# Patient Record
Sex: FEMALE | Race: WHITE | NOT HISPANIC OR LATINO
[De-identification: names, ages, dates, MRNs, and addresses within clinical notes are randomized per-mention and may not be internally consistent; named-entity substitution may affect disease eponyms.]

---

## 2017-03-16 PROBLEM — Z00.00 ENCOUNTER FOR PREVENTIVE HEALTH EXAMINATION: Status: ACTIVE | Noted: 2017-03-16

## 2017-03-24 ENCOUNTER — RECORD ABSTRACTING (OUTPATIENT)
Age: 82
End: 2017-03-24

## 2017-03-24 DIAGNOSIS — Z87.891 PERSONAL HISTORY OF NICOTINE DEPENDENCE: ICD-10-CM

## 2017-03-24 DIAGNOSIS — I25.10 ATHEROSCLEROTIC HEART DISEASE OF NATIVE CORONARY ARTERY W/OUT ANGINA PECTORIS: ICD-10-CM

## 2017-03-24 DIAGNOSIS — Z95.5 PRESENCE OF CORONARY ANGIOPLASTY IMPLANT AND GRAFT: ICD-10-CM

## 2017-04-14 ENCOUNTER — APPOINTMENT (OUTPATIENT)
Dept: VASCULAR SURGERY | Facility: CLINIC | Age: 82
End: 2017-04-14

## 2017-04-14 VITALS
SYSTOLIC BLOOD PRESSURE: 162 MMHG | HEIGHT: 61 IN | WEIGHT: 108 LBS | DIASTOLIC BLOOD PRESSURE: 100 MMHG | BODY MASS INDEX: 20.39 KG/M2

## 2017-04-14 DIAGNOSIS — I70.0 ATHEROSCLEROSIS OF AORTA: ICD-10-CM

## 2017-04-14 DIAGNOSIS — I73.9 PERIPHERAL VASCULAR DISEASE, UNSPECIFIED: ICD-10-CM

## 2017-04-14 DIAGNOSIS — I65.23 OCCLUSION AND STENOSIS OF BILATERAL CAROTID ARTERIES: ICD-10-CM

## 2017-04-14 DIAGNOSIS — I70.299 ATHEROSCLEROSIS OF AORTA: ICD-10-CM

## 2017-04-14 RX ORDER — RISEDRONATE SODIUM 129; 21 MG/1; MG/1
150 TABLET, FILM COATED ORAL
Refills: 0 | Status: ACTIVE | COMMUNITY

## 2017-04-14 RX ORDER — METOPROLOL TARTRATE 25 MG/1
25 TABLET, FILM COATED ORAL
Refills: 0 | Status: ACTIVE | COMMUNITY

## 2017-04-14 RX ORDER — LEVETIRACETAM 500 MG/1
500 TABLET, FILM COATED ORAL
Refills: 0 | Status: ACTIVE | COMMUNITY

## 2017-04-14 RX ORDER — ASPIRIN 81 MG
81 TABLET, DELAYED RELEASE (ENTERIC COATED) ORAL
Refills: 0 | Status: ACTIVE | COMMUNITY

## 2017-04-14 RX ORDER — CILOSTAZOL 100 MG/1
100 TABLET ORAL
Refills: 0 | Status: ACTIVE | COMMUNITY

## 2017-04-14 RX ORDER — BENAZEPRIL HYDROCHLORIDE 10 MG/1
10 TABLET, FILM COATED ORAL
Refills: 0 | Status: ACTIVE | COMMUNITY

## 2017-04-14 RX ORDER — LOPERAMIDE HYDROCHLORIDE 2 MG/1
2 TABLET ORAL
Refills: 0 | Status: ACTIVE | COMMUNITY

## 2017-04-14 RX ORDER — ATORVASTATIN CALCIUM 10 MG/1
10 TABLET, FILM COATED ORAL
Refills: 0 | Status: ACTIVE | COMMUNITY

## 2017-04-14 RX ORDER — LORATADINE 5 MG/5 ML
10 SOLUTION, ORAL ORAL
Refills: 0 | Status: ACTIVE | COMMUNITY

## 2017-04-14 RX ORDER — CHROMIUM 200 MCG
10 MCG TABLET ORAL
Refills: 0 | Status: ACTIVE | COMMUNITY

## 2017-04-14 RX ORDER — FELODIPINE 10 MG/1
10 TABLET, EXTENDED RELEASE ORAL
Refills: 0 | Status: ACTIVE | COMMUNITY

## 2017-04-14 RX ORDER — MECLIZINE HYDROCHLORIDE 25 MG/1
25 TABLET ORAL
Refills: 0 | Status: ACTIVE | COMMUNITY

## 2017-04-14 RX ORDER — CLOPIDOGREL 75 MG/1
75 TABLET, FILM COATED ORAL
Refills: 0 | Status: ACTIVE | COMMUNITY

## 2017-10-07 ENCOUNTER — OUTPATIENT (OUTPATIENT)
Dept: OUTPATIENT SERVICES | Facility: HOSPITAL | Age: 82
LOS: 1 days | Discharge: HOME | End: 2017-10-07

## 2017-10-07 DIAGNOSIS — Z12.31 ENCOUNTER FOR SCREENING MAMMOGRAM FOR MALIGNANT NEOPLASM OF BREAST: ICD-10-CM

## 2017-10-13 ENCOUNTER — APPOINTMENT (OUTPATIENT)
Dept: VASCULAR SURGERY | Facility: CLINIC | Age: 82
End: 2017-10-13

## 2017-10-27 ENCOUNTER — OUTPATIENT (OUTPATIENT)
Dept: OUTPATIENT SERVICES | Facility: HOSPITAL | Age: 82
LOS: 1 days | Discharge: HOME | End: 2017-10-27

## 2017-10-27 DIAGNOSIS — R92.8 OTHER ABNORMAL AND INCONCLUSIVE FINDINGS ON DIAGNOSTIC IMAGING OF BREAST: ICD-10-CM

## 2018-01-01 ENCOUNTER — TRANSCRIPTION ENCOUNTER (OUTPATIENT)
Age: 83
End: 2018-01-01

## 2018-01-01 ENCOUNTER — INPATIENT (INPATIENT)
Facility: HOSPITAL | Age: 83
LOS: 8 days | Discharge: HOME HEALTH | End: 2018-04-19
Attending: INTERNAL MEDICINE | Admitting: INTERNAL MEDICINE

## 2018-01-01 ENCOUNTER — INPATIENT (INPATIENT)
Facility: HOSPITAL | Age: 83
LOS: 3 days | Discharge: HOME | End: 2018-09-10
Attending: INTERNAL MEDICINE | Admitting: INTERNAL MEDICINE

## 2018-01-01 VITALS
SYSTOLIC BLOOD PRESSURE: 190 MMHG | OXYGEN SATURATION: 92 % | RESPIRATION RATE: 18 BRPM | HEART RATE: 90 BPM | DIASTOLIC BLOOD PRESSURE: 114 MMHG

## 2018-01-01 VITALS
SYSTOLIC BLOOD PRESSURE: 112 MMHG | OXYGEN SATURATION: 62 % | RESPIRATION RATE: 18 BRPM | HEART RATE: 88 BPM | TEMPERATURE: 97 F | DIASTOLIC BLOOD PRESSURE: 57 MMHG

## 2018-01-01 VITALS — TEMPERATURE: 99 F | SYSTOLIC BLOOD PRESSURE: 105 MMHG | DIASTOLIC BLOOD PRESSURE: 58 MMHG | HEART RATE: 82 BPM

## 2018-01-01 VITALS — OXYGEN SATURATION: 98 %

## 2018-01-01 DIAGNOSIS — J44.9 CHRONIC OBSTRUCTIVE PULMONARY DISEASE, UNSPECIFIED: ICD-10-CM

## 2018-01-01 DIAGNOSIS — E78.5 HYPERLIPIDEMIA, UNSPECIFIED: ICD-10-CM

## 2018-01-01 DIAGNOSIS — Z98.890 OTHER SPECIFIED POSTPROCEDURAL STATES: Chronic | ICD-10-CM

## 2018-01-01 DIAGNOSIS — I13.0 HYPERTENSIVE HEART AND CHRONIC KIDNEY DISEASE WITH HEART FAILURE AND STAGE 1 THROUGH STAGE 4 CHRONIC KIDNEY DISEASE, OR UNSPECIFIED CHRONIC KIDNEY DISEASE: ICD-10-CM

## 2018-01-01 DIAGNOSIS — R47.81 SLURRED SPEECH: ICD-10-CM

## 2018-01-01 DIAGNOSIS — J43.9 EMPHYSEMA, UNSPECIFIED: ICD-10-CM

## 2018-01-01 DIAGNOSIS — F03.90 UNSPECIFIED DEMENTIA WITHOUT BEHAVIORAL DISTURBANCE: ICD-10-CM

## 2018-01-01 DIAGNOSIS — G45.9 TRANSIENT CEREBRAL ISCHEMIC ATTACK, UNSPECIFIED: ICD-10-CM

## 2018-01-01 DIAGNOSIS — Z87.891 PERSONAL HISTORY OF NICOTINE DEPENDENCE: ICD-10-CM

## 2018-01-01 DIAGNOSIS — R06.02 SHORTNESS OF BREATH: ICD-10-CM

## 2018-01-01 DIAGNOSIS — G40.909 EPILEPSY, UNSPECIFIED, NOT INTRACTABLE, WITHOUT STATUS EPILEPTICUS: ICD-10-CM

## 2018-01-01 DIAGNOSIS — Z95.828 PRESENCE OF OTHER VASCULAR IMPLANTS AND GRAFTS: Chronic | ICD-10-CM

## 2018-01-01 DIAGNOSIS — J90 PLEURAL EFFUSION, NOT ELSEWHERE CLASSIFIED: ICD-10-CM

## 2018-01-01 DIAGNOSIS — Z86.73 PERSONAL HISTORY OF TRANSIENT ISCHEMIC ATTACK (TIA), AND CEREBRAL INFARCTION WITHOUT RESIDUAL DEFICITS: ICD-10-CM

## 2018-01-01 DIAGNOSIS — R91.1 SOLITARY PULMONARY NODULE: ICD-10-CM

## 2018-01-01 DIAGNOSIS — I73.9 PERIPHERAL VASCULAR DISEASE, UNSPECIFIED: ICD-10-CM

## 2018-01-01 DIAGNOSIS — J96.01 ACUTE RESPIRATORY FAILURE WITH HYPOXIA: ICD-10-CM

## 2018-01-01 DIAGNOSIS — N18.3 CHRONIC KIDNEY DISEASE, STAGE 3 (MODERATE): ICD-10-CM

## 2018-01-01 DIAGNOSIS — I65.21 OCCLUSION AND STENOSIS OF RIGHT CAROTID ARTERY: ICD-10-CM

## 2018-01-01 DIAGNOSIS — I27.20 PULMONARY HYPERTENSION, UNSPECIFIED: ICD-10-CM

## 2018-01-01 DIAGNOSIS — I08.3 COMBINED RHEUMATIC DISORDERS OF MITRAL, AORTIC AND TRICUSPID VALVES: ICD-10-CM

## 2018-01-01 DIAGNOSIS — R09.02 HYPOXEMIA: ICD-10-CM

## 2018-01-01 DIAGNOSIS — M19.90 UNSPECIFIED OSTEOARTHRITIS, UNSPECIFIED SITE: ICD-10-CM

## 2018-01-01 DIAGNOSIS — N18.1 CHRONIC KIDNEY DISEASE, STAGE 1: ICD-10-CM

## 2018-01-01 DIAGNOSIS — I50.31 ACUTE DIASTOLIC (CONGESTIVE) HEART FAILURE: ICD-10-CM

## 2018-01-01 DIAGNOSIS — I12.9 HYPERTENSIVE CHRONIC KIDNEY DISEASE WITH STAGE 1 THROUGH STAGE 4 CHRONIC KIDNEY DISEASE, OR UNSPECIFIED CHRONIC KIDNEY DISEASE: ICD-10-CM

## 2018-01-01 LAB
ALBUMIN SERPL ELPH-MCNC: 4.1 G/DL — SIGNIFICANT CHANGE UP (ref 3.5–5.2)
ALBUMIN SERPL ELPH-MCNC: 4.2 G/DL — SIGNIFICANT CHANGE UP (ref 3.5–5.2)
ALBUMIN SERPL ELPH-MCNC: 4.7 G/DL — SIGNIFICANT CHANGE UP (ref 3.5–5.2)
ALP SERPL-CCNC: 56 U/L — SIGNIFICANT CHANGE UP (ref 30–115)
ALP SERPL-CCNC: 59 U/L — SIGNIFICANT CHANGE UP (ref 30–115)
ALP SERPL-CCNC: 86 U/L — SIGNIFICANT CHANGE UP (ref 30–115)
ALT FLD-CCNC: 15 U/L — SIGNIFICANT CHANGE UP (ref 0–41)
ALT FLD-CCNC: 174 U/L — HIGH (ref 0–41)
ALT FLD-CCNC: 19 U/L — SIGNIFICANT CHANGE UP (ref 0–41)
ANION GAP SERPL CALC-SCNC: 10 MMOL/L — SIGNIFICANT CHANGE UP (ref 7–14)
ANION GAP SERPL CALC-SCNC: 11 MMOL/L — SIGNIFICANT CHANGE UP (ref 7–14)
ANION GAP SERPL CALC-SCNC: 12 MMOL/L — SIGNIFICANT CHANGE UP (ref 7–14)
ANION GAP SERPL CALC-SCNC: 12 MMOL/L — SIGNIFICANT CHANGE UP (ref 7–14)
ANION GAP SERPL CALC-SCNC: 14 MMOL/L — SIGNIFICANT CHANGE UP (ref 7–14)
ANION GAP SERPL CALC-SCNC: 16 MMOL/L — HIGH (ref 7–14)
ANION GAP SERPL CALC-SCNC: 19 MMOL/L — HIGH (ref 7–14)
ANION GAP SERPL CALC-SCNC: 9 MMOL/L — SIGNIFICANT CHANGE UP (ref 7–14)
APTT BLD: 24.5 SEC — LOW (ref 27–39.2)
AST SERPL-CCNC: 25 U/L — SIGNIFICANT CHANGE UP (ref 0–41)
AST SERPL-CCNC: 48 U/L — HIGH (ref 0–41)
AST SERPL-CCNC: 72 U/L — HIGH (ref 0–41)
BASE EXCESS BLDA CALC-SCNC: 2.8 MMOL/L — HIGH (ref -2–2)
BASE EXCESS BLDA CALC-SCNC: 2.9 MMOL/L — HIGH (ref -2–2)
BASE EXCESS BLDV CALC-SCNC: 1.3 MMOL/L — SIGNIFICANT CHANGE UP (ref -2–2)
BASOPHILS # BLD AUTO: 0.04 K/UL — SIGNIFICANT CHANGE UP (ref 0–0.2)
BASOPHILS # BLD AUTO: 0.05 K/UL — SIGNIFICANT CHANGE UP (ref 0–0.2)
BASOPHILS # BLD AUTO: 0.05 K/UL — SIGNIFICANT CHANGE UP (ref 0–0.2)
BASOPHILS NFR BLD AUTO: 0.5 % — SIGNIFICANT CHANGE UP (ref 0–1)
BASOPHILS NFR BLD AUTO: 0.7 % — SIGNIFICANT CHANGE UP (ref 0–1)
BASOPHILS NFR BLD AUTO: 0.8 % — SIGNIFICANT CHANGE UP (ref 0–1)
BILIRUB SERPL-MCNC: 0.3 MG/DL — SIGNIFICANT CHANGE UP (ref 0.2–1.2)
BILIRUB SERPL-MCNC: 0.3 MG/DL — SIGNIFICANT CHANGE UP (ref 0.2–1.2)
BILIRUB SERPL-MCNC: 0.5 MG/DL — SIGNIFICANT CHANGE UP (ref 0.2–1.2)
BUN SERPL-MCNC: 21 MG/DL — HIGH (ref 10–20)
BUN SERPL-MCNC: 24 MG/DL — HIGH (ref 10–20)
BUN SERPL-MCNC: 27 MG/DL — HIGH (ref 10–20)
BUN SERPL-MCNC: 30 MG/DL — HIGH (ref 10–20)
BUN SERPL-MCNC: 32 MG/DL — HIGH (ref 10–20)
BUN SERPL-MCNC: 33 MG/DL — HIGH (ref 10–20)
BUN SERPL-MCNC: 37 MG/DL — HIGH (ref 10–20)
BUN SERPL-MCNC: 37 MG/DL — HIGH (ref 10–20)
CA-I SERPL-SCNC: 1.26 MMOL/L — SIGNIFICANT CHANGE UP (ref 1.12–1.3)
CALCIUM SERPL-MCNC: 8.7 MG/DL — SIGNIFICANT CHANGE UP (ref 8.5–10.1)
CALCIUM SERPL-MCNC: 8.7 MG/DL — SIGNIFICANT CHANGE UP (ref 8.5–10.1)
CALCIUM SERPL-MCNC: 8.8 MG/DL — SIGNIFICANT CHANGE UP (ref 8.5–10.1)
CALCIUM SERPL-MCNC: 9 MG/DL — SIGNIFICANT CHANGE UP (ref 8.5–10.1)
CALCIUM SERPL-MCNC: 9.1 MG/DL — SIGNIFICANT CHANGE UP (ref 8.5–10.1)
CALCIUM SERPL-MCNC: 9.1 MG/DL — SIGNIFICANT CHANGE UP (ref 8.5–10.1)
CALCIUM SERPL-MCNC: 9.3 MG/DL — SIGNIFICANT CHANGE UP (ref 8.5–10.1)
CALCIUM SERPL-MCNC: 9.6 MG/DL — SIGNIFICANT CHANGE UP (ref 8.5–10.1)
CALCIUM SERPL-MCNC: 9.6 MG/DL — SIGNIFICANT CHANGE UP (ref 8.5–10.1)
CALCIUM SERPL-MCNC: 9.9 MG/DL — SIGNIFICANT CHANGE UP (ref 8.5–10.1)
CHLORIDE SERPL-SCNC: 100 MMOL/L — SIGNIFICANT CHANGE UP (ref 98–110)
CHLORIDE SERPL-SCNC: 102 MMOL/L — SIGNIFICANT CHANGE UP (ref 98–110)
CHLORIDE SERPL-SCNC: 103 MMOL/L — SIGNIFICANT CHANGE UP (ref 98–110)
CHLORIDE SERPL-SCNC: 103 MMOL/L — SIGNIFICANT CHANGE UP (ref 98–110)
CHLORIDE SERPL-SCNC: 105 MMOL/L — SIGNIFICANT CHANGE UP (ref 98–110)
CHLORIDE SERPL-SCNC: 105 MMOL/L — SIGNIFICANT CHANGE UP (ref 98–110)
CHLORIDE SERPL-SCNC: 97 MMOL/L — LOW (ref 98–110)
CHLORIDE SERPL-SCNC: 98 MMOL/L — SIGNIFICANT CHANGE UP (ref 98–110)
CHLORIDE SERPL-SCNC: 99 MMOL/L — SIGNIFICANT CHANGE UP (ref 98–110)
CHLORIDE SERPL-SCNC: 99 MMOL/L — SIGNIFICANT CHANGE UP (ref 98–110)
CHOLEST SERPL-MCNC: 209 MG/DL — HIGH (ref 100–200)
CK SERPL-CCNC: 100 U/L — SIGNIFICANT CHANGE UP (ref 0–225)
CO2 SERPL-SCNC: 24 MMOL/L — SIGNIFICANT CHANGE UP (ref 17–32)
CO2 SERPL-SCNC: 29 MMOL/L — SIGNIFICANT CHANGE UP (ref 17–32)
CO2 SERPL-SCNC: 29 MMOL/L — SIGNIFICANT CHANGE UP (ref 17–32)
CO2 SERPL-SCNC: 30 MMOL/L — SIGNIFICANT CHANGE UP (ref 17–32)
CO2 SERPL-SCNC: 31 MMOL/L — SIGNIFICANT CHANGE UP (ref 17–32)
CO2 SERPL-SCNC: 31 MMOL/L — SIGNIFICANT CHANGE UP (ref 17–32)
CO2 SERPL-SCNC: 32 MMOL/L — SIGNIFICANT CHANGE UP (ref 17–32)
CO2 SERPL-SCNC: 34 MMOL/L — HIGH (ref 17–32)
CO2 SERPL-SCNC: 34 MMOL/L — HIGH (ref 17–32)
CO2 SERPL-SCNC: 35 MMOL/L — HIGH (ref 17–32)
CREAT SERPL-MCNC: 1.1 MG/DL — SIGNIFICANT CHANGE UP (ref 0.7–1.5)
CREAT SERPL-MCNC: 1.2 MG/DL — SIGNIFICANT CHANGE UP (ref 0.7–1.5)
CREAT SERPL-MCNC: 1.3 MG/DL — SIGNIFICANT CHANGE UP (ref 0.7–1.5)
CREAT SERPL-MCNC: 1.4 MG/DL — SIGNIFICANT CHANGE UP (ref 0.7–1.5)
EOSINOPHIL # BLD AUTO: 0.19 K/UL — SIGNIFICANT CHANGE UP (ref 0–0.7)
EOSINOPHIL # BLD AUTO: 0.19 K/UL — SIGNIFICANT CHANGE UP (ref 0–0.7)
EOSINOPHIL # BLD AUTO: 0.34 K/UL — SIGNIFICANT CHANGE UP (ref 0–0.7)
EOSINOPHIL NFR BLD AUTO: 2.7 % — SIGNIFICANT CHANGE UP (ref 0–8)
EOSINOPHIL NFR BLD AUTO: 3.6 % — SIGNIFICANT CHANGE UP (ref 0–8)
EOSINOPHIL NFR BLD AUTO: 3.7 % — SIGNIFICANT CHANGE UP (ref 0–8)
ESTIMATED AVERAGE GLUCOSE: 114 MG/DL — SIGNIFICANT CHANGE UP (ref 68–114)
GAS PNL BLDV: 146 MMOL/L — HIGH (ref 136–145)
GAS PNL BLDV: SIGNIFICANT CHANGE UP
GLUCOSE BLDC GLUCOMTR-MCNC: 100 MG/DL — HIGH (ref 70–99)
GLUCOSE BLDC GLUCOMTR-MCNC: 102 MG/DL — HIGH (ref 70–99)
GLUCOSE BLDC GLUCOMTR-MCNC: 112 MG/DL — HIGH (ref 70–99)
GLUCOSE BLDC GLUCOMTR-MCNC: 154 MG/DL — HIGH (ref 70–99)
GLUCOSE BLDC GLUCOMTR-MCNC: 80 MG/DL — SIGNIFICANT CHANGE UP (ref 70–99)
GLUCOSE BLDC GLUCOMTR-MCNC: 98 MG/DL — SIGNIFICANT CHANGE UP (ref 70–99)
GLUCOSE SERPL-MCNC: 104 MG/DL — HIGH (ref 70–99)
GLUCOSE SERPL-MCNC: 111 MG/DL — HIGH (ref 70–99)
GLUCOSE SERPL-MCNC: 114 MG/DL — HIGH (ref 70–99)
GLUCOSE SERPL-MCNC: 127 MG/DL — HIGH (ref 70–99)
GLUCOSE SERPL-MCNC: 76 MG/DL — SIGNIFICANT CHANGE UP (ref 70–99)
GLUCOSE SERPL-MCNC: 80 MG/DL — SIGNIFICANT CHANGE UP (ref 70–99)
GLUCOSE SERPL-MCNC: 86 MG/DL — SIGNIFICANT CHANGE UP (ref 70–99)
GLUCOSE SERPL-MCNC: 87 MG/DL — SIGNIFICANT CHANGE UP (ref 70–99)
GLUCOSE SERPL-MCNC: 89 MG/DL — SIGNIFICANT CHANGE UP (ref 70–99)
GLUCOSE SERPL-MCNC: 92 MG/DL — SIGNIFICANT CHANGE UP (ref 70–99)
HBA1C BLD-MCNC: 5.6 % — SIGNIFICANT CHANGE UP (ref 4–5.6)
HCO3 BLDA-SCNC: 29 MMOL/L — HIGH (ref 23–27)
HCO3 BLDA-SCNC: 29 MMOL/L — HIGH (ref 23–27)
HCO3 BLDV-SCNC: 28 MMOL/L — SIGNIFICANT CHANGE UP (ref 22–29)
HCT VFR BLD CALC: 35.1 % — LOW (ref 37–47)
HCT VFR BLD CALC: 36.6 % — LOW (ref 37–47)
HCT VFR BLD CALC: 36.9 % — LOW (ref 37–47)
HCT VFR BLD CALC: 37.7 % — SIGNIFICANT CHANGE UP (ref 37–47)
HCT VFR BLD CALC: 38.7 % — SIGNIFICANT CHANGE UP (ref 37–47)
HCT VFR BLD CALC: 38.9 % — SIGNIFICANT CHANGE UP (ref 37–47)
HCT VFR BLD CALC: 40.2 % — SIGNIFICANT CHANGE UP (ref 37–47)
HCT VFR BLD CALC: 42.3 % — SIGNIFICANT CHANGE UP (ref 37–47)
HCT VFR BLD CALC: 42.3 % — SIGNIFICANT CHANGE UP (ref 37–47)
HCT VFR BLD CALC: 42.8 % — SIGNIFICANT CHANGE UP (ref 37–47)
HCT VFR BLDA CALC: 39.5 % — SIGNIFICANT CHANGE UP (ref 34–44)
HDLC SERPL-MCNC: 106 MG/DL — SIGNIFICANT CHANGE UP
HGB BLD CALC-MCNC: 12.9 G/DL — LOW (ref 14–18)
HGB BLD-MCNC: 11.1 G/DL — LOW (ref 12–16)
HGB BLD-MCNC: 11.4 G/DL — LOW (ref 12–16)
HGB BLD-MCNC: 11.9 G/DL — LOW (ref 12–16)
HGB BLD-MCNC: 12 G/DL — SIGNIFICANT CHANGE UP (ref 12–16)
HGB BLD-MCNC: 12.2 G/DL — SIGNIFICANT CHANGE UP (ref 12–16)
HGB BLD-MCNC: 12.2 G/DL — SIGNIFICANT CHANGE UP (ref 12–16)
HGB BLD-MCNC: 12.4 G/DL — SIGNIFICANT CHANGE UP (ref 12–16)
HGB BLD-MCNC: 13 G/DL — SIGNIFICANT CHANGE UP (ref 12–16)
HGB BLD-MCNC: 13.3 G/DL — SIGNIFICANT CHANGE UP (ref 12–16)
HGB BLD-MCNC: 13.6 G/DL — SIGNIFICANT CHANGE UP (ref 12–16)
HOROWITZ INDEX BLDA+IHG-RTO: 50 — SIGNIFICANT CHANGE UP
IMM GRANULOCYTES NFR BLD AUTO: 0.4 % — HIGH (ref 0.1–0.3)
IMM GRANULOCYTES NFR BLD AUTO: 0.4 % — HIGH (ref 0.1–0.3)
IMM GRANULOCYTES NFR BLD AUTO: 0.7 % — HIGH (ref 0.1–0.3)
INR BLD: 0.99 RATIO — SIGNIFICANT CHANGE UP (ref 0.65–1.3)
LACTATE BLDV-MCNC: 1.1 MMOL/L — SIGNIFICANT CHANGE UP (ref 0.5–1.6)
LIPID PNL WITH DIRECT LDL SERPL: 89 MG/DL — SIGNIFICANT CHANGE UP (ref 4–129)
LYMPHOCYTES # BLD AUTO: 0.81 K/UL — LOW (ref 1.2–3.4)
LYMPHOCYTES # BLD AUTO: 0.96 K/UL — LOW (ref 1.2–3.4)
LYMPHOCYTES # BLD AUTO: 0.98 K/UL — LOW (ref 1.2–3.4)
LYMPHOCYTES # BLD AUTO: 14 % — LOW (ref 20.5–51.1)
LYMPHOCYTES # BLD AUTO: 18.9 % — LOW (ref 20.5–51.1)
LYMPHOCYTES # BLD AUTO: 8.5 % — LOW (ref 20.5–51.1)
MAGNESIUM SERPL-MCNC: 1.9 MG/DL — SIGNIFICANT CHANGE UP (ref 1.8–2.4)
MCHC RBC-ENTMCNC: 28.3 PG — SIGNIFICANT CHANGE UP (ref 27–31)
MCHC RBC-ENTMCNC: 28.8 PG — SIGNIFICANT CHANGE UP (ref 27–31)
MCHC RBC-ENTMCNC: 28.9 PG — SIGNIFICANT CHANGE UP (ref 27–31)
MCHC RBC-ENTMCNC: 29 PG — SIGNIFICANT CHANGE UP (ref 27–31)
MCHC RBC-ENTMCNC: 29 PG — SIGNIFICANT CHANGE UP (ref 27–31)
MCHC RBC-ENTMCNC: 29.2 PG — SIGNIFICANT CHANGE UP (ref 27–31)
MCHC RBC-ENTMCNC: 29.3 PG — SIGNIFICANT CHANGE UP (ref 27–31)
MCHC RBC-ENTMCNC: 29.4 PG — SIGNIFICANT CHANGE UP (ref 27–31)
MCHC RBC-ENTMCNC: 29.6 PG — SIGNIFICANT CHANGE UP (ref 27–31)
MCHC RBC-ENTMCNC: 29.8 PG — SIGNIFICANT CHANGE UP (ref 27–31)
MCHC RBC-ENTMCNC: 30.7 G/DL — LOW (ref 32–37)
MCHC RBC-ENTMCNC: 30.8 G/DL — LOW (ref 32–37)
MCHC RBC-ENTMCNC: 31.1 G/DL — LOW (ref 32–37)
MCHC RBC-ENTMCNC: 31.4 G/DL — LOW (ref 32–37)
MCHC RBC-ENTMCNC: 31.4 G/DL — LOW (ref 32–37)
MCHC RBC-ENTMCNC: 31.5 G/DL — LOW (ref 32–37)
MCHC RBC-ENTMCNC: 31.6 G/DL — LOW (ref 32–37)
MCHC RBC-ENTMCNC: 31.8 G/DL — LOW (ref 32–37)
MCHC RBC-ENTMCNC: 31.8 G/DL — LOW (ref 32–37)
MCHC RBC-ENTMCNC: 32.2 G/DL — SIGNIFICANT CHANGE UP (ref 32–37)
MCV RBC AUTO: 89.8 FL — SIGNIFICANT CHANGE UP (ref 81–99)
MCV RBC AUTO: 90.8 FL — SIGNIFICANT CHANGE UP (ref 81–99)
MCV RBC AUTO: 91.4 FL — SIGNIFICANT CHANGE UP (ref 81–99)
MCV RBC AUTO: 92.2 FL — SIGNIFICANT CHANGE UP (ref 81–99)
MCV RBC AUTO: 92.4 FL — SIGNIFICANT CHANGE UP (ref 81–99)
MCV RBC AUTO: 93.1 FL — SIGNIFICANT CHANGE UP (ref 81–99)
MCV RBC AUTO: 93.5 FL — SIGNIFICANT CHANGE UP (ref 81–99)
MCV RBC AUTO: 93.8 FL — SIGNIFICANT CHANGE UP (ref 81–99)
MCV RBC AUTO: 93.9 FL — SIGNIFICANT CHANGE UP (ref 81–99)
MCV RBC AUTO: 95.3 FL — SIGNIFICANT CHANGE UP (ref 81–99)
MONOCYTES # BLD AUTO: 0.58 K/UL — SIGNIFICANT CHANGE UP (ref 0.1–0.6)
MONOCYTES # BLD AUTO: 0.79 K/UL — HIGH (ref 0.1–0.6)
MONOCYTES # BLD AUTO: 0.88 K/UL — HIGH (ref 0.1–0.6)
MONOCYTES NFR BLD AUTO: 11.3 % — HIGH (ref 1.7–9.3)
MONOCYTES NFR BLD AUTO: 11.4 % — HIGH (ref 1.7–9.3)
MONOCYTES NFR BLD AUTO: 9.2 % — SIGNIFICANT CHANGE UP (ref 1.7–9.3)
NEUTROPHILS # BLD AUTO: 3.3 K/UL — SIGNIFICANT CHANGE UP (ref 1.4–6.5)
NEUTROPHILS # BLD AUTO: 4.97 K/UL — SIGNIFICANT CHANGE UP (ref 1.4–6.5)
NEUTROPHILS # BLD AUTO: 7.4 K/UL — HIGH (ref 1.4–6.5)
NEUTROPHILS NFR BLD AUTO: 64.8 % — SIGNIFICANT CHANGE UP (ref 42.2–75.2)
NEUTROPHILS NFR BLD AUTO: 70.9 % — SIGNIFICANT CHANGE UP (ref 42.2–75.2)
NEUTROPHILS NFR BLD AUTO: 77.5 % — HIGH (ref 42.2–75.2)
NRBC # BLD: 0 /100 WBCS — SIGNIFICANT CHANGE UP (ref 0–0)
NT-PROBNP SERPL-SCNC: 4869 PG/ML — HIGH (ref 0–300)
NT-PROBNP SERPL-SCNC: 9750 PG/ML — HIGH (ref 0–300)
PCO2 BLDA: 50 MMHG — HIGH (ref 38–42)
PCO2 BLDA: 52 MMHG — HIGH (ref 38–42)
PCO2 BLDV: 52 MMHG — HIGH (ref 41–51)
PH BLDA: 7.36 — LOW (ref 7.38–7.42)
PH BLDA: 7.37 — LOW (ref 7.38–7.42)
PH BLDV: 7.34 — SIGNIFICANT CHANGE UP (ref 7.26–7.43)
PLATELET # BLD AUTO: 202 K/UL — SIGNIFICANT CHANGE UP (ref 130–400)
PLATELET # BLD AUTO: 229 K/UL — SIGNIFICANT CHANGE UP (ref 130–400)
PLATELET # BLD AUTO: 231 K/UL — SIGNIFICANT CHANGE UP (ref 130–400)
PLATELET # BLD AUTO: 238 K/UL — SIGNIFICANT CHANGE UP (ref 130–400)
PLATELET # BLD AUTO: 244 K/UL — SIGNIFICANT CHANGE UP (ref 130–400)
PLATELET # BLD AUTO: 261 K/UL — SIGNIFICANT CHANGE UP (ref 130–400)
PLATELET # BLD AUTO: 274 K/UL — SIGNIFICANT CHANGE UP (ref 130–400)
PLATELET # BLD AUTO: 274 K/UL — SIGNIFICANT CHANGE UP (ref 130–400)
PLATELET # BLD AUTO: 303 K/UL — SIGNIFICANT CHANGE UP (ref 130–400)
PLATELET # BLD AUTO: 328 K/UL — SIGNIFICANT CHANGE UP (ref 130–400)
PO2 BLDA: 61 MMHG — LOW (ref 78–95)
PO2 BLDA: 70 MMHG — LOW (ref 78–95)
PO2 BLDV: 36 MMHG — SIGNIFICANT CHANGE UP (ref 20–40)
POTASSIUM BLDV-SCNC: 4.5 MMOL/L — SIGNIFICANT CHANGE UP (ref 3.3–5.6)
POTASSIUM SERPL-MCNC: 4.3 MMOL/L — SIGNIFICANT CHANGE UP (ref 3.5–5)
POTASSIUM SERPL-MCNC: 4.3 MMOL/L — SIGNIFICANT CHANGE UP (ref 3.5–5)
POTASSIUM SERPL-MCNC: 4.4 MMOL/L — SIGNIFICANT CHANGE UP (ref 3.5–5)
POTASSIUM SERPL-MCNC: 4.4 MMOL/L — SIGNIFICANT CHANGE UP (ref 3.5–5)
POTASSIUM SERPL-MCNC: 4.5 MMOL/L — SIGNIFICANT CHANGE UP (ref 3.5–5)
POTASSIUM SERPL-MCNC: 4.6 MMOL/L — SIGNIFICANT CHANGE UP (ref 3.5–5)
POTASSIUM SERPL-MCNC: 4.7 MMOL/L — SIGNIFICANT CHANGE UP (ref 3.5–5)
POTASSIUM SERPL-MCNC: 4.9 MMOL/L — SIGNIFICANT CHANGE UP (ref 3.5–5)
POTASSIUM SERPL-MCNC: 5 MMOL/L — SIGNIFICANT CHANGE UP (ref 3.5–5)
POTASSIUM SERPL-MCNC: 7 MMOL/L — CRITICAL HIGH (ref 3.5–5)
POTASSIUM SERPL-SCNC: 4.3 MMOL/L — SIGNIFICANT CHANGE UP (ref 3.5–5)
POTASSIUM SERPL-SCNC: 4.3 MMOL/L — SIGNIFICANT CHANGE UP (ref 3.5–5)
POTASSIUM SERPL-SCNC: 4.4 MMOL/L — SIGNIFICANT CHANGE UP (ref 3.5–5)
POTASSIUM SERPL-SCNC: 4.4 MMOL/L — SIGNIFICANT CHANGE UP (ref 3.5–5)
POTASSIUM SERPL-SCNC: 4.5 MMOL/L — SIGNIFICANT CHANGE UP (ref 3.5–5)
POTASSIUM SERPL-SCNC: 4.6 MMOL/L — SIGNIFICANT CHANGE UP (ref 3.5–5)
POTASSIUM SERPL-SCNC: 4.7 MMOL/L — SIGNIFICANT CHANGE UP (ref 3.5–5)
POTASSIUM SERPL-SCNC: 4.9 MMOL/L — SIGNIFICANT CHANGE UP (ref 3.5–5)
POTASSIUM SERPL-SCNC: 5 MMOL/L — SIGNIFICANT CHANGE UP (ref 3.5–5)
POTASSIUM SERPL-SCNC: 7 MMOL/L — CRITICAL HIGH (ref 3.5–5)
PROT SERPL-MCNC: 6 G/DL — SIGNIFICANT CHANGE UP (ref 6–8)
PROT SERPL-MCNC: 6.3 G/DL — SIGNIFICANT CHANGE UP (ref 6–8)
PROT SERPL-MCNC: 7.2 G/DL — SIGNIFICANT CHANGE UP (ref 6–8)
PROTHROM AB SERPL-ACNC: 10.7 SEC — SIGNIFICANT CHANGE UP (ref 9.95–12.87)
RBC # BLD: 3.84 M/UL — LOW (ref 4.2–5.4)
RBC # BLD: 4.03 M/UL — LOW (ref 4.2–5.4)
RBC # BLD: 4.03 M/UL — LOW (ref 4.2–5.4)
RBC # BLD: 4.11 M/UL — LOW (ref 4.2–5.4)
RBC # BLD: 4.12 M/UL — LOW (ref 4.2–5.4)
RBC # BLD: 4.18 M/UL — LOW (ref 4.2–5.4)
RBC # BLD: 4.22 M/UL — SIGNIFICANT CHANGE UP (ref 4.2–5.4)
RBC # BLD: 4.51 M/UL — SIGNIFICANT CHANGE UP (ref 4.2–5.4)
RBC # BLD: 4.58 M/UL — SIGNIFICANT CHANGE UP (ref 4.2–5.4)
RBC # BLD: 4.64 M/UL — SIGNIFICANT CHANGE UP (ref 4.2–5.4)
RBC # FLD: 12.7 % — SIGNIFICANT CHANGE UP (ref 11.5–14.5)
RBC # FLD: 12.8 % — SIGNIFICANT CHANGE UP (ref 11.5–14.5)
RBC # FLD: 12.9 % — SIGNIFICANT CHANGE UP (ref 11.5–14.5)
RBC # FLD: 13.3 % — SIGNIFICANT CHANGE UP (ref 11.5–14.5)
RBC # FLD: 13.3 % — SIGNIFICANT CHANGE UP (ref 11.5–14.5)
RBC # FLD: 13.4 % — SIGNIFICANT CHANGE UP (ref 11.5–14.5)
RBC # FLD: 13.4 % — SIGNIFICANT CHANGE UP (ref 11.5–14.5)
RBC # FLD: 13.8 % — SIGNIFICANT CHANGE UP (ref 11.5–14.5)
RBC # FLD: 13.9 % — SIGNIFICANT CHANGE UP (ref 11.5–14.5)
RBC # FLD: 14.3 % — SIGNIFICANT CHANGE UP (ref 11.5–14.5)
SAO2 % BLDA: 89 % — LOW (ref 94–98)
SAO2 % BLDA: 92 % — LOW (ref 94–98)
SAO2 % BLDV: 61 % — SIGNIFICANT CHANGE UP
SODIUM SERPL-SCNC: 141 MMOL/L — SIGNIFICANT CHANGE UP (ref 135–146)
SODIUM SERPL-SCNC: 143 MMOL/L — SIGNIFICANT CHANGE UP (ref 135–146)
SODIUM SERPL-SCNC: 144 MMOL/L — SIGNIFICANT CHANGE UP (ref 135–146)
SODIUM SERPL-SCNC: 145 MMOL/L — SIGNIFICANT CHANGE UP (ref 135–146)
SODIUM SERPL-SCNC: 146 MMOL/L — SIGNIFICANT CHANGE UP (ref 135–146)
TOTAL CHOLESTEROL/HDL RATIO MEASUREMENT: 2 RATIO — LOW (ref 4–5.5)
TRIGL SERPL-MCNC: 86 MG/DL — SIGNIFICANT CHANGE UP (ref 10–149)
TROPONIN T SERPL-MCNC: 0.02 NG/ML — HIGH
TROPONIN T SERPL-MCNC: <0.01 NG/ML — SIGNIFICANT CHANGE UP
WBC # BLD: 5.09 K/UL — SIGNIFICANT CHANGE UP (ref 4.8–10.8)
WBC # BLD: 7.01 K/UL — SIGNIFICANT CHANGE UP (ref 4.8–10.8)
WBC # BLD: 7.3 K/UL — SIGNIFICANT CHANGE UP (ref 4.8–10.8)
WBC # BLD: 7.74 K/UL — SIGNIFICANT CHANGE UP (ref 4.8–10.8)
WBC # BLD: 7.74 K/UL — SIGNIFICANT CHANGE UP (ref 4.8–10.8)
WBC # BLD: 8.19 K/UL — SIGNIFICANT CHANGE UP (ref 4.8–10.8)
WBC # BLD: 8.43 K/UL — SIGNIFICANT CHANGE UP (ref 4.8–10.8)
WBC # BLD: 8.62 K/UL — SIGNIFICANT CHANGE UP (ref 4.8–10.8)
WBC # BLD: 9.55 K/UL — SIGNIFICANT CHANGE UP (ref 4.8–10.8)
WBC # BLD: 9.64 K/UL — SIGNIFICANT CHANGE UP (ref 4.8–10.8)
WBC # FLD AUTO: 5.09 K/UL — SIGNIFICANT CHANGE UP (ref 4.8–10.8)
WBC # FLD AUTO: 7.01 K/UL — SIGNIFICANT CHANGE UP (ref 4.8–10.8)
WBC # FLD AUTO: 7.3 K/UL — SIGNIFICANT CHANGE UP (ref 4.8–10.8)
WBC # FLD AUTO: 7.74 K/UL — SIGNIFICANT CHANGE UP (ref 4.8–10.8)
WBC # FLD AUTO: 7.74 K/UL — SIGNIFICANT CHANGE UP (ref 4.8–10.8)
WBC # FLD AUTO: 8.19 K/UL — SIGNIFICANT CHANGE UP (ref 4.8–10.8)
WBC # FLD AUTO: 8.43 K/UL — SIGNIFICANT CHANGE UP (ref 4.8–10.8)
WBC # FLD AUTO: 8.62 K/UL — SIGNIFICANT CHANGE UP (ref 4.8–10.8)
WBC # FLD AUTO: 9.55 K/UL — SIGNIFICANT CHANGE UP (ref 4.8–10.8)
WBC # FLD AUTO: 9.64 K/UL — SIGNIFICANT CHANGE UP (ref 4.8–10.8)

## 2018-01-01 RX ORDER — DONEPEZIL HYDROCHLORIDE 10 MG/1
5 TABLET, FILM COATED ORAL AT BEDTIME
Qty: 0 | Refills: 0 | Status: DISCONTINUED | OUTPATIENT
Start: 2018-01-01 | End: 2018-01-01

## 2018-01-01 RX ORDER — BUDESONIDE AND FORMOTEROL FUMARATE DIHYDRATE 160; 4.5 UG/1; UG/1
2 AEROSOL RESPIRATORY (INHALATION)
Qty: 0 | Refills: 0 | Status: DISCONTINUED | OUTPATIENT
Start: 2018-01-01 | End: 2018-01-01

## 2018-01-01 RX ORDER — CHOLECALCIFEROL (VITAMIN D3) 125 MCG
1000 CAPSULE ORAL DAILY
Qty: 0 | Refills: 0 | Status: DISCONTINUED | OUTPATIENT
Start: 2018-01-01 | End: 2018-01-01

## 2018-01-01 RX ORDER — CLOPIDOGREL BISULFATE 75 MG/1
1 TABLET, FILM COATED ORAL
Qty: 30 | Refills: 0 | OUTPATIENT
Start: 2018-01-01 | End: 2018-01-01

## 2018-01-01 RX ORDER — HEPARIN SODIUM 5000 [USP'U]/ML
5000 INJECTION INTRAVENOUS; SUBCUTANEOUS EVERY 12 HOURS
Qty: 0 | Refills: 0 | Status: DISCONTINUED | OUTPATIENT
Start: 2018-01-01 | End: 2018-01-01

## 2018-01-01 RX ORDER — ASPIRIN/CALCIUM CARB/MAGNESIUM 324 MG
81 TABLET ORAL
Qty: 0 | Refills: 0 | Status: DISCONTINUED | OUTPATIENT
Start: 2018-01-01 | End: 2018-01-01

## 2018-01-01 RX ORDER — RISEDRONATE SODIUM 25.8; 4.2 MG/1; MG/1
1 TABLET, FILM COATED ORAL
Qty: 1 | Refills: 0 | OUTPATIENT
Start: 2018-01-01 | End: 2018-01-01

## 2018-01-01 RX ORDER — ALBUTEROL 90 UG/1
3 AEROSOL, METERED ORAL
Qty: 0 | Refills: 0 | COMMUNITY

## 2018-01-01 RX ORDER — SODIUM CHLORIDE 9 MG/ML
500 INJECTION INTRAMUSCULAR; INTRAVENOUS; SUBCUTANEOUS ONCE
Qty: 0 | Refills: 0 | Status: DISCONTINUED | OUTPATIENT
Start: 2018-01-01 | End: 2018-01-01

## 2018-01-01 RX ORDER — METOPROLOL TARTRATE 50 MG
25 TABLET ORAL
Qty: 0 | Refills: 0 | Status: DISCONTINUED | OUTPATIENT
Start: 2018-01-01 | End: 2018-01-01

## 2018-01-01 RX ORDER — CHOLECALCIFEROL (VITAMIN D3) 125 MCG
400 CAPSULE ORAL DAILY
Qty: 0 | Refills: 0 | Status: DISCONTINUED | OUTPATIENT
Start: 2018-01-01 | End: 2018-01-01

## 2018-01-01 RX ORDER — FUROSEMIDE 40 MG
40 TABLET ORAL DAILY
Qty: 0 | Refills: 0 | Status: DISCONTINUED | OUTPATIENT
Start: 2018-01-01 | End: 2018-01-01

## 2018-01-01 RX ORDER — ASPIRIN/CALCIUM CARB/MAGNESIUM 324 MG
0 TABLET ORAL
Qty: 0 | Refills: 0 | COMMUNITY

## 2018-01-01 RX ORDER — AMLODIPINE BESYLATE 2.5 MG/1
10 TABLET ORAL DAILY
Qty: 0 | Refills: 0 | Status: DISCONTINUED | OUTPATIENT
Start: 2018-01-01 | End: 2018-01-01

## 2018-01-01 RX ORDER — DILTIAZEM HCL 120 MG
1 CAPSULE, EXT RELEASE 24 HR ORAL
Qty: 30 | Refills: 0 | OUTPATIENT
Start: 2018-01-01 | End: 2018-01-01

## 2018-01-01 RX ORDER — ASPIRIN/CALCIUM CARB/MAGNESIUM 324 MG
1 TABLET ORAL
Qty: 9 | Refills: 0 | OUTPATIENT
Start: 2018-01-01 | End: 2018-01-01

## 2018-01-01 RX ORDER — RISEDRONATE SODIUM 25.8; 4.2 MG/1; MG/1
1 TABLET, FILM COATED ORAL
Qty: 0 | Refills: 0 | COMMUNITY

## 2018-01-01 RX ORDER — MECLIZINE HCL 12.5 MG
25 TABLET ORAL
Qty: 0 | Refills: 0 | Status: DISCONTINUED | OUTPATIENT
Start: 2018-01-01 | End: 2018-01-01

## 2018-01-01 RX ORDER — BUDESONIDE AND FORMOTEROL FUMARATE DIHYDRATE 160; 4.5 UG/1; UG/1
2 AEROSOL RESPIRATORY (INHALATION)
Qty: 1 | Refills: 0 | OUTPATIENT
Start: 2018-01-01 | End: 2018-01-01

## 2018-01-01 RX ORDER — ATORVASTATIN CALCIUM 80 MG/1
1 TABLET, FILM COATED ORAL
Qty: 30 | Refills: 0 | OUTPATIENT
Start: 2018-01-01 | End: 2018-01-01

## 2018-01-01 RX ORDER — LEVETIRACETAM 250 MG/1
1 TABLET, FILM COATED ORAL
Qty: 60 | Refills: 0 | OUTPATIENT
Start: 2018-01-01 | End: 2018-01-01

## 2018-01-01 RX ORDER — ATORVASTATIN CALCIUM 80 MG/1
1 TABLET, FILM COATED ORAL
Qty: 0 | Refills: 0 | COMMUNITY

## 2018-01-01 RX ORDER — NYSTATIN CREAM 100000 [USP'U]/G
1 CREAM TOPICAL
Qty: 0 | Refills: 0 | Status: DISCONTINUED | OUTPATIENT
Start: 2018-01-01 | End: 2018-01-01

## 2018-01-01 RX ORDER — MIRTAZAPINE 45 MG/1
1 TABLET, ORALLY DISINTEGRATING ORAL
Qty: 0 | Refills: 0 | COMMUNITY

## 2018-01-01 RX ORDER — LORATADINE 10 MG/1
1 TABLET ORAL
Qty: 30 | Refills: 0 | OUTPATIENT
Start: 2018-01-01 | End: 2018-01-01

## 2018-01-01 RX ORDER — CILOSTAZOL 100 MG/1
1 TABLET ORAL
Qty: 0 | Refills: 0 | COMMUNITY

## 2018-01-01 RX ORDER — ASPIRIN/CALCIUM CARB/MAGNESIUM 324 MG
1 TABLET ORAL
Qty: 0 | Refills: 0 | COMMUNITY

## 2018-01-01 RX ORDER — METOPROLOL TARTRATE 50 MG
1 TABLET ORAL
Qty: 0 | Refills: 0 | COMMUNITY

## 2018-01-01 RX ORDER — BENAZEPRIL HYDROCHLORIDE 40 MG/1
1 TABLET ORAL
Qty: 0 | Refills: 0 | COMMUNITY

## 2018-01-01 RX ORDER — BENAZEPRIL HYDROCHLORIDE 40 MG/1
1 TABLET ORAL
Qty: 30 | Refills: 0 | OUTPATIENT
Start: 2018-01-01 | End: 2018-01-01

## 2018-01-01 RX ORDER — BENAZEPRIL HYDROCHLORIDE 40 MG/1
0 TABLET ORAL
Qty: 0 | Refills: 0 | COMMUNITY

## 2018-01-01 RX ORDER — CHOLECALCIFEROL (VITAMIN D3) 125 MCG
1 CAPSULE ORAL
Qty: 30 | Refills: 0 | OUTPATIENT
Start: 2018-01-01 | End: 2018-01-01

## 2018-01-01 RX ORDER — NICARDIPINE HYDROCHLORIDE 30 MG/1
5 CAPSULE, EXTENDED RELEASE ORAL
Qty: 40 | Refills: 0 | Status: DISCONTINUED | OUTPATIENT
Start: 2018-01-01 | End: 2018-01-01

## 2018-01-01 RX ORDER — SODIUM CHLORIDE 9 MG/ML
500 INJECTION INTRAMUSCULAR; INTRAVENOUS; SUBCUTANEOUS ONCE
Qty: 0 | Refills: 0 | Status: COMPLETED | OUTPATIENT
Start: 2018-01-01 | End: 2018-01-01

## 2018-01-01 RX ORDER — FELODIPINE 5 MG/1
1 TABLET, FILM COATED, EXTENDED RELEASE ORAL
Qty: 0 | Refills: 0 | COMMUNITY

## 2018-01-01 RX ORDER — LISINOPRIL 2.5 MG/1
10 TABLET ORAL DAILY
Qty: 0 | Refills: 0 | Status: DISCONTINUED | OUTPATIENT
Start: 2018-01-01 | End: 2018-01-01

## 2018-01-01 RX ORDER — FUROSEMIDE 40 MG
20 TABLET ORAL DAILY
Qty: 0 | Refills: 0 | Status: DISCONTINUED | OUTPATIENT
Start: 2018-01-01 | End: 2018-01-01

## 2018-01-01 RX ORDER — FELODIPINE 5 MG/1
1 TABLET, FILM COATED, EXTENDED RELEASE ORAL
Qty: 30 | Refills: 0 | OUTPATIENT
Start: 2018-01-01 | End: 2018-01-01

## 2018-01-01 RX ORDER — CILOSTAZOL 100 MG/1
100 TABLET ORAL
Qty: 0 | Refills: 0 | Status: DISCONTINUED | OUTPATIENT
Start: 2018-01-01 | End: 2018-01-01

## 2018-01-01 RX ORDER — CHOLECALCIFEROL (VITAMIN D3) 125 MCG
0 CAPSULE ORAL
Qty: 0 | Refills: 0 | COMMUNITY

## 2018-01-01 RX ORDER — FUROSEMIDE 40 MG
1 TABLET ORAL
Qty: 0 | Refills: 0 | COMMUNITY

## 2018-01-01 RX ORDER — ASPIRIN/CALCIUM CARB/MAGNESIUM 324 MG
243 TABLET ORAL ONCE
Qty: 0 | Refills: 0 | Status: COMPLETED | OUTPATIENT
Start: 2018-01-01 | End: 2018-01-01

## 2018-01-01 RX ORDER — MECLIZINE HCL 12.5 MG
1 TABLET ORAL
Qty: 0 | Refills: 0 | COMMUNITY

## 2018-01-01 RX ORDER — LEVETIRACETAM 250 MG/1
500 TABLET, FILM COATED ORAL
Qty: 0 | Refills: 0 | Status: DISCONTINUED | OUTPATIENT
Start: 2018-01-01 | End: 2018-01-01

## 2018-01-01 RX ORDER — METOPROLOL TARTRATE 50 MG
1 TABLET ORAL
Qty: 60 | Refills: 0 | OUTPATIENT
Start: 2018-01-01 | End: 2018-01-01

## 2018-01-01 RX ORDER — CILOSTAZOL 100 MG/1
1 TABLET ORAL
Qty: 60 | Refills: 0 | OUTPATIENT
Start: 2018-01-01 | End: 2018-01-01

## 2018-01-01 RX ORDER — LEVETIRACETAM 250 MG/1
1 TABLET, FILM COATED ORAL
Qty: 0 | Refills: 0 | COMMUNITY

## 2018-01-01 RX ORDER — CLOPIDOGREL BISULFATE 75 MG/1
75 TABLET, FILM COATED ORAL DAILY
Qty: 0 | Refills: 0 | Status: DISCONTINUED | OUTPATIENT
Start: 2018-01-01 | End: 2018-01-01

## 2018-01-01 RX ORDER — LORATADINE 10 MG/1
10 TABLET ORAL DAILY
Qty: 0 | Refills: 0 | Status: DISCONTINUED | OUTPATIENT
Start: 2018-01-01 | End: 2018-01-01

## 2018-01-01 RX ORDER — ONDANSETRON 8 MG/1
4 TABLET, FILM COATED ORAL EVERY 6 HOURS
Qty: 0 | Refills: 0 | Status: DISCONTINUED | OUTPATIENT
Start: 2018-01-01 | End: 2018-01-01

## 2018-01-01 RX ORDER — DILTIAZEM HCL 120 MG
180 CAPSULE, EXT RELEASE 24 HR ORAL DAILY
Qty: 0 | Refills: 0 | Status: DISCONTINUED | OUTPATIENT
Start: 2018-01-01 | End: 2018-01-01

## 2018-01-01 RX ORDER — CLOPIDOGREL BISULFATE 75 MG/1
1 TABLET, FILM COATED ORAL
Qty: 0 | Refills: 0 | COMMUNITY

## 2018-01-01 RX ORDER — HALOPERIDOL DECANOATE 100 MG/ML
0.5 INJECTION INTRAMUSCULAR ONCE
Qty: 0 | Refills: 0 | Status: COMPLETED | OUTPATIENT
Start: 2018-01-01 | End: 2018-01-01

## 2018-01-01 RX ORDER — LORATADINE 10 MG/1
1 TABLET ORAL
Qty: 0 | Refills: 0 | COMMUNITY

## 2018-01-01 RX ORDER — ATORVASTATIN CALCIUM 80 MG/1
10 TABLET, FILM COATED ORAL AT BEDTIME
Qty: 0 | Refills: 0 | Status: DISCONTINUED | OUTPATIENT
Start: 2018-01-01 | End: 2018-01-01

## 2018-01-01 RX ORDER — CLOPIDOGREL BISULFATE 75 MG/1
0 TABLET, FILM COATED ORAL
Qty: 0 | Refills: 0 | COMMUNITY

## 2018-01-01 RX ORDER — HEPARIN SODIUM 5000 [USP'U]/ML
5000 INJECTION INTRAVENOUS; SUBCUTANEOUS EVERY 8 HOURS
Qty: 0 | Refills: 0 | Status: DISCONTINUED | OUTPATIENT
Start: 2018-01-01 | End: 2018-01-01

## 2018-01-01 RX ORDER — FUROSEMIDE 40 MG
40 TABLET ORAL EVERY 12 HOURS
Qty: 0 | Refills: 0 | Status: DISCONTINUED | OUTPATIENT
Start: 2018-01-01 | End: 2018-01-01

## 2018-01-01 RX ORDER — SENNA PLUS 8.6 MG/1
2 TABLET ORAL AT BEDTIME
Qty: 0 | Refills: 0 | Status: DISCONTINUED | OUTPATIENT
Start: 2018-01-01 | End: 2018-01-01

## 2018-01-01 RX ORDER — METOPROLOL TARTRATE 50 MG
50 TABLET ORAL
Qty: 0 | Refills: 0 | Status: DISCONTINUED | OUTPATIENT
Start: 2018-01-01 | End: 2018-01-01

## 2018-01-01 RX ORDER — DOCUSATE SODIUM 100 MG
100 CAPSULE ORAL THREE TIMES A DAY
Qty: 0 | Refills: 0 | Status: DISCONTINUED | OUTPATIENT
Start: 2018-01-01 | End: 2018-01-01

## 2018-01-01 RX ORDER — FUROSEMIDE 40 MG
40 TABLET ORAL ONCE
Qty: 0 | Refills: 0 | Status: COMPLETED | OUTPATIENT
Start: 2018-01-01 | End: 2018-01-01

## 2018-01-01 RX ORDER — MECLIZINE HCL 12.5 MG
0 TABLET ORAL
Qty: 0 | Refills: 0 | COMMUNITY

## 2018-01-01 RX ORDER — ATORVASTATIN CALCIUM 80 MG/1
10 TABLET, FILM COATED ORAL DAILY
Qty: 0 | Refills: 0 | Status: DISCONTINUED | OUTPATIENT
Start: 2018-01-01 | End: 2018-01-01

## 2018-01-01 RX ORDER — FUROSEMIDE 40 MG
1 TABLET ORAL
Qty: 0 | Refills: 0 | COMMUNITY
Start: 2018-01-01

## 2018-01-01 RX ORDER — IPRATROPIUM/ALBUTEROL SULFATE 18-103MCG
3 AEROSOL WITH ADAPTER (GRAM) INHALATION
Qty: 0 | Refills: 0 | Status: COMPLETED | OUTPATIENT
Start: 2018-01-01 | End: 2018-01-01

## 2018-01-01 RX ORDER — ASPIRIN/CALCIUM CARB/MAGNESIUM 324 MG
325 TABLET ORAL ONCE
Qty: 0 | Refills: 0 | Status: COMPLETED | OUTPATIENT
Start: 2018-01-01 | End: 2018-01-01

## 2018-01-01 RX ORDER — IPRATROPIUM/ALBUTEROL SULFATE 18-103MCG
3 AEROSOL WITH ADAPTER (GRAM) INHALATION EVERY 6 HOURS
Qty: 0 | Refills: 0 | Status: DISCONTINUED | OUTPATIENT
Start: 2018-01-01 | End: 2018-01-01

## 2018-01-01 RX ORDER — IPRATROPIUM/ALBUTEROL SULFATE 18-103MCG
3 AEROSOL WITH ADAPTER (GRAM) INHALATION EVERY 4 HOURS
Qty: 0 | Refills: 0 | Status: DISCONTINUED | OUTPATIENT
Start: 2018-01-01 | End: 2018-01-01

## 2018-01-01 RX ORDER — MULTIVIT-MIN/FERROUS GLUCONATE 9 MG/15 ML
1 LIQUID (ML) ORAL
Qty: 0 | Refills: 0 | COMMUNITY

## 2018-01-01 RX ADMIN — CLOPIDOGREL BISULFATE 75 MILLIGRAM(S): 75 TABLET, FILM COATED ORAL at 12:40

## 2018-01-01 RX ADMIN — HEPARIN SODIUM 5000 UNIT(S): 5000 INJECTION INTRAVENOUS; SUBCUTANEOUS at 15:46

## 2018-01-01 RX ADMIN — Medication 50 MILLIGRAM(S): at 07:10

## 2018-01-01 RX ADMIN — LEVETIRACETAM 500 MILLIGRAM(S): 250 TABLET, FILM COATED ORAL at 18:14

## 2018-01-01 RX ADMIN — Medication 20 MILLIGRAM(S): at 06:08

## 2018-01-01 RX ADMIN — Medication 125 MILLIGRAM(S): at 22:57

## 2018-01-01 RX ADMIN — LORATADINE 10 MILLIGRAM(S): 10 TABLET ORAL at 12:40

## 2018-01-01 RX ADMIN — ATORVASTATIN CALCIUM 10 MILLIGRAM(S): 80 TABLET, FILM COATED ORAL at 21:07

## 2018-01-01 RX ADMIN — LEVETIRACETAM 500 MILLIGRAM(S): 250 TABLET, FILM COATED ORAL at 05:46

## 2018-01-01 RX ADMIN — ATORVASTATIN CALCIUM 10 MILLIGRAM(S): 80 TABLET, FILM COATED ORAL at 22:14

## 2018-01-01 RX ADMIN — HEPARIN SODIUM 5000 UNIT(S): 5000 INJECTION INTRAVENOUS; SUBCUTANEOUS at 06:53

## 2018-01-01 RX ADMIN — Medication 25 MILLIGRAM(S): at 17:29

## 2018-01-01 RX ADMIN — Medication 25 MILLIGRAM(S): at 17:56

## 2018-01-01 RX ADMIN — LEVETIRACETAM 500 MILLIGRAM(S): 250 TABLET, FILM COATED ORAL at 17:25

## 2018-01-01 RX ADMIN — BUDESONIDE AND FORMOTEROL FUMARATE DIHYDRATE 2 PUFF(S): 160; 4.5 AEROSOL RESPIRATORY (INHALATION) at 22:10

## 2018-01-01 RX ADMIN — LEVETIRACETAM 500 MILLIGRAM(S): 250 TABLET, FILM COATED ORAL at 18:23

## 2018-01-01 RX ADMIN — Medication 25 MILLIGRAM(S): at 18:04

## 2018-01-01 RX ADMIN — HEPARIN SODIUM 5000 UNIT(S): 5000 INJECTION INTRAVENOUS; SUBCUTANEOUS at 18:14

## 2018-01-01 RX ADMIN — CLOPIDOGREL BISULFATE 75 MILLIGRAM(S): 75 TABLET, FILM COATED ORAL at 11:23

## 2018-01-01 RX ADMIN — Medication 100 MILLIGRAM(S): at 06:02

## 2018-01-01 RX ADMIN — HEPARIN SODIUM 5000 UNIT(S): 5000 INJECTION INTRAVENOUS; SUBCUTANEOUS at 06:36

## 2018-01-01 RX ADMIN — HEPARIN SODIUM 5000 UNIT(S): 5000 INJECTION INTRAVENOUS; SUBCUTANEOUS at 05:39

## 2018-01-01 RX ADMIN — HEPARIN SODIUM 5000 UNIT(S): 5000 INJECTION INTRAVENOUS; SUBCUTANEOUS at 05:24

## 2018-01-01 RX ADMIN — Medication 325 MILLIGRAM(S): at 09:52

## 2018-01-01 RX ADMIN — CILOSTAZOL 100 MILLIGRAM(S): 100 TABLET ORAL at 17:45

## 2018-01-01 RX ADMIN — LEVETIRACETAM 500 MILLIGRAM(S): 250 TABLET, FILM COATED ORAL at 06:02

## 2018-01-01 RX ADMIN — CLOPIDOGREL BISULFATE 75 MILLIGRAM(S): 75 TABLET, FILM COATED ORAL at 12:57

## 2018-01-01 RX ADMIN — Medication 20 MILLIGRAM(S): at 05:39

## 2018-01-01 RX ADMIN — CLOPIDOGREL BISULFATE 75 MILLIGRAM(S): 75 TABLET, FILM COATED ORAL at 12:46

## 2018-01-01 RX ADMIN — HEPARIN SODIUM 5000 UNIT(S): 5000 INJECTION INTRAVENOUS; SUBCUTANEOUS at 18:31

## 2018-01-01 RX ADMIN — LORATADINE 10 MILLIGRAM(S): 10 TABLET ORAL at 12:52

## 2018-01-01 RX ADMIN — Medication 3 MILLILITER(S): at 22:31

## 2018-01-01 RX ADMIN — Medication 1000 UNIT(S): at 12:46

## 2018-01-01 RX ADMIN — Medication 243 MILLIGRAM(S): at 04:14

## 2018-01-01 RX ADMIN — ATORVASTATIN CALCIUM 10 MILLIGRAM(S): 80 TABLET, FILM COATED ORAL at 12:17

## 2018-01-01 RX ADMIN — LEVETIRACETAM 500 MILLIGRAM(S): 250 TABLET, FILM COATED ORAL at 17:13

## 2018-01-01 RX ADMIN — Medication 3 MILLILITER(S): at 12:40

## 2018-01-01 RX ADMIN — Medication 180 MILLIGRAM(S): at 06:29

## 2018-01-01 RX ADMIN — LEVETIRACETAM 500 MILLIGRAM(S): 250 TABLET, FILM COATED ORAL at 06:53

## 2018-01-01 RX ADMIN — HEPARIN SODIUM 5000 UNIT(S): 5000 INJECTION INTRAVENOUS; SUBCUTANEOUS at 18:04

## 2018-01-01 RX ADMIN — HEPARIN SODIUM 5000 UNIT(S): 5000 INJECTION INTRAVENOUS; SUBCUTANEOUS at 06:16

## 2018-01-01 RX ADMIN — Medication 25 MILLIGRAM(S): at 06:11

## 2018-01-01 RX ADMIN — HEPARIN SODIUM 5000 UNIT(S): 5000 INJECTION INTRAVENOUS; SUBCUTANEOUS at 13:29

## 2018-01-01 RX ADMIN — LISINOPRIL 10 MILLIGRAM(S): 2.5 TABLET ORAL at 06:53

## 2018-01-01 RX ADMIN — Medication 60 MILLIGRAM(S): at 17:08

## 2018-01-01 RX ADMIN — Medication 1000 UNIT(S): at 12:57

## 2018-01-01 RX ADMIN — Medication 25 MILLIGRAM(S): at 17:45

## 2018-01-01 RX ADMIN — CLOPIDOGREL BISULFATE 75 MILLIGRAM(S): 75 TABLET, FILM COATED ORAL at 11:10

## 2018-01-01 RX ADMIN — LISINOPRIL 10 MILLIGRAM(S): 2.5 TABLET ORAL at 06:40

## 2018-01-01 RX ADMIN — LORATADINE 10 MILLIGRAM(S): 10 TABLET ORAL at 12:08

## 2018-01-01 RX ADMIN — LORATADINE 10 MILLIGRAM(S): 10 TABLET ORAL at 12:11

## 2018-01-01 RX ADMIN — Medication 100 MILLIGRAM(S): at 06:23

## 2018-01-01 RX ADMIN — Medication 180 MILLIGRAM(S): at 05:24

## 2018-01-01 RX ADMIN — LORATADINE 10 MILLIGRAM(S): 10 TABLET ORAL at 12:45

## 2018-01-01 RX ADMIN — BUDESONIDE AND FORMOTEROL FUMARATE DIHYDRATE 2 PUFF(S): 160; 4.5 AEROSOL RESPIRATORY (INHALATION) at 12:45

## 2018-01-01 RX ADMIN — Medication 60 MILLIGRAM(S): at 06:37

## 2018-01-01 RX ADMIN — LEVETIRACETAM 500 MILLIGRAM(S): 250 TABLET, FILM COATED ORAL at 06:29

## 2018-01-01 RX ADMIN — HEPARIN SODIUM 5000 UNIT(S): 5000 INJECTION INTRAVENOUS; SUBCUTANEOUS at 06:23

## 2018-01-01 RX ADMIN — LISINOPRIL 10 MILLIGRAM(S): 2.5 TABLET ORAL at 06:08

## 2018-01-01 RX ADMIN — Medication 3 MILLILITER(S): at 23:40

## 2018-01-01 RX ADMIN — Medication 10 MILLIGRAM(S): at 12:11

## 2018-01-01 RX ADMIN — CILOSTAZOL 100 MILLIGRAM(S): 100 TABLET ORAL at 17:26

## 2018-01-01 RX ADMIN — Medication 25 MILLIGRAM(S): at 06:02

## 2018-01-01 RX ADMIN — HEPARIN SODIUM 5000 UNIT(S): 5000 INJECTION INTRAVENOUS; SUBCUTANEOUS at 18:02

## 2018-01-01 RX ADMIN — CLOPIDOGREL BISULFATE 75 MILLIGRAM(S): 75 TABLET, FILM COATED ORAL at 12:17

## 2018-01-01 RX ADMIN — HALOPERIDOL DECANOATE 0.5 MILLIGRAM(S): 100 INJECTION INTRAMUSCULAR at 01:40

## 2018-01-01 RX ADMIN — HEPARIN SODIUM 5000 UNIT(S): 5000 INJECTION INTRAVENOUS; SUBCUTANEOUS at 17:26

## 2018-01-01 RX ADMIN — HEPARIN SODIUM 5000 UNIT(S): 5000 INJECTION INTRAVENOUS; SUBCUTANEOUS at 18:25

## 2018-01-01 RX ADMIN — BUDESONIDE AND FORMOTEROL FUMARATE DIHYDRATE 2 PUFF(S): 160; 4.5 AEROSOL RESPIRATORY (INHALATION) at 09:17

## 2018-01-01 RX ADMIN — LISINOPRIL 10 MILLIGRAM(S): 2.5 TABLET ORAL at 05:47

## 2018-01-01 RX ADMIN — Medication 25 MILLIGRAM(S): at 06:29

## 2018-01-01 RX ADMIN — HEPARIN SODIUM 5000 UNIT(S): 5000 INJECTION INTRAVENOUS; SUBCUTANEOUS at 17:13

## 2018-01-01 RX ADMIN — HEPARIN SODIUM 5000 UNIT(S): 5000 INJECTION INTRAVENOUS; SUBCUTANEOUS at 06:40

## 2018-01-01 RX ADMIN — LEVETIRACETAM 500 MILLIGRAM(S): 250 TABLET, FILM COATED ORAL at 18:31

## 2018-01-01 RX ADMIN — LORATADINE 10 MILLIGRAM(S): 10 TABLET ORAL at 12:57

## 2018-01-01 RX ADMIN — BUDESONIDE AND FORMOTEROL FUMARATE DIHYDRATE 2 PUFF(S): 160; 4.5 AEROSOL RESPIRATORY (INHALATION) at 21:41

## 2018-01-01 RX ADMIN — NYSTATIN CREAM 1 APPLICATION(S): 100000 CREAM TOPICAL at 06:02

## 2018-01-01 RX ADMIN — LEVETIRACETAM 500 MILLIGRAM(S): 250 TABLET, FILM COATED ORAL at 17:41

## 2018-01-01 RX ADMIN — NYSTATIN CREAM 1 APPLICATION(S): 100000 CREAM TOPICAL at 17:46

## 2018-01-01 RX ADMIN — HEPARIN SODIUM 5000 UNIT(S): 5000 INJECTION INTRAVENOUS; SUBCUTANEOUS at 06:51

## 2018-01-01 RX ADMIN — LEVETIRACETAM 500 MILLIGRAM(S): 250 TABLET, FILM COATED ORAL at 06:40

## 2018-01-01 RX ADMIN — ATORVASTATIN CALCIUM 10 MILLIGRAM(S): 80 TABLET, FILM COATED ORAL at 22:08

## 2018-01-01 RX ADMIN — Medication 1000 UNIT(S): at 12:40

## 2018-01-01 RX ADMIN — HEPARIN SODIUM 5000 UNIT(S): 5000 INJECTION INTRAVENOUS; SUBCUTANEOUS at 21:31

## 2018-01-01 RX ADMIN — Medication 1000 UNIT(S): at 11:42

## 2018-01-01 RX ADMIN — Medication 100 MILLIGRAM(S): at 06:52

## 2018-01-01 RX ADMIN — Medication 100 MILLIGRAM(S): at 06:40

## 2018-01-01 RX ADMIN — NYSTATIN CREAM 1 APPLICATION(S): 100000 CREAM TOPICAL at 06:42

## 2018-01-01 RX ADMIN — LISINOPRIL 10 MILLIGRAM(S): 2.5 TABLET ORAL at 06:51

## 2018-01-01 RX ADMIN — ATORVASTATIN CALCIUM 10 MILLIGRAM(S): 80 TABLET, FILM COATED ORAL at 22:30

## 2018-01-01 RX ADMIN — LEVETIRACETAM 500 MILLIGRAM(S): 250 TABLET, FILM COATED ORAL at 06:24

## 2018-01-01 RX ADMIN — CLOPIDOGREL BISULFATE 75 MILLIGRAM(S): 75 TABLET, FILM COATED ORAL at 12:52

## 2018-01-01 RX ADMIN — CILOSTAZOL 100 MILLIGRAM(S): 100 TABLET ORAL at 17:25

## 2018-01-01 RX ADMIN — ATORVASTATIN CALCIUM 10 MILLIGRAM(S): 80 TABLET, FILM COATED ORAL at 11:13

## 2018-01-01 RX ADMIN — Medication 180 MILLIGRAM(S): at 05:39

## 2018-01-01 RX ADMIN — HEPARIN SODIUM 5000 UNIT(S): 5000 INJECTION INTRAVENOUS; SUBCUTANEOUS at 21:30

## 2018-01-01 RX ADMIN — ATORVASTATIN CALCIUM 10 MILLIGRAM(S): 80 TABLET, FILM COATED ORAL at 11:11

## 2018-01-01 RX ADMIN — Medication 180 MILLIGRAM(S): at 06:16

## 2018-01-01 RX ADMIN — Medication 25 MILLIGRAM(S): at 18:52

## 2018-01-01 RX ADMIN — HEPARIN SODIUM 5000 UNIT(S): 5000 INJECTION INTRAVENOUS; SUBCUTANEOUS at 22:49

## 2018-01-01 RX ADMIN — Medication 3 MILLILITER(S): at 22:56

## 2018-01-01 RX ADMIN — NYSTATIN CREAM 1 APPLICATION(S): 100000 CREAM TOPICAL at 17:33

## 2018-01-01 RX ADMIN — CLOPIDOGREL BISULFATE 75 MILLIGRAM(S): 75 TABLET, FILM COATED ORAL at 17:24

## 2018-01-01 RX ADMIN — Medication 3 MILLILITER(S): at 08:11

## 2018-01-01 RX ADMIN — CILOSTAZOL 100 MILLIGRAM(S): 100 TABLET ORAL at 06:23

## 2018-01-01 RX ADMIN — LEVETIRACETAM 500 MILLIGRAM(S): 250 TABLET, FILM COATED ORAL at 05:25

## 2018-01-01 RX ADMIN — LEVETIRACETAM 500 MILLIGRAM(S): 250 TABLET, FILM COATED ORAL at 18:04

## 2018-01-01 RX ADMIN — BUDESONIDE AND FORMOTEROL FUMARATE DIHYDRATE 2 PUFF(S): 160; 4.5 AEROSOL RESPIRATORY (INHALATION) at 09:40

## 2018-01-01 RX ADMIN — LEVETIRACETAM 500 MILLIGRAM(S): 250 TABLET, FILM COATED ORAL at 17:32

## 2018-01-01 RX ADMIN — NICARDIPINE HYDROCHLORIDE 25 MG/HR: 30 CAPSULE, EXTENDED RELEASE ORAL at 09:51

## 2018-01-01 RX ADMIN — Medication 25 MILLIGRAM(S): at 06:51

## 2018-01-01 RX ADMIN — LISINOPRIL 10 MILLIGRAM(S): 2.5 TABLET ORAL at 06:29

## 2018-01-01 RX ADMIN — Medication 1000 UNIT(S): at 12:15

## 2018-01-01 RX ADMIN — Medication 25 MILLIGRAM(S): at 17:41

## 2018-01-01 RX ADMIN — CLOPIDOGREL BISULFATE 75 MILLIGRAM(S): 75 TABLET, FILM COATED ORAL at 12:33

## 2018-01-01 RX ADMIN — CILOSTAZOL 100 MILLIGRAM(S): 100 TABLET ORAL at 17:32

## 2018-01-01 RX ADMIN — Medication 1000 UNIT(S): at 11:24

## 2018-01-01 RX ADMIN — CILOSTAZOL 100 MILLIGRAM(S): 100 TABLET ORAL at 18:53

## 2018-01-01 RX ADMIN — Medication 180 MILLIGRAM(S): at 17:08

## 2018-01-01 RX ADMIN — Medication 3 MILLILITER(S): at 14:37

## 2018-01-01 RX ADMIN — BUDESONIDE AND FORMOTEROL FUMARATE DIHYDRATE 2 PUFF(S): 160; 4.5 AEROSOL RESPIRATORY (INHALATION) at 17:28

## 2018-01-01 RX ADMIN — HEPARIN SODIUM 5000 UNIT(S): 5000 INJECTION INTRAVENOUS; SUBCUTANEOUS at 17:29

## 2018-01-01 RX ADMIN — Medication 20 MILLIGRAM(S): at 06:29

## 2018-01-01 RX ADMIN — CLOPIDOGREL BISULFATE 75 MILLIGRAM(S): 75 TABLET, FILM COATED ORAL at 11:42

## 2018-01-01 RX ADMIN — LISINOPRIL 10 MILLIGRAM(S): 2.5 TABLET ORAL at 06:12

## 2018-01-01 RX ADMIN — Medication 25 MILLIGRAM(S): at 17:26

## 2018-01-01 RX ADMIN — NYSTATIN CREAM 1 APPLICATION(S): 100000 CREAM TOPICAL at 17:27

## 2018-01-01 RX ADMIN — CLOPIDOGREL BISULFATE 75 MILLIGRAM(S): 75 TABLET, FILM COATED ORAL at 16:28

## 2018-01-01 RX ADMIN — Medication 25 MILLIGRAM(S): at 06:08

## 2018-01-01 RX ADMIN — BUDESONIDE AND FORMOTEROL FUMARATE DIHYDRATE 2 PUFF(S): 160; 4.5 AEROSOL RESPIRATORY (INHALATION) at 21:43

## 2018-01-01 RX ADMIN — LISINOPRIL 10 MILLIGRAM(S): 2.5 TABLET ORAL at 07:10

## 2018-01-01 RX ADMIN — LEVETIRACETAM 500 MILLIGRAM(S): 250 TABLET, FILM COATED ORAL at 06:08

## 2018-01-01 RX ADMIN — Medication 180 MILLIGRAM(S): at 05:46

## 2018-01-01 RX ADMIN — LEVETIRACETAM 500 MILLIGRAM(S): 250 TABLET, FILM COATED ORAL at 17:56

## 2018-01-01 RX ADMIN — Medication 1000 UNIT(S): at 16:29

## 2018-01-01 RX ADMIN — CILOSTAZOL 100 MILLIGRAM(S): 100 TABLET ORAL at 06:51

## 2018-01-01 RX ADMIN — Medication 100 MILLIGRAM(S): at 15:46

## 2018-01-01 RX ADMIN — HEPARIN SODIUM 5000 UNIT(S): 5000 INJECTION INTRAVENOUS; SUBCUTANEOUS at 07:13

## 2018-01-01 RX ADMIN — ATORVASTATIN CALCIUM 10 MILLIGRAM(S): 80 TABLET, FILM COATED ORAL at 21:40

## 2018-01-01 RX ADMIN — Medication 81 MILLIGRAM(S): at 06:07

## 2018-01-01 RX ADMIN — LEVETIRACETAM 500 MILLIGRAM(S): 250 TABLET, FILM COATED ORAL at 18:32

## 2018-01-01 RX ADMIN — Medication 25 MILLIGRAM(S): at 06:40

## 2018-01-01 RX ADMIN — Medication 25 MILLIGRAM(S): at 06:24

## 2018-01-01 RX ADMIN — LEVETIRACETAM 500 MILLIGRAM(S): 250 TABLET, FILM COATED ORAL at 17:46

## 2018-01-01 RX ADMIN — LEVETIRACETAM 500 MILLIGRAM(S): 250 TABLET, FILM COATED ORAL at 17:29

## 2018-01-01 RX ADMIN — AMLODIPINE BESYLATE 10 MILLIGRAM(S): 2.5 TABLET ORAL at 07:10

## 2018-01-01 RX ADMIN — LEVETIRACETAM 500 MILLIGRAM(S): 250 TABLET, FILM COATED ORAL at 17:26

## 2018-01-01 RX ADMIN — LEVETIRACETAM 500 MILLIGRAM(S): 250 TABLET, FILM COATED ORAL at 06:37

## 2018-01-01 RX ADMIN — HEPARIN SODIUM 5000 UNIT(S): 5000 INJECTION INTRAVENOUS; SUBCUTANEOUS at 06:35

## 2018-01-01 RX ADMIN — LORATADINE 10 MILLIGRAM(S): 10 TABLET ORAL at 11:23

## 2018-01-01 RX ADMIN — BUDESONIDE AND FORMOTEROL FUMARATE DIHYDRATE 2 PUFF(S): 160; 4.5 AEROSOL RESPIRATORY (INHALATION) at 20:00

## 2018-01-01 RX ADMIN — Medication 40 MILLIGRAM(S): at 04:14

## 2018-01-01 RX ADMIN — BUDESONIDE AND FORMOTEROL FUMARATE DIHYDRATE 2 PUFF(S): 160; 4.5 AEROSOL RESPIRATORY (INHALATION) at 22:32

## 2018-01-01 RX ADMIN — ATORVASTATIN CALCIUM 10 MILLIGRAM(S): 80 TABLET, FILM COATED ORAL at 17:24

## 2018-01-01 RX ADMIN — Medication 1000 UNIT(S): at 12:08

## 2018-01-01 RX ADMIN — ATORVASTATIN CALCIUM 10 MILLIGRAM(S): 80 TABLET, FILM COATED ORAL at 22:10

## 2018-01-01 RX ADMIN — BUDESONIDE AND FORMOTEROL FUMARATE DIHYDRATE 2 PUFF(S): 160; 4.5 AEROSOL RESPIRATORY (INHALATION) at 20:47

## 2018-01-01 RX ADMIN — CLOPIDOGREL BISULFATE 75 MILLIGRAM(S): 75 TABLET, FILM COATED ORAL at 11:13

## 2018-01-01 RX ADMIN — LISINOPRIL 10 MILLIGRAM(S): 2.5 TABLET ORAL at 05:39

## 2018-01-01 RX ADMIN — ATORVASTATIN CALCIUM 10 MILLIGRAM(S): 80 TABLET, FILM COATED ORAL at 22:32

## 2018-01-01 RX ADMIN — HEPARIN SODIUM 5000 UNIT(S): 5000 INJECTION INTRAVENOUS; SUBCUTANEOUS at 05:47

## 2018-01-01 RX ADMIN — SODIUM CHLORIDE 500 MILLILITER(S): 9 INJECTION INTRAMUSCULAR; INTRAVENOUS; SUBCUTANEOUS at 10:40

## 2018-01-01 RX ADMIN — LEVETIRACETAM 500 MILLIGRAM(S): 250 TABLET, FILM COATED ORAL at 07:10

## 2018-01-01 RX ADMIN — Medication 25 MILLIGRAM(S): at 17:25

## 2018-01-01 RX ADMIN — Medication 180 MILLIGRAM(S): at 06:08

## 2018-01-01 RX ADMIN — CILOSTAZOL 100 MILLIGRAM(S): 100 TABLET ORAL at 06:02

## 2018-01-01 RX ADMIN — Medication 81 MILLIGRAM(S): at 06:10

## 2018-01-01 RX ADMIN — Medication 20 MILLIGRAM(S): at 06:12

## 2018-01-01 RX ADMIN — LEVETIRACETAM 500 MILLIGRAM(S): 250 TABLET, FILM COATED ORAL at 06:11

## 2018-01-01 RX ADMIN — HEPARIN SODIUM 5000 UNIT(S): 5000 INJECTION INTRAVENOUS; SUBCUTANEOUS at 21:38

## 2018-01-01 RX ADMIN — CILOSTAZOL 100 MILLIGRAM(S): 100 TABLET ORAL at 06:32

## 2018-01-01 RX ADMIN — HEPARIN SODIUM 5000 UNIT(S): 5000 INJECTION INTRAVENOUS; SUBCUTANEOUS at 13:46

## 2018-01-01 RX ADMIN — Medication 50 MILLIGRAM(S): at 17:13

## 2018-01-01 RX ADMIN — LISINOPRIL 10 MILLIGRAM(S): 2.5 TABLET ORAL at 06:02

## 2018-01-01 RX ADMIN — Medication 180 MILLIGRAM(S): at 06:53

## 2018-01-01 RX ADMIN — HEPARIN SODIUM 5000 UNIT(S): 5000 INJECTION INTRAVENOUS; SUBCUTANEOUS at 18:32

## 2018-01-01 RX ADMIN — LISINOPRIL 10 MILLIGRAM(S): 2.5 TABLET ORAL at 06:23

## 2018-01-01 RX ADMIN — LEVETIRACETAM 500 MILLIGRAM(S): 250 TABLET, FILM COATED ORAL at 06:51

## 2018-01-01 RX ADMIN — LEVETIRACETAM 500 MILLIGRAM(S): 250 TABLET, FILM COATED ORAL at 05:39

## 2018-01-01 RX ADMIN — CLOPIDOGREL BISULFATE 75 MILLIGRAM(S): 75 TABLET, FILM COATED ORAL at 12:08

## 2018-01-01 RX ADMIN — Medication 20 MILLIGRAM(S): at 08:04

## 2018-01-01 RX ADMIN — Medication 25 MILLIGRAM(S): at 18:31

## 2018-01-01 RX ADMIN — Medication 25 MILLIGRAM(S): at 05:39

## 2018-01-01 RX ADMIN — BUDESONIDE AND FORMOTEROL FUMARATE DIHYDRATE 2 PUFF(S): 160; 4.5 AEROSOL RESPIRATORY (INHALATION) at 21:27

## 2018-01-01 RX ADMIN — Medication 25 MILLIGRAM(S): at 05:27

## 2018-01-01 RX ADMIN — LEVETIRACETAM 500 MILLIGRAM(S): 250 TABLET, FILM COATED ORAL at 18:52

## 2018-01-01 RX ADMIN — BUDESONIDE AND FORMOTEROL FUMARATE DIHYDRATE 2 PUFF(S): 160; 4.5 AEROSOL RESPIRATORY (INHALATION) at 20:54

## 2018-01-01 RX ADMIN — Medication 25 MILLIGRAM(S): at 05:47

## 2018-01-01 RX ADMIN — Medication 100 MILLIGRAM(S): at 22:49

## 2018-01-01 RX ADMIN — CLOPIDOGREL BISULFATE 75 MILLIGRAM(S): 75 TABLET, FILM COATED ORAL at 12:13

## 2018-01-01 RX ADMIN — Medication 20 MILLIGRAM(S): at 05:24

## 2018-01-01 RX ADMIN — ATORVASTATIN CALCIUM 10 MILLIGRAM(S): 80 TABLET, FILM COATED ORAL at 21:43

## 2018-01-01 RX ADMIN — ATORVASTATIN CALCIUM 10 MILLIGRAM(S): 80 TABLET, FILM COATED ORAL at 12:33

## 2018-01-01 RX ADMIN — Medication 100 MILLIGRAM(S): at 21:31

## 2018-01-01 RX ADMIN — HEPARIN SODIUM 5000 UNIT(S): 5000 INJECTION INTRAVENOUS; SUBCUTANEOUS at 15:20

## 2018-01-01 RX ADMIN — HEPARIN SODIUM 5000 UNIT(S): 5000 INJECTION INTRAVENOUS; SUBCUTANEOUS at 06:09

## 2018-01-01 RX ADMIN — Medication 1000 UNIT(S): at 12:52

## 2018-01-01 RX ADMIN — NYSTATIN CREAM 1 APPLICATION(S): 100000 CREAM TOPICAL at 17:26

## 2018-01-01 RX ADMIN — Medication 3 MILLILITER(S): at 12:41

## 2018-01-01 RX ADMIN — Medication 3 MILLILITER(S): at 20:00

## 2018-01-01 RX ADMIN — HEPARIN SODIUM 5000 UNIT(S): 5000 INJECTION INTRAVENOUS; SUBCUTANEOUS at 06:02

## 2018-04-11 NOTE — ED PROVIDER NOTE - MEDICAL DECISION MAKING DETAILS
SOB on exertion. Noted trop elevation with no CALE changes on ECG and no symptoms currently at rest. Noted BNP elevation. No overt e/o HF on CXR, though this is likely new onset CHF. Also suspicion for PE, awaiting CT PE. Has O2 req at this time, well appearing with no increased WOB. Signed off care to Dr. Roche at this time. SOB on exertion. Noted trop elevation with no CALE changes on ECG and no symptoms currently at rest. Noted BNP elevation. No overt e/o HF on CXR, though this is likely new onset CHF. Also suspicion for PE, awaiting CT PE. Has O2 req at this time, well appearing with no increased WOB. Signed off care to Dr. Roche at this time.   Addendum: Pt admitted to Tele SOB on exertion. Noted trop elevation with no CALE changes on ECG and no symptoms currently at rest. Noted BNP elevation. No overt e/o HF on CXR, though this is likely new onset CHF. Long-time smoker and diminished lung sounds concerning for COPD, given Duonebs/solumedrol. Also suspicion for PE, awaiting CT PE. Has O2 req at this time, well appearing with no increased WOB. Signed off care to Dr. Roche at this time.   Addendum: Pt admitted to Tele

## 2018-04-11 NOTE — H&P ADULT - NSHPREVIEWOFSYSTEMS_GEN_ALL_CORE
Review of Systems:   CONSTITUTIONAL: No fever, chills  EYES: No eye pain, visual disturbances, or discharge  ENMT:  No difficulty hearing, tinnitus, vertigo; No sinus or throat pain  NECK: No pain or stiffness  RESPIRATORY: + SOB, dry cough. no wheezing  CARDIOVASCULAR: No chest pain, palpitations, dizziness, or leg swelling  GASTROINTESTINAL: No abdominal or epigastric pain. No nausea, vomiting, diarrhea  GENITOURINARY: No dysuria  NEUROLOGICAL: No headaches, loss of strength  SKIN: No itching, burning, rashes, or lesions

## 2018-04-11 NOTE — H&P ADULT - NSHPPHYSICALEXAM_GEN_ALL_CORE
GENERAL: NAD, thin older women  HEAD:  Atraumatic, Normocephalic  NECK: Supple, No JVD  CHEST/LUNG: decreased breath sound. + bibasilar crackles. + rare occasional wheezes  HEART: Regular rate and rhythm; No murmurs, rubs, or gallops  ABDOMEN: Soft, Nontender, Nondistended + BS  EXTREMITIES:  trace - 1 + pitting edema on b/l legs  NEUROLOGY: non-focal, AAO X3

## 2018-04-11 NOTE — ED PROVIDER NOTE - PHYSICAL EXAMINATION
Afebrile, hemodynamically stable, needs 3-4L NC O2  NAD, well appearing, no increased WOB  Head NCAT  EOMI grossly, anicteric  MMM  No JVD  RRR, nml S1/S2, no m/r/g  Diminished lung sounds diffusely, no w/r/r  Abd soft, NT, ND, nml BS, no rebound or guarding  AAO, CN's 3-12 grossly intact  LANDRY spontaneously, b/l symmetric 1+ pitting edema  Skin warm, dry

## 2018-04-11 NOTE — ED PROVIDER NOTE - CARE PLAN
Principal Discharge DX:	Congestive heart failure, unspecified chronicity, unspecified heart failure type  Secondary Diagnosis:	Shortness of breath Principal Discharge DX:	Congestive heart failure, unspecified chronicity, unspecified heart failure type  Secondary Diagnosis:	Emphysema of lung  Secondary Diagnosis:	Pleural effusion, bilateral

## 2018-04-11 NOTE — ED PROVIDER NOTE - OBJECTIVE STATEMENT
84yoF with h/o HTN, HLD, carotid stent, on ASA/Plavix, p/w SOB x1 wk, worsened x2-3 days, only present on exertion and not currently. Associated cough with exertion. Denies CP at any time, as well as leg pain or swelling, recent long distance travel, or fever.  Meds: ASA, Plavix, benazepril, metop, filodipine, statin, Keppra for 1 sz 84yoF with h/o dementia, PVD, HTN, HLD, carotid stent, on ASA/Plavix, p/w SOB x1 wk, worsened x2-3 days, only present on exertion and not currently. Associated cough with exertion. Denies CP at any time, as well as leg pain or swelling, recent long distance travel, or fever. Sent from [facility] with sats 76% on R  Meds: ASA, Plavix, benazepril, metop, filodipine, statin, Keppra for 1 sz

## 2018-04-11 NOTE — CONSULT NOTE ADULT - SUBJECTIVE AND OBJECTIVE BOX
Date of Admission: 4/11/18    CHIEF COMPLAINT:   Shortness of breath   Hx obtained from son, daughter and the chart.  Pt has hx of dementia.    HISTORY OF PRESENT ILLNESS: 84yoF with h/o dementia, PVD, HTN, HLD, R carotid stent, femoral artery angioplasty p/w GUSTAFSON x 5 days. SOB is associated with occasional dry cough, no fever, chills, CP. Pt was at PMD's office, was found to have pulse ox 76% on RA and pt was send to the ED. Pt reports having worsening dyspnea on exertion the past few days, having SOB with walking to the bathroom, admits to PND, no orthopnea. Pt is able to walk 1-2 blocks and climb one flight of stairs without SOB. Pt denies fever, chills, headache, dizziness, palpitations, abdominal pain, or dysuria.   - CTA negative for PE, + severe emphysematous changes and bilateral pleural effusion, L>R.   - pt is s/p IV lasix 40mg X1, douneb X1 and solumedol 125mg X1. (11 Apr 2018 04:58)      PAST MEDICAL & SURGICAL HISTORY:  Kidney disease, chronic, stage I (GFR over 89 ml/min)  Peripheral vascular disease  HTN (hypertension)  High cholesterol  CVA (cerebral vascular accident)  History of right common carotid artery stent placement    HEALTH ISSUES - PROBLEM Dx:        FAMILY HISTORY:  Family history of atherosclerosis (Mother)    Allergies:   No Known Allergies        	  Home Medications:  Actonel 150 mg oral tablet: 1 tab(s) orally once a month (11 Apr 2018 06:30)  aspirin 81 mg oral tablet: orally 2 times a week, on tuesday and friday (11 Apr 2018 06:30)  Claritin 10 mg oral tablet: 1 tab(s) orally once a day (11 Apr 2018 06:30)  Keppra 500 mg oral tablet: 1 tab(s) orally 2 times a day (11 Apr 2018 06:30)  Lipitor 10 mg oral tablet: 1 tab(s) orally once a day (11 Apr 2018 06:30)  Lopressor 50 mg oral tablet: 1 tab(s) orally 2 times a day (11 Apr 2018 06:30)  Lotensin 10 mg oral tablet: orally 2 times a day (11 Apr 2018 06:30)  meclizine 25 mg oral tablet: 1  orally 2 times a day, As Needed (11 Apr 2018 06:30)  Plavix 75 mg oral tablet: 1  orally once a day (11 Apr 2018 06:30)  Plendil 10 mg oral tablet, extended release: 1 tab(s) orally once a day (11 Apr 2018 06:30)  Pletal 100 mg oral tablet: 1 tab(s) orally 2 times a day (11 Apr 2018 06:30)  Vitamin D3 400 intl units oral tablet: orally once a day (11 Apr 2018 06:30)    MEDICATIONS  (STANDING):  ALBUTerol/ipratropium for Nebulization 3 milliLiter(s) Nebulizer every 6 hours  amLODIPine   Tablet 10 milliGRAM(s) Oral daily  aspirin  chewable 81 milliGRAM(s) Oral <User Schedule>  atorvastatin 10 milliGRAM(s) Oral at bedtime  cholecalciferol 1000 Unit(s) Oral daily  clopidogrel Tablet 75 milliGRAM(s) Oral daily  furosemide   Injectable 40 milliGRAM(s) IV Push daily  heparin  Injectable 5000 Unit(s) SubCutaneous every 12 hours  levETIRAcetam 500 milliGRAM(s) Oral two times a day  lisinopril 10 milliGRAM(s) Oral daily  loratadine 10 milliGRAM(s) Oral daily  methylPREDNISolone sodium succinate Injectable 60 milliGRAM(s) IV Push daily  metoprolol tartrate 50 milliGRAM(s) Oral two times a day    MEDICATIONS  (PRN):  ALBUTerol/ipratropium for Nebulization 3 milliLiter(s) Nebulizer every 4 hours PRN Shortness of Breath and/or Wheezing  meclizine 25 milliGRAM(s) Oral two times a day PRN Dizziness      SOCIAL HISTORY:    [x ] Smoker  [ ] Alcohol    REVIEW OF SYSTEMS:  CONSTITUTIONAL: No fever, weight loss but has fatigue  CARDIOLOGY: Patient denies chest pain, palp,.    RESPIRATORY: progressive shortness of breath, no wheezing.   NEUROLOGICAL:  no focal deficits to report.  ENDOCRINOLOGICAL: none   GI: no BRBPR, no N,V,diarrhea.    PSYCHIATRY: normal mood and affect  HEENT: no nasal discharge, no ecchymosis  SKIN: no ecchymosis, no breakdown  MUSCULOSKELETAL: Full range of motion x4.      PHYSICAL EXAM:  T(C): 36.1 (04-11-18 @ 07:34), Max: 36.4 (04-11-18 @ 00:16)  HR: 68 (04-11-18 @ 07:34) (68 - 92)  BP: 141/67 (04-11-18 @ 07:34) (110/60 - 144/63)  RR: 18 (04-11-18 @ 07:34) (18 - 18)  SpO2: 94% (04-11-18 @ 07:34) (62% - 94%)  Wt(kg): --  I&O's Summary    General Appearance: Well developed, thin female in mild distress  Cardiovascular:  S1 S2 normal intensity, No JVD, 2/6 BALJINDER at base, no S3.  No peripheral edema  Respiratory: Lungs decreased air entry, no wheezing, few scattered rales in bases.	  Psychiatry: A & O x 3, Mood & affect appropriate  Gastrointestinal:  Soft, Non-tender, no bruits.  Skin: No rashes, No ecchymoses, No cyanosis	  Neurologic: Alert, oriented to person.  No focal motor deficits.  Extremities: Normal range of motion, No clubbing, cyanosis or edema  Vascular: Peripheral pulses palpable 2+ bilaterally    LABS:	 	                          12.2   9.55  )-----------( 328      ( 10 Apr 2018 22:29 )             38.7     04-10    146  |  105  |  30<H>  ----------------------------<  111<H>  5.0   |  29  |  1.2    Ca    9.6      10 Apr 2018 22:29    TPro  6.3  /  Alb  4.1  /  TBili  0.3  /  DBili  x   /  AST  72<H>  /  ALT  174<H>  /  AlkPhos  86  04-10    CARDIAC MARKERS ( 10 Apr 2018 22:29 )  x     / 0.02 ng/mL / x     / x     / x          PT/INR - ( 10 Apr 2018 22:29 )   PT: 10.70 sec;   INR: 0.99 ratio         PTT - ( 10 Apr 2018 22:29 )  PTT:24.5 sec    proBNP: Serum Pro-Brain Natriuretic Peptide: 9750 pg/mL (04-10 @ 22:29)    Lipid Profile:   HgA1c:   TSH:       TELEMETRY EVENTS: 	    ECG:  	Normal sinus rhythm, cannot exclude old septal MI, old inferior MI  RADIOLOGY:  OTHER: 	    PREVIOUS DIAGNOSTIC TESTING:    [x ] Echocardiogram: Hyperdynamic LV systolic function, dilated RV and RA.  Severe pulmonary hypertension, mild aortic stenosis.  [ ]  Catheterization:  [ ] Stress Test:  	  	  ASSESSMENT/PLAN: Date of Admission: 4/11/18    CHIEF COMPLAINT:   Shortness of breath   Hx obtained from son, daughter and the chart.  Pt has hx of dementia.    HISTORY OF PRESENT ILLNESS: 84yoF with h/o dementia, PVD, HTN, HLD, R carotid stent, femoral artery angioplasty p/w GUSTAFSON x 5 days. SOB is associated with occasional dry cough, no fever, chills, CP. Pt was at PMD's office, was found to have pulse ox 76% on RA and pt was send to the ED.  Pt reports having worsening dyspnea on exertion the past few days, having SOB with walking to the bathroom, admits to PND, no orthopnea. Pt is able to walk 1 block and climb one flight of stairs without SOB.  Pt denies fever, chills, headache, dizziness, palpitations, abdominal pain, or dysuria.  No hx of MI, arrhythmias.  - CTA negative for PE, + severe emphysematous changes and bilateral pleural effusion, L>R.   - pt was given IV lasix 40mg X1, douneb X1 and solumedol 125mg X1. (11 Apr 2018 04:58)      PAST MEDICAL & SURGICAL HISTORY:  Kidney disease, chronic, stage I (GFR over 89 ml/min)  Peripheral vascular disease  HTN (hypertension)  High cholesterol  CVA (cerebral vascular accident)  History of right common carotid artery stent placement    FAMILY HISTORY:  Family history of atherosclerosis (Mother)    Allergies:   No Known Allergies    Home Medications:  Actonel 150 mg oral tablet: 1 tab(s) orally once a month (11 Apr 2018 06:30)  aspirin 81 mg oral tablet: orally 2 times a week, on tuesday and friday (11 Apr 2018 06:30)  Claritin 10 mg oral tablet: 1 tab(s) orally once a day (11 Apr 2018 06:30)  Keppra 500 mg oral tablet: 1 tab(s) orally 2 times a day (11 Apr 2018 06:30)  Lipitor 10 mg oral tablet: 1 tab(s) orally once a day (11 Apr 2018 06:30)  Lopressor 50 mg oral tablet: 1 tab(s) orally 2 times a day (11 Apr 2018 06:30)  Lotensin 10 mg oral tablet: orally 2 times a day (11 Apr 2018 06:30)  meclizine 25 mg oral tablet: 1  orally 2 times a day, As Needed (11 Apr 2018 06:30)  Plavix 75 mg oral tablet: 1  orally once a day (11 Apr 2018 06:30)  Plendil 10 mg oral tablet, extended release: 1 tab(s) orally once a day (11 Apr 2018 06:30)  Pletal 100 mg oral tablet: 1 tab(s) orally 2 times a day (11 Apr 2018 06:30)  Vitamin D3 400 intl units oral tablet: orally once a day (11 Apr 2018 06:30)    MEDICATIONS  (STANDING):  ALBUTerol/ipratropium for Nebulization 3 milliLiter(s) Nebulizer every 6 hours  amLODIPine   Tablet 10 milliGRAM(s) Oral daily  aspirin  chewable 81 milliGRAM(s) Oral <User Schedule>  atorvastatin 10 milliGRAM(s) Oral at bedtime  cholecalciferol 1000 Unit(s) Oral daily  clopidogrel Tablet 75 milliGRAM(s) Oral daily  furosemide   Injectable 40 milliGRAM(s) IV Push daily  heparin  Injectable 5000 Unit(s) SubCutaneous every 12 hours  levETIRAcetam 500 milliGRAM(s) Oral two times a day  lisinopril 10 milliGRAM(s) Oral daily  loratadine 10 milliGRAM(s) Oral daily  methylPREDNISolone sodium succinate Injectable 60 milliGRAM(s) IV Push daily  metoprolol tartrate 50 milliGRAM(s) Oral two times a day    MEDICATIONS  (PRN):  ALBUTerol/ipratropium for Nebulization 3 milliLiter(s) Nebulizer every 4 hours PRN Shortness of Breath and/or Wheezing  meclizine 25 milliGRAM(s) Oral two times a day PRN Dizziness      SOCIAL HISTORY:    [x ] Smoker  [ ] Alcohol    REVIEW OF SYSTEMS:  CONSTITUTIONAL: No fever, chills  but has fatigue  CARDIOLOGY: Patient denies chest pain, palp,.    RESPIRATORY: progressive shortness of breath, no wheezing.   NEUROLOGICAL:  no focal deficits to report.  ENDOCRINOLOGICAL: none   GI: no BRBPR, no N,V,diarrhea.    PSYCHIATRY: normal mood and affect  HEENT: no nasal discharge, no ecchymosis  SKIN: no ecchymosis, no breakdown  MUSCULOSKELETAL: Full range of motion x4.      PHYSICAL EXAM:  T(C): 36.1 (04-11-18 @ 07:34), Max: 36.4 (04-11-18 @ 00:16)  HR: 68 (04-11-18 @ 07:34) (68 - 92)  BP: 141/67 (04-11-18 @ 07:34) (110/60 - 144/63)  RR: 18 (04-11-18 @ 07:34) (18 - 18)  SpO2: 94% (04-11-18 @ 07:34) (62% - 94%)  Wt(kg): --  I&O's Summary    General Appearance: Well developed, thin female in mild distress  Cardiovascular:  S1 S2 normal intensity, No JVD, 2/6 BALJINDER at base, no S3.  No peripheral edema  Respiratory: Lungs decreased air entry, no wheezing, few scattered rales in bases.	  Psychiatry: A & O x 3, Mood & affect appropriate  Gastrointestinal:  Soft, Non-tender, no bruits.  Skin: No rashes, No ecchymoses, No cyanosis	  Neurologic: Alert, oriented to person.  No focal motor deficits.  Extremities: Normal range of motion, No clubbing, cyanosis or edema  Vascular: Peripheral pulses 1+ in DP/PT    LABS:	 	                          12.2   9.55  )-----------( 328      ( 10 Apr 2018 22:29 )             38.7     04-10    146  |  105  |  30<H>  ----------------------------<  111<H>  5.0   |  29  |  1.2    Ca    9.6      10 Apr 2018 22:29    TPro  6.3  /  Alb  4.1  /  TBili  0.3  /  DBili  x   /  AST  72<H>  /  ALT  174<H>  /  AlkPhos  86  04-10    CARDIAC MARKERS ( 10 Apr 2018 22:29 )  x     / 0.02 ng/mL / x     / x     / x          PT/INR - ( 10 Apr 2018 22:29 )   PT: 10.70 sec;   INR: 0.99 ratio         PTT - ( 10 Apr 2018 22:29 )  PTT:24.5 sec    proBNP: Serum Pro-Brain Natriuretic Peptide: 9750 pg/mL (04-10 @ 22:29)    Lipid Profile:   HgA1c:   TSH:       TELEMETRY EVENTS: 	    ECG:  	Normal sinus rhythm, cannot exclude old septal MI, old inferior MI  RADIOLOGY:  OTHER: 	    PREVIOUS DIAGNOSTIC TESTING:    [x ] Echocardiogram: Hyperdynamic LV systolic function, dilated RV and RA.  Severe pulmonary hypertension, mild aortic stenosis.  [ ]  Catheterization:  [ ] Stress Test:  	  	  ASSESSMENT/PLAN:

## 2018-04-11 NOTE — H&P ADULT - HISTORY OF PRESENT ILLNESS
· HPI Objective Statement: 84yoF with h/o dementia, PVD, HTN, HLD, carotid stent, on ASA/Plavix, p/w SOB x1 wk, worsened x2-3 days, only present on exertion and not currently. Associated cough with exertion. Denies CP at any time, as well as leg pain or swelling, recent long distance travel, or fever. Sent from [facility] with sats 76% on R Meds: ASA, Plavix, benazepril, metop, filodipine, statin, Keppra for 1 sz 84yoF with h/o dementia, PVD, HTN, HLD, R carotid stent, femoral artery angioplasty p/w GUSTAFSON x 5 days. SOB is associated with occasional dry cough, no fever, chills, CP. Pt was at PMD's office, was found to have pulse ox 76% on RA and pt was send to the ED. Pt reports having worsening dyspnea on exertion the past few days, having SOB with walking to the bathroom, admits to PND, no orthopnea. Pt is able to walk 1-2 blocks and climb one flight of stairs without SOB. Pt denies fever, chills, headache, dizziness, palpitations, abdominal pain, or dysuria.   - CTA negative for PE, + severe emphysematous changes and bilateral pleural effusion, L>R.   - pt is s/p IV lasix 40mg X1, douneb X1 and solumedol 125mg X1.

## 2018-04-11 NOTE — CONSULT NOTE ADULT - SUBJECTIVE AND OBJECTIVE BOX
LILA VELA  MRN-[id number]    HISTORY OF PRESENT ILLNESS:    84yoF with h/o dementia, PVD, HTN, HLD, R carotid stent, femoral artery angioplasty p/w GUSTAFSON x 5 days. SOB is associated with occasional dry cough, no fever, chills, CP. Pt was at PMD's office, was found to have pulse ox 76% on RA and pt was send to the ED. Pt reports having worsening dyspnea on exertion the past few days, having SOB with walking to the bathroom, admits to PND and orthopnea. Pt is able to walk 1-2 blocks and climb one flight of stairs without SOB. Pt denies fever, chills, headache, dizziness, palpitations, abdominal pain, or dysuria.   - CTA negative for PE, + severe emphysematous changes and bilateral pleural effusion, L>R.   - pt is s/p IV lasix 40mg X1, douneb X1 and solumedol 125mg X1.       PMH/PSH:  PAST MEDICAL & SURGICAL HISTORY:  Kidney disease, chronic, stage I (GFR over 89 ml/min)  Peripheral vascular disease  HTN (hypertension)  High cholesterol  CVA (cerebral vascular accident)  History of right common carotid artery stent placement    ALLERGIES:  Allergies    No Known Allergies    Intolerances      SOCIAL HABITS:  tobacco abuse    FAMILY HISTORY:   FAMILY HISTORY:  Family history of atherosclerosis (Mother)      REVIEW OF SYSTEM:  Elements of review of systems are negative or non-applicable except as noted above in HPI section.       HOME MEDICATIONS:  Actonel 150 mg oral tablet  aspirin 81 mg oral tablet  Claritin 10 mg oral tablet  Keppra 500 mg oral tablet  Lipitor 10 mg oral tablet  Lopressor 50 mg oral tablet  Lotensin 10 mg oral tablet  meclizine 25 mg oral tablet  Plavix 75 mg oral tablet  Plendil 10 mg oral tablet, extended release  Pletal 100 mg oral tablet  Vitamin D3 400 intl units oral tablet    MEDICATIONS:  MEDICATIONS  (STANDING):  ALBUTerol/ipratropium for Nebulization 3 milliLiter(s) Nebulizer every 6 hours  aspirin  chewable 81 milliGRAM(s) Oral <User Schedule>  atorvastatin 10 milliGRAM(s) Oral at bedtime  cholecalciferol 1000 Unit(s) Oral daily  clopidogrel Tablet 75 milliGRAM(s) Oral daily  diltiazem    milliGRAM(s) Oral daily  furosemide   Injectable 40 milliGRAM(s) IV Push daily  heparin  Injectable 5000 Unit(s) SubCutaneous every 12 hours  levETIRAcetam 500 milliGRAM(s) Oral two times a day  lisinopril 10 milliGRAM(s) Oral daily  loratadine 10 milliGRAM(s) Oral daily  methylPREDNISolone sodium succinate Injectable 60 milliGRAM(s) IV Push daily  metoprolol tartrate 50 milliGRAM(s) Oral two times a day    MEDICATIONS  (PRN):  ALBUTerol/ipratropium for Nebulization 3 milliLiter(s) Nebulizer every 4 hours PRN Shortness of Breath and/or Wheezing  meclizine 25 milliGRAM(s) Oral two times a day PRN Dizziness        VITALS:   Vital Signs Last 24 Hrs  T(C): 36.1 (11 Apr 2018 07:34), Max: 36.4 (11 Apr 2018 00:16)  T(F): 96.9 (11 Apr 2018 07:34), Max: 97.5 (11 Apr 2018 00:16)  HR: 77 (11 Apr 2018 14:36) (68 - 92)  BP: 108/57 (11 Apr 2018 14:36) (108/57 - 144/63)  BP(mean): --  RR: 18 (11 Apr 2018 14:36) (18 - 18)  SpO2: 93% (11 Apr 2018 19:01) (62% - 96%)    [id number]      PHYSICAL EXAM:    GENERAL: NAD,  HEAD:  Atraumatic, Normocephalic  NECK: Supple, No JVD  CHEST/LUNG: dec breath sounds b/l  HEART: Regular rate and rhythm  ABDOMEN: Soft, Nontender, Nondistended; Bowel sounds present  EXTREMITIES:  Good peripheral Pulses, No clubbing, cyanosis, or edema      LABS:                        12.2   9.55  )-----------( 328      ( 10 Apr 2018 22:29 )             38.7     04-10    146  |  105  |  30<H>  ----------------------------<  111<H>  5.0   |  29  |  1.2    Ca    9.6      10 Apr 2018 22:29    TPro  6.3  /  Alb  4.1  /  TBili  0.3  /  DBili  x   /  AST  72<H>  /  ALT  174<H>  /  AlkPhos  86  04-10    LIVER FUNCTIONS - ( 10 Apr 2018 22:29 )  Alb: 4.1 g/dL / Pro: 6.3 g/dL / ALK PHOS: 86 U/L / ALT: 174 U/L / AST: 72 U/L / GGT: x           CARDIAC MARKERS ( 10 Apr 2018 22:29 )  x     / 0.02 ng/mL / x     / x     / x          PT/INR - ( 10 Apr 2018 22:29 )   PT: 10.70 sec;   INR: 0.99 ratio         PTT - ( 10 Apr 2018 22:29 )  PTT:24.5 sec    Blood Gas Profile - Arterial (04.11.18 @ 14:57)    pH, Arterial: 7.36    pCO2, Arterial: 52 mmHg    pO2, Arterial: 70 mmHg    HCO3, Arterial: 29 mmoL/L    Base Excess, Arterial: 2.9 mmoL/L    Oxygen Saturation, Arterial: 92 %    FIO2, Arterial: 50    Serum Pro-Brain Natriuretic Peptide: 9750 pg/mL (04.10.18 @ 22:29)          ABG & VENT SETTINGS (when applicable)  ABG - ( 11 Apr 2018 19:00 )  pH: 7.37  /  pCO2: 50    /  pO2: 61    / HCO3: 29    / Base Excess: 2.8   /  SaO2: 89                    DIAGNOSTIC STUDIES:  < from: Xray Chest 1 View- PORTABLE-Urgent (04.11.18 @ 15:50) >  Impression:      Bilateral pleural effusions.    < end of copied text >  < from: CT Chest w/ IV Cont (04.11.18 @ 02:48) >  FINDINGS:    PULMONARY EMBOLUS: No pulmonary embolus.    LUNGS, PLEURA AND AIRWAYS:  Severe emphysematous changes of bilateral   lungs. Bilateral pleural effusions, left greater than right, small to   moderate volume. There is adjacent compressive atelectasis. No lobar   consolidation, pleural effusion or pneumothorax. Patent central airways.    1.1 x 0.8 x 2.2 cm irregular nodule in the left upper lobe (series 5,   image 64).    MEDIASTINUM/THORACIC NODES: No lymphadenopathy. Enlarged heart. No   pericardial effusion. Normal caliber main pulmonary artery and thoracic   aorta. Patent aorto-right innominate graft. Left subclavian artery bypass   graft placement. Atherosclerotic calcifications.    VISUALIZED UPPER ABDOMEN: Partially visualized upper abdomen demonstrates   no focal abnormality.    BONES/SOFT TISSUES: Degenerative changes.    IMPRESSION:     No evidence of central or lobar pulmonary embolism.    Severe emphysematous changes of bilateral lungs.     Bilateral pleural effusions, left greater than right, small to moderate   volume.    < end of copied text >  < from: CT Chest w/ IV Cont (04.11.18 @ 02:48) >  FINDINGS:    PULMONARY EMBOLUS: No pulmonary embolus.    LUNGS, PLEURA AND AIRWAYS:  Severe emphysematous changes of bilateral   lungs. Bilateral pleural effusions, left greater than right, small to   moderate volume. There is adjacent compressive atelectasis. No lobar   consolidation, pleural effusion or pneumothorax. Patent central airways.    1.1 x 0.8 x 2.2 cm irregular nodule in the left upper lobe (series 5,   image 64).    MEDIASTINUM/THORACIC NODES: No lymphadenopathy. Enlarged heart. No   pericardial effusion. Normal caliber main pulmonary artery and thoracic   aorta. Patent aorto-right innominate graft. Left subclavian artery bypass   graft placement. Atherosclerotic calcifications.    VISUALIZED UPPER ABDOMEN: Partially visualized upper abdomen demonstrates   no focal abnormality.    BONES/SOFT TISSUES: Degenerative changes.    IMPRESSION:     No evidence of central or lobar pulmonary embolism.    Severe emphysematous changes of bilateral lungs.     Bilateral pleural effusions, left greater than right, small to moderate   volume.    < end of copied text >

## 2018-04-11 NOTE — H&P ADULT - NSHPLABSRESULTS_GEN_ALL_CORE
T(C): 36.3 (04-11-18 @ 04:15), Max: 36.4 (04-11-18 @ 00:16)  T(F): 97.3 (04-11-18 @ 04:15), Max: 97.5 (04-11-18 @ 00:16)  HR: 92 (04-11-18 @ 04:15) (88 - 92)  BP: 144/63 (04-11-18 @ 04:15) (110/60 - 144/63)  RR: 18 (04-11-18 @ 04:15) (18 - 18)  SpO2: 93% (04-11-18 @ 04:15) (62% - 93%)  Labs:                         12.2   9.55    )-----------(   328      ( 10 Apr 2018 22:29 )              38.7     Neutro%  77.5<H>   Lympho%  8.5<L>   Mono%    9.2     Bands    x        04-10    146  |  105  |  30<H>  ----------------------------<  111<H>  5.0   |  29  |  1.2    Ca    9.6      10 Apr 2018 22:29    TPro  6.3  /  Alb  4.1  /  TBili  0.3  /  DBili  x   /  AST  72<H>  /  ALT  174<H>  /  AlkPhos  86  04-10    eGFR if Non African American: 41 mL/min/1.73M2 (04-10-18 @ 22:29)  eGFR if : 48 mL/min/1.73M2 (04-10-18 @ 22:29)      ( 10 Apr 2018 22:29 )   PT: 10.70 sec;   INR: 0.99 ratio;       PTT:24.5 sec    CARDIAC MARKERS ( 10 Apr 2018 22:29 )  x     / 0.02 ng/mL / x     / x     / x          Serum Pro-Brain Natriuretic Peptide: 9750 pg/mL (04-10-18 @ 22:29)    Venous Blood Gas:  04-10 @ 22:17  7.34/52/36/28/61  VBG Lactate: 1.1 T(C): 36.3 (04-11-18 @ 04:15), Max: 36.4 (04-11-18 @ 00:16)  T(F): 97.3 (04-11-18 @ 04:15), Max: 97.5 (04-11-18 @ 00:16)  HR: 92 (04-11-18 @ 04:15) (88 - 92)  BP: 144/63 (04-11-18 @ 04:15) (110/60 - 144/63)  RR: 18 (04-11-18 @ 04:15) (18 - 18)  SpO2: 93% (04-11-18 @ 04:15) (62% - 93%)  Labs:                         12.2   9.55    )-----------(   328      ( 10 Apr 2018 22:29 )              38.7     Neutro%  77.5<H>   Lympho%  8.5<L>   Mono%    9.2     Bands    x        04-10    146  |  105  |  30<H>  ----------------------------<  111<H>  5.0   |  29  |  1.2    Ca    9.6      10 Apr 2018 22:29    TPro  6.3  /  Alb  4.1  /  TBili  0.3  /  DBili  x   /  AST  72<H>  /  ALT  174<H>  /  AlkPhos  86  04-10    eGFR if Non African American: 41 mL/min/1.73M2 (04-10-18 @ 22:29)  eGFR if : 48 mL/min/1.73M2 (04-10-18 @ 22:29)      ( 10 Apr 2018 22:29 )   PT: 10.70 sec;   INR: 0.99 ratio;       PTT:24.5 sec    CARDIAC MARKERS ( 10 Apr 2018 22:29 )  x     / 0.02 ng/mL / x     / x     / x          Serum Pro-Brain Natriuretic Peptide: 9750 pg/mL (04-10-18 @ 22:29)    Venous Blood Gas:  04-10 @ 22:17  7.34/52/36/28/61  VBG Lactate: 1.1    EKG: NSR

## 2018-04-11 NOTE — CONSULT NOTE ADULT - ASSESSMENT
Acute respiratory failure  Severe pulmonary hypertension - suspect severe chronic lung disease  Diastolic dysfunction  Mild aortic stenosis    REC:  Neb treatment  No further diuretics.  Change Amlodipine to Diltiazem  mg daily.  Pulm follow up. Acute respiratory failure  Severe pulmonary hypertension - suspect severe chronic lung disease  Diastolic dysfunction with acute diastolic CHF (given elevated BMP) but is euvolemic now.  Mild aortic stenosis    REC:  Neb treatment  No further diuretics.  Change Amlodipine to Diltiazem  mg daily and reduce Metoprolol to 25 mg q12H.  Discussed with Dr. Milligan, Pulm follow up.

## 2018-04-11 NOTE — H&P ADULT - PMH
CVA (cerebral vascular accident)    High cholesterol    HTN (hypertension)    Kidney disease, chronic, stage I (GFR over 89 ml/min)    Peripheral vascular disease

## 2018-04-11 NOTE — H&P ADULT - ASSESSMENT
83 yo F with PMHx of CVA. PVD, carotid artery stent, femoral artery angioplasty, HTN, DLD presents for worsening SOB X 5 days.    # SOB/dyspnea on exertion likely 2/2 a combination of CHF exacerbation and copd exaceration  - f/u 2D echo  - c/w IV lasix q24  - f/u cardiology: Dr. Geronimo and Pulmonary: Dr. Milligan  - strict Is and Os and daily weight, elevate head of bed  - repeat cxr tomorrow  - IV solumedrol 60mg q24 and douneb treatment and prn    # HTN- controlled  - c/w Norvasc 10 mg and lisinopril 10 mg    # PVD/ right carotid artery stent/ femoral artery angioplasty/ CVA in 2005  - DC cilostazol 2/2 new onset CHF  - c/w ASA, plavix and statin    # DLD  - c/w statin    # dvt ppx  - no need for gi ppx for now

## 2018-04-11 NOTE — CONSULT NOTE ADULT - ASSESSMENT
ARF  CHF-decompensated  COPD  PH- Group 2/3  Pulmonary nodule 2 cm    o2 via nasal cannula  2 decho  lasix 40 mg iv [id number]  monitor i/os, lytes cr-  maintain negative balance  symbicort 160 mg/4.5 2 puff bid  albuterol prn  cardio eval  repeat cxr 48 hours  discussed finding of pulmonary nodule with patient and family in detail regarding suspicion for underlying malignancy,  will benefit from pet scan as outpt  dvt px  will follow closely

## 2018-04-11 NOTE — ED PROVIDER NOTE - PROGRESS NOTE DETAILS
pt endorsed to me by Dr Grande. Pending CT r.o PE and admission spoke to dr madrid for admission (MAR) spoke to Jimmy (covering for [place]). Admit to him, consult Simran for cardiology, consult Dionne Milligan For pulmonary. Informed MAR about these requests spoke to cardiac fellow about the Trop of 0.02. Pt currently has no cp and comfortable. Will admit to Tele. No CCU at this time.

## 2018-04-11 NOTE — ED ADULT NURSE NOTE - OBJECTIVE STATEMENT
Patient reports having dyspnea on exertion x 1 week, went to PMD today and sent here for low pulse oxymetry. Patient denies pain, denies fever, denies cough, denies leg swelling.

## 2018-04-11 NOTE — PROGRESS NOTE ADULT - SUBJECTIVE AND OBJECTIVE BOX
LILA VLEA  84y  Female  patient sob for approx, days  Patient seen in office yesterday by Dr. Marquez  and found to be hypoxic. Patient sent to er    SUBJECTIVE:      Attending Admission Note:  PMH:  copd  cva  htn  ckd stage 3  rt. carotid occlusion   seizure disorder  dld  pad  cognitive impairment/dementia    REVIEW OF SYSTEMS:    T(C): 36.1 (04-11-18 @ 07:34), Max: 36.4 (04-11-18 @ 00:16)  04-10  HR: 77 (04-11-18 @ 14:36) (68 - 92)  BP: 108/57 (04-11-18 @ 14:36) (108/57 - 144/63)  RR: 18 (04-11-18 @ 14:36) (18 - 18)  SpO2: 93% (04-11-18 @ 19:01) (62% - 96%)  Wt(kg): --Vital Signs Last 24 Hrs  T(C): 36.1 (11 Apr 2018 07:34), Max: 36.4 (11 Apr 2018 00:16)  T(F): 96.9 (11 Apr 2018 07:34), Max: 97.5 (11 Apr 2018 00:16)  HR: 77 (11 Apr 2018 14:36) (68 - 92)  BP: 108/57 (11 Apr 2018 14:36) (108/57 - 144/63)  BP(mean): --  RR: 18 (11 Apr 2018 14:36) (18 - 18)  SpO2: 93% (11 Apr 2018 19:01) (62% - 96%)    PHYSICAL EXAM:  neck no masses  lungs decreased bs at bases  cor reg. nlS1 &S2  neuro-alert and confused  no calf tenderness    LABS:                        12.2   9.55  )-----------( 328      ( 10 Apr 2018 22:29 )             38.7   04-10    146  |  105  |  30<H>  ----------------------------<  111<H>  5.0   |  29  |  1.2    Ca    9.6      10 Apr 2018 22:29    TPro  6.3  /  Alb  4.1  /  TBili  0.3  /  DBili  x   /  AST  72<H>  /  ALT  174<H>  /  AlkPhos  86  04-10  Serum Pro-Brain Natriuretic Peptide (04.10.18 @ 22:29)    Serum Pro-Brain Natriuretic Peptide: 9750 pg/mL    HCO3, Arterial: 29 mmoL/L (04.11.18 @ 19:00)      < from: Transthoracic Echocardiogram (04.11.18 @ 11:38) >     EXAM:  2-D ECHO (TTE) COMPLETE        PROCEDURE DATE:  04/11/2018      INTERPRETATION:  REPORT:    TRANSTHORACIC ECHOCARDIOGRAM REPORT         Patient Name:   LILA VELA Accession #: [id number]  Medical Rec #:  [id number]         Height:      61.0 in 154.9 cm  YOB: 1933        Weight:      108.0 lb 48.99 kg  Patient Age:    84 years         BSA:         1.45 m²  Patient Gender: F                BP:          136/70 mmHg       Date of Exam:        4/11/2018 11:38:44 AM  Referring Physician: [id number] DESIREE HWANG  Sonographer:         Karlos armendariz  Reading Physician:   Ravin Nicholson M.D.    Procedure:   2D Echo/Doppler/Color Doppler Complete.  Indications: R06.00 - Dyspnea, unspecified  Diagnosis:   Diastolic dysfunction         Summary:   1. Left ventricular ejection fraction, by visual estimation, is 60 to   Serum Pro-Brain Natriuretic Peptide (04.10.18 @ 22:29)    Serum Pro-Brain Natriuretic Peptide: 9750 pg/mL    65%.   2. Moderately increased LV wall thickness.   3. Spectral Doppler shows impaired relaxation pattern of left   ventricular myocardial filling (Grade Idiastolic dysfunction).   4. There is moderate concentric left ventricular hypertrophy.   5. Moderately reduced RV systolic function.   6. Severely enlarged right ventricle.   7. Severe mitral annular calcification.   8. Moderate tricuspid regurgitation.   9. Estimated pulmonary artery systolic pressure is 58.7 mmHg assuming a   right atrial pressure of 10 mmHg, which is consistent with moderate   pulmonary hypertension.    PHYSICIAN INTERPRETATION:  Left Ventricle: The left ventricular internal cavity size is normal. Left   ventricular wall thickness is moderately increased. There is moderate   concentric left ventricular hypertrophy. Left ventricular ejection   fraction, by visual estimation, is 60 to 65%. Spectral Doppler shows   impaired relaxation pattern of left ventricular myocardial filling (Grade   I diastolic dysfunction).  Right Ventricle: The right ventricular size is severely enlarged. RV   systolic function is moderately reduced.  Left Atrium: Moderately enlarged left atrium.  Right Atrium: Moderately enlarged right atrium.  Mitral Valve: There is severe mitral annular calcification. Mild mitral   valve regurgitation is seen.  Tricuspid Valve: Moderate tricuspid regurgitation is visualized.   Estimated pulmonary artery systolic pressure is 58.7 mmHg assuming a   right atrial pressure of 10 mmHg, which is consistent with moderate     < end of copied text >    RADIOLOGY:    < from: CT Chest w/ IV Cont (04.11.18 @ 02:48) >    EXAM:  CT CHEST IC            PROCEDURE DATE:  04/11/2018            INTERPRETATION:  Clinical History / Reason for exam: Shortness of breath.    TECHNIQUE: Multislice helical sections were obtained from the thoracic   inlet to the lung bases during rapid administration of 80 cc Optiray 320   intravenous contrast using a CTA protocol. 20 cc discarded. Thin sections   were reconstructed through the pulmonary vasculature. Coronal and   sagittal reformatted images are also submitted.    COMPARISONCT: None.    OTHER STUDIES USED FOR CORRELATION: None.    FINDINGS:    PULMONARY EMBOLUS: No pulmonary embolus.    LUNGS, PLEURA AND AIRWAYS:  Severe emphysematous changes of bilateral   lungs. Bilateral pleural effusions, left greater than right, small to   moderate volume. There is adjacent compressive atelectasis. No lobar   consolidation, pleural effusion or pneumothorax. Patent central airways.    1.1 x 0.8 x 2.2 cm irregular nodule in the left upper lobe (series 5,   image 64).    MEDIASTINUM/THORACIC NODES: No lymphadenopathy. Enlarged heart. No   pericardial effusion. Normal caliber main pulmonary artery and thoracic   aorta. Patent aorto-right innominate graft. Left subclavian artery bypass   graft placement. Atherosclerotic calcifications.    VISUALIZED UPPER ABDOMEN: Partially visualized upper abdomen demonstrates   no focal abnormality.    BONES/SOFT TISSUES: Degenerative changes.    IMPRESSION:     No evidence of central or lobar pulmonary embolism.    Severe emphysematous changes of bilateral lungs.     Bilateral pleural effusions, left greater than right, small to moderate   volume.      Dr. Magy Horton discussed preliminary findings with SHAMA LOWERY on   4/11/2018 3:20 AM with readback.    Additional Findings/Recommendations After Attending Radiologist Review:    1.1 x 0.8 x 2.2 cm irregular nodule in the left upper lobe (series 5,   image 64). 3 month follow-up chest CT is recommended.    < end of copied text >    IMPRESSION:  chf  copd exaccerbation  pulm. htn.  hypoxia due to chf,pulm htn and copd  ckd stable  old cva  seizure disorder  cognitive impairment/dementia  lung nodule r/o cancer  pad        PLAN:  lasix iv prn  oxygen  steroids/nebulizer  cardiology consult dr. alejandro  pulm consult dr. gabriela wayne  neuro consult dr. henderson  dvt prophylaxis  I SPENT 60 MINUTES EVALUATING PATIENT/EXAMINING AND COORDINATING CARE AND DISCUSSING TREAMENT WITH FAMILY

## 2018-04-12 NOTE — PROGRESS NOTE ADULT - SUBJECTIVE AND OBJECTIVE BOX
LILA VELA  84y, Female  Allergy: No Known Allergies    PMH/PSH:  PAST MEDICAL & SURGICAL HISTORY:  Kidney disease, chronic, stage I (GFR over 89 ml/min)  Peripheral vascular disease  HTN (hypertension)  High cholesterol  CVA (cerebral vascular accident)  History of right common carotid artery stent placement    ALLERGIES:  Allergies    No Known Allergies    Intolerances          LAST 24-Hr EVENTS:  pt seen/examined  shortness of breath improved  feels better overall    VITALS:  T(F): 98 (04-12-18 @ 08:14), Max: 98 (04-12-18 @ 08:14)  HR: 65 (04-12-18 @ 08:14)  BP: 116/55 (04-12-18 @ 08:14) (100/52 - 116/55)  RR: 18 (04-12-18 @ 08:14)  SpO2: 96% (04-12-18 @ 08:14)    PHYSICAL EXAM:    GENERAL: NAD, well-developed  NECK: Supple, No JVD  CHEST/LUNG: dec breath sounds b/l  HEART: Regular rate and rhythm; No murmurs.  ABDOMEN: Soft, Nontender, Nondistended; Bowel sounds present  EXTREMITIES:  No clubbing, edema absent        TESTS & MEASUREMENTS:    IN: 0 mL / OUT: 200 mL / NET: -200 mL    IN: 0 mL / OUT: 150 mL / NET: -150 mL                            12.0   8.43  )-----------( 274      ( 12 Apr 2018 08:50 )             37.7     PT/INR - ( 10 Apr 2018 22:29 )   PT: 10.70 sec;   INR: 0.99 ratio         PTT - ( 10 Apr 2018 22:29 )  PTT:24.5 sec  04-12    146  |  105  |  30<H>  ----------------------------<  127<H>  4.9   |  31  |  1.1    Ca    9.0      12 Apr 2018 08:50  Mg     1.9     04-12    TPro  6.3  /  Alb  4.1  /  TBili  0.3  /  DBili  x   /  AST  72<H>  /  ALT  174<H>  /  AlkPhos  86  04-10    LIVER FUNCTIONS - ( 10 Apr 2018 22:29 )  Alb: 4.1 g/dL / Pro: 6.3 g/dL / ALK PHOS: 86 U/L / ALT: 174 U/L / AST: 72 U/L / GGT: x           CARDIAC MARKERS ( 10 Apr 2018 22:29 )  x     / 0.02 ng/mL / x     / x     / x                ABG & VENT SETTINGS: (when applicable)  ABG - ( 11 Apr 2018 19:00 )  pH: 7.37  /  pCO2: 50    /  pO2: 61    / HCO3: 29    / Base Excess: 2.8   /  SaO2: 89                    RADIOLOGY & ADDITIONAL TESTS:    < from: Xray Chest 1 View- PORTABLE-Routine (04.12.18 @ 06:33) >    Impression:      Stable bilateral pleural effusions.    < end of copied text >      MEDICATIONS:  MEDICATIONS  (STANDING):  ALBUTerol/ipratropium for Nebulization 3 milliLiter(s) Nebulizer every 6 hours  aspirin  chewable 81 milliGRAM(s) Oral <User Schedule>  atorvastatin 10 milliGRAM(s) Oral at bedtime  buDESOnide 160 MICROgram(s)/formoterol 4.5 MICROgram(s) Inhaler 2 Puff(s) Inhalation two times a day  cholecalciferol 1000 Unit(s) Oral daily  clopidogrel Tablet 75 milliGRAM(s) Oral daily  diltiazem    milliGRAM(s) Oral daily  furosemide   Injectable 40 milliGRAM(s) IV Push every 12 hours  heparin  Injectable 5000 Unit(s) SubCutaneous every 12 hours  levETIRAcetam 500 milliGRAM(s) Oral two times a day  lisinopril 10 milliGRAM(s) Oral daily  loratadine 10 milliGRAM(s) Oral daily  methylPREDNISolone sodium succinate Injectable 60 milliGRAM(s) IV Push daily  metoprolol tartrate 25 milliGRAM(s) Oral two times a day    MEDICATIONS  (PRN):  ALBUTerol/ipratropium for Nebulization 3 milliLiter(s) Nebulizer every 4 hours PRN Shortness of Breath and/or Wheezing  meclizine 25 milliGRAM(s) Oral two times a day PRN Dizziness        < from: Transthoracic Echocardiogram (04.11.18 @ 11:38) >  Summary:   1. Left ventricular ejection fraction, by visual estimation, is 60 to   65%.   2. Moderately increased LV wall thickness.   3. Spectral Doppler shows impaired relaxation pattern of left   ventricular myocardial filling (Grade Idiastolic dysfunction).   4. There is moderate concentric left ventricular hypertrophy.   5. Moderately reduced RV systolic function.   6. Severely enlarged right ventricle.   7. Severe mitral annular calcification.   8. Moderate tricuspid regurgitation.   9. Estimated pulmonary artery systolic pressure is 58.7 mmHg assuming a   right atrial pressure of 10 mmHg, which is consistent with moderate   pulmonary hypertension.    < end of copied text >

## 2018-04-12 NOTE — PROGRESS NOTE ADULT - ASSESSMENT
83 yo F presents for worsening SOB X 5 days.    # SOB/dyspnea on exertion likely 2/2 to concurrent diastolic CHF exacerbation, pulmonary hypertension and copd exacerbation   - c/w IV lasix BID  - strict Is and Os and daily weight, elevate head of bed  - repeat cxr this morning  - IV solumedrol 60mg q24   -Symbicort BID, nebs PRN  -Pulmonary nodule work up via pet scan as outpatient     # HTN- controlled  - c/w diltiazem, metoprolol, lisinopril     # PVD/ right carotid artery stent/ femoral artery angioplasty/ CVA in 2005  - DC cilostazol 2/2 new onset CHF  - c/w ASA, plavix and statin    # DLD  - c/w statin 83 yo F presents for worsening SOB X 5 days.    # SOB/dyspnea on exertion likely 2/2 to concurrent diastolic CHF exacerbation, pulmonary hypertension and copd exacerbation   - c/w IV lasix BID  - strict Is and Os and daily weight, elevate head of bed  - repeat cxr this morning  - IV solumedrol 60mg q24   -Symbicort BID, nebs PRN  -Pulmonary nodule work up via pet scan as outpatient     # HTN- controlled  - c/w diltiazem, metoprolol, lisinopril     # PVD/ right carotid artery stent/ femoral artery angioplasty/ CVA in 2005  - DC cilostazol 2/2 new onset CHF  - c/w ASA, plavix and statin    # DLD  - c/w statin    #Dementia/Seizure  -Awaiting neuro evaluation as per PMD

## 2018-04-12 NOTE — PROGRESS NOTE ADULT - ASSESSMENT
ARF  CHF-decompensated  COPD  PH- Group 2/3  Pulmonary nodule 2 cm    o2 via nasal cannula, check pulse ox on ra  switch iv lasix to po per cardio  monitor i/os, lytes cr-  symbicort 160 mg/4.5 2 puff bid  d/c iv steroids please  albuterol prn  discussed finding of pulmonary nodule with patient and family in detail regarding suspicion for underlying malignancy,  will benefit from pet scan as outpt  dvt px

## 2018-04-12 NOTE — PROGRESS NOTE ADULT - SUBJECTIVE AND OBJECTIVE BOX
04-12-18 @ 12:54    DANE LILA  84y  Female  seen OOB to chair. comfortable. PO2 now 93 - 94 at 2 L NC.   She feels well.     INTERVAL EVENTS: none    MEDICATIONS  (STANDING):  ALBUTerol/ipratropium for Nebulization 3 milliLiter(s) Nebulizer every 6 hours  aspirin  chewable 81 milliGRAM(s) Oral <User Schedule>  atorvastatin 10 milliGRAM(s) Oral at bedtime  buDESOnide 160 MICROgram(s)/formoterol 4.5 MICROgram(s) Inhaler 2 Puff(s) Inhalation two times a day  cholecalciferol 1000 Unit(s) Oral daily  clopidogrel Tablet 75 milliGRAM(s) Oral daily  diltiazem    milliGRAM(s) Oral daily  donepezil 5 milliGRAM(s) Oral at bedtime  heparin  Injectable 5000 Unit(s) SubCutaneous every 12 hours  levETIRAcetam 500 milliGRAM(s) Oral two times a day  lisinopril 10 milliGRAM(s) Oral daily  loratadine 10 milliGRAM(s) Oral daily  metoprolol tartrate 25 milliGRAM(s) Oral two times a day    MEDICATIONS  (PRN):  ALBUTerol/ipratropium for Nebulization 3 milliLiter(s) Nebulizer every 4 hours PRN Shortness of Breath and/or Wheezing  meclizine 25 milliGRAM(s) Oral two times a day PRN Dizziness      T(C): 36.7 (04-12-18 @ 08:14), Max: 36.7 (04-12-18 @ 08:14)  HR: 65 (04-12-18 @ 08:14) (59 - 81)  BP: 116/55 (04-12-18 @ 08:14) (100/52 - 116/55)  RR: 18 (04-12-18 @ 08:14) (18 - 18)  SpO2: 96% (04-12-18 @ 08:14) (79% - 97%)  Wt(kg): --Vital Signs Last 24 Hrs  T(C): 36.7 (12 Apr 2018 08:14), Max: 36.7 (12 Apr 2018 08:14)  T(F): 98 (12 Apr 2018 08:14), Max: 98 (12 Apr 2018 08:14)  HR: 65 (12 Apr 2018 08:14) (59 - 81)  BP: 116/55 (12 Apr 2018 08:14) (100/52 - 116/55)  BP(mean): --  RR: 18 (12 Apr 2018 08:14) (18 - 18)  SpO2: 96% (12 Apr 2018 08:14) (79% - 97%)    PHYSICAL EXAM:  GENERAL:   NECK:   CHEST/LUNG:  HEART: S1  ABDOMEN:   EXTREMITIES:                           12.0   8.43  )-----------( 274      ( 12 Apr 2018 08:50 )             37.7     04-12    146  |  105  |  30<H>  ----------------------------<  127<H>  4.9   |  31  |  1.1    Ca    9.0      12 Apr 2018 08:50  Mg     1.9     04-12    TPro  6.3  /  Alb  4.1  /  TBili  0.3  /  DBili  x   /  AST  72<H>  /  ALT  174<H>  /  AlkPhos  86  04-10      PT/INR - ( 10 Apr 2018 22:29 )   PT: 10.70 sec;   INR: 0.99 ratio         PTT - ( 10 Apr 2018 22:29 )  PTT:24.5 sec      RADIOLOGY & ADDITIONAL TESTS:      ASSESSMENT / PLAN  :    HEALTH ISSUES - PROBLEM Dx: 04-12-18 @ 12:54    DANE LILA  84y  Female  seen OOB to chair. comfortable. PO2 now 93 - 94 at 2 L NC.   She feels well.     INTERVAL EVENTS: none    MEDICATIONS  (STANDING):  ALBUTerol/ipratropium for Nebulization 3 milliLiter(s) Nebulizer every 6 hours  aspirin  chewable 81 milliGRAM(s) Oral <User Schedule>  atorvastatin 10 milliGRAM(s) Oral at bedtime  buDESOnide 160 MICROgram(s)/formoterol 4.5 MICROgram(s) Inhaler 2 Puff(s) Inhalation two times a day  cholecalciferol 1000 Unit(s) Oral daily  clopidogrel Tablet 75 milliGRAM(s) Oral daily  diltiazem    milliGRAM(s) Oral daily  donepezil 5 milliGRAM(s) Oral at bedtime  heparin  Injectable 5000 Unit(s) SubCutaneous every 12 hours  levETIRAcetam 500 milliGRAM(s) Oral two times a day  lisinopril 10 milliGRAM(s) Oral daily  loratadine 10 milliGRAM(s) Oral daily  metoprolol tartrate 25 milliGRAM(s) Oral two times a day    MEDICATIONS  (PRN):  ALBUTerol/ipratropium for Nebulization 3 milliLiter(s) Nebulizer every 4 hours PRN Shortness of Breath and/or Wheezing  meclizine 25 milliGRAM(s) Oral two times a day PRN Dizziness      T(C): 36.7 (04-12-18 @ 08:14), Max: 36.7 (04-12-18 @ 08:14)  HR: 65 (04-12-18 @ 08:14) (59 - 81)  BP: 116/55 (04-12-18 @ 08:14) (100/52 - 116/55)  RR: 18 (04-12-18 @ 08:14) (18 - 18)  SpO2: 96% (04-12-18 @ 08:14) (79% - 97%)  Wt(kg): --Vital Signs Last 24 Hrs  T(C): 36.7 (12 Apr 2018 08:14), Max: 36.7 (12 Apr 2018 08:14)  T(F): 98 (12 Apr 2018 08:14), Max: 98 (12 Apr 2018 08:14)  HR: 65 (12 Apr 2018 08:14) (59 - 81)  BP: 116/55 (12 Apr 2018 08:14) (100/52 - 116/55)  BP(mean): --  RR: 18 (12 Apr 2018 08:14) (18 - 18)  SpO2: 96% (12 Apr 2018 08:14) (79% - 97%)    PHYSICAL EXAM:  GENERAL: Thin built, no distress. Amiable, though not coherent in replies.   NECK: supple, no JVD  CHEST/LUNG: B/L AE, no wheeze.   HEART: S1S2 reg, no gallop  ABDOMEN: benign.   EXTREMITIES: no edema. Dry skin.                          12.0   8.43  )-----------( 274      ( 12 Apr 2018 08:50 )             37.7     04-12    146  |  105  |  30<H>  ----------------------------<  127<H>  4.9   |  31  |  1.1    Ca    9.0      12 Apr 2018 08:50  Mg     1.9     04-12    TPro  6.3  /  Alb  4.1  /  TBili  0.3  /  DBili  x   /  AST  72<H>  /  ALT  174<H>  /  AlkPhos  86  04-10      PT/INR - ( 10 Apr 2018 22:29 )   PT: 10.70 sec;   INR: 0.99 ratio         PTT - ( 10 Apr 2018 22:29 )  PTT:24.5 sec      RADIOLOGY & ADDITIONAL TESTS:      < from: Xray Chest 1 View- PORTABLE-Routine (04.12.18 @ 06:33) >  Lung parenchyma/Pleura: Bilateral pleural effusions. Stable increased   interstitial markings. No consolidation or pneumothorax. Skinfold   overlies left upper lung zone.    Skeleton/soft tissues: Stable    Impression:      Stable bilateral pleural effusions.    Stable increased interstitial markings    CXR   4/10 :    Lung parenchyma/Pleura: Bilateral coarsened interstitial lung disease,   worse since prior. Small bilateral pleural effusions with basilar   atelectasis . Known left upper lung field nodule is better assessed on    chest CT of same date    Skeleton/soft tissues: Stable. Vascular stent reidentified    Impression:      Cardiomegaly with increased interstitial opacities and small effusion   consistent with CHF    Nodular left upper lung field opacity, best assessed on  chest   CT of 4/11/2018    Underlying chronic interstitial lung disease    TTE :    < from: Transthoracic Echocardiogram (04.11.18 @ 11:38) >  Summary:   1. Left ventricular ejection fraction, by visual estimation, is 60 to   65%.   2. Moderately increased LV wall thickness.   3. Spectral Doppler shows impaired relaxation pattern of left   ventricular myocardial filling (Grade Idiastolic dysfunction).   4. There is moderate concentric left ventricular hypertrophy.   5. Moderately reduced RV systolic function.   6. Severely enlarged right ventricle.   7. Severe mitral annular calcification.   8. Moderate tricuspid regurgitation.   9. Estimated pulmonary artery systolic pressure is 58.7 mmHg assuming a   right atrial pressure of 10 mmHg, which is consistent with moderate   pulmonary hypertension.    CT chest w/ C :    < from: CT Chest w/ IV Cont (04.11.18 @ 02:48) >  IMPRESSION:     No evidence of central or lobar pulmonary embolism.    Severe emphysematous changes of bilateral lungs.     Bilateral pleural effusions, left greater than right, small to moderate   volume.      Dr. Magy Horton discussed preliminary findings with SHAMA LOWERY on   4/11/2018 3:20 AM with readback.    < end of copied text >    ASSESSMENT / PLAN  :    Hypoxemia : Seen by pulm. w/u c/w chronic lung disease. Sx-atically she is OK. Currently doing well w/ inhalers as advised, & low flow supplemental O2. To cont as adv by pulm.     Pulm HTN : Cont med Tx. as adv by Cardio.     Diast. Dysfx in Echo. : Started on high dose lasix. However, clinically as well as labs c/w relative dehydration. Will lower dose to 20 mg daily.     Lung nodule : incidental finding on chest CT. To be observed. 04-12-18 @ 12:54    DANE LILA  84y  Female  seen OOB to chair. comfortable. PO2 now 93 - 94 at 2 L NC.   She feels well.     INTERVAL EVENTS: none    MEDICATIONS  (STANDING):  ALBUTerol/ipratropium for Nebulization 3 milliLiter(s) Nebulizer every 6 hours  aspirin  chewable 81 milliGRAM(s) Oral <User Schedule>  atorvastatin 10 milliGRAM(s) Oral at bedtime  buDESOnide 160 MICROgram(s)/formoterol 4.5 MICROgram(s) Inhaler 2 Puff(s) Inhalation two times a day  cholecalciferol 1000 Unit(s) Oral daily  clopidogrel Tablet 75 milliGRAM(s) Oral daily  diltiazem    milliGRAM(s) Oral daily  donepezil 5 milliGRAM(s) Oral at bedtime  heparin  Injectable 5000 Unit(s) SubCutaneous every 12 hours  levETIRAcetam 500 milliGRAM(s) Oral two times a day  lisinopril 10 milliGRAM(s) Oral daily  loratadine 10 milliGRAM(s) Oral daily  metoprolol tartrate 25 milliGRAM(s) Oral two times a day    MEDICATIONS  (PRN):  ALBUTerol/ipratropium for Nebulization 3 milliLiter(s) Nebulizer every 4 hours PRN Shortness of Breath and/or Wheezing  meclizine 25 milliGRAM(s) Oral two times a day PRN Dizziness      T(C): 36.7 (04-12-18 @ 08:14), Max: 36.7 (04-12-18 @ 08:14)  HR: 65 (04-12-18 @ 08:14) (59 - 81)  BP: 116/55 (04-12-18 @ 08:14) (100/52 - 116/55)  RR: 18 (04-12-18 @ 08:14) (18 - 18)  SpO2: 96% (04-12-18 @ 08:14) (79% - 97%)  Wt(kg): --Vital Signs Last 24 Hrs  T(C): 36.7 (12 Apr 2018 08:14), Max: 36.7 (12 Apr 2018 08:14)  T(F): 98 (12 Apr 2018 08:14), Max: 98 (12 Apr 2018 08:14)  HR: 65 (12 Apr 2018 08:14) (59 - 81)  BP: 116/55 (12 Apr 2018 08:14) (100/52 - 116/55)  BP(mean): --  RR: 18 (12 Apr 2018 08:14) (18 - 18)  SpO2: 96% (12 Apr 2018 08:14) (79% - 97%)    PHYSICAL EXAM:  GENERAL: Thin built, no distress. Amiable, though not coherent in replies.   NECK: supple, no JVD  CHEST/LUNG: B/L AE, no wheeze.   HEART: S1S2 reg, no gallop  ABDOMEN: benign.   EXTREMITIES: no edema. Dry skin.                            12.0   8.43  )-----------( 274      ( 12 Apr 2018 08:50 )             37.7     04-12    146  |  105  |  30<H>  ----------------------------<  127<H>  4.9   |  31  |  1.1    Ca    9.0      12 Apr 2018 08:50  Mg     1.9     04-12    TPro  6.3  /  Alb  4.1  /  TBili  0.3  /  DBili  x   /  AST  72<H>  /  ALT  174<H>  /  AlkPhos  86  04-10      PT/INR - ( 10 Apr 2018 22:29 )   PT: 10.70 sec;   INR: 0.99 ratio         PTT - ( 10 Apr 2018 22:29 )  PTT:24.5 sec      RADIOLOGY & ADDITIONAL TESTS:      < from: Xray Chest 1 View- PORTABLE-Routine (04.12.18 @ 06:33) >  Lung parenchyma/Pleura: Bilateral pleural effusions. Stable increased   interstitial markings. No consolidation or pneumothorax. Skinfold   overlies left upper lung zone.    Skeleton/soft tissues: Stable    Impression:      Stable bilateral pleural effusions.    Stable increased interstitial markings    CXR   4/10 :    Lung parenchyma/Pleura: Bilateral coarsened interstitial lung disease,   worse since prior. Small bilateral pleural effusions with basilar   atelectasis . Known left upper lung field nodule is better assessed on    chest CT of same date    Skeleton/soft tissues: Stable. Vascular stent reidentified    Impression:      Cardiomegaly with increased interstitial opacities and small effusion   consistent with CHF    Nodular left upper lung field opacity, best assessed on  chest   CT of 4/11/2018    Underlying chronic interstitial lung disease    TTE :    < from: Transthoracic Echocardiogram (04.11.18 @ 11:38) >  Summary:   1. Left ventricular ejection fraction, by visual estimation, is 60 to   65%.   2. Moderately increased LV wall thickness.   3. Spectral Doppler shows impaired relaxation pattern of left   ventricular myocardial filling (Grade Idiastolic dysfunction).   4. There is moderate concentric left ventricular hypertrophy.   5. Moderately reduced RV systolic function.   6. Severely enlarged right ventricle.   7. Severe mitral annular calcification.   8. Moderate tricuspid regurgitation.   9. Estimated pulmonary artery systolic pressure is 58.7 mmHg assuming a   right atrial pressure of 10 mmHg, which is consistent with moderate   pulmonary hypertension.    CT chest w/ C :    < from: CT Chest w/ IV Cont (04.11.18 @ 02:48) >  IMPRESSION:     No evidence of central or lobar pulmonary embolism.    Severe emphysematous changes of bilateral lungs.     Bilateral pleural effusions, left greater than right, small to moderate   volume.      Dr. Magy Horton discussed preliminary findings with SHAMA LOWERY on   4/11/2018 3:20 AM with readback.    < end of copied text >    ASSESSMENT / PLAN  :    Hypoxemia : Seen by pulm. w/u c/w chronic lung disease. Sx-atically she is OK. Currently doing well w/ inhalers as advised, & low flow supplemental O2. To cont as adv by pulm.     Pulm HTN : Cont med Tx. as adv by Cardio.     Diast. Dysfx in Echo. : Started on high dose lasix. However, clinically as well as labs c/w relative dehydration. Will lower dose to 20 mg daily.     Lung nodule : incidental finding on chest CT. To be observed. Family doesn't want to pursue further, as discussed by pulm.     Hx CKD. Her labs c/w dehydration. Observe, monitor. Renal Fx stable.     Known hx PAD, car stenosis. Stable w/o sx. Observe.     Elevated BNP : To be rptd. ? 2' predominantly R heart failure, though w/o fluid overload.     Cont supp  & routine preventive Tx.

## 2018-04-12 NOTE — PROGRESS NOTE ADULT - SUBJECTIVE AND OBJECTIVE BOX
LILA VELA, female, 84y ([birth year]),   MRN-[id number]  Admit Date: 04-11-18 (1d)    Chief Complaint:  Patient is a 84y old  Female who presents with a chief complaint of worsening GUSTAFSON X 5 days (11 Apr 2018 04:58)      Past Medical and Surgical History:  PAST MEDICAL & SURGICAL HISTORY:  Kidney disease, chronic, stage I (GFR over 89 ml/min)  Peripheral vascular disease  HTN (hypertension)  High cholesterol  CVA (cerebral vascular accident)  History of right common carotid artery stent placement      Current Medications:  MEDICATIONS  (STANDING):  ALBUTerol/ipratropium for Nebulization 3 milliLiter(s) Nebulizer every 6 hours  aspirin  chewable 81 milliGRAM(s) Oral <User Schedule>  atorvastatin 10 milliGRAM(s) Oral at bedtime  buDESOnide 160 MICROgram(s)/formoterol 4.5 MICROgram(s) Inhaler 2 Puff(s) Inhalation two times a day  cholecalciferol 1000 Unit(s) Oral daily  clopidogrel Tablet 75 milliGRAM(s) Oral daily  diltiazem    milliGRAM(s) Oral daily  furosemide   Injectable 40 milliGRAM(s) IV Push every 12 hours  heparin  Injectable 5000 Unit(s) SubCutaneous every 12 hours  levETIRAcetam 500 milliGRAM(s) Oral two times a day  lisinopril 10 milliGRAM(s) Oral daily  loratadine 10 milliGRAM(s) Oral daily  methylPREDNISolone sodium succinate Injectable 60 milliGRAM(s) IV Push daily  metoprolol tartrate 25 milliGRAM(s) Oral two times a day    MEDICATIONS  (PRN):  ALBUTerol/ipratropium for Nebulization 3 milliLiter(s) Nebulizer every 4 hours PRN Shortness of Breath and/or Wheezing  meclizine 25 milliGRAM(s) Oral two times a day PRN Dizziness      Interval History:  No acute events overnight. No complaints at this time.    Vital Signs:  T(F): 97.2 (04-12-18 @ 06:00), Max: 97.9 (04-12-18 @ 00:23)  HR: 59 (04-12-18 @ 06:00) (59 - 92)  BP: 100/52 (04-12-18 @ 06:00) (100/52 - 144/63)  RR: 18 (04-12-18 @ 06:00) (18 - 18)  SpO2: 97% (04-12-18 @ 06:00) (62% - 97%)  CAPILLARY BLOOD GLUCOSE          Physical Exam:  General: Not in distress.   HEENT: Moist mucus membranes. PERRLA.  Cardio: Regular rate and rhythm, S1, S2, no murmur, rub, or gallop.  Pulm: Clear to auscultation bilaterally. No wheezing, rales, or rhonchi.  Abdomen: Soft, non-tender, non-distended. Normoactive bowel sounds.  Extremities: No cyanosis or edema bilaterally. No calf tenderness to palpation.  Neuro: A&O x3. No focal deficits. CN II - XII grossly intact.    Labs and Imaging:  CBC Full  -  ( 10 Apr 2018 22:29 )  WBC Count : 9.55 K/uL  Hemoglobin : 12.2 g/dL  Hematocrit : 38.7 %  Platelet Count - Automated : 328 K/uL  Mean Cell Volume : 93.9 fL  Mean Cell Hemoglobin : 29.6 pg  Mean Cell Hemoglobin Concentration : 31.5 g/dL  Auto Neutrophil # : 7.40 K/uL  Auto Lymphocyte # : 0.81 K/uL  Auto Monocyte # : 0.88 K/uL  Auto Eosinophil # : 0.34 K/uL  Auto Basophil # : 0.05 K/uL  Auto Neutrophil % : 77.5 %  Auto Lymphocyte % : 8.5 %  Auto Monocyte % : 9.2 %  Auto Eosinophil % : 3.6 %  Auto Basophil % : 0.5 %    RDW:   PT/INR/PTT: 04-10-18 @ 22:29  10.70 | 24.5        ^      0.99      LFTs: 04-10-18 @ 22:29  TP  6.3  | 4.1 Alb   ---------------  TB  0.3  | --  DB   ---------------   | 72  AST            ^          86  ALK          Cardiac Enzymes:    Urinalysis:    Cultures:      Home Medications:  Home Medications:  Actonel 150 mg oral tablet: 1 tab(s) orally once a month (11 Apr 2018 06:30)  aspirin 81 mg oral tablet: orally 2 times a week, on tuesday and friday (11 Apr 2018 06:30)  Claritin 10 mg oral tablet: 1 tab(s) orally once a day (11 Apr 2018 06:30)  Keppra 500 mg oral tablet: 1 tab(s) orally 2 times a day (11 Apr 2018 06:30)  Lipitor 10 mg oral tablet: 1 tab(s) orally once a day (11 Apr 2018 06:30)  Lopressor 50 mg oral tablet: 1 tab(s) orally 2 times a day (11 Apr 2018 06:30)  Lotensin 10 mg oral tablet: orally 2 times a day (11 Apr 2018 06:30)  meclizine 25 mg oral tablet: 1  orally 2 times a day, As Needed (11 Apr 2018 06:30)  Plavix 75 mg oral tablet: 1  orally once a day (11 Apr 2018 06:30)  Plendil 10 mg oral tablet, extended release: 1 tab(s) orally once a day (11 Apr 2018 06:30)  Pletal 100 mg oral tablet: 1 tab(s) orally 2 times a day (11 Apr 2018 06:30)  Vitamin D3 400 intl units oral tablet: orally once a day (11 Apr 2018 06:30)

## 2018-04-12 NOTE — CONSULT NOTE ADULT - ASSESSMENT
An 85 yo woman with multiple comorbidities adm for COPD exacerbation, has progressive memory loss.  - Memory loss most likely Dementia - vascular vs AD, r/o pseudo dementia.  - h/o CVA w/o residual neurological deficit  - h/o HTN, PVD, HLD    Plan:  - CT head w/o contrast to r/o structural lesion  - TSH, RPR, Vit B12, folate  - formal psychological testing for dementia as OP after d/c  - start Aricept 5mg po qd if OK with pulmonologist  - f/u with neurology as OP after d/c  - please call us for any questions

## 2018-04-12 NOTE — CONSULT NOTE ADULT - SUBJECTIVE AND OBJECTIVE BOX
Neurology consult    LILA [id number]    HPI:  84yoF with h/o dementia, PVD, HTN, HLD, R carotid stent, femoral artery angioplasty p/w GUSTAFSON x 5 days. SOB is associated with occasional dry cough, no fever, chills, CP. Pt was at PMD's office, was found to have pulse ox 76% on RA and pt was send to the ED. Pt reports having worsening dyspnea on exertion the past few days, having SOB with walking to the bathroom, admits to PND, no orthopnea. Pt is able to walk 1-2 blocks and climb one flight of stairs without SOB. Pt denies fever, chills, headache, dizziness, palpitations, abdominal pain, or dysuria.   - CTA negative for PE, + severe emphysematous changes and bilateral pleural effusion, L>R.   - pt is s/p IV lasix 40mg X1, douneb X1 and solumedol 125mg X1. (11 Apr 2018 04:58)  neurology was called to evaluate for progressive memory loss for the past 1-2 yrs. Pt thinks that she has no problems with memory.  Denies focal weakness, numbness, neck pain, incontinence, change in gait. Has h/o CVAs in the past w/o residual neuro deficit.    MEDICATIONS    ALBUTerol/ipratropium for Nebulization 3 milliLiter(s) Nebulizer every 6 hours  ALBUTerol/ipratropium for Nebulization 3 milliLiter(s) Nebulizer every 4 hours PRN  aspirin  chewable 81 milliGRAM(s) Oral <User Schedule>  atorvastatin 10 milliGRAM(s) Oral at bedtime  buDESOnide 160 MICROgram(s)/formoterol 4.5 MICROgram(s) Inhaler 2 Puff(s) Inhalation two times a day  cholecalciferol 1000 Unit(s) Oral daily  clopidogrel Tablet 75 milliGRAM(s) Oral daily  diltiazem    milliGRAM(s) Oral daily  furosemide   Injectable 40 milliGRAM(s) IV Push every 12 hours  heparin  Injectable 5000 Unit(s) SubCutaneous every 12 hours  levETIRAcetam 500 milliGRAM(s) Oral two times a day  lisinopril 10 milliGRAM(s) Oral daily  loratadine 10 milliGRAM(s) Oral daily  meclizine 25 milliGRAM(s) Oral two times a day PRN  methylPREDNISolone sodium succinate Injectable 60 milliGRAM(s) IV Push daily  metoprolol tartrate 25 milliGRAM(s) Oral two times a day    PAST MEDICAL & SURGICAL HISTORY:  Kidney disease, chronic, stage I (GFR over 89 ml/min)  Peripheral vascular disease  HTN (hypertension)  High cholesterol  CVA (cerebral vascular accident)  History of right common carotid artery stent placement     Family history: No history of dementia, strokes, or seizures   FAMILY HISTORY:  Family history of atherosclerosis (Mother)     Social History (marital status, living situation, occupation, tobacco use, alcohol and drug use, and sexual history): Former smoker: quit in 2005, 1 ppd for 40 years    Allergies  No Known Allergies    Vital Signs Last 24 Hrs  T(C): 36.7 (12 Apr 2018 08:14), Max: 36.7 (12 Apr 2018 08:14)  T(F): 98 (12 Apr 2018 08:14), Max: 98 (12 Apr 2018 08:14)  HR: 65 (12 Apr 2018 08:14) (59 - 81)  BP: 116/55 (12 Apr 2018 08:14) (100/52 - 116/55)  BP(mean): --  RR: 18 (12 Apr 2018 08:14) (18 - 18)  SpO2: 96% (12 Apr 2018 08:14) (79% - 97%)    CONGESTIVE HEART FAILURE  ^PHYS SENT FOR SOB  H/o or current diagnosis of HF- ACEI/ARB contraindication unknown  Family history of atherosclerosis (Mother)  Handoff  MEWS Score  Kidney disease, chronic, stage I (GFR over 89 ml/min)  Peripheral vascular disease  HTN (hypertension)  High cholesterol  CVA (cerebral vascular accident)  Congestive heart failure, unspecified chronicity, unspecified heart failure type  History of right common carotid artery stent placement  PHYS SENT FOR SOB  Pleural effusion, bilateral  Emphysema of lung  Shortness of breath      REVIEW OF SYSTEMS:    Constitutional: No fever, chills, fatigue, weakness  Eyes: no eye pain, visual disturbances, or discharge  ENT:  No difficulty hearing, tinnitus, vertigo; No sinus or throat pain  Neck: No pain or stiffness  Respiratory: No cough, dyspnea, wheezing   Cardiovascular: No chest pain, palpitations,   Gastrointestinal: No abdominal or epigastric pain. No nausea, vomiting  No diarrhea or constipation.   Genitourinary: No dysuria, frequency, hematuria or incontinence  Neurological: No headaches, lightheadedness, vertigo, numbness or tremors  Psychiatric: No depression, anxiety, mood swings or difficulty sleeping  Musculoskeletal: No joint pain or swelling; No muscle, back or extremity pain  Skin: No itching, burning, rashes or lesions   Lymph Nodes: No enlarged glands  Endocrine: No heat or cold intolerance; No hair loss, No h/o diabetes or thyroid dysfunction  Allergy and Immunologic: No hives or eczema      PHYSICAL EXAMINATION:  General: Well-developed, well nourished, in no acute distress.  Eyes: Conjunctiva and sclera clear.  Cardiovascular: Regular rate and rhythm; S1 and S2 Normal; No murmurs, gallops or rubs.  Neurologic:  - Mental Status:  Alert, awake, oriented to person, place, partially oriented to time. Does not know her age.  Impaired short term memory. Long term memory - OK. poor attention span. Speech is fluent with intact naming, repetition, and comprehension.  - Cranial Nerves II-XII:    II:  Visual acuity is 20/20 bilaterally; Visual fields are full to confronation; Fundoscopic exam is normal with sharp discs; Pupils are equal, round, and reactive to light.  III, IV, VI:  Extraocular movements are intact without nystagmus.  V:  Facial sensation is intact in the V1-V3 distribution bilaterally.  VII:  Face is symmetric.  VIII:  Hearing is intact to finger rub.  IX, X:  Uvula is midline and soft palate rises symmetrically  XI:  Head turning and shoulder shrug are intact.  XII:  Tongue protudes in the midline.  - Motor:  Strength is 5/5 throughout.  There is no prontator drift.  Normal muscle bulk and tone throughout.  - Reflexes:  2+ and symmetric at the biceps, triceps, brachioradialis, knees, and ankles.  Plantar responses flexor R>L.  - Sensory:  Intact to light touch, pin prick, vibration, and joint-position sense throughout.  - Coordination:  Finger-nose-finger intact without dysmetria.  Rapid alternating hand movements intact.  - Gait:  deferred.              LABS:  CBC Full  -  ( 12 Apr 2018 08:50 )  WBC Count : 8.43 K/uL  Hemoglobin : 12.0 g/dL  Hematocrit : 37.7 %  Platelet Count - Automated : 274 K/uL  Mean Cell Volume : 93.5 fL  Mean Cell Hemoglobin : 29.8 pg  Mean Cell Hemoglobin Concentration : 31.8 g/dL  Auto Neutrophil # : x  Auto Lymphocyte # : x  Auto Monocyte # : x  Auto Eosinophil # : x  Auto Basophil # : x  Auto Neutrophil % : x  Auto Lymphocyte % : x  Auto Monocyte % : x  Auto Eosinophil % : x  Auto Basophil % : x      04-12    146  |  105  |  30<H>  ----------------------------<  127<H>  4.9   |  31  |  1.1    Ca    9.0      12 Apr 2018 08:50  Mg     1.9     04-12    TPro  6.3  /  Alb  4.1  /  TBili  0.3  /  DBili  x   /  AST  72<H>  /  ALT  174<H>  /  AlkPhos  86  04-10    Hemoglobin A1C:     LIVER FUNCTIONS - ( 10 Apr 2018 22:29 )  Alb: 4.1 g/dL / Pro: 6.3 g/dL / ALK PHOS: 86 U/L / ALT: 174 U/L / AST: 72 U/L / GGT: x           Vitamin B12   PT/INR - ( 10 Apr 2018 22:29 )   PT: 10.70 sec;   INR: 0.99 ratio         PTT - ( 10 Apr 2018 22:29 )  PTT:24.5 sec      RADIOLOGY    EKG

## 2018-04-12 NOTE — PROGRESS NOTE ADULT - SUBJECTIVE AND OBJECTIVE BOX
SUBJ:  SOB improving    MEDICATIONS  (STANDING):  ALBUTerol/ipratropium for Nebulization 3 milliLiter(s) Nebulizer every 6 hours  aspirin  chewable 81 milliGRAM(s) Oral <User Schedule>  atorvastatin 10 milliGRAM(s) Oral at bedtime  buDESOnide 160 MICROgram(s)/formoterol 4.5 MICROgram(s) Inhaler 2 Puff(s) Inhalation two times a day  cholecalciferol 1000 Unit(s) Oral daily  clopidogrel Tablet 75 milliGRAM(s) Oral daily  diltiazem    milliGRAM(s) Oral daily  donepezil 5 milliGRAM(s) Oral at bedtime  heparin  Injectable 5000 Unit(s) SubCutaneous every 12 hours  levETIRAcetam 500 milliGRAM(s) Oral two times a day  lisinopril 10 milliGRAM(s) Oral daily  loratadine 10 milliGRAM(s) Oral daily  metoprolol tartrate 25 milliGRAM(s) Oral two times a day    MEDICATIONS  (PRN):  ALBUTerol/ipratropium for Nebulization 3 milliLiter(s) Nebulizer every 4 hours PRN Shortness of Breath and/or Wheezing  meclizine 25 milliGRAM(s) Oral two times a day PRN Dizziness            Vital Signs Last 24 Hrs  T(C): 36.6 (12 Apr 2018 16:13), Max: 36.7 (12 Apr 2018 08:14)  T(F): 97.8 (12 Apr 2018 16:13), Max: 98 (12 Apr 2018 08:14)  HR: 65 (12 Apr 2018 08:14) (59 - 81)  BP: 122/65 (12 Apr 2018 16:13) (100/52 - 122/65)  BP(mean): --  RR: 18 (12 Apr 2018 16:13) (18 - 18)  SpO2: 94% (12 Apr 2018 16:13) (93% - 97%)     REVIEW OF SYSTEMS:  CONSTITUTIONAL: No fever, weight loss, or fatigue  CARDIOLOGY: Patient denies chest pain, shortness of breath or syncopal episodes and leg edema.   RESPIRATORY: denies shortness of breath, wheezing.   NEUROLOGICAL: NO weakness, no focal deficits to report.  GI: no BRBPR, no Nausea, no Vomiting, no diarrhea.    PSYCHIATRY: normal mood and affect  HEENT: no nose bleed  SKIN: no ecchymosis, no breakdown  MUSCULOSKELETAL: normal range of motion,no myalgia        PHYSICAL EXAM:    ·RESPIRATORY:   airway patent; breath sounds equal; good air movement; respirations non-labored; clear to auscultation bilaterally; no chest wall tenderness; no intercostal retractions; no rales, rhonchi or wheeze  · CARDIOVASCULAR	regular rate and rhythm  no rub  no murmur  normal PMI  · EXTREMITIES: No cyanosis, clubbing or edema  · VASCULAR: 	Equal and normal pulses (carotid, femoral, dorsalis pedis)  	  TELEMETRY:    ECG:    TTE:    LABS:                        12.0   8.43  )-----------( 274      ( 12 Apr 2018 08:50 )             37.7     04-12    146  |  105  |  30<H>  ----------------------------<  127<H>  4.9   |  31  |  1.1    Ca    9.0      12 Apr 2018 08:50  Mg     1.9     04-12    TPro  6.3  /  Alb  4.1  /  TBili  0.3  /  DBili  x   /  AST  72<H>  /  ALT  174<H>  /  AlkPhos  86  04-10    CARDIAC MARKERS ( 10 Apr 2018 22:29 )  x     / 0.02 ng/mL / x     / x     / x          PT/INR - ( 10 Apr 2018 22:29 )   PT: 10.70 sec;   INR: 0.99 ratio         PTT - ( 10 Apr 2018 22:29 )  PTT:24.5 sec    I&O's Summary    11 Apr 2018 07:01  -  12 Apr 2018 07:00  --------------------------------------------------------  IN: 0 mL / OUT: 200 mL / NET: -200 mL    12 Apr 2018 07:01  -  12 Apr 2018 18:02  --------------------------------------------------------  IN: 0 mL / OUT: 150 mL / NET: -150 mL      BNP  RADIOLOGY & ADDITIONAL STUDIES:    IMPRESSION AND PLAN:      1) Acute respiratory failure likely due to chronic lung disease and acute diastolic CHF.      Continue present dose of diuretics.    2)Severe pulmonary hypertension - suspect severe chronic lung disease    3)Mild aortic stenosis     4) HTN controlled.       Continue Diltiazem  mg daily and reduce Metoprolol to 25 mg q12H.

## 2018-04-13 NOTE — PROGRESS NOTE ADULT - SUBJECTIVE AND OBJECTIVE BOX
LILA VELA  84y  Female      SUBJECTIVE:  resting comfortably no acute complaints "breathing better"    PAST MEDICAL & SURGICAL HISTORY:  Kidney disease, chronic, stage I (GFR over 89 ml/min)  Peripheral vascular disease  HTN (hypertension)  High cholesterol  CVA (cerebral vascular accident)  History of right common carotid artery stent placement    84y    REVIEW OF SYSTEMS:    T(C): 35.6 (04-13-18 @ 14:33), Max: 36.1 (04-13-18 @ 09:07)  HR: 72 (04-13-18 @ 14:33) (72 - 85)  BP: 103/53 (04-13-18 @ 14:33) (103/53 - 114/74)  RR: 18 (04-13-18 @ 14:33) (18 - 20)  SpO2: 77% (04-13-18 @ 13:20) (77% - 92%)  Wt(kg): --Vital Signs Last 24 Hrs  T(C): 35.6 (13 Apr 2018 14:33), Max: 36.1 (13 Apr 2018 09:07)  T(F): 96.1 (13 Apr 2018 14:33), Max: 96.9 (13 Apr 2018 09:07)  HR: 72 (13 Apr 2018 14:33) (72 - 85)  BP: 103/53 (13 Apr 2018 14:33) (103/53 - 114/74)  BP(mean): --  RR: 18 (13 Apr 2018 14:33) (18 - 20)  SpO2: 77% (13 Apr 2018 13:20) (77% - 92%)    MEDICATION:  ALBUTerol/ipratropium for Nebulization 3 milliLiter(s) Nebulizer every 6 hours  ALBUTerol/ipratropium for Nebulization 3 milliLiter(s) Nebulizer every 4 hours PRN  aspirin  chewable 81 milliGRAM(s) Oral <User Schedule>  atorvastatin 10 milliGRAM(s) Oral at bedtime  buDESOnide 160 MICROgram(s)/formoterol 4.5 MICROgram(s) Inhaler 2 Puff(s) Inhalation two times a day  cholecalciferol 1000 Unit(s) Oral daily  clopidogrel Tablet 75 milliGRAM(s) Oral daily  diltiazem    milliGRAM(s) Oral daily  donepezil 5 milliGRAM(s) Oral at bedtime  furosemide    Tablet 20 milliGRAM(s) Oral daily  heparin  Injectable 5000 Unit(s) SubCutaneous every 12 hours  levETIRAcetam 500 milliGRAM(s) Oral two times a day  lisinopril 10 milliGRAM(s) Oral daily  loratadine 10 milliGRAM(s) Oral daily  meclizine 25 milliGRAM(s) Oral two times a day PRN  metoprolol tartrate 25 milliGRAM(s) Oral two times a day      LABS:                       12.0   8.43  )-----------( 274      ( 12 Apr 2018 08:50 )             37.7     04-12    146  |  105  |  30<H>  ----------------------------<  127<H>  4.9   |  31  |  1.1    Ca    9.0      12 Apr 2018 08:50  Mg     1.9     04-12    RADIOLOGY:< from: Xray Chest 1 View- PORTABLE-Routine (04.12.18 @ 06:33) >    Impression:      Stable bilateral pleural effusions.    Stable increased interstitial markings    < end of copied text >  < from: CT Chest w/ IV Cont (04.11.18 @ 02:48) >  SSION:     No evidence of central or lobar pulmonary embolism.    Severe emphysematous changes of bilateral lungs.     Bilateral    < end of copied text >  < from: Transthoracic Echocardiogram (04.11.18 @ 11:38) >  summary:   1. Left ventricular ejection fraction, by visual estimation, is 60 to   65%.   2. Moderately increased LV wall thickness.   3. Spectral Doppler shows impaired relaxation pattern of left   ventricular myocardial filling (Grade Idiastolic dysfunction).   4. There is moderate concentric left ventricular hypertrophy.   5. Moderately reduced RV systolic function.   6. Severely enlarged right ventricle.   7. Severe mitral annular calcification.   8. Moderate tricuspid regurgitation.   9. Estimated pulmonary artery systolic pressure is 58.7 mmHg assuming a   right atrial pressure of 10 mmHg, which is consistent with moderate   pulmonary hypertension.    < end of copied text >    PHYSICAL EXAM:  awake alert in NAD  HEART RSR S1S2+  LUNGS - decreased breath sounds both bases scattered wheeze +  abdomen soft bs+  extremities no edema    IMPRESSION:  CHF DIASTOLIC - pulmonary hypertension - Severe MAC-   Acute respiratory failure - ct chest negative for PE  COPD - lung nodule  PERIPHERAL VASCULAR DISEASE  RT CAROTID STENT  HTN  old cva  dyslipedemia  CKD stage 1    PLAN:  discussed with pulmonary continue cautious diuresis  monitor lytes and renal function  nebulizer treatments   patients son aware of the lung nodule   wants f/u as outpatient with pulmonary   cardiology f/u  dvt prophylaxis with sc heparin

## 2018-04-13 NOTE — PROGRESS NOTE ADULT - ASSESSMENT
ASSESSMENT / PLAN  :    1) Acute respiratory failure likely due to chronic lung disease and acute diastolic CHF.      Continue present dose of diuretics.    Currently doing well w/ inhalers as advised, & low flow supplemental O2. To cont as adv by pulm.     2)Severe pulmonary hypertension - suspect severe chronic lung disease    Diast. Dysfx in Echo. : Started on high dose lasix. However, clinically as well as labs c/w relative dehydration. Will lower dose to 20 mg daily.     Lung nodule : incidental finding on chest CT. To be observed. Family doesn't want to pursue further, as discussed by pulm.     Hx CKD. Her labs c/w dehydration. Observe, monitor. Renal Fx stable.     Known hx PAD, car stenosis. Stable w/o sx. Observe.     Elevated BNP : To be rptd. ? 2' predominantly R heart failure, though w/o fluid overload.     3)Mild aortic stenosis     4) HTN controlled.       Continue Diltiazem  mg daily and reduce Metoprolol to 25 mg q12H.    Pt refused aricept, discussed with son ([facility]).  Due to side effect of weight loss pt does not want to be on aricept and wants to f/u with primary doctor after discharge before starting any further meds.    Pt refused bloods to be taken today    Cont supp  & routine preventive Tx.

## 2018-04-13 NOTE — PROGRESS NOTE ADULT - SUBJECTIVE AND OBJECTIVE BOX
Patient is a 84y old  Female who presents with a chief complaint of worsening GUSTAFSON X 5 days (11 Apr 2018 04:58)      PAST MEDICAL & SURGICAL HISTORY:  Kidney disease, chronic, stage I (GFR over 89 ml/min)  Peripheral vascular disease  HTN (hypertension)  High cholesterol  CVA (cerebral vascular accident)  History of right common carotid artery stent placement      MEDICATIONS  (STANDING):  ALBUTerol/ipratropium for Nebulization 3 milliLiter(s) Nebulizer every 6 hours  aspirin  chewable 81 milliGRAM(s) Oral <User Schedule>  atorvastatin 10 milliGRAM(s) Oral at bedtime  buDESOnide 160 MICROgram(s)/formoterol 4.5 MICROgram(s) Inhaler 2 Puff(s) Inhalation two times a day  cholecalciferol 1000 Unit(s) Oral daily  clopidogrel Tablet 75 milliGRAM(s) Oral daily  diltiazem    milliGRAM(s) Oral daily  donepezil 5 milliGRAM(s) Oral at bedtime  furosemide    Tablet 20 milliGRAM(s) Oral daily  heparin  Injectable 5000 Unit(s) SubCutaneous every 12 hours  levETIRAcetam 500 milliGRAM(s) Oral two times a day  lisinopril 10 milliGRAM(s) Oral daily  loratadine 10 milliGRAM(s) Oral daily  metoprolol tartrate 25 milliGRAM(s) Oral two times a day    MEDICATIONS  (PRN):  ALBUTerol/ipratropium for Nebulization 3 milliLiter(s) Nebulizer every 4 hours PRN Shortness of Breath and/or Wheezing  meclizine 25 milliGRAM(s) Oral two times a day PRN Dizziness      Overnight events:    Vital Signs Last 24 Hrs  T(C): 35.6 (13 Apr 2018 14:33), Max: 36.1 (13 Apr 2018 09:07)  T(F): 96.1 (13 Apr 2018 14:33), Max: 96.9 (13 Apr 2018 09:07)  HR: 72 (13 Apr 2018 14:33) (72 - 85)  BP: 103/53 (13 Apr 2018 14:33) (103/53 - 114/74)  BP(mean): --  RR: 18 (13 Apr 2018 14:33) (18 - 20)  SpO2: 77% (13 Apr 2018 13:20) (77% - 92%)  CAPILLARY BLOOD GLUCOSE        I&O's Summary    12 Apr 2018 07:01  -  13 Apr 2018 07:00  --------------------------------------------------------  IN: 0 mL / OUT: 450 mL / NET: -450 mL        Physical Exam:    GENERAL: NAD  NECK: supple  CHEST/LUNG: cta b/l, no wheezing   HEART: S1S2 reg  ABDOMEN: soft, NT, +BS   EXTREMITIES: no LL edema         Labs:                        12.0   8.43  )-----------( 274      ( 12 Apr 2018 08:50 )             37.7             04-12    146  |  105  |  30<H>  ----------------------------<  127<H>  4.9   |  31  |  1.1    Ca    9.0      12 Apr 2018 08:50  Mg     1.9     04-12                    ABG - ( 11 Apr 2018 19:00 )  pH: 7.37  /  pCO2: 50    /  pO2: 61    / HCO3: 29    / Base Excess: 2.8   /  SaO2: 89            < from: Xray Chest 1 View- PORTABLE-Routine (04.12.18 @ 06:33) >  Lung parenchyma/Pleura: Bilateral pleural effusions. Stable increased   interstitial markings. No consolidation or pneumothorax. Skinfold   overlies left upper lung zone.    Skeleton/soft tissues: Stable    Impression:      Stable bilateral pleural effusions.    Stable increased interstitial markings    CXR   4/10 :    Lung parenchyma/Pleura: Bilateral coarsened interstitial lung disease,   worse since prior. Small bilateral pleural effusions with basilar   atelectasis . Known left upper lung field nodule is better assessed on    chest CT of same date    Skeleton/soft tissues: Stable. Vascular stent reidentified    Impression:      Cardiomegaly with increased interstitial opacities and small effusion   consistent with CHF    Nodular left upper lung field opacity, best assessed on  chest   CT of 4/11/2018    Underlying chronic interstitial lung disease    TTE :    < from: Transthoracic Echocardiogram (04.11.18 @ 11:38) >  Summary:   1. Left ventricular ejection fraction, by visual estimation, is 60 to   65%.   2. Moderately increased LV wall thickness.   3. Spectral Doppler shows impaired relaxation pattern of left   ventricular myocardial filling (Grade Idiastolic dysfunction).   4. There is moderate concentric left ventricular hypertrophy.   5. Moderately reduced RV systolic function.   6. Severely enlarged right ventricle.   7. Severe mitral annular calcification.   8. Moderate tricuspid regurgitation.   9. Estimated pulmonary artery systolic pressure is 58.7 mmHg assuming a   right atrial pressure of 10 mmHg, which is consistent with moderate   pulmonary hypertension.    CT chest w/ C :    < from: CT Chest w/ IV Cont (04.11.18 @ 02:48) >  IMPRESSION:     No evidence of central or lobar pulmonary embolism.    Severe emphysematous changes of bilateral lungs.     Bilateral pleural effusions, left greater than right, small to moderate   volume.      Dr. Magy Horton discussed preliminary findings with SHAMA LOWERY on   4/11/2018 3:20 AM with readback.    < end of copied text >

## 2018-04-13 NOTE — PROGRESS NOTE ADULT - ASSESSMENT
ARF  CHF-decompensated  COPD  PH- Group 2/3  Pulmonary nodule 2 cm    o2 via nasal cannula, will need home 02 set up on discharge  would inc lasix 40 mg daily  monitor i/os, lytes cr-  symbicort 160 mg/4.5 2 puff bid  albuterol prn  patient and family aware of suspicion for underlying malignancy,  will benefit from pet scan as outpt i  dvt px  d/w primary attending  will follow

## 2018-04-13 NOTE — PROGRESS NOTE ADULT - SUBJECTIVE AND OBJECTIVE BOX
LILA VELA  84y, Female  Allergy: No Known Allergies    PMH/PSH:  PAST MEDICAL & SURGICAL HISTORY:  Kidney disease, chronic, stage I (GFR over 89 ml/min)  Peripheral vascular disease  HTN (hypertension)  High cholesterol  CVA (cerebral vascular accident)  History of right common carotid artery stent placement    ALLERGIES:  Allergies    No Known Allergies    Intolerances            LAST 24-Hr EVENTS:  sob improved  feeling better   hypoxic on ra    VITALS:  T(F): 96.7 (04-13-18 @ 20:51), Max: 96.9 (04-13-18 @ 09:07)  HR: 70 (04-13-18 @ 20:51)  BP: 112/64 (04-13-18 @ 20:51) (103/53 - 114/74)  RR: 18 (04-13-18 @ 20:51)  SpO2: 77% (04-13-18 @ 13:20)    PHYSICAL EXAM:    GENERAL: NAD, well-developed  NECK: Supple, No JVD  CHEST/LUNG: dec bs b/l bases  HEART: Regular rate and rhythm; No murmurs.  ABDOMEN: Soft, Nontender, Nondistended; Bowel sounds present  EXTREMITIES:  No clubbing, edema absent        TESTS & MEASUREMENTS:    IN: 0 mL / OUT: 200 mL / NET: -200 mL    IN: 0 mL / OUT: 450 mL / NET: -450 mL    IN: 630 mL / OUT: 200 mL / NET: 430 mL                            12.0   8.43  )-----------( 274      ( 12 Apr 2018 08:50 )             37.7       04-12    146  |  105  |  30<H>  ----------------------------<  127<H>  4.9   |  31  |  1.1    Ca    9.0      12 Apr 2018 08:50  Mg     1.9     04-12                  ABG & VENT SETTINGS: (when applicable)        RADIOLOGY & ADDITIONAL TESTS:  < from: Xray Chest 1 View- PORTABLE-Routine (04.12.18 @ 06:33) >    Impression:      Stable bilateral pleural effusions.    Stable increased interstitial markings    < end of copied text >        MEDICATIONS:  MEDICATIONS  (STANDING):  ALBUTerol/ipratropium for Nebulization 3 milliLiter(s) Nebulizer every 6 hours  aspirin  chewable 81 milliGRAM(s) Oral <User Schedule>  atorvastatin 10 milliGRAM(s) Oral at bedtime  buDESOnide 160 MICROgram(s)/formoterol 4.5 MICROgram(s) Inhaler 2 Puff(s) Inhalation two times a day  cholecalciferol 1000 Unit(s) Oral daily  clopidogrel Tablet 75 milliGRAM(s) Oral daily  diltiazem    milliGRAM(s) Oral daily  donepezil 5 milliGRAM(s) Oral at bedtime  furosemide    Tablet 20 milliGRAM(s) Oral daily  heparin  Injectable 5000 Unit(s) SubCutaneous every 12 hours  levETIRAcetam 500 milliGRAM(s) Oral two times a day  lisinopril 10 milliGRAM(s) Oral daily  loratadine 10 milliGRAM(s) Oral daily  metoprolol tartrate 25 milliGRAM(s) Oral two times a day    MEDICATIONS  (PRN):  ALBUTerol/ipratropium for Nebulization 3 milliLiter(s) Nebulizer every 4 hours PRN Shortness of Breath and/or Wheezing  meclizine 25 milliGRAM(s) Oral two times a day PRN Dizziness

## 2018-04-14 NOTE — PROGRESS NOTE ADULT - SUBJECTIVE AND OBJECTIVE BOX
SUBJ:  She is feeling better still on oxygen via     MEDICATIONS  (STANDING):  aspirin  chewable 81 milliGRAM(s) Oral <User Schedule>  atorvastatin 10 milliGRAM(s) Oral at bedtime  buDESOnide 160 MICROgram(s)/formoterol 4.5 MICROgram(s) Inhaler 2 Puff(s) Inhalation two times a day  cholecalciferol 1000 Unit(s) Oral daily  clopidogrel Tablet 75 milliGRAM(s) Oral daily  diltiazem    milliGRAM(s) Oral daily  donepezil 5 milliGRAM(s) Oral at bedtime  furosemide    Tablet 20 milliGRAM(s) Oral daily  heparin  Injectable 5000 Unit(s) SubCutaneous every 12 hours  levETIRAcetam 500 milliGRAM(s) Oral two times a day  lisinopril 10 milliGRAM(s) Oral daily  loratadine 10 milliGRAM(s) Oral daily  metoprolol tartrate 25 milliGRAM(s) Oral two times a day    MEDICATIONS  (PRN):  ALBUTerol/ipratropium for Nebulization 3 milliLiter(s) Nebulizer every 4 hours PRN Shortness of Breath and/or Wheezing  meclizine 25 milliGRAM(s) Oral two times a day PRN Dizziness      Vital Signs Last 24 Hrs  T(C): 36.6 (14 Apr 2018 20:23), Max: 36.6 (14 Apr 2018 20:23)  T(F): 97.9 (14 Apr 2018 20:23), Max: 97.9 (14 Apr 2018 20:23)  HR: 58 (14 Apr 2018 20:23) (58 - 75)  BP: 100/55 (14 Apr 2018 20:23) (97/55 - 147/64)  BP(mean): --  RR: 20 (14 Apr 2018 20:23) (18 - 20)  SpO2: 100% (14 Apr 2018 20:23) (78% - 100%)      PHYSICAL EXAM:  ·RESPIRATORY:   airway patent; breath sounds equal; good air movement; respirations non-labored; clear to auscultation bilaterally; no chest wall tenderness; no intercostal retractions; no rales, rhonchi or wheeze  · CARDIOVASCULAR	regular rate and rhythm  no rub  no murmur  normal PMI  · EXTREMITIES: No cyanosis, clubbing or edema  · VASCULAR: 	Equal and normal pulses (carotid, femoral, dorsalis pedis)  	  TELEMETRY:    ECG:    TTE:    LABS:                        12.4   8.19  )-----------( 238      ( 14 Apr 2018 08:39 )             40.2     04-14    145  |  102  |  33<H>  ----------------------------<  76  4.6   |  34<H>  |  1.1    Ca    8.7      14 Apr 2018 08:39              I&O's Summary    13 Apr 2018 07:01  -  14 Apr 2018 07:00  --------------------------------------------------------  IN: 830 mL / OUT: 200 mL / NET: 630 mL    14 Apr 2018 07:01  -  14 Apr 2018 22:13  --------------------------------------------------------  IN: 610 mL / OUT: 750 mL / NET: -140 mL      BNP  RADIOLOGY & ADDITIONAL STUDIES:    IMPRESSION AND PLAN:    1) Acute respiratory failure likely due to chronic lung disease and acute diastolic CHF.      Spoke to pulmonary and decided to increase lasix 40 mg po q24h    2)Severe pulmonary hypertension - suspect severe chronic lung disease    3)Mild aortic stenosis     4) HTN controlled.       Continue Diltiazem  mg daily and reduce Metoprolol to 25 mg q12H.

## 2018-04-14 NOTE — PROGRESS NOTE ADULT - SUBJECTIVE AND OBJECTIVE BOX
LILA VELA  84y, Female  Allergy: No Known Allergies    PMH/PSH:  PAST MEDICAL & SURGICAL HISTORY:  Kidney disease, chronic, stage I (GFR over 89 ml/min)  Peripheral vascular disease  HTN (hypertension)  High cholesterol  CVA (cerebral vascular accident)  History of right common carotid artery stent placement    ALLERGIES:  Allergies    No Known Allergies    Intolerances            LAST 24-Hr EVENTS:  sob improved  sitting up comfortable in bed    VITALS:  T(F): 96.9 (04-14-18 @ 14:31), Max: 97 (04-14-18 @ 06:11)  HR: 69 (04-14-18 @ 14:31)  BP: 97/55 (04-14-18 @ 14:31) (97/55 - 113/54)  RR: 18 (04-14-18 @ 14:31)  SpO2: 93% (04-14-18 @ 08:35)    PHYSICAL EXAM:    GENERAL: NAD, cachectic  NECK: Supple, No JVD  CHEST/LUNG: dec bs b/l  HEART: Regular rate and rhythm; No murmurs.  ABDOMEN: Soft, Nontender, Nondistended; Bowel sounds present  EXTREMITIES:  No clubbing, edema absent        TESTS & MEASUREMENTS:    IN: 0 mL / OUT: 450 mL / NET: -450 mL    IN: 830 mL / OUT: 200 mL / NET: 630 mL    IN: 610 mL / OUT: 300 mL / NET: 310 mL                            12.4   8.19  )-----------( 238      ( 14 Apr 2018 08:39 )             40.2       04-14    145  |  102  |  33<H>  ----------------------------<  76  4.6   |  34<H>  |  1.1    Ca    8.7      14 Apr 2018 08:39                  ABG & VENT SETTINGS: (when applicable)        RADIOLOGY & ADDITIONAL TESTS:    < from: Xray Chest 1 View- PORTABLE-Routine (04.12.18 @ 06:33) >  Impression:      Stable bilateral pleural effusions.    Stable increased interstitial markings    < end of copied text >      MEDICATIONS:  MEDICATIONS  (STANDING):  ALBUTerol/ipratropium for Nebulization 3 milliLiter(s) Nebulizer every 6 hours  aspirin  chewable 81 milliGRAM(s) Oral <User Schedule>  atorvastatin 10 milliGRAM(s) Oral at bedtime  buDESOnide 160 MICROgram(s)/formoterol 4.5 MICROgram(s) Inhaler 2 Puff(s) Inhalation two times a day  cholecalciferol 1000 Unit(s) Oral daily  clopidogrel Tablet 75 milliGRAM(s) Oral daily  diltiazem    milliGRAM(s) Oral daily  donepezil 5 milliGRAM(s) Oral at bedtime  furosemide    Tablet 20 milliGRAM(s) Oral daily  heparin  Injectable 5000 Unit(s) SubCutaneous every 12 hours  levETIRAcetam 500 milliGRAM(s) Oral two times a day  lisinopril 10 milliGRAM(s) Oral daily  loratadine 10 milliGRAM(s) Oral daily  metoprolol tartrate 25 milliGRAM(s) Oral two times a day    MEDICATIONS  (PRN):  ALBUTerol/ipratropium for Nebulization 3 milliLiter(s) Nebulizer every 4 hours PRN Shortness of Breath and/or Wheezing  meclizine 25 milliGRAM(s) Oral two times a day PRN Dizziness

## 2018-04-14 NOTE — PROGRESS NOTE ADULT - ASSESSMENT
ARF  CHF-decompensated  COPD  PH- Group 2/3  Pulmonary nodule 2 cm    o2 via nasal cannula, will need home 02 set up on discharge  inc lasix 40 mg daily  monitor i/os, lytes cr-  symbicort 160 mg/4.5 2 puff bid  albuterol prn  patient and family aware of suspicion for underlying malignancy,  will benefit from pet scan as outpt   dvt px  d/w cardiology

## 2018-04-15 NOTE — PROGRESS NOTE ADULT - SUBJECTIVE AND OBJECTIVE BOX
LILA VELA  84y  Female      SUBJECTIVE:  c/o wants to go home    Progress Note:      REVIEW OF SYSTEMS:    T(C): 36 (04-15-18 @ 05:30), Max: 36.6 (04-14-18 @ 20:23)  HR: 67 (04-15-18 @ 05:37) (58 - 75)  04-15        BP: 180/80 (04-15-18 @ 05:37) (97/55 - 197/81)  RR: 19 (04-15-18 @ 05:30) (18 - 20)  SpO2: 96% (04-15-18 @ 07:44) (92% - 100%)  Wt(kg): --Vital Signs Last 24 Hrs  T(C): 36 (15 Apr 2018 05:30), Max: 36.6 (14 Apr 2018 20:23)  T(F): 96.8 (15 Apr 2018 05:30), Max: 97.9 (14 Apr 2018 20:23)  HR: 67 (15 Apr 2018 05:37) (58 - 75)  BP: 180/80 (15 Apr 2018 05:37) (97/55 - 197/81)  BP(mean): --  RR: 19 (15 Apr 2018 05:30) (18 - 20)  SpO2: 96% (15 Apr 2018 07:44) (92% - 100%)    PHYSICAL EXAM    GENERAL:  no distress  CHEST/LUNG:  -clear, no wheezing   HEART: S1S2 reg  ABDOMEN: soft, non tender  EXTREMITIES: no edema , no calf tenderness  LABS:                          13.3   8.62  )-----------( 274      ( 15 Apr 2018 06:53 )             42.3   04-15    144  |  99  |  30<H>  ----------------------------<  87  4.7   |  34<H>  |  1.1    Ca    9.1      15 Apr 2018 06:53      RADIOLOGY:      IMPRESSION:    chf, diastolic , clinically better  copd exaccerbation improved with oxygen and nebulizer rx  pulm. htn.  hypoxia -will need home oxygen as per Dr. Milligan  ckd stable  old cva  seizure disorder  cognitive impairment   lung nodule r/o cancer  pad        PLAN:  lasix increased to 40 mg by cardiologis  nebulizer   dr. gabriela milligan -outpatient w/u for lung nodule including pet scan  will need home oxygen  dvt prophylaxis  discharge planning  outpatient w/u for cognitive impairement- improved  I SPENT 30 MINUTES EVALUATING PATIENT/EXAMINING AND COORDINATING CARE AND DISCUSSING TREAMENT WITH patient and family

## 2018-04-15 NOTE — PROGRESS NOTE ADULT - ASSESSMENT
ASSESSMENT / PLAN  :    1) Acute respiratory failure likely due to chronic lung disease and acute diastolic CHF.      Continue present dose of diuretics.    Currently doing well w/ inhalers as advised, & low flow supplemental O2. To cont as adv by pulm.     will need home O2    2)Severe pulmonary hypertension - suspect severe chronic lung disease    Diast. dysfunction on Echo. : Started on high dose lasix. Now on oral lasix      3)Lung nodule : incidental finding on chest CT. To be observed. Family doesn't want to pursue further, as discussed by pulm.       4) HTN controlled.       Continue Diltiazem  mg daily and reduce Metoprolol to 25 mg q12H.    Pt refused aricept, discussed with son (pharmacy tech).  Due to side effect of weight loss pt does not want to be on aricept and wants to f/u with primary doctor after discharge before starting any further meds.    Pt refused bloods to be taken today    Outpatient work up for cognitive impairment

## 2018-04-15 NOTE — PROGRESS NOTE ADULT - ASSESSMENT
ARF  CHF-decompensated  COPD  PH- Group 2/3  Pulmonary nodule 2 cm    o2 via nasal cannula, will need home 02 set up on discharge  on lasix-  monitor i/os, lytes cr-  symbicort 160 mg/4.5 2 puff bid  albuterol prn  patient and family aware of suspicion for underlying malignancy,  will benefit from pet scan as outpt   dvt px  overall prognosis guarded

## 2018-04-15 NOTE — PROGRESS NOTE ADULT - SUBJECTIVE AND OBJECTIVE BOX
Patient is a 84y old  Female who presents with a chief complaint of worsening GUSTAFSON X 5 days (11 Apr 2018 04:58)      PAST MEDICAL & SURGICAL HISTORY:  Kidney disease, chronic, stage I (GFR over 89 ml/min)  Peripheral vascular disease  HTN (hypertension)  High cholesterol  CVA (cerebral vascular accident)  History of right common carotid artery stent placement      MEDICATIONS  (STANDING):  aspirin  chewable 81 milliGRAM(s) Oral <User Schedule>  atorvastatin 10 milliGRAM(s) Oral at bedtime  buDESOnide 160 MICROgram(s)/formoterol 4.5 MICROgram(s) Inhaler 2 Puff(s) Inhalation two times a day  cholecalciferol 1000 Unit(s) Oral daily  clopidogrel Tablet 75 milliGRAM(s) Oral daily  diltiazem    milliGRAM(s) Oral daily  donepezil 5 milliGRAM(s) Oral at bedtime  furosemide    Tablet 20 milliGRAM(s) Oral daily  heparin  Injectable 5000 Unit(s) SubCutaneous every 12 hours  levETIRAcetam 500 milliGRAM(s) Oral two times a day  lisinopril 10 milliGRAM(s) Oral daily  loratadine 10 milliGRAM(s) Oral daily  metoprolol tartrate 25 milliGRAM(s) Oral two times a day    MEDICATIONS  (PRN):  ALBUTerol/ipratropium for Nebulization 3 milliLiter(s) Nebulizer every 4 hours PRN Shortness of Breath and/or Wheezing  meclizine 25 milliGRAM(s) Oral two times a day PRN Dizziness      Overnight events:    Vital Signs Last 24 Hrs  T(C): 36.6 (14 Apr 2018 20:23), Max: 36.6 (14 Apr 2018 20:23)  T(F): 97.9 (14 Apr 2018 20:23), Max: 97.9 (14 Apr 2018 20:23)  HR: 58 (14 Apr 2018 20:23) (58 - 75)  BP: 100/55 (14 Apr 2018 20:23) (97/55 - 147/64)  BP(mean): --  RR: 20 (14 Apr 2018 20:23) (18 - 20)  SpO2: 100% (14 Apr 2018 20:23) (93% - 100%)  CAPILLARY BLOOD GLUCOSE        I&O's Summary    13 Apr 2018 07:01  -  14 Apr 2018 07:00  --------------------------------------------------------  IN: 830 mL / OUT: 200 mL / NET: 630 mL    14 Apr 2018 07:01  -  15 Apr 2018 00:09  --------------------------------------------------------  IN: 610 mL / OUT: 750 mL / NET: -140 mL        Physical Exam:    -     General : NAD    -      Cardiac: S1/S2, no mrumurs    -      Pulm: cta b/l     -      GI: soft, NT, +BS    -    EXT:  no LL edema        Labs:                        12.4   8.19  )-----------( 238      ( 14 Apr 2018 08:39 )             40.2             04-14    145  |  102  |  33<H>  ----------------------------<  76  4.6   |  34<H>  |  1.1    Ca    8.7      14 Apr 2018 08:39                            Imaging:    < from: CT Head No Cont (04.12.18 @ 16:49) >  IMPRESSION:    1.  No evidence of acute intracranial hemorrhage, extra axial fluid   collection or midline shift.    2.  Left parietal lobe chronic appearing infarct, increased in extent   since the patient's prior CT from 2005.    < end of copied text >    < from: Xray Chest 1 View- PORTABLE-Routine (04.12.18 @ 06:33) >  Impression:      Stable bilateral pleural effusions.    Stable increased interstitial markings    < end of copied text >

## 2018-04-15 NOTE — PROGRESS NOTE ADULT - SUBJECTIVE AND OBJECTIVE BOX
LILA VELA  84y, Female  Allergy: No Known Allergies    PMH/PSH:  PAST MEDICAL & SURGICAL HISTORY:  Kidney disease, chronic, stage I (GFR over 89 ml/min)  Peripheral vascular disease  HTN (hypertension)  High cholesterol  CVA (cerebral vascular accident)  History of right common carotid artery stent placement    ALLERGIES:  Allergies    No Known Allergies    Intolerances          LAST 24-Hr EVENTS:  sob improved  laying comfortable in bed    VITALS:  T(F): 97 (04-15-18 @ 13:45), Max: 97.9 (04-14-18 @ 20:23)  HR: 64 (04-15-18 @ 18:32)  BP: 98/62 (04-15-18 @ 18:32) (93/48 - 197/81)  RR: 18 (04-15-18 @ 13:45)  SpO2: 96% (04-15-18 @ 07:44)    PHYSICAL EXAM:    GENERAL: NAD, well-developed  NECK: Supple, No JVD  CHEST/LUNG: Cdec bs b/l bases  HEART: Regular rate and rhythm  ABDOMEN: Soft, Nontender, Nondistended; Bowel sounds present  EXTREMITIES:  No clubbing, edema absent        TESTS & MEASUREMENTS:    IN: 830 mL / OUT: 200 mL / NET: 630 mL    IN: 610 mL / OUT: 750 mL / NET: -140 mL    IN: 710 mL / OUT: 950 mL / NET: -240 mL                            13.3   8.62  )-----------( 274      ( 15 Apr 2018 06:53 )             42.3       04-15    144  |  99  |  30<H>  ----------------------------<  87  4.7   |  34<H>  |  1.1    Ca    9.1      15 Apr 2018 06:53                  ABG & VENT SETTINGS: (when applicable)        RADIOLOGY & ADDITIONAL TESTS:        MEDICATIONS:  MEDICATIONS  (STANDING):  aspirin  chewable 81 milliGRAM(s) Oral <User Schedule>  atorvastatin 10 milliGRAM(s) Oral at bedtime  buDESOnide 160 MICROgram(s)/formoterol 4.5 MICROgram(s) Inhaler 2 Puff(s) Inhalation two times a day  cholecalciferol 1000 Unit(s) Oral daily  clopidogrel Tablet 75 milliGRAM(s) Oral daily  diltiazem    milliGRAM(s) Oral daily  donepezil 5 milliGRAM(s) Oral at bedtime  furosemide    Tablet 20 milliGRAM(s) Oral daily  heparin  Injectable 5000 Unit(s) SubCutaneous every 12 hours  levETIRAcetam 500 milliGRAM(s) Oral two times a day  lisinopril 10 milliGRAM(s) Oral daily  loratadine 10 milliGRAM(s) Oral daily  metoprolol tartrate 25 milliGRAM(s) Oral two times a day    MEDICATIONS  (PRN):  ALBUTerol/ipratropium for Nebulization 3 milliLiter(s) Nebulizer every 4 hours PRN Shortness of Breath and/or Wheezing  meclizine 25 milliGRAM(s) Oral two times a day PRN Dizziness

## 2018-04-16 NOTE — PROGRESS NOTE ADULT - SUBJECTIVE AND OBJECTIVE BOX
04-16-18 @ 10:40    LILA VELA  84y  Female  Seen OOB to chair. Feels good. Denies any c/o per se. Ambulates w/o sx.     INTERVAL EVENTS: None    MEDICATIONS  (STANDING):  aspirin  chewable 81 milliGRAM(s) Oral <User Schedule>  atorvastatin 10 milliGRAM(s) Oral at bedtime  buDESOnide 160 MICROgram(s)/formoterol 4.5 MICROgram(s) Inhaler 2 Puff(s) Inhalation two times a day  cholecalciferol 1000 Unit(s) Oral daily  clopidogrel Tablet 75 milliGRAM(s) Oral daily  diltiazem    milliGRAM(s) Oral daily  donepezil 5 milliGRAM(s) Oral at bedtime  furosemide    Tablet 20 milliGRAM(s) Oral daily  heparin  Injectable 5000 Unit(s) SubCutaneous every 12 hours  levETIRAcetam 500 milliGRAM(s) Oral two times a day  lisinopril 10 milliGRAM(s) Oral daily  loratadine 10 milliGRAM(s) Oral daily  metoprolol tartrate 25 milliGRAM(s) Oral two times a day    MEDICATIONS  (PRN):  ALBUTerol/ipratropium for Nebulization 3 milliLiter(s) Nebulizer every 4 hours PRN Shortness of Breath and/or Wheezing  meclizine 25 milliGRAM(s) Oral two times a day PRN Dizziness      T(C): 35.9 (04-16-18 @ 05:46), Max: 36.1 (04-15-18 @ 13:45)  HR: 69 (04-16-18 @ 05:46) (64 - 72)  BP: 192/84 (04-16-18 @ 05:46) (92/55 - 192/84)  RR: 18 (04-15-18 @ 20:27) (18 - 18)  SpO2: 100% (04-16-18 @ 07:48) (100% - 100%)  Wt(kg): --Vital Signs Last 24 Hrs  T(C): 35.9 (16 Apr 2018 05:46), Max: 36.1 (15 Apr 2018 13:45)  T(F): 96.7 (16 Apr 2018 05:46), Max: 97 (15 Apr 2018 13:45)  HR: 69 (16 Apr 2018 05:46) (64 - 72)  BP: 192/84 (16 Apr 2018 05:46) (92/55 - 192/84)  BP(mean): --  RR: 18 (15 Apr 2018 20:27) (18 - 18)  SpO2: 100% (16 Apr 2018 07:48) (100% - 100%)    PHYSICAL EXAM:  GENERAL: Slim built. NAD  NECK: supple.   CHEST/LUNG: No adv sound. AE OK. No wheeze.   HEART: S1 S2 regular.   ABDOMEN: benign  EXTREMITIES: Good color. No CCE                          13.3   8.62  )-----------( 274      ( 15 Apr 2018 06:53 )             42.3     04-15    144  |  99  |  30<H>  ----------------------------<  87  4.7   |  34<H>  |  1.1    Ca    9.1      15 Apr 2018 06:53              RADIOLOGY & ADDITIONAL TESTS:      ASSESSMENT / PLAN  :    Resp failure : 2' COPD, Chronic lung, Diast Dysfx, pulm HTN. Inhalers as guided by pulm. O2 supplement.   Diast CHF ; stable. lasix increased to 40 qd. Monitor dehydration. Sx atically stable.   Pulm HTN : med Tx as per cardio.   HTN ; fluctuating Amlodipine added. Observe  Pulm nodule : to be f/u as OP.   Hx PVD ; stable. No sx.   Cont supportive care, preventive cares.     Discussed w/ Dr. Alvarado, resident.

## 2018-04-16 NOTE — PROGRESS NOTE ADULT - SUBJECTIVE AND OBJECTIVE BOX
Patient is a 84y old  Female who presents with a chief complaint of worsening GUSTAFSON X 5 days (11 Apr 2018 04:58)      PAST MEDICAL & SURGICAL HISTORY:  Kidney disease, chronic, stage I (GFR over 89 ml/min)  Peripheral vascular disease  HTN (hypertension)  High cholesterol  CVA (cerebral vascular accident)  History of right common carotid artery stent placement      MEDICATIONS  (STANDING):  aspirin  chewable 81 milliGRAM(s) Oral <User Schedule>  atorvastatin 10 milliGRAM(s) Oral at bedtime  buDESOnide 160 MICROgram(s)/formoterol 4.5 MICROgram(s) Inhaler 2 Puff(s) Inhalation two times a day  cholecalciferol 1000 Unit(s) Oral daily  clopidogrel Tablet 75 milliGRAM(s) Oral daily  diltiazem    milliGRAM(s) Oral daily  donepezil 5 milliGRAM(s) Oral at bedtime  furosemide    Tablet 20 milliGRAM(s) Oral daily  heparin  Injectable 5000 Unit(s) SubCutaneous every 12 hours  levETIRAcetam 500 milliGRAM(s) Oral two times a day  lisinopril 10 milliGRAM(s) Oral daily  loratadine 10 milliGRAM(s) Oral daily  metoprolol tartrate 25 milliGRAM(s) Oral two times a day    MEDICATIONS  (PRN):  ALBUTerol/ipratropium for Nebulization 3 milliLiter(s) Nebulizer every 4 hours PRN Shortness of Breath and/or Wheezing  meclizine 25 milliGRAM(s) Oral two times a day PRN Dizziness      Overnight events:    Vital Signs Last 24 Hrs  T(C): 35.9 (16 Apr 2018 05:46), Max: 35.9 (16 Apr 2018 05:46)  T(F): 96.7 (16 Apr 2018 05:46), Max: 96.7 (16 Apr 2018 05:46)  HR: 69 (16 Apr 2018 05:46) (64 - 72)  BP: 110/65 (16 Apr 2018 14:07) (92/55 - 192/84)  BP(mean): --  RR: 18 (15 Apr 2018 20:27) (18 - 18)  SpO2: 86% (16 Apr 2018 12:01) (86% - 100%)  CAPILLARY BLOOD GLUCOSE        I&O's Summary    15 Apr 2018 07:01  -  16 Apr 2018 07:00  --------------------------------------------------------  IN: 1030 mL / OUT: 950 mL / NET: 80 mL    16 Apr 2018 07:01  -  16 Apr 2018 14:55  --------------------------------------------------------  IN: 0 mL / OUT: 1000 mL / NET: -1000 mL        Physical Exam:    GEN:  NAD  NECK: supple.   CHEST/LUNG: No adv sound. No wheeze.   HEART: S1 S2, regular.   ABDOMEN: soft, NT, +BS  EXTREMITIES: Good color. no LL edema         Labs:                        13.6   9.64  )-----------( 303      ( 16 Apr 2018 10:20 )             42.8             04-16    146  |  99  |  32<H>  ----------------------------<  92  4.3   |  31  |  1.2    Ca    9.3      16 Apr 2018 10:20                            Imaging:  < from: CT Head No Cont (04.12.18 @ 16:49) >  IMPRESSION:    1.  No evidence of acute intracranial hemorrhage, extra axial fluid   collection or midline shift.    2.  Left parietal lobe chronic appearing infarct, increased in extent   since the patient's prior CT from 2005.    < end of copied text >    < from: Xray Chest 1 View- PORTABLE-Routine (04.12.18 @ 06:33) >  Impression:      Stable bilateral pleural effusions.    Stable increased interstitial markings    < end of copied text >

## 2018-04-16 NOTE — PROGRESS NOTE ADULT - ASSESSMENT
ASSESSMENT / PLAN  :    1) Acute respiratory failure likely due to chronic lung disease and acute diastolic CHF.      Continue present dose of diuretics.    Currently doing well w/ inhalers as advised, & low flow supplemental O2. To cont as adv by pulm.     will need home O2.  Patient's sats are 86% on air at rest today.  Patient has diagnosis of emphysema and therefore needs home O2.  Patient is aware she will be going home on oxygen.  Patient is in a chronic stable state.      2)Severe pulmonary hypertension - suspect severe chronic lung disease    Diast. dysfunction on Echo. : Started on high dose lasix. Now on oral lasix    d/w Dr. Marquez, wants cardio f/u prior to discharge, message left w/ Dr. Geronimo's office requesting f/u.      3)Lung nodule : incidental finding on chest CT.      4) HTN controlled.       Continue Diltiazem  mg daily and reduce Metoprolol to 25 mg q12H.    Pt refused aricept, discussed with son (pharmacy tech).  Due to side effect of weight loss pt does not want to be on aricept and wants to f/u with primary doctor after discharge before starting any further meds.        Outpatient work up for cognitive impairment ASSESSMENT / PLAN  :    1) Acute respiratory failure (resolved) likely due to chronic lung disease and acute diastolic CHF (improved with lasix).  Currently doing well w/ inhalers as advised & low flow supplemental O2.   will need home O2.  Patient's sats are 86% on air at rest today.  Patient has diagnosis of emphysema and therefore needs home O2.  Patient is aware she will be going home on oxygen.  Patient is in a chronic stable state.      2)Severe pulmonary hypertension - suspect severe chronic lung disease    Diast. dysfunction on Echo. : Started on high dose lasix. Now on oral lasix    d/w Dr. Marquez, wants cardio f/u prior to discharge, message left w/ Dr. Geronimo's office requesting f/u.      3)Lung nodule : incidental finding on chest CT.      4) HTN controlled.       Continue Diltiazem  mg daily and reduce Metoprolol to 25 mg q12H.    Pt refused aricept, discussed with son (pharmacy tech).  Due to side effect of weight loss pt does not want to be on aricept and wants to f/u with primary doctor after discharge before starting any further meds.        Outpatient work up for cognitive impairment

## 2018-04-17 NOTE — PROVIDER CONTACT NOTE (OTHER) - SITUATION
Patient noted to have Bp of 90/64. No s/s and symptoms noted. Dr. Patricio at bedside. No interventions at this time. Metoprolol held. Will continue to monitor.

## 2018-04-17 NOTE — PROGRESS NOTE ADULT - SUBJECTIVE AND OBJECTIVE BOX
LILA VELA  84y  Female      SUBJECTIVE:  c/o wants to go home,feels good      Progress Note:      REVIEW OF SYSTEMS:    T(C): 35.8 (04-17-18 @ 05:42), Max: 36.1 (04-16-18 @ 19:22)  HR: 66 (04-17-18 @ 05:42) (66 - 82)  BP: 143/65 (04-17-18 @ 05:42) (110/65 - 144/75)  RR: 18 (04-17-18 @ 05:42) (18 - 18)  SpO2: 95% (04-16-18 @ 20:24) (86% - 100%)  Wt(kg): --Vital Signs Last 24 Hrs  T(C): 35.8 (17 Apr 2018 05:42), Max: 36.1 (16 Apr 2018 19:22)  T(F): 96.5 (17 Apr 2018 05:42), Max: 96.9 (16 Apr 2018 19:22)  HR: 66 (17 Apr 2018 05:42) (66 - 82)  BP: 143/65 (17 Apr 2018 05:42) (110/65 - 144/75)  BP(mean): --  RR: 18 (17 Apr 2018 05:42) (18 - 18)  SpO2: 95% (16 Apr 2018 20:24) (86% - 100%)    PHYSICAL EXAM:  CHEST/LUNG:  -clear   HEART:regular lormal  S1 & S2   ABDOMEN: soft, non tender  EXTREMITIES: no edema , no calf tenderness  LABS:                        13.6   9.64  )-----------( 303      ( 16 Apr 2018 10:20 )             42.8   04-16    146  |  99  |  32<H>  ----------------------------<  92  4.3   |  31  |  1.2    Ca    9.3      16 Apr 2018 10:20        RADIOLOGY:      IMPRESSION:    chf, diastolic , improved  copd exaccerbation improved with oxygen and nebulizer rx  pulm. htn.  hypoxia  needs  home oxygen as per Dr. Milligan  ckd stable  old cva  seizure disorder- no recurrence  cognitive impairment   lung nodule r/o cancer  pad      PLAN:  continie po lasix   dr. gabriela milligan -outpatient w/u for lung nodule including pet scan  home oxygen   dvt prophylaxis  discharge once home oxygen arranged  follow up with DR. Srini christian.  Follow up in our office,also with  neurologist and cardiologist  outpatient w/u for cognitive impairement- improved  I SPENT  over 20 MINUTES EVALUATING PATIENT/EXAMINING AND COORDINATING CARE

## 2018-04-17 NOTE — PROGRESS NOTE ADULT - ASSESSMENT
ASSESSMENT / PLAN  :    1) Acute respiratory failure (resolved) likely due to chronic lung disease and acute diastolic CHF (improved with lasix).  Currently doing well w/ inhalers as advised & low flow supplemental O2.   will need home O2.  Patient's sats are 86% on air at rest.  Patient has diagnosis of emphysema and therefore needs home O2.  Patient is aware she will be going home on oxygen.  Patient is in a chronic stable state.      2)Severe pulmonary hypertension - suspect severe chronic lung disease    Diast. dysfunction on Echo. : Started on high dose lasix. Now on oral lasix         3)Lung nodule : incidental finding on chest CT.   w/u as outpatient      4) HTN controlled.       Continue Diltiazem  mg daily and reduce Metoprolol to 25 mg q12H.    Pt refused aricept, discussed with son (pharmacy tech).  Due to side effect of weight loss pt does not want to be on aricept and wants to f/u with primary doctor after discharge before starting any further meds.        Outpatient work up for cognitive impairment   For discharge tomorrow

## 2018-04-17 NOTE — PROGRESS NOTE ADULT - SUBJECTIVE AND OBJECTIVE BOX
Patient is a 84y old  Female who presents with a chief complaint of worsening GUSTAFSON X 5 days (11 Apr 2018 04:58)      PAST MEDICAL & SURGICAL HISTORY:  Kidney disease, chronic, stage I (GFR over 89 ml/min)  Peripheral vascular disease  HTN (hypertension)  High cholesterol  CVA (cerebral vascular accident)  History of right common carotid artery stent placement      MEDICATIONS  (STANDING):  aspirin  chewable 81 milliGRAM(s) Oral <User Schedule>  atorvastatin 10 milliGRAM(s) Oral at bedtime  buDESOnide 160 MICROgram(s)/formoterol 4.5 MICROgram(s) Inhaler 2 Puff(s) Inhalation two times a day  cholecalciferol 1000 Unit(s) Oral daily  clopidogrel Tablet 75 milliGRAM(s) Oral daily  diltiazem    milliGRAM(s) Oral daily  donepezil 5 milliGRAM(s) Oral at bedtime  furosemide    Tablet 20 milliGRAM(s) Oral daily  heparin  Injectable 5000 Unit(s) SubCutaneous every 12 hours  levETIRAcetam 500 milliGRAM(s) Oral two times a day  lisinopril 10 milliGRAM(s) Oral daily  loratadine 10 milliGRAM(s) Oral daily  metoprolol tartrate 25 milliGRAM(s) Oral two times a day    MEDICATIONS  (PRN):  ALBUTerol/ipratropium for Nebulization 3 milliLiter(s) Nebulizer every 4 hours PRN Shortness of Breath and/or Wheezing  meclizine 25 milliGRAM(s) Oral two times a day PRN Dizziness      Overnight events:    Vital Signs Last 24 Hrs  T(C): 35.7 (17 Apr 2018 14:36), Max: 36.1 (16 Apr 2018 19:22)  T(F): 96.2 (17 Apr 2018 14:36), Max: 96.9 (16 Apr 2018 19:22)  HR: 67 (17 Apr 2018 14:36) (66 - 82)  BP: 89/43 (17 Apr 2018 14:36) (89/43 - 144/75)  BP(mean): --  RR: 18 (17 Apr 2018 14:36) (18 - 18)  SpO2: 95% (16 Apr 2018 20:24) (95% - 95%)  CAPILLARY BLOOD GLUCOSE        I&O's Summary    16 Apr 2018 07:01  -  17 Apr 2018 07:00  --------------------------------------------------------  IN: 0 mL / OUT: 1300 mL / NET: -1300 mL        Physical Exam:    GEN:  NAD  NECK: supple.   CHEST/LUNG: No adv sound. No wheeze.   HEART: S1 S2, regular.   ABDOMEN: soft, NT, +BS  EXTREMITIES: no LL edema       Labs:                        13.0   7.74  )-----------( 261      ( 17 Apr 2018 06:28 )             42.3             04-17    144  |  97<L>  |  37<H>  ----------------------------<  89  4.4   |  35<H>  |  1.3    Ca    8.7      17 Apr 2018 06:28                    Imaging:  Imaging:  < from: CT Head No Cont (04.12.18 @ 16:49) >  IMPRESSION:    1.  No evidence of acute intracranial hemorrhage, extra axial fluid   collection or midline shift.    2.  Left parietal lobe chronic appearing infarct, increased in extent   since the patient's prior CT from 2005.    < end of copied text >    < from: Xray Chest 1 View- PORTABLE-Routine (04.12.18 @ 06:33) >  Impression:      Stable bilateral pleural effusions.    Stable increased interstitial markings    < end of copied text >

## 2018-04-17 NOTE — PROGRESS NOTE ADULT - SUBJECTIVE AND OBJECTIVE BOX
LILA VELA  84y, Female  Allergy: No Known Allergies    PMH/PSH:  PAST MEDICAL & SURGICAL HISTORY:  Kidney disease, chronic, stage I (GFR over 89 ml/min)  Peripheral vascular disease  HTN (hypertension)  High cholesterol  CVA (cerebral vascular accident)  History of right common carotid artery stent placement    ALLERGIES:  Allergies    No Known Allergies    Intolerances          LAST 24-Hr EVENTS:  pt seen examined earlier this am around 9 am  sitting in chair  no complaints of sob    VITALS:  T(F): 96.4 (04-17-18 @ 20:12), Max: 96.5 (04-17-18 @ 05:42)  HR: 73 (04-17-18 @ 20:12)  BP: 93/52 (04-17-18 @ 20:12) (89/43 - 143/65)  RR: 18 (04-17-18 @ 20:12)  SpO2: --    PHYSICAL EXAM:    GENERAL: NAD, cachectic  NECK: Supple, No JVD  CHEST/LUNG: dec bs b/l bases  HEART: Regular rate and rhythm  ABDOMEN: Soft, Nontender, Nondistended; Bowel sounds present  EXTREMITIES:  No clubbing, edema absent        TESTS & MEASUREMENTS:    IN: 1030 mL / OUT: 950 mL / NET: 80 mL    IN: 0 mL / OUT: 1300 mL / NET: -1300 mL                            13.0   7.74  )-----------( 261      ( 17 Apr 2018 06:28 )             42.3       04-17    144  |  97<L>  |  37<H>  ----------------------------<  89  4.4   |  35<H>  |  1.3    Ca    8.7      17 Apr 2018 06:28                  MEDICATIONS:  MEDICATIONS  (STANDING):  aspirin  chewable 81 milliGRAM(s) Oral <User Schedule>  atorvastatin 10 milliGRAM(s) Oral at bedtime  buDESOnide 160 MICROgram(s)/formoterol 4.5 MICROgram(s) Inhaler 2 Puff(s) Inhalation two times a day  cholecalciferol 1000 Unit(s) Oral daily  clopidogrel Tablet 75 milliGRAM(s) Oral daily  diltiazem    milliGRAM(s) Oral daily  donepezil 5 milliGRAM(s) Oral at bedtime  furosemide    Tablet 20 milliGRAM(s) Oral daily  heparin  Injectable 5000 Unit(s) SubCutaneous every 12 hours  levETIRAcetam 500 milliGRAM(s) Oral two times a day  lisinopril 10 milliGRAM(s) Oral daily  loratadine 10 milliGRAM(s) Oral daily  metoprolol tartrate 25 milliGRAM(s) Oral two times a day    MEDICATIONS  (PRN):  ALBUTerol/ipratropium for Nebulization 3 milliLiter(s) Nebulizer every 4 hours PRN Shortness of Breath and/or Wheezing  meclizine 25 milliGRAM(s) Oral two times a day PRN Dizziness

## 2018-04-17 NOTE — PROGRESS NOTE ADULT - ASSESSMENT
ARF  CHF-decompensated now improved  COPD  PH- Group 2/3  Pulmonary nodule 2 cm    o2 via nasal cannula, will need home 02 set up on discharge  on lasix  monitor i/os, lytes cr  symbicort 160 mg/4.5 2 puff bid  albuterol prn  patient and family aware of suspicion for underlying malignancy,  will benefit from pet scan as out pt   dvt px  overall prognosis guarded  stable from pulmonary standpoint

## 2018-04-18 NOTE — DISCHARGE NOTE ADULT - CARE PROVIDERS DIRECT ADDRESSES
,DirectAddress_Unknown,DirectAddress_Unknown,[email or web address],DirectAddress_Unknown

## 2018-04-18 NOTE — DISCHARGE NOTE ADULT - CARE PLAN
Principal Discharge DX:	Emphysema of lung  Goal:	clinical stability  Assessment and plan of treatment:	will be on oxygen on nasal cannula at home  continue inhaler  will follow up pulmonary as outpatient and arrange for PET CT scan to follow up lung nodule found on CT chest  Secondary Diagnosis:	Congestive heart failure, unspecified chronicity, unspecified heart failure type  Goal:	clinical stability  Assessment and plan of treatment:	on lasix 20mg one tablet every other day  Secondary Diagnosis:	Lung nodule  Goal:	follow up  Assessment and plan of treatment:	found on CT chest  follow up with Dr. Milligan, pulmonology

## 2018-04-18 NOTE — PROGRESS NOTE ADULT - ASSESSMENT
ASSESSMENT / PLAN  :    1) Acute respiratory failure (resolved) likely due to chronic lung disease and acute diastolic CHF (improved with lasix).  Currently doing well w/ inhalers as advised & low flow supplemental O2.   will need home O2.  Patient's sats are 86% on air at rest.  Patient has diagnosis of emphysema and therefore needs home O2.  Patient is aware she will be going home on oxygen.  Patient is in a chronic stable state.      2)Severe pulmonary hypertension - suspect severe chronic lung disease    Diast. dysfunction on Echo. : Started on high dose lasix. Now on oral lasix         3)Lung nodule : incidental finding on chest CT.   w/u as outpatient      4) HTN controlled.       Continue Diltiazem  mg daily and reduce Metoprolol to 25 mg q12H.  Now hypotensive this afternoon (89/51) possibly 2/2 overdiuresis, patient asymptomatic.  D/w Dr. Bunch, hold afternoon metoprolol dose, furosemide already lowered to q48hrs, keep diltiazem at same dose.  Discharge cancelled, if becomes symptomatic consider gentle hydration.      Pt refused aricept, discussed with son (pharmacy tech).  Due to side effect of weight loss pt does not want to be on aricept and wants to f/u with primary doctor after discharge before starting any further meds.

## 2018-04-18 NOTE — PROGRESS NOTE ADULT - ASSESSMENT
ARF  CHF-decompensated now improved  COPD  PH- Group 2/3  Pulmonary nodule 2 cm    o2 via nasal cannula, will need home 02 set up on discharge  on lasix  monitor i/os, lytes cr  symbicort 160 mg/4.5 2 puff bid  albuterol prn  patient and family aware of suspicion for underlying malignancy,  will benefit from pet scan as out pt   dvt px  overall prognosis guarded  remains stable from pulmonary standpoint  follow up as outpt 1-2 weeks

## 2018-04-18 NOTE — PROGRESS NOTE ADULT - ASSESSMENT
Diastolic chf - improved, euvolemic  azotemia secondary to diuretics  copd exacerbation improved with oxygen and nebulizer rx  pulm. htn. with hypoxia - needs  home oxygen   ckd stable  old CVA  seizure disorder- no recurrence  cognitive impairment   lung nodule r/o cancer        PLAN:  Decrease Lasix to 20 mg every other day.  D/W Dr. Bunch.   Pulmonary follow up.  home oxygen   Follow up in office in 2-3 weeks.

## 2018-04-18 NOTE — DISCHARGE NOTE ADULT - HOSPITAL COURSE
Admitted with acute respiratory failure, improved after lasix.   Secondary to emphysema and diastolic CHF   Currently doing well w/ inhalers as advised & low flow supplemental O2.   will need home O2.  Patient's sats are 86% on air at rest.  Patient has diagnosis of emphysema and therefore needs home O2.  Patient is aware she will be going home on oxygen.  Patient is in a chronic stable state.       Lung nodule : incidental finding on chest CT.  Will f/u as outpatient with pulmonary as outpatient and arrange PET CT      For hypertension. Continue Diltiazem  mg daily and reduce Metoprolol to 25 mg q12H.  Reduce lasix dose to every other day.  Discussed with Dr. Bunch        Outpatient work up for cognitive impairment Admitted with acute respiratory failure, improved after lasix.   Secondary to emphysema and diastolic CHF   Currently doing well w/ inhalers as advised & low flow supplemental O2.   will need home O2.  Patient's sats are 86% on air at rest.  Patient has diagnosis of emphysema and therefore needs home O2.  Patient is aware she will be going home on oxygen.  Patient is in a chronic stable state.       Lung nodule : incidental finding on chest CT.  Will f/u as outpatient with pulmonary as outpatient and arrange PET CT      For hypertension. Reduce Metoprolol to 25 mg q12H.  Reduce lasix dose to every other day, discussed with Dr. Bunch        Outpatient work up for cognitive impairment with neurology (follow up with Dr. Rubio)  Follow up with Dr. Bunch, Dr. Geronimo (cardiology), and Dr. Milligan (pulmonology) Admitted with acute respiratory failure, improved after lasix.   Secondary to emphysema and diastolic CHF   Currently doing well w/ inhalers as advised & low flow supplemental O2.   will need home O2.  Patient's sats are 86% on air at rest.  Patient has diagnosis of emphysema and therefore needs home O2.  Patient is aware she will be going home on oxygen.  Patient is in a chronic stable state.       Lung nodule : incidental finding on chest CT.  Will f/u as outpatient with pulmonary as outpatient and arrange PET CT      For hypertension. Reduce Metoprolol to 25 mg q12H.  Reduce lasix dose to every other day, continue rest of home meds discussed with Dr. Bunch        Outpatient work up for cognitive impairment with neurology (follow up with Dr. Rubio)  Follow up with Dr. Bunch, Dr. Geronimo (cardiology), and Dr. Milligan (pulmonology)

## 2018-04-18 NOTE — DISCHARGE NOTE ADULT - MEDICATION SUMMARY - MEDICATIONS TO TAKE
I will START or STAY ON the medications listed below when I get home from the hospital:    aspirin 81 mg oral tablet  -- orally 2 times a week, on tuesday and friday  -- Indication: For Cardioprotective    Lotensin 10 mg oral tablet  -- orally 2 times a day  -- Indication: For Hypertension    Keppra 500 mg oral tablet  -- 1 tab(s) by mouth 2 times a day  -- Indication: For seizure    meclizine 25 mg oral tablet  -- 1  by mouth 2 times a day, As Needed  -- Indication: For dizziness    Claritin 10 mg oral tablet  -- 1 tab(s) by mouth once a day  -- Indication: For allergies    Lipitor 10 mg oral tablet  -- 1 tab(s) by mouth once a day  -- Indication: For Hyperlipidemia    Plavix 75 mg oral tablet  -- 1  by mouth once a day  -- Indication: For CVA (cerebral vascular accident)    Actonel 150 mg oral tablet  -- 1 tab(s) by mouth once a month  -- Indication: For bone protection    Plendil 10 mg oral tablet, extended release  -- 1 tab(s) by mouth once a day  -- Indication: For Hypertension    Lasix 20 mg oral tablet  -- 1 tab(s) by mouth every 48 hours  -- Indication: For Hypertension/Heart Failure    Pletal 100 mg oral tablet  -- 1 tab(s) by mouth 2 times a day  -- Indication: For vasodilator    Vitamin D3 400 intl units oral tablet  -- orally once a day  -- Indication: For supplement I will START or STAY ON the medications listed below when I get home from the hospital:    meclizine 25 mg oral tablet  -- 1  by mouth 2 times a day, As Needed  -- Indication: For dizziness I will START or STAY ON the medications listed below when I get home from the hospital:    aspirin 81 mg oral tablet  -- 1 tab(s) by mouth 2 times a week   -- Indication: For Cardioprotection    benazepril 10 mg oral tablet  -- 1 tab(s) by mouth once a day   -- Do not take this drug if you are pregnant.  It is very important that you take or use this exactly as directed.  Do not skip doses or discontinue unless directed by your doctor.  Some non-prescription drugs may aggravate your condition.  Read all labels carefully.  If a warning appears, check with your doctor before taking.    -- Indication: For blood pressure    dilTIAZem 180 mg/24 hours oral capsule, extended release  -- 1 cap(s) by mouth once a day   -- Indication: For blood pressure    Keppra 500 mg oral tablet  -- 1 tab(s) by mouth 2 times a day  -- Indication: For seizures    meclizine 25 mg oral tablet  -- 1  by mouth 2 times a day, As Needed  -- Indication: For dizziness    Claritin 10 mg oral tablet  -- 1 tab(s) by mouth once a day  -- Indication: For allergies    Lipitor 10 mg oral tablet  -- 1 tab(s) by mouth once a day  -- Indication: For dyslipidemia    clopidogrel 75 mg oral tablet  -- 1 tab(s) by mouth once a day  -- Indication: For CVA (cerebral vascular accident)    metoprolol tartrate 25 mg oral tablet  -- 1 tab(s) by mouth 2 times a day  -- Indication: For blood pressure    Actonel 150 mg oral tablet  -- 1 tab(s) by mouth once a month  -- Indication: For bone protection    budesonide-formoterol 160 mcg-4.5 mcg/inh inhalation aerosol  -- 2 puff(s) inhaled 2 times a day   -- Indication: For Emphysema of lung    Vitamin D3 400 intl units oral tablet  -- 1 tab(s) by mouth once a day   -- Indication: For supplement I will START or STAY ON the medications listed below when I get home from the hospital:    aspirin 81 mg oral tablet  -- 1 tab(s) by mouth 2 times a week   -- Indication: For Cardioprotection    benazepril 10 mg oral tablet  -- 1 tab(s) by mouth once a day   -- Do not take this drug if you are pregnant.  It is very important that you take or use this exactly as directed.  Do not skip doses or discontinue unless directed by your doctor.  Some non-prescription drugs may aggravate your condition.  Read all labels carefully.  If a warning appears, check with your doctor before taking.    -- Indication: For blood pressure     dilTIAZem 180 mg/24 hours oral capsule, extended release  -- 1 cap(s) by mouth once a day   -- Indication: For blood pressure    Keppra 500 mg oral tablet  -- 1 tab(s) by mouth 2 times a day  -- Indication: For seizures    meclizine 25 mg oral tablet  -- 1  by mouth 2 times a day, As Needed  -- Indication: For dizziness    Claritin 10 mg oral tablet  -- 1 tab(s) by mouth once a day  -- Indication: For allergies    Lipitor 10 mg oral tablet  -- 1 tab(s) by mouth once a day  -- Indication: For Hyperlipidemia    clopidogrel 75 mg oral tablet  -- 1 tab(s) by mouth once a day  -- Indication: For CVA (cerebral vascular accident)    metoprolol tartrate 25 mg oral tablet  -- 1 tab(s) by mouth 2 times a day  -- Indication: For blood pressure    Actonel 150 mg oral tablet  -- 1 tab(s) by mouth once a month  -- Indication: For bone protection    budesonide-formoterol 160 mcg-4.5 mcg/inh inhalation aerosol  -- 2 puff(s) inhaled 2 times a day   -- Indication: For Emphysema of lung    Vitamin D3 400 intl units oral tablet  -- 1 tab(s) by mouth once a day   -- Indication: For supplement

## 2018-04-18 NOTE — PROGRESS NOTE ADULT - SUBJECTIVE AND OBJECTIVE BOX
LILA VELA  84y  Female      SUBJECTIVE:  c/o want to  go home    Progress Note:      REVIEW OF SYSTEMS:    T(C): 36.3 (04-18-18 @ 05:00), Max: 36.3 (04-18-18 @ 05:00)  04-17        HR: 76 (04-18-18 @ 05:00) (67 - 76)  BP: 148/66 (04-18-18 @ 05:00) (89/43 - 148/66)  RR: 18 (04-18-18 @ 05:00) (18 - 18)  SpO2: --  Wt(kg): --Vital Signs Last 24 Hrs  T(C): 36.3 (18 Apr 2018 05:00), Max: 36.3 (18 Apr 2018 05:00)  T(F): 97.4 (18 Apr 2018 05:00), Max: 97.4 (18 Apr 2018 05:00)  HR: 76 (18 Apr 2018 05:00) (67 - 76)  BP: 148/66 (18 Apr 2018 05:00) (89/43 - 148/66)  BP(mean): --  RR: 18 (18 Apr 2018 05:00) (18 - 18)  SpO2: --    PHYSICAL EXAM:  CHEST/LUNG:  -clear   HEART:regular normal  S1 & S2   ABDOMEN: soft, non tender  EXTREMITIES: no edema , no calf tenderness  neuro alert and oriented  LABS:                        13.0   7.74  )-----------( 261      ( 17 Apr 2018 06:28 )             42.3     04-17    144  |  97<L>  |  37<H>  ----------------------------<  89  4.4   |  35<H>  |  1.3    Ca    8.7      17 Apr 2018 06:28      RADIOLOGY:      IMPRESSION:  chf, diastolic , improved  azotemia secondary to diuretic  copd exaccerbation improved with oxygen and nebulizer rx  pulm. htn.  hypoxia  needs  home oxygen as per Dr. Milligan  ckd stable  old cva  seizure disorder- no recurrence  cognitive impairment   lung nodule r/o cancer  pad        PLAN:  Decrease po lasix to 20 mg every other day    dr. gabriela milligan -outpatient w/u for lung nodule including pet scan  home oxygen   dvt prophylaxis  discharge  home  today  follow up with DR. Srini christian.  Follow up in our office,also with  neurologist and cardiologist  outpatient w/u for cognitive impairment- clinically  improved  I SPENT  over 20 MINUTES EVALUATING PATIENT/EXAMINING AND COORDINATING CARE

## 2018-04-18 NOTE — DISCHARGE NOTE ADULT - PLAN OF CARE
on lasix 20mg one tablet every other day follow up found on CT chest  follow up with Dr. Milligan, pulmonology clinical stability will be on oxygen on nasal cannula at home  continue inhaler  will follow up pulmonary as outpatient and arrange for PET CT scan to follow up lung nodule found on CT chest

## 2018-04-18 NOTE — DISCHARGE NOTE ADULT - PATIENT PORTAL LINK FT
You can access the [facility] Patient Portal, offered by [facility], by registering with the following website: [email or web address]

## 2018-04-18 NOTE — PROGRESS NOTE ADULT - SUBJECTIVE AND OBJECTIVE BOX
SUBJ: Dyspnea is better.  No chest pain.      MEDICATIONS  (STANDING):  aspirin  chewable 81 milliGRAM(s) Oral <User Schedule>  atorvastatin 10 milliGRAM(s) Oral at bedtime  buDESOnide 160 MICROgram(s)/formoterol 4.5 MICROgram(s) Inhaler 2 Puff(s) Inhalation two times a day  cholecalciferol 1000 Unit(s) Oral daily  clopidogrel Tablet 75 milliGRAM(s) Oral daily  diltiazem    milliGRAM(s) Oral daily  heparin  Injectable 5000 Unit(s) SubCutaneous every 12 hours  levETIRAcetam 500 milliGRAM(s) Oral two times a day  lisinopril 10 milliGRAM(s) Oral daily  loratadine 10 milliGRAM(s) Oral daily  metoprolol tartrate 25 milliGRAM(s) Oral two times a day    MEDICATIONS  (PRN):  ALBUTerol/ipratropium for Nebulization 3 milliLiter(s) Nebulizer every 4 hours PRN Shortness of Breath and/or Wheezing  meclizine 25 milliGRAM(s) Oral two times a day PRN Dizziness    Vital Signs Last 24 Hrs  T(C): 36.3 (18 Apr 2018 05:00), Max: 36.3 (18 Apr 2018 05:00)  T(F): 97.4 (18 Apr 2018 05:00), Max: 97.4 (18 Apr 2018 05:00)  HR: 76 (18 Apr 2018 05:00) (67 - 76)  BP: 148/66 (18 Apr 2018 05:00) (89/43 - 148/66)  BP(mean): --  RR: 18 (18 Apr 2018 05:00) (18 - 18)  SpO2: --     REVIEW OF SYSTEMS:  CONSTITUTIONAL: No fever, chills.  CARDIOLOGY: shortness of breath .   RESPIRATORY: denies wheezing.   NEUROLOGICAL: No weakness, no focal deficits to report.  GI: no BRBPR, denies nausea, Vomiting or diarrhea.    PSYCHIATRY: normal mood and affect    PHYSICAL EXAM:  · CONSTITUTIONAL:	Well-developed, well nourished      ·RESPIRATORY:   decreased air entry;  no rales, rhonchi or wheeze  · CARDIOVASCULAR	S1 and S2 soft intensity, no rub, gallop. 2/6 BALJINDER at base    ABDOMEN: soft, non-tender, NABS  · EXTREMITIES: No cyanosis, clubbing or edema  NEURO: alert .  · VASCULAR: 	Equal and normal pulses   	  TELEMETRY:      ECG:    LABS:                        13.0   7.74  )-----------( 261      ( 17 Apr 2018 06:28 )             42.3     04-17    144  |  97<L>  |  37<H>  ----------------------------<  89  4.4   |  35<H>  |  1.3    Ca    8.7      17 Apr 2018 06:28      I&O's Summary    18 Apr 2018 07:01  -  18 Apr 2018 12:50  --------------------------------------------------------  IN: 0 mL / OUT: 1 mL / NET: -1 mL      BNP  RADIOLOGY & ADDITIONAL STUDIES:    IMPRESSION AND PLAN:

## 2018-04-18 NOTE — PROGRESS NOTE ADULT - SUBJECTIVE AND OBJECTIVE BOX
LILA VELA  84y, Female  Allergy: No Known Allergies    PMH/PSH:  PAST MEDICAL & SURGICAL HISTORY:  Kidney disease, chronic, stage I (GFR over 89 ml/min)  Peripheral vascular disease  HTN (hypertension)  High cholesterol  CVA (cerebral vascular accident)  History of right common carotid artery stent placement    ALLERGIES:  Allergies    No Known Allergies    Intolerances          LAST 24-Hr EVENTS:  sob improved  feeling overall better    VITALS:  T(F): 96 (04-18-18 @ 20:48), Max: 97.4 (04-18-18 @ 05:00)  HR: 75 (04-18-18 @ 20:48)  BP: 84/60 (04-18-18 @ 20:48) (84/60 - 148/66)  RR: 18 (04-18-18 @ 20:48)  SpO2: --    PHYSICAL EXAM:    GENERAL: NAD, well-developed  NECK: Supple, No JVD  CHEST/LUNG: dec bs b/l bases  HEART: Regular rate and rhythm; No murmurs.  ABDOMEN: Soft, Nontender, Nondistended; Bowel sounds present  EXTREMITIES:  No clubbing, edema absent        TESTS & MEASUREMENTS:    IN: 0 mL / OUT: 1300 mL / NET: -1300 mL    IN: 240 mL / OUT: 1 mL / NET: 239 mL                            13.0   7.74  )-----------( 261      ( 17 Apr 2018 06:28 )             42.3       04-17    144  |  97<L>  |  37<H>  ----------------------------<  89  4.4   |  35<H>  |  1.3    Ca    8.7      17 Apr 2018 06:28          MEDICATIONS:  MEDICATIONS  (STANDING):  aspirin  chewable 81 milliGRAM(s) Oral <User Schedule>  atorvastatin 10 milliGRAM(s) Oral at bedtime  buDESOnide 160 MICROgram(s)/formoterol 4.5 MICROgram(s) Inhaler 2 Puff(s) Inhalation two times a day  cholecalciferol 1000 Unit(s) Oral daily  clopidogrel Tablet 75 milliGRAM(s) Oral daily  diltiazem    milliGRAM(s) Oral daily  heparin  Injectable 5000 Unit(s) SubCutaneous every 12 hours  levETIRAcetam 500 milliGRAM(s) Oral two times a day  lisinopril 10 milliGRAM(s) Oral daily  loratadine 10 milliGRAM(s) Oral daily    MEDICATIONS  (PRN):  ALBUTerol/ipratropium for Nebulization 3 milliLiter(s) Nebulizer every 4 hours PRN Shortness of Breath and/or Wheezing  meclizine 25 milliGRAM(s) Oral two times a day PRN Dizziness

## 2018-04-18 NOTE — PROGRESS NOTE ADULT - SUBJECTIVE AND OBJECTIVE BOX
Patient is a 84y old  Female who presents with a chief complaint of worsening GUSTAFSON X 5 days (18 Apr 2018 11:51)      PAST MEDICAL & SURGICAL HISTORY:  Kidney disease, chronic, stage I (GFR over 89 ml/min)  Peripheral vascular disease  HTN (hypertension)  High cholesterol  CVA (cerebral vascular accident)  History of right common carotid artery stent placement      MEDICATIONS  (STANDING):  aspirin  chewable 81 milliGRAM(s) Oral <User Schedule>  atorvastatin 10 milliGRAM(s) Oral at bedtime  buDESOnide 160 MICROgram(s)/formoterol 4.5 MICROgram(s) Inhaler 2 Puff(s) Inhalation two times a day  cholecalciferol 1000 Unit(s) Oral daily  clopidogrel Tablet 75 milliGRAM(s) Oral daily  diltiazem    milliGRAM(s) Oral daily  heparin  Injectable 5000 Unit(s) SubCutaneous every 12 hours  levETIRAcetam 500 milliGRAM(s) Oral two times a day  lisinopril 10 milliGRAM(s) Oral daily  loratadine 10 milliGRAM(s) Oral daily    MEDICATIONS  (PRN):  ALBUTerol/ipratropium for Nebulization 3 milliLiter(s) Nebulizer every 4 hours PRN Shortness of Breath and/or Wheezing  meclizine 25 milliGRAM(s) Oral two times a day PRN Dizziness      Overnight events:    Vital Signs Last 24 Hrs  T(C): 35.6 (18 Apr 2018 14:27), Max: 36.3 (18 Apr 2018 05:00)  T(F): 96.1 (18 Apr 2018 14:27), Max: 97.4 (18 Apr 2018 05:00)  HR: 70 (18 Apr 2018 14:27) (70 - 76)  BP: 94/64 (18 Apr 2018 15:56) (89/51 - 148/66)  BP(mean): --  RR: 18 (18 Apr 2018 14:27) (18 - 18)  SpO2: --  CAPILLARY BLOOD GLUCOSE        I&O's Summary    18 Apr 2018 07:01  -  18 Apr 2018 17:46  --------------------------------------------------------  IN: 0 mL / OUT: 1 mL / NET: -1 mL        Physical Exam:    GEN:  NAD  NECK: supple.   CHEST/LUNG: very mild crackles b/l   HEART: S1 S2, regular.   ABDOMEN: soft, NT, +BS  EXTREMITIES: no LL edema       Labs:                        13.0   7.74  )-----------( 261      ( 17 Apr 2018 06:28 )             42.3             04-17    144  |  97<L>  |  37<H>  ----------------------------<  89  4.4   |  35<H>  |  1.3    Ca    8.7      17 Apr 2018 06:28                            Imaging:    echo:  1. Left ventricular ejection fraction, by visual estimation, is 60 to   65%.   2. Moderately increased LV wall thickness.   3. Spectral Doppler shows impaired relaxation pattern of left   ventricular myocardial filling (Grade I diastolic dysfunction).   4. There is moderate concentric left ventricular hypertrophy.   5. Moderately reduced RV systolic function.   6. Severely enlarged right ventricle.   7. Severe mitral annular calcification.   8. Moderate tricuspid regurgitation.   9. Estimated pulmonary artery systolic pressure is 58.7 mmHg assuming a   right atrial pressure of 10 mmHg, which is consistent with moderate   pulmonary hypertension.    CXR: Impression:    Stable bilateral pleural effusions.  Stable increased interstitial markings    EKG NSR    CT chest: IMPRESSION:     No evidence of central or lobar pulmonary embolism.  Severe emphysematous changes of bilateral lungs.   Bilateral pleural effusions, left greater than right, small to moderate   volume.  2x1x1 cm nodule in MARINA    CT head:  1.  No evidence of acute intracranial hemorrhage, extra axial fluid   collection or midline shift.    2.  Left parietal lobe chronic appearing infarct, increased in extent   since the patient's prior CT from 2005.

## 2018-04-18 NOTE — DISCHARGE NOTE ADULT - MEDICATION SUMMARY - MEDICATIONS TO STOP TAKING
I will STOP taking the medications listed below when I get home from the hospital:    Remeron 15 mg oral tablet  -- 1 tab(s) by mouth once a day (at bedtime) I will STOP taking the medications listed below when I get home from the hospital:    Pletal 100 mg oral tablet  -- 1 tab(s) by mouth 2 times a day    Plendil 10 mg oral tablet, extended release  -- 1 tab(s) by mouth once a day    Remeron 15 mg oral tablet  -- 1 tab(s) by mouth once a day (at bedtime)

## 2018-04-18 NOTE — DISCHARGE NOTE ADULT - MEDICATION SUMMARY - MEDICATIONS TO CHANGE
I will SWITCH the dose or number of times a day I take the medications listed below when I get home from the hospital:    Lopressor 50 mg oral tablet  -- 1 tab(s) by mouth 2 times a day I will SWITCH the dose or number of times a day I take the medications listed below when I get home from the hospital:    Lotensin 10 mg oral tablet  -- orally 2 times a day    Lopressor 50 mg oral tablet  -- 1 tab(s) by mouth 2 times a day

## 2018-04-18 NOTE — DISCHARGE NOTE ADULT - CARE PROVIDER_API CALL
Mulugeta Bunch), Geriatric Medicine; Internal Medicine  [address] [place] [address]  Phone: [phone]  Fax: [phone]    Eliana Milligan), Internal Medicine  [address] [place] [address]  Phone: [phone]  Fax: [phone]    Bill Geronimo), Cardiology; Interventional Cardiology  [address]  [address]  [place] [address]  Phone: [phone]  Fax: [phone]    Patti Rubio), Neurology  [address] [place] [address]  Phone: [phone]  Fax: [phone]

## 2018-04-19 NOTE — PROGRESS NOTE ADULT - SUBJECTIVE AND OBJECTIVE BOX
04-19-18 @ 11:45    LLIA VELA  84y  Female  Seen resting on bed, denies any c/o. feels well.     INTERVAL EVENTS: BP was low yesterday. Lasix d/viv. Improved. Pt w/o sx.     MEDICATIONS  (STANDING):  aspirin  chewable 81 milliGRAM(s) Oral <User Schedule>  atorvastatin 10 milliGRAM(s) Oral at bedtime  buDESOnide 160 MICROgram(s)/formoterol 4.5 MICROgram(s) Inhaler 2 Puff(s) Inhalation two times a day  cholecalciferol 1000 Unit(s) Oral daily  clopidogrel Tablet 75 milliGRAM(s) Oral daily  diltiazem    milliGRAM(s) Oral daily  heparin  Injectable 5000 Unit(s) SubCutaneous every 12 hours  levETIRAcetam 500 milliGRAM(s) Oral two times a day  lisinopril 10 milliGRAM(s) Oral daily  loratadine 10 milliGRAM(s) Oral daily  metoprolol tartrate 25 milliGRAM(s) Oral two times a day    MEDICATIONS  (PRN):  ALBUTerol/ipratropium for Nebulization 3 milliLiter(s) Nebulizer every 4 hours PRN Shortness of Breath and/or Wheezing  meclizine 25 milliGRAM(s) Oral two times a day PRN Dizziness      T(C): 35.9 (04-19-18 @ 05:00), Max: 35.9 (04-19-18 @ 05:00)  HR: 75 (04-19-18 @ 05:00) (70 - 75)  BP: 127/56 (04-19-18 @ 05:00) (84/60 - 127/56)  RR: 18 (04-19-18 @ 05:00) (18 - 18)  SpO2: --  Wt(kg): --Vital Signs Last 24 Hrs  T(C): 35.9 (19 Apr 2018 05:00), Max: 35.9 (19 Apr 2018 05:00)  T(F): 96.6 (19 Apr 2018 05:00), Max: 96.6 (19 Apr 2018 05:00)  HR: 75 (19 Apr 2018 05:00) (70 - 75)  BP: 127/56 (19 Apr 2018 05:00) (84/60 - 127/56)  BP(mean): --  RR: 18 (19 Apr 2018 05:00) (18 - 18)  SpO2: --    PHYSICAL EXAM:  GENERAL: Slim built, alert, response legibly to my basic qns. NAD  NECK: supple, no JVD  CHEST/LUNG: Good AE, no adv sound  HEART: S1S2 reg.  ABDOMEN: benign  EXTREMITIES: no CCE noted. warm skin                          12.2   7.74  )-----------( 244      ( 19 Apr 2018 09:25 )             38.9     04-19    143  |  100  |  37<H>  ----------------------------<  104<H>  4.4   |  32  |  1.2    Ca    8.8      19 Apr 2018 09:25              RADIOLOGY & ADDITIONAL TESTS:      ASSESSMENT / PLAN  :    COPD/Emphysema : Seen & f/u-ed by pulm. Inhalers as advised. To cont home O2.   Lung nodule ; Family doesn't want to pursue w/u. to be f/u by pulm as OP  Diast Dysfx. Was Tx-ed w/ Diuretic, rather she developed hypotension, as well as persistent relative dehydration state. Doing OK off diuretic. Cont f/u as OP.  Hx HTN : off diuretic, cont current. rather been low to low normal BP. To be f/u as OP  Dementia ; stable.   Hx PVD ; been stable. No active issue.     Routine preventive, supportive care, home O2.     Discussed w/ Dr. Alvarado, resident. Stable to discharge.

## 2018-04-19 NOTE — DIETITIAN INITIAL EVALUATION ADULT. - ORAL INTAKE PTA
Pt reports following a regular diet at home; consuming 3 meals a day. 2-3 months prior to admit pt reports PO intake decreased; however has increased since admit. Pt reports no supplements/vitamins/minerals. NKFA. Pt reports following a regular diet at home; consuming 3 meals per day; pt reports PO intake/appetite has been somewhat decreased x 2-3 months PTA, however has still been able to consume 3 meals per day. States now intake/duy is improving back to baseline since admission.Pt reports no supplements/vitamins/minerals. NKFA.

## 2018-04-19 NOTE — PROGRESS NOTE ADULT - PROVIDER SPECIALTY LIST ADULT
Cardiology
Internal Medicine
Pulmonology
Internal Medicine

## 2018-04-19 NOTE — DIETITIAN INITIAL EVALUATION ADULT. - OTHER INFO
Reason for RD assessment: LOS. Pt reports appetite and PO intake good; consuming 75% of meals vs EMR 50-75% of meal trays. Pts lowest wt (4/18) 46 kg, highest wt (4/13) 50.8 kg - possibly wt decrease d/t lasix. Pt reports  lbs (2 months ago); denies any wt loss. UBW wt consistent with wt on 4/13 50.8 kg. Pertinent medical information: Pt p/w SOB/dyspnea on exertion likely 2/2 a combination of CHF exacerbation and copd exacerbation; Pt put on lasix. COPD exacerbation noted improved; CKD noted stable; CHF diastolic noted improving. Pt had low BP 4/18; Lasix d/viv now noted improved

## 2018-04-19 NOTE — DIETITIAN INITIAL EVALUATION ADULT. - PHYSICAL APPEARANCE
BMI 19.8. No edema noted. Skin: intact. Abd soft/nontender, +BS, LBM 4/18 per pt. BMI 19.8 (using current wt 47.6 kg from 4/19, ht 154.94 cm) No edema noted. Skin: intact. Abd soft/nontender, +BS, LBM 4/18 per pt.

## 2018-09-06 PROBLEM — N18.1 CHRONIC KIDNEY DISEASE, STAGE 1: Chronic | Status: ACTIVE | Noted: 2018-01-01

## 2018-09-06 PROBLEM — I63.9 CEREBRAL INFARCTION, UNSPECIFIED: Chronic | Status: ACTIVE | Noted: 2018-01-01

## 2018-09-06 PROBLEM — I73.9 PERIPHERAL VASCULAR DISEASE, UNSPECIFIED: Chronic | Status: ACTIVE | Noted: 2018-01-01

## 2018-09-06 PROBLEM — I10 ESSENTIAL (PRIMARY) HYPERTENSION: Chronic | Status: ACTIVE | Noted: 2018-01-01

## 2018-09-06 PROBLEM — E78.00 PURE HYPERCHOLESTEROLEMIA, UNSPECIFIED: Chronic | Status: ACTIVE | Noted: 2018-01-01

## 2018-09-06 NOTE — H&P ADULT - ASSESSMENT
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    # SLURRED SPEECH / NO WEAKNESS / NO DEFICITS / CTH NEGATIVE / H/O STROKE    - likely TIA   - Admit to tele .  - will get 2 d echo , mra head and neck.   - CTA was not done as pt has CKD .   - pt does not need pt / ot eval as walking fine .  - will c/w plavix , pt takes aspirin twice a wk .  - lipid profile reviewed , will keep pt on lipitor 10 mg for now.  - neurology f/u.    # PVD:    - will c/w cilostazol.    # COPD :    - stable , c/w O2 by NC , duonebs q 4 hrs and prn.    # HTN :  - pt was hypertensive on presentation , repeat Blood pressure after giving enalapril is normal.  - will monitor.    # CKD:    - stable .    # DVT :    - ppx with heparin.    # DISPO:    - from home ; full code.

## 2018-09-06 NOTE — PROGRESS NOTE ADULT - SUBJECTIVE AND OBJECTIVE BOX
LILA VELA    Chief Complaint: Expressive aphasia.    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  Both episodes witnessed by son and lasted 10 minutes before resolution. Last known asymptomatic at 0700. Upon arrival she was found to have a right visual field deficit.  CTH did not show any intracranial pathology but did reveal a chronic left occipital lobe infarct. This chronic left occipital lobe infarct is most likely the reason for the right visual field deficit. We discussed with the family that the patients right visual field deficit is most likely chronic therefore she is not a candidate for IV TPA. Family agrees with plan, they report the patient having visual problems at and after time of last ischemic stroke.    Relevant PMH:  [x] Prior ischemic stroke/TIA  [] Afib  []CAD  [x]HTN  [x]DLD  []DM []PVD []Obesity [] Sedintary lifestyle []CHF  []MARVIN  []Cancer Hx     Social History: [] Smoking []  Drug Use: []   Alcohol Use:   [] Other:      Possible Location of Stroke:  Unknown, will have a better understanding post MRI brain.  Possible Cause of Stroke:    Relevant Cerebral Imaging:    Relevant Cervicocerebral Imaging:          Relevant blood tests:  Direct LDL: 89 mg/dL [4 - 129] (09-06-18 @ 09:15)      Relevant cardiac rhythm monitoring:    Relevant Cardiac Structure:(TTE/KIRK +/-):[]No intracardiac thrombus/[] no vegetation/[]no akynesia/EF:      Home Medications:  albuterol 2.5 mg/3 mL (0.083%) inhalation solution: 3 milliliter(s) inhaled every 6 hours (06 Sep 2018 13:40)  Centrum oral tablet: 1 tab(s) orally once a day (06 Sep 2018 13:40)  furosemide 20 mg oral tablet: 1 tab(s) orally every 7 days (06 Sep 2018 13:40)  Pletal 100 mg oral tablet: 1 tab(s) orally 2 times a day (06 Sep 2018 13:40)      MEDICATIONS  (STANDING):  atorvastatin Oral Tab/Cap - Peds 10 milliGRAM(s) Oral daily  buDESOnide 160 MICROgram(s)/formoterol 4.5 MICROgram(s) Inhaler 2 Puff(s) Inhalation two times a day  cilostazol 100 milliGRAM(s) Oral two times a day  clopidogrel Tablet 75 milliGRAM(s) Oral daily  docusate sodium 100 milliGRAM(s) Oral three times a day  heparin  Injectable 5000 Unit(s) SubCutaneous every 8 hours  levETIRAcetam 500 milliGRAM(s) Oral two times a day  lisinopril 10 milliGRAM(s) Oral daily  metoprolol tartrate 25 milliGRAM(s) Oral two times a day  niCARdipine Infusion 5 mG/Hr (25 mL/Hr) IV Continuous <Continuous>      PT/OT/Speech/Rehab/S&Swr:    Exam:    Vital Signs Last 24 Hrs  T(C): 36.4 (06 Sep 2018 14:22), Max: 36.4 (06 Sep 2018 14:22)  T(F): 97.6 (06 Sep 2018 14:22), Max: 97.6 (06 Sep 2018 14:22)  HR: 69 (06 Sep 2018 14:22) (69 - 90)  BP: 116/55 (06 Sep 2018 14:22) (116/55 - 190/114)  BP(mean): --  RR: 18 (06 Sep 2018 14:22) (17 - 18)  SpO2: 97% (06 Sep 2018 14:22) (92% - 100%)    NIHSS      LOC:       1a:     1b(Questions):           1c(Instructions):             Best Gaze:  Visual:  Motor:                 RUE:     RLE:     LUE:     LLE:     FACE:     Limb Ataxia:  Sensory:       Language:       Dysarthria:          Extinction and Inattention:    NIHSS on admission:          NIHSS yesterday:          NIHSS today:             m-RS:    Impression:      Suggestion:  Routine stroke workup including:    Disposition: LILA VELA    Chief Complaint: Expressive aphasia.    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  Both episodes witnessed by son and lasted 5 minutes before resolution. Last known asymptomatic at 0700. Upon arrival she was found to have a right visual field deficit.  CTH did not show any intracranial pathology but did reveal a chronic left occipital lobe infarct. This chronic left occipital lobe infarct is most likely the reason for the right visual field deficit. We discussed with the family that the patients right visual field deficit is most likely chronic therefore she is not a candidate for IV TPA. Family agrees with plan, they report the patient having visual problems at and after time of last ischemic stroke.    Relevant PMH:  [x] Prior ischemic stroke/TIA  [] Afib  []CAD  [x]HTN  [x]DLD  []DM []PVD []Obesity [] Sedintary lifestyle []CHF  []MARVIN  []Cancer Hx     Social History: [] Smoking []  Drug Use: []   Alcohol Use:   [] Other:      Possible Location of Stroke:  Unknown, will have a better understanding post MRI brain.  Possible Cause of Stroke:  Unknown, will have a better understanding post MRI brain.  Relevant Cerebral Imaging:  < from: CT Brain Stroke Protocol (09.06.18 @ 09:30) >  IMPRESSION:    1.  No CT evidence of acute intracranial hemorrhage or infarct.    2.  Stable chronic infarcts of the left anterior frontal and posterior   parietal lobes.    Relevant Cervicocerebral Imaging:  MRA pending    Relevant blood tests:  Direct LDL: 89 mg/dL [4 - 129] (09-06-18 @ 09:15)      Relevant cardiac rhythm monitoring:  telemetry recently placed, no events reported.  Relevant Cardiac Structure:(TTE/KIRK +/-):[]No intracardiac thrombus/[] no vegetation/[]no akynesia/EF:  TTE pending.    Home Medications:  albuterol 2.5 mg/3 mL (0.083%) inhalation solution: 3 milliliter(s) inhaled every 6 hours (06 Sep 2018 13:40)  Centrum oral tablet: 1 tab(s) orally once a day (06 Sep 2018 13:40)  furosemide 20 mg oral tablet: 1 tab(s) orally every 7 days (06 Sep 2018 13:40)  Pletal 100 mg oral tablet: 1 tab(s) orally 2 times a day (06 Sep 2018 13:40)      MEDICATIONS  (STANDING):  atorvastatin Oral Tab/Cap - Peds 10 milliGRAM(s) Oral daily  buDESOnide 160 MICROgram(s)/formoterol 4.5 MICROgram(s) Inhaler 2 Puff(s) Inhalation two times a day  cilostazol 100 milliGRAM(s) Oral two times a day  clopidogrel Tablet 75 milliGRAM(s) Oral daily  docusate sodium 100 milliGRAM(s) Oral three times a day  heparin  Injectable 5000 Unit(s) SubCutaneous every 8 hours  levETIRAcetam 500 milliGRAM(s) Oral two times a day  lisinopril 10 milliGRAM(s) Oral daily  metoprolol tartrate 25 milliGRAM(s) Oral two times a day  niCARdipine Infusion 5 mG/Hr (25 mL/Hr) IV Continuous <Continuous>      PT/OT/Speech/Rehab/S&Swr: pending    Exam:    Vital Signs Last 24 Hrs  T(C): 36.4 (06 Sep 2018 14:22), Max: 36.4 (06 Sep 2018 14:22)  T(F): 97.6 (06 Sep 2018 14:22), Max: 97.6 (06 Sep 2018 14:22)  HR: 69 (06 Sep 2018 14:22) (69 - 90)  BP: 116/55 (06 Sep 2018 14:22) (116/55 - 190/114)  BP(mean): --  RR: 18 (06 Sep 2018 14:22) (17 - 18)  SpO2: 97% (06 Sep 2018 14:22) (92% - 100%)    NIHSS      LOC:       1a:   0  1b(Questions):    0       1c(Instructions):  0          Best Gaze:0  Visual:1R  Motor:                 RUE:   0  RLE: 0    LUE:   0 LLE:   0  FACE:   0  Limb Ataxia:0  Sensory:     0  Language:     0  Dysarthria:     0     Extinction and Inattention:0    NIHSS on admission:       1             m-RS:1    Impression:  85 year old woman with a past medical history of HTN , DLD , COPD, pulmonary HTN , ischemic stroke in 2015 , 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  Both episodes witnessed by son and lasted 5 minutes before resolution. Last known asymptomatic at 0700. Upon arrival she was found to have a right visual field deficit.  CTH did not show any intracranial pathology but did reveal a chronic left occipital lobe infarct. This chronic left occipital lobe infarct is most likely the reason for the right visual field deficit. The patients right visual field deficit is most likely chronic therefore she is not a candidate for IV TPA. Etiology of symptoms unknown, will have a better understanding post MRI brain.    Suggestion:  Routine stroke workup including:  MRI brain without cyrus.  MRA head and neck.   TTE with contrast to rule out right to left shunt, vegetation, akinesia, intracardiac thrombus.  Telemetry monitoring.  LDL, Hemoglobin A1C.   PT OT Rehab     Normal saline 75 ml per hour.  May continue Cilastozol and Plavix.  Continue Statin.    Disposition:  ED Observational Unit.    We spent more than 45 minutes to review the chart, gather necessary information, evaluate the patient and discuss the case with primary team and counseling the patient and his family to their satisfaction. Total visit time more than 60min.  Beatriz Ochoa MD.  [phone] LILA VELA    Chief Complaint: Expressive aphasia.    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  Both episodes witnessed by son and lasted 5 minutes before resolution. Last known asymptomatic at 0700. Upon arrival she was found to have a right visual field deficit.  CTH did not show any intracranial pathology but did reveal a chronic left occipital lobe infarct. This chronic left occipital lobe infarct is most likely the reason for the right visual field deficit. We discussed with the family that the patients right visual field deficit is most likely chronic therefore she is not a candidate for IV TPA. Family agrees with plan, they report the patient having visual problems at and after time of last ischemic stroke.    Relevant PMH:  [x] Prior ischemic stroke/TIA  [] Afib  []CAD  [x]HTN  [x]DLD  []DM []PVD []Obesity [] Sedintary lifestyle []CHF  []MARVIN  []Cancer Hx     Social History: [] Smoking []  Drug Use: []   Alcohol Use:   [] Other:      Possible Location of Stroke:  Unknown, will have a better understanding post MRI brain.  Possible Cause of Stroke:  Unknown, will have a better understanding post MRI brain.  Relevant Cerebral Imaging:  < from: CT Brain Stroke Protocol (09.06.18 @ 09:30) >  IMPRESSION:    1.  No CT evidence of acute intracranial hemorrhage or infarct.    2.  Stable chronic infarcts of the left anterior frontal and posterior   parietal lobes.    Relevant Cervicocerebral Imaging:  MRA pending    Relevant blood tests:  Direct LDL: 89 mg/dL [4 - 129] (09-06-18 @ 09:15)      Relevant cardiac rhythm monitoring:  telemetry recently placed, no events reported.  Relevant Cardiac Structure:(TTE/KIRK +/-):[]No intracardiac thrombus/[] no vegetation/[]no akynesia/EF:  TTE pending.    Home Medications:  albuterol 2.5 mg/3 mL (0.083%) inhalation solution: 3 milliliter(s) inhaled every 6 hours (06 Sep 2018 13:40)  Centrum oral tablet: 1 tab(s) orally once a day (06 Sep 2018 13:40)  furosemide 20 mg oral tablet: 1 tab(s) orally every 7 days (06 Sep 2018 13:40)  Pletal 100 mg oral tablet: 1 tab(s) orally 2 times a day (06 Sep 2018 13:40)      MEDICATIONS  (STANDING):  atorvastatin Oral Tab/Cap - Peds 10 milliGRAM(s) Oral daily  buDESOnide 160 MICROgram(s)/formoterol 4.5 MICROgram(s) Inhaler 2 Puff(s) Inhalation two times a day  cilostazol 100 milliGRAM(s) Oral two times a day  clopidogrel Tablet 75 milliGRAM(s) Oral daily  docusate sodium 100 milliGRAM(s) Oral three times a day  heparin  Injectable 5000 Unit(s) SubCutaneous every 8 hours  levETIRAcetam 500 milliGRAM(s) Oral two times a day  lisinopril 10 milliGRAM(s) Oral daily  metoprolol tartrate 25 milliGRAM(s) Oral two times a day  niCARdipine Infusion 5 mG/Hr (25 mL/Hr) IV Continuous <Continuous>      PT/OT/Speech/Rehab/S&Swr: pending    Exam:    Vital Signs Last 24 Hrs  T(C): 36.4 (06 Sep 2018 14:22), Max: 36.4 (06 Sep 2018 14:22)  T(F): 97.6 (06 Sep 2018 14:22), Max: 97.6 (06 Sep 2018 14:22)  HR: 69 (06 Sep 2018 14:22) (69 - 90)  BP: 116/55 (06 Sep 2018 14:22) (116/55 - 190/114)  BP(mean): --  RR: 18 (06 Sep 2018 14:22) (17 - 18)  SpO2: 97% (06 Sep 2018 14:22) (92% - 100%)    NIHSS      LOC:       1a:   0  1b(Questions):    0       1c(Instructions):  0          Best Gaze:0  Visual: 2R  Motor:                 RUE:   0  RLE: 0    LUE:   0 LLE:   0  FACE:   0  Limb Ataxia:0  Sensory:     0  Language:     0  Dysarthria:     0     Extinction and Inattention:0    NIHSS on admission:       2             m-RS:1    Impression:  85 year old woman with a past medical history of HTN , DLD , COPD, pulmonary HTN , ischemic stroke in 2015 , 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  Both episodes witnessed by son and lasted 5 minutes before resolution. Last known asymptomatic at 0700. Upon arrival she was found to have a right visual field deficit.  CTH did not show any intracranial pathology but did reveal a chronic left occipital lobe infarct. This chronic left occipital lobe infarct is most likely the reason for the right visual field deficit. The patients right visual field deficit is most likely chronic therefore she is not a candidate for IV TPA. Etiology of symptoms unknown, will have a better understanding post MRI brain.    Suggestion:  Routine TIA workup including:  MRI brain without cyrus.  MRA head and neck.   TTE with contrast to rule out right to left shunt, vegetation, akinesia, intracardiac thrombus.  Telemetry monitoring.  LDL, Hemoglobin A1C.   PT OT Rehab     Normal saline 75 ml per hour.  May continue Cilastozol and Plavix.  Continue Statin.    Disposition:  ED Observational Unit.    We spent more than 45 minutes to review the chart, gather necessary information, evaluate the patient and discuss the case with primary team and counseling the patient and his family to their satisfaction. Total visit time more than 60min.  Beatriz Ochoa MD.  [phone]

## 2018-09-06 NOTE — H&P ADULT - NSHPLABSRESULTS_GEN_ALL_CORE
11.9   7.01  )-----------( 231      ( 06 Sep 2018 09:15 )             36.9       09-06    141  |  98  |  27<H>  ----------------------------<  114<H>  7.0<HH>   |  24  |  1.4    Ca    9.6      06 Sep 2018 09:15    TPro  7.2  /  Alb  4.7  /  TBili  0.5  /  DBili  x   /  AST  48<H>  /  ALT  19  /  AlkPhos  59  09-06                      Lactate Trend      CARDIAC MARKERS ( 06 Sep 2018 09:15 )  x     / <0.01 ng/mL / 100 U/L / x     / x            CAPILLARY BLOOD GLUCOSE  104 (06 Sep 2018 09:25)

## 2018-09-06 NOTE — ED PROVIDER NOTE - PROGRESS NOTE DETAILS
Pt is CT. Neurointerventional at bedside. Discussed with neuro NP Nahun. Patient not a candidate for tPA at this time due to old infarct and low NIHSS. D/W neurology. No tPA. Perfusion study. Admit to monitored setting. D/W neurology. No tPA. Perfusion study. Admit to monitored setting. /100. Will slowly lower BP. Spoke with Dr Ochoa  No need for CT perfusion as creatinine clearance is 25.   Neurology to follow as inpatient  Will administer enalapril and stop the nicardine drip  Will admit to telemetry. Dr Bunch agreeable.

## 2018-09-06 NOTE — ED PROVIDER NOTE - PMH
COPD (chronic obstructive pulmonary disease)    CVA (cerebral vascular accident)    High cholesterol    HTN (hypertension)    Kidney disease, chronic, stage I (GFR over 89 ml/min)    Peripheral vascular disease    PVD (peripheral vascular disease)

## 2018-09-06 NOTE — ED PROVIDER NOTE - OBJECTIVE STATEMENT
84 yo F PMH of CVA (2005), htn, DLD, COPD on 2L O2, hx of Left Carotid occlusion s/p stenting, Hx of subclavian vertebral bypass, hx of seizure in 2005 on keppra brought in from home this morning for 2 episodes of slurred speech lasting 15 minutes each. No focal deficits reported by family. In ED NIH scale 0. Code stroked called. Patient candidate for TPA has visual deficits (likely chronic) Ct head showing old occipital lobe stroke. no tpa administered. BP was also 200 systolic patient was started on nicardine drip. 84 yo F PMH of CVA (2005), htn, DLD, COPD on 2L O2, hx of Left Carotid occlusion s/p stenting, Hx of subclavian vertebral bypass, hx of seizure in 2005 on keppra brought in from home this morning for 2 episodes of slurred speech lasting 15 minutes each. No focal deficits reported by family. In ED NIH scale 0. Code stroked called. Patient candidate for TPA has visual deficits (likely chronic) Ct head showing old parietal lobe strokes. no tpa administered. BP was also 200 systolic patient was started on nicardine drip. PMD: Dr Thomas  Cardiology: Dr Geronimo  Pulm: Dr Milligan  Nephrology: Dr Phillips    86 yo F PMH of CVA (2005), htn, DLD, COPD on 2L O2, hx of Left Carotid occlusion s/p stenting, Hx of subclavian vertebral bypass, hx of seizure in 2005 on keppra brought in from home this morning for 2 episodes of slurred speech lasting 15 minutes each. No focal deficits reported by family. In ED NIH scale 0. Code stroked called. Patient candidate for TPA has visual deficits (likely chronic) Ct head showing old parietal lobe strokes. no tpa administered. BP was also 200 systolic patient was started on nicardine drip.

## 2018-09-06 NOTE — ED PROVIDER NOTE - MEDICAL DECISION MAKING DETAILS
Presented with expressive aphasia - resolved. Code stroke called. Stat head CT. Seen by neurology/neurointerventional. No tPA. No need for perfusion study. Will admit to monitored setting. /80, on Cardene drip.

## 2018-09-06 NOTE — STROKE CODE NOTE - ABSOLUTE EXCLUSION OTHER CRITERIA
No acute stroke intervention. Visual field deficit most likely due to chronic occipital infarct, discussed with family that this is most probably chronic, they understand and agree with no administration of IV TPA.

## 2018-09-06 NOTE — ED PROVIDER NOTE - ATTENDING CONTRIBUTION TO CARE
86 y/o female with hx of CVA S/P right carotid artery stent, left carotid artery bypass. Last known well 7:30 AM. Then found by family with expressive aphasia. No arm or leg symptoms. Lasted ~15 minutes then subsided. 5 minutes later recurred, lasted ~ 5 minutes then subsided. Currently back to baseline. 86 y/o female with hx of CVA S/P right carotid artery stent, left carotid artery bypass. Last known well 7:30 AM. Then found by family with expressive aphasia. No arm or leg symptoms. Lasted ~15 minutes then subsided. 5 minutes later recurred, lasted ~ 5 minutes then subsided. Currently back to baseline.  O/E: Non-toxic in appearance. No pallor, no jaundice. Neck supple, no meningeal signs. Lungs CTA b/l. S1 S2 regular, no murmur. ABD soft, no tenderness. No pedal edema, no calf tenderness. No skin rash. Neuro: A&O x 3, Normal speech, CN II-XII intact, strength 5/5 b/l, sensation intact and equal, finger-to-nose intact, mildly decreased vision from right eye.   Code stroke called. Stat head CT. Seen by neurology/neurointerventional. No tPA. No need for perfusion study. Will admit to monitored setting.  BP noted to be elevated. Given IV cardene for BP control.

## 2018-09-06 NOTE — H&P ADULT - HISTORY OF PRESENT ILLNESS
85 y o f with pmh of HTN , DLD , COPD on 2 l home O2 , pulmonary HTN , h/o CVA in 2015 , h/o carotid artery  occulusion with subclavian to vertebral bypass , h/o femoral bypass and  h/o seizures presented to ER with c/o 2 episodes of slurred speech since morning that resolved on its own. As per son , pt was in her usual state of health last night , when he woke up in the morning , he noticed that she is having difficulty speaking ; she was not making incomprehensible sounds , that lasted for about 5 minutes and then it resolved . She did not c/o any visual changes , facial asymmetry , weakness , dizziness or any other symptom. A few minutes after pt had another episode when she had slurred speech , that also lasted for 5 minutes and then it resolved again , son called EMS and brought pt to ER . In ER stroke code was called , but TPA was not given as NIHSS score was low, CTH did not show any intracranial pathology. Pt reports complete resolution of symptoms now.    Pt denies any recent illness , no c/o abdominal pain , nausea , vomiting , diarrhea , cough or SOB. Pt uses O 2 as needed while ambulating or sleeping.

## 2018-09-06 NOTE — H&P ADULT - NSHPPHYSICALEXAM_GEN_ALL_CORE
T(F): --  HR: 71  BP: 139/73  RR: 17  SpO2: 100%      PHYSICAL EXAM:  GENERAL: sitting comfortable very pleasant female   HEAD:  Atraumatic, Normocephalic  EYES: EOMI, PERRLA, conjunctiva and sclera clear  NECK: Supple, No JVD  CHEST/LUNG: Clear to auscultation bilaterally; No wheeze  HEART: Regular rate and rhythm; No murmurs, rubs, or gallops  ABDOMEN: Soft, Nontender, Nondistended; Bowel sounds present  EXTREMITIES:  2+ Peripheral Pulses, No clubbing, cyanosis, or edema  PSYCH: AAOx3  NEUROLOGY: non-focal  SKIN: No rashes or lesions

## 2018-09-07 NOTE — PROGRESS NOTE ADULT - SUBJECTIVE AND OBJECTIVE BOX
DANE LILA  85y  Female  attending initial evaluation    SUBJECTIVE:  85 yrs old w/f hx of CVA 2005 carotid stenosis PAD DLD HTN COPD on home O2 Pulmonary HTN CKD dementia seizure disorder brought to er with c/o upto 3 repeated episodes of slurred speech and aphasia in the er was also noted to have rt visual field deficit  presently no recurrence of sx denies motor weakness or swallowing deficit    PAST MEDICAL & SURGICAL HISTORY:  Carotid stenosis  PVD (peripheral vascular disease)  COPD (chronic obstructive pulmonary disease)  PULMONARY HTN on home O2 SINCE 4/2018  Kidney disease, chronic, stage III   HTN (hypertension)  High cholesterol  CVA (cerebral vascular accident) 2005  H/O LT Carotid total occlusion s/p Lt sub	clavian to vertebral artery bypass 2005  H/O right common carotid artery stent placement 2005  S/P BILATERAL FEMORAL ARTERY STENTS    SOCIAL hx quit smoking 2005    REVIEW OF SYSTEMS:    T(C): 37 (09-07-18 @ 15:19), Max: 37 (09-07-18 @ 15:19)  HR: 70 (09-07-18 @ 15:19) (64 - 85)  BP: 126/54 (09-07-18 @ 15:19) (102/55 - 150/65)  RR: 18 (09-07-18 @ 15:19) (16 - 18)  SpO2: 98% (09-07-18 @ 15:19) (98% - 100%)  Wt(kg): --Vital Signs Last 24 Hrs  T(C): 37 (07 Sep 2018 15:19), Max: 37 (07 Sep 2018 15:19)  T(F): 98.6 (07 Sep 2018 15:19), Max: 98.6 (07 Sep 2018 15:19)  HR: 70 (07 Sep 2018 15:19) (64 - 85)  BP: 126/54 (07 Sep 2018 15:19) (102/55 - 150/65)  BP(mean): --  RR: 18 (07 Sep 2018 15:19) (16 - 18)  SpO2: 98% (07 Sep 2018 15:19) (98% - 100%)    MEDICATION:  aluminum hydroxide/magnesium hydroxide/simethicone Suspension 30 milliLiter(s) Oral every 4 hours PRN  atorvastatin Oral Tab/Cap - Peds 10 milliGRAM(s) Oral daily  buDESOnide 160 MICROgram(s)/formoterol 4.5 MICROgram(s) Inhaler 2 Puff(s) Inhalation two times a day  cilostazol 100 milliGRAM(s) Oral two times a day  clopidogrel Tablet 75 milliGRAM(s) Oral daily  docusate sodium 100 milliGRAM(s) Oral three times a day  heparin  Injectable 5000 Unit(s) SubCutaneous every 8 hours  levETIRAcetam 500 milliGRAM(s) Oral two times a day  lisinopril 10 milliGRAM(s) Oral daily  metoprolol tartrate 25 milliGRAM(s) Oral two times a day  nystatin Cream 1 Application(s) Topical two times a day  ondansetron Injectable 4 milliGRAM(s) IV Push every 6 hours PRN  senna 2 Tablet(s) Oral at bedtime PRN    LABS:               11.1   5.09  )-----------( 202      ( 07 Sep 2018 07:11 )             35.1     09-07    144  |  103  |  21<H>  ----------------------------<  80  4.5   |  30  |  1.1    Ca    9.1      07 Sep 2018 07:11    TPro  6.0  /  Alb  4.2  /  TBili  0.3  /  DBili  x   /  AST  25  /  ALT  15  /  AlkPhos  56  09-07      Hemoglobin A1C with Mean Plasma Glucose (09.06.18 @ 09:15)    Hemoglobin A1C, Whole Blood: 5.6: Method: Immunoassay         Troponin T, Serum (09.06.18 @ 09:15)    Troponin T, Serum: <0.01: Hemolyzed. Interpret with caution ng/mL    Serum Pro-Brain Natriuretic Peptide (04.12.18 @ 17:00)    Serum Pro-Brain Natriuretic Peptide: 4869 pg/mL    Magnesium, Serum in AM (04.12.18 @ 08:50)    Magnesium, Serum: 1.9 mg/dL    RADIOLOGY:  < from: Xray Chest 1 View- PORTABLE-Urgent (09.06.18 @ 12:26) >  Impression:      Improved aeration atthe lung bases with residual interstitial disease   present.    Hyperinflation and emphysematous disease in the right upper lobe.  < end of copied text >    < from: CT Brain Stroke Protocol (09.06.18 @ 09:30) >    IMPRESSION:    1.  No CT evidence of acute intracranial hemorrhage or infarct.    2.  Stable chronic infarcts of the left anterior frontal and posterior   parietal lobes.    3.  If the patient continues to be symptomatic follow-up CT scan and/or   MRI of the brain may be helpful for further evaluation.      Dr. Cal Beckham discussed preliminary findings with SHANNON HODGSON   on 9/6/2018 at 9:37 AM with read back.  < end of copied text >    < from: Transthoracic Echocardiogram (09.07.18 @ 08:04) >    Summary:   1. Normal global left ventricular systolic function.   2. LV Ejection Fraction by Gould's Method with a biplane EF of 63 %.   3. Increased LV wall thickness.   4. Spectral Doppler shows impaired relaxation pattern of left   ventricular myocardial filling (Grade I diastolic dysfunction).   5. Normal left atrial size.   6. Normal right atrial size.   7. Degenerative mitral valve.   8. Mild mitral stenosis.   9. Peak transmitral mean gradient equals 2.8 mmHg, calculated mitral   valve area by pressure half time equals 1.90 cm² consistent with mild   mitral stenosis.  10. Severe mitral annular calcification.  11. Thickening of the anterior and posterior mitral valve leaflets.  12. Trace tricuspid regurgitation.  13. Heavily calcified aortic valve with mildly decreased leaflet opening   although no significant gradient.  14. LA volume Index is 16.0 ml/m² ml/m2.  15. There is moderate aortic root calcification.      < end of copied text >      PHYSICAL EXAM:  AWAKE ALERT SPEECH IS CLEAR ORIENTEDX3  HEART RSR S1S2 +   LUNGS CLEAR  ABDOMEN SOFT NONTENDER BS+  EXTREMITIES NO EDEMA OR TENDERNESS  CNS - CRANIAL NERVES INTACT NO FOCAL MOTOR OR SENSORY DEFICIT    IMPRESSION:    APHASIA RESOLVED LIKELY TIA R/O NEW CVA  OLD CVA LT FRONTO PARIETAL INFARCTS- 2005  SEIZURE DISORDER  HX OF BILATERAL CAROTID STENOSIS - RT CAROTID STENT/LT SUBCLAVIAN VERTEBRAL BYPASS - 2005  PAD - BILATERAL FEMORAL ARTERY STENTS  COPD  PULMONARY HTN  HTN  DLD  CKDIII  DEMENTIA  MAC SEVERE/MILD MITRAL STENOSIS    PLAN:  NEUROLOGY CONSULT NOTED  MRI BRAIN  MRA BRAIN /NECK PENDING  MAY NEED KIRK - CARDIOLOGY CONSULT - DR JOINER  CONTINUE PLAVIX  CONTINUE PLETAL

## 2018-09-07 NOTE — PROGRESS NOTE ADULT - SUBJECTIVE AND OBJECTIVE BOX
LILA VELA    Chief Complaint: Expressive aphasia.    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  Both episodes witnessed by son and lasted 5 minutes before resolution. Last known asymptomatic at 0700. Upon arrival she was found to have a right visual field deficit.  CTH did not show any intracranial pathology but did reveal a chronic left occipital lobe infarct. This chronic left occipital lobe infarct is most likely the reason for the right visual field deficit. We discussed with the family that the patients right visual field deficit is most likely chronic therefore she was not a candidate for IV TPA. Today she has persistent right visual field deficit.    Relevant PMH:  [x] Prior ischemic stroke/TIA  [] Afib  []CAD  [x]HTN  [x]DLD  []DM []PVD []Obesity [] Sedintary lifestyle []CHF  []MARVIN []Cancer Hx     Social History: [] Smoking []  Drug Use: []   Alcohol Use:   [] Other:      Possible Location of Stroke:  Unknown, will have a better understanding post MRI brain.  Possible Cause of Stroke:  Unknown, will have a better understanding post MRI brain.  Relevant Cerebral Imaging:  < from: CT Brain Stroke Protocol (09.06.18 @ 09:30) >  IMPRESSION:    1.  No CT evidence of acute intracranial hemorrhage or infarct.    2.  Stable chronic infarcts of the left anterior frontal and posterior   parietal lobes.    Relevant Cervicocerebral Imaging:  MRA pending    Relevant blood tests:  Direct LDL: 89 mg/dL [4 - 129] (09-06-18 @ 09:15)      Relevant cardiac rhythm monitoring:  On telemetry 24 hours and no events reported.  Relevant Cardiac Structure:(TTE/KIRK +/-):[x]No intracardiac thrombus/[x] no vegetation/[x]no [place]/EF:63%    Home Medications:  albuterol 2.5 mg/3 mL (0.083%) inhalation solution: 3 milliliter(s) inhaled every 6 hours (06 Sep 2018 13:40)  Centrum oral tablet: 1 tab(s) orally once a day (06 Sep 2018 13:40)  furosemide 20 mg oral tablet: 1 tab(s) orally every 7 days (06 Sep 2018 13:40)  Pletal 100 mg oral tablet: 1 tab(s) orally 2 times a day (06 Sep 2018 13:40)      MEDICATIONS  (STANDING):  atorvastatin Oral Tab/Cap - Peds 10 milliGRAM(s) Oral daily  buDESOnide 160 MICROgram(s)/formoterol 4.5 MICROgram(s) Inhaler 2 Puff(s) Inhalation two times a day  cilostazol 100 milliGRAM(s) Oral two times a day  clopidogrel Tablet 75 milliGRAM(s) Oral daily  docusate sodium 100 milliGRAM(s) Oral three times a day  heparin  Injectable 5000 Unit(s) SubCutaneous every 8 hours  levETIRAcetam 500 milliGRAM(s) Oral two times a day  lisinopril 10 milliGRAM(s) Oral daily  metoprolol tartrate 25 milliGRAM(s) Oral two times a day  nystatin Cream 1 Application(s) Topical two times a day    Exam:    Vital Signs Last 24 Hrs  T(C): 35.8 (07 Sep 2018 07:51), Max: 36.6 (06 Sep 2018 23:29)  T(F): 96.4 (07 Sep 2018 07:51), Max: 97.9 (06 Sep 2018 23:29)  HR: 64 (07 Sep 2018 07:51) (64 - 85)  BP: 102/55 (07 Sep 2018 07:51) (102/55 - 150/65)  BP(mean): --  RR: 16 (07 Sep 2018 07:51) (16 - 18)  SpO2: 100% (07 Sep 2018 07:51) (97% - 100%)    NIHSS      LOC:       1a:  0   1b(Questions):    0       1c(Instructions):   0          Best Gaze:0  Visual:1R  Motor:                 RUE:  0   RLE:  0   LUE:   0 LLE:  0   FACE:   0  Limb Ataxia:0  Sensory:     0  Language:     0  Dysarthria:      0    Extinction and Inattention:0    NIHSS on admission:       1   NIHSS yesterday:    1      NIHSS today:      1       m-RS:1    Impression:  85 year old woman with a past medical history of HTN , DLD , COPD, pulmonary HTN , ischemic stroke in 2015 , 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 Upon arrival she was found to have a right visual field deficit.  CTH did not show any intracranial pathology but did reveal a chronic left occipital lobe infarct. This chronic left occipital lobe infarct is most likely the reason for the right visual field deficit. Today she has a persistent right visual field deficit. TTE and telemetry failed to reveal any abnormalities, she continues to wait for MRI and MRA. Etiology of symptoms unknown, will have a better understanding post MRI brain.    Suggestion:  Routine TIA workup including:  MRI brain without cyrus.  MRA head and neck.   Telemetry monitoring.  Hemoglobin A1C.   PT OT Rehab     Normal saline 75 ml per hour.  May continue Cilastozol and Plavix.  Continue Statin.    Disposition:  ED Observational Unit.    We spent more than 45 minutes to review the chart, gather necessary information, evaluate the patient and discuss the case with primary team and counseling the patient and his family to their satisfaction. Total visit time more than 60min.  Beatriz Ochoa MD.  [phone]

## 2018-09-08 NOTE — DISCHARGE NOTE ADULT - PATIENT PORTAL LINK FT
You can access the [email or web address] Patient Portal, offered by [facility], by registering with the following website: [email or web address]

## 2018-09-08 NOTE — PROGRESS NOTE ADULT - SUBJECTIVE AND OBJECTIVE BOX
Neurology follow up note   Patient seen at bedside , currently refusing exam but aphasia has resolved patient, and as per family patient is currently baseline. No acute overnight events      PMH  PVD (peripheral vascular disease)  COPD (chronic obstructive pulmonary disease)  Kidney disease, chronic, stage I (GFR over 89 ml/min)  Peripheral vascular disease  HTN (hypertension)  High cholesterol  CVA (cerebral vascular accident)          Pertinent Medical History/Social History/Family History/other:     Social History: []  Drug Use: []   Alcohol Use: []   Tobacco Use:  [] Other:      Cerebrovascular Risk Factors:[X] prior ischemic stroke  [] Afib  []CAD  [X]HTN  [X]DLD  []DM []PVD      Stroke Workup:    Cardiac Rhythm(Tele/Holter) & Duration:                   Events:none    Cardiac Structure:(TTE/KIRK +/-):< from: Transthoracic Echocardiogram (09.07.18 @ 08:04) >  Summary:   1. Normal global left ventricular systolic function.   2. LV Ejection Fraction by Gould's Method with a biplane EF of 63 %.   3. Increased LV wall thickness.   4. Spectral Doppler shows impaired relaxation pattern of left   ventricular myocardial filling (Grade I diastolic dysfunction).   5. Normal left atrial size.   6. Normal right atrial size.   7. Degenerative mitral valve.   8. Mild mitral stenosis.   9. Peak transmitral mean gradient equals 2.8 mmHg, calculated mitral   valve area by pressure half time equals 1.90 cm² consistent with mild   mitral stenosis.  10. Severe mitral annular calcification.  11. Thickening of the anterior and posterior mitral valve leaflets.  12. Trace tricuspid regurgitation.  13. Heavily calcified aortic valve with mildly decreased leaflet opening   although no significant gradient.  14. LA volume Index is 16.0 ml/m² ml/m2.  15. There is moderate aortic root calcification.    < end of copied text >      Home Medications:  albuterol 2.5 mg/3 mL (0.083%) inhalation solution: 3 milliliter(s) inhaled every 6 hours (06 Sep 2018 13:40)  Centrum oral tablet: 1 tab(s) orally once a day (06 Sep 2018 13:40)  furosemide 20 mg oral tablet: 1 tab(s) orally every 7 days (06 Sep 2018 13:40)  Pletal 100 mg oral tablet: 1 tab(s) orally 2 times a day (06 Sep 2018 13:40)      MEDICATIONS  (STANDING):  atorvastatin Oral Tab/Cap - Peds 10 milliGRAM(s) Oral daily  buDESOnide 160 MICROgram(s)/formoterol 4.5 MICROgram(s) Inhaler 2 Puff(s) Inhalation two times a day  cilostazol 100 milliGRAM(s) Oral two times a day  clopidogrel Tablet 75 milliGRAM(s) Oral daily  docusate sodium 100 milliGRAM(s) Oral three times a day  heparin  Injectable 5000 Unit(s) SubCutaneous every 8 hours  levETIRAcetam 500 milliGRAM(s) Oral two times a day  lisinopril 10 milliGRAM(s) Oral daily  metoprolol tartrate 25 milliGRAM(s) Oral two times a day  nystatin Cream 1 Application(s) Topical two times a day      Last 24 hour events:none    Physical Exam:    Vital Signs Last 24 Hrs  T(C): 37 (07 Sep 2018 15:19), Max: 37 (07 Sep 2018 15:19)  T(F): 98.6 (07 Sep 2018 15:19), Max: 98.6 (07 Sep 2018 15:19)  HR: 70 (07 Sep 2018 15:19) (64 - 70)  BP: 126/54 (07 Sep 2018 15:19) (102/55 - 126/54)  BP(mean): --  RR: 18 (07 Sep 2018 15:19) (16 - 18)  SpO2: 98% (07 Sep 2018 15:19) (98% - 100%)    NIHSS      LOC:       1a:   0  1b(Questions):    0       1c(Instructions):  0          Best Gaze:0  Visual: 2R  Motor:                 RUE:   0  RLE: 0    LUE:   0 LLE:   0  FACE:   0  Limb Ataxia:0  Sensory:     0  Language:     0  Dysarthria:     0     Extinction and Inattention:0    NIHSS on admission:       2             m-RS:1        Labs  Hemoglobin A1C with Mean Plasma Glucose (09.06.18 @ 09:15)    Hemoglobin A1C, Whole Blood: 5.6: Method: Immunoassay         Lipid Profile (09.06.18 @ 09:15)    Total Cholesterol/HDL Ratio Measurement: 2.0 Ratio    Cholesterol, Serum: 209 mg/dL    Triglycerides, Serum: 86 mg/dL    HDL Cholesterol, Serum: 106: HDL Levels >/= 60 mg/dL are considered beneficial and a "negative" risk

## 2018-09-08 NOTE — PROGRESS NOTE ADULT - ASSESSMENT
LILA VELA 85y Female  MRN#: [id number]   CODE STATUS: Full code      SUBJECTIVE    Patient is a 85y old Female who presents with a chief complaint of Slurred speech (08 Sep 2018 13:14)  Currently admitted to medicine with the primary diagnosis of TIA vs. stroke.    Interval History: Today is hospital day 2d. Overnight there were no events. Today she is doing well; no further episodes/persistence of slurred speech. No weakness, numbness, tingling, vision changes, hearing changes, or headaches.    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 As per son , pt was in her usual state of health last night , when he woke up in the morning , he noticed that she is having difficulty speaking ; she was not making incomprehensible sounds , that lasted for about 5 minutes and then it resolved . She did not c/o any visual changes , facial asymmetry , weakness , dizziness or any other symptom. A few minutes after pt had another episode when she had slurred speech , that also lasted for 5 minutes and then it resolved again , son called EMS and brought pt to ER . In ER stroke code was called , but TPA was not given as NIHSS score was low, CTH did not show any intracranial pathology. Pt reports complete resolution of symptoms now.    Pt denies any recent illness , no c/o abdominal pain , nausea , vomiting , diarrhea , cough or SOB. Pt uses O 2 as needed while ambulating or sleeping. (06 Sep 2018 13:20)    Hospital Course:       PAST MEDICAL & SURGICAL HISTORY  PVD (peripheral vascular disease)  COPD (chronic obstructive pulmonary disease)  Kidney disease, chronic, stage I (GFR over 89 ml/min)  Peripheral vascular disease  HTN (hypertension)  High cholesterol  CVA (cerebral vascular accident)  H/O aorto-femoral bypass  History of right common carotid artery stent placement      ALLERGIES:  No Known Allergies      MEDICATIONS:  STANDING MEDICATIONS  atorvastatin Oral Tab/Cap - Peds 10 milliGRAM(s) Oral daily  buDESOnide 160 MICROgram(s)/formoterol 4.5 MICROgram(s) Inhaler 2 Puff(s) Inhalation two times a day  cilostazol 100 milliGRAM(s) Oral two times a day  clopidogrel Tablet 75 milliGRAM(s) Oral daily  docusate sodium 100 milliGRAM(s) Oral three times a day  heparin  Injectable 5000 Unit(s) SubCutaneous every 8 hours  levETIRAcetam 500 milliGRAM(s) Oral two times a day  lisinopril 10 milliGRAM(s) Oral daily  metoprolol tartrate 25 milliGRAM(s) Oral two times a day  nystatin Cream 1 Application(s) Topical two times a day    PRN MEDICATIONS  aluminum hydroxide/magnesium hydroxide/simethicone Suspension 30 milliLiter(s) Oral every 4 hours PRN  ondansetron Injectable 4 milliGRAM(s) IV Push every 6 hours PRN  senna 2 Tablet(s) Oral at bedtime PRN          OBJECTIVE    VITAL SIGNS: Last 24 Hours  T(C): 36.2 (08 Sep 2018 15:23), Max: 36.6 (08 Sep 2018 07:50)  T(F): 97.1 (08 Sep 2018 15:23), Max: 97.9 (08 Sep 2018 07:50)  HR: 79 (08 Sep 2018 15:23) (71 - 79)  BP: 150/69 (08 Sep 2018 15:23) (122/57 - 153/66)  BP(mean): --  RR: 19 (08 Sep 2018 15:23) (18 - 20)  SpO2: 99% (08 Sep 2018 10:41) (97% - 99%)      PHYSICAL EXAM:    GENERAL: No acute distress. Appears stated age.  PULMONARY: Clear to auscultation bilaterally.  CARDIOVASCULAR: Regular rate and rhythm; No murmurs, rubs, or gallops.  NEUROLOGY: Alert and oriented x3. Cranial nerves intact. No slurred speech. Strength 5/5 in all muscle groups. Sensation intact throughout all extremities. Coordination intact.      LABS:                        11.1   5.09  )-----------( 202      ( 07 Sep 2018 07:11 )             35.1     09-07    144  |  103  |  21<H>  ----------------------------<  80  4.5   |  30  |  1.1    Ca    9.1      07 Sep 2018 07:11    TPro  6.0  /  Alb  4.2  /  TBili  0.3  /  DBili  x   /  AST  25  /  ALT  15  /  AlkPhos  56  09-07      RADIOLOGY:    CT Head No Cont (04.12.18 @ 16:49):  1.  No evidence of acute intracranial hemorrhage, extra axial fluid   collection or midline shift    2.  Left parietal lobe chronic appearing infarct, increased in extent   since the patient's prior CT from 2005      Transthoracic Echocardiogram (09.07.18 @ 08:04)   1. Normal global left ventricular systolic function.   2. LV Ejection Fraction by Gould's Method with a biplane EF of 63 %.   3. Increased LV wall thickness.   4. Spectral Doppler shows impaired relaxation pattern of left   ventricular myocardial filling (Grade I diastolic dysfunction).   5. Normal left atrial size.   6. Normal right atrial size.   7. Degenerative mitral valve.   8. Mild mitral stenosis.   9. Peak transmitral mean gradient equals 2.8 mmHg, calculated mitral   valve area by pressure half time equals 1.90 cm² consistent with mild   mitral stenosis.  10. Severe mitral annular calcification.  11. Thickening of the anterior and posterior mitral valve leaflets.  12. Trace tricuspid regurgitation.  13. Heavily calcified aortic valve with mildly decreased leaflet opening   although no significant gradient.  14. LA volume Index is 16.0 ml/m² ml/m2.  15. There is moderate aortic root calcification.          ASSESSMENT AND PLAN    Patient is a 85y old Female who presents with a chief complaint of Slurred speech (08 Sep 2018 13:14)  Currently admitted to medicine with the primary diagnosis of TIA vs. stroke.    # SLURRED SPEECH / NO WEAKNESS / NO DEFICITS / CTH NEGATIVE / H/O STROKE  - Deficits fully resolved within 24 hours; likely TIA   - CTH showing old chronic infarct only with possible interval extension  - TTE with calcified aortic valve, otherwise unremarkable  - Can obtain TTE with bubble contrast, [facility]IR[facility] (would need cardiology consult, Dr. Ceja) if recommended by neuro  - F/u MRI, MRA  - Pt does not need pt / ot eval as walking fine  - C/w aspirin, plavix  - C/w lipitor    # PVD:  - C/w cilostazol    # COPD :  - stable  - C/w O2 by NC  - C/w Duonebs prn    # HTN :  - C/w lisinopril  - Monitor BP    # CKD:  - Stable    # DVT:  - Ppx with heparin    # DISPO:    - From home; full code      Prognosis: Good    Disposition: Discharge home if further workup negative LILA VELA 85y Female  MRN#: [id number]   CODE STATUS: Full code      SUBJECTIVE    Patient is a 85y old Female who presents with a chief complaint of Slurred speech (08 Sep 2018 13:14).  Currently admitted to medicine with the primary diagnosis of TIA vs. stroke.    Interval History: Today is hospital day 2d. Overnight there were no events. Today she is doing well; no further episodes/persistence of slurred speech. No weakness, numbness, tingling, vision changes, hearing changes, or headaches.    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 As per son , pt was in her usual state of health last night , when he woke up in the morning , he noticed that she is having difficulty speaking ; she was not making incomprehensible sounds , that lasted for about 5 minutes and then it resolved . She did not c/o any visual changes , facial asymmetry , weakness , dizziness or any other symptom. A few minutes after pt had another episode when she had slurred speech , that also lasted for 5 minutes and then it resolved again , son called EMS and brought pt to ER . In ER stroke code was called , but TPA was not given as NIHSS score was low, CTH did not show any intracranial pathology. Pt reports complete resolution of symptoms now.    Pt denies any recent illness , no c/o abdominal pain , nausea , vomiting , diarrhea , cough or SOB. Pt uses O 2 as needed while ambulating or sleeping. (06 Sep 2018 13:20)    Hospital Course:       PAST MEDICAL & SURGICAL HISTORY  PVD (peripheral vascular disease)  COPD (chronic obstructive pulmonary disease)  Kidney disease, chronic, stage I (GFR over 89 ml/min)  Peripheral vascular disease  HTN (hypertension)  High cholesterol  CVA (cerebral vascular accident)  H/O aorto-femoral bypass  History of right common carotid artery stent placement      ALLERGIES:  No Known Allergies      MEDICATIONS:  STANDING MEDICATIONS  atorvastatin Oral Tab/Cap - Peds 10 milliGRAM(s) Oral daily  buDESOnide 160 MICROgram(s)/formoterol 4.5 MICROgram(s) Inhaler 2 Puff(s) Inhalation two times a day  cilostazol 100 milliGRAM(s) Oral two times a day  clopidogrel Tablet 75 milliGRAM(s) Oral daily  docusate sodium 100 milliGRAM(s) Oral three times a day  heparin  Injectable 5000 Unit(s) SubCutaneous every 8 hours  levETIRAcetam 500 milliGRAM(s) Oral two times a day  lisinopril 10 milliGRAM(s) Oral daily  metoprolol tartrate 25 milliGRAM(s) Oral two times a day  nystatin Cream 1 Application(s) Topical two times a day    PRN MEDICATIONS  aluminum hydroxide/magnesium hydroxide/simethicone Suspension 30 milliLiter(s) Oral every 4 hours PRN  ondansetron Injectable 4 milliGRAM(s) IV Push every 6 hours PRN  senna 2 Tablet(s) Oral at bedtime PRN          OBJECTIVE    VITAL SIGNS: Last 24 Hours  T(C): 36.2 (08 Sep 2018 15:23), Max: 36.6 (08 Sep 2018 07:50)  T(F): 97.1 (08 Sep 2018 15:23), Max: 97.9 (08 Sep 2018 07:50)  HR: 79 (08 Sep 2018 15:23) (71 - 79)  BP: 150/69 (08 Sep 2018 15:23) (122/57 - 153/66)  BP(mean): --  RR: 19 (08 Sep 2018 15:23) (18 - 20)  SpO2: 99% (08 Sep 2018 10:41) (97% - 99%)      PHYSICAL EXAM:    GENERAL: No acute distress. Appears stated age.  PULMONARY: Clear to auscultation bilaterally.  CARDIOVASCULAR: Regular rate and rhythm; No murmurs, rubs, or gallops.  NEUROLOGY: Alert and oriented x3. Cranial nerves intact. No slurred speech. Strength 5/5 in all muscle groups. Sensation intact throughout all extremities. Coordination intact.      LABS:                        11.1   5.09  )-----------( 202      ( 07 Sep 2018 07:11 )             35.1     09-07    144  |  103  |  21<H>  ----------------------------<  80  4.5   |  30  |  1.1    Ca    9.1      07 Sep 2018 07:11    TPro  6.0  /  Alb  4.2  /  TBili  0.3  /  DBili  x   /  AST  25  /  ALT  15  /  AlkPhos  56  09-07      RADIOLOGY:    CT Head No Cont (04.12.18 @ 16:49):  1.  No evidence of acute intracranial hemorrhage, extra axial fluid   collection or midline shift    2.  Left parietal lobe chronic appearing infarct, increased in extent   since the patient's prior CT from 2005      Transthoracic Echocardiogram (09.07.18 @ 08:04)   1. Normal global left ventricular systolic function.   2. LV Ejection Fraction by Gould's Method with a biplane EF of 63 %.   3. Increased LV wall thickness.   4. Spectral Doppler shows impaired relaxation pattern of left   ventricular myocardial filling (Grade I diastolic dysfunction).   5. Normal left atrial size.   6. Normal right atrial size.   7. Degenerative mitral valve.   8. Mild mitral stenosis.   9. Peak transmitral mean gradient equals 2.8 mmHg, calculated mitral   valve area by pressure half time equals 1.90 cm² consistent with mild   mitral stenosis.  10. Severe mitral annular calcification.  11. Thickening of the anterior and posterior mitral valve leaflets.  12. Trace tricuspid regurgitation.  13. Heavily calcified aortic valve with mildly decreased leaflet opening   although no significant gradient.  14. LA volume Index is 16.0 ml/m² ml/m2.  15. There is moderate aortic root calcification.          ASSESSMENT AND PLAN    Patient is a 85y old Female who presents with a chief complaint of Slurred speech (08 Sep 2018 13:14).  Currently admitted to medicine with the primary diagnosis of TIA vs. stroke.    # SLURRED SPEECH  - Deficits fully resolved within 24 hours; likely TIA   - CTH showing old chronic infarct only with possible interval extension  - TTE with calcified aortic valve, otherwise unremarkable  - Can obtain TTE with bubble contrast, [facility] (would need cardiology consult, Dr. Ceja) if recommended by neuro  - F/u MRI, MRA  - Pt does not need pt / ot eval as walking fine  - C/w aspirin, plavix  - C/w lipitor    # PVD  - C/w cilostazol    # COPD  - Stable  - C/w O2 by NC  - C/w Duonebs prn    # HTN  - C/w lisinopril  - Monitor BP    # CKD  - Stable    # DVT  - Ppx with heparin      Prognosis: Good    Disposition: Discharge home if further workup negative

## 2018-09-08 NOTE — DISCHARGE NOTE ADULT - CARE PROVIDER_API CALL
Mulugeta Bunch), Geriatric Medicine; Internal Medicine  [address] [place] [address]  Phone: [phone]  Fax: [phone]    Nate Kat), Neurology  [address] [place] [address]  Phone: [phone]  Fax: [phone]

## 2018-09-08 NOTE — PROGRESS NOTE ADULT - ASSESSMENT
85 year old woman with a past medical history of HTN , DLD , COPD, pulmonary HTN , ischemic stroke in 2015 , 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  Both episodes witnessed by son and lasted 5 minutes before resolution. Last known asymptomatic at 0700. Upon arrival she was found to have a right visual field deficit.  CTH did not show any intracranial pathology but did reveal a chronic left occipital lobe infarct. This chronic left occipital lobe infarct is most likely the reason for the right visual field deficit. The patients right visual field deficit is most likely chronic therefore she is not a candidate for IV TPA. Etiology of symptoms unknown, will have a better understanding post MRI brain.    PLAN  MRI brain without cyrus.  MRA head and neck.   Follow up TTE with contrast to rule out right to left shunt, vegetation, akinesia, intracardiac thrombus.  Telemetry monitoring.  LDL, Hemoglobin A1C.   PT OT Rehab   Normal saline 75 ml per hour.  continue Cilostazol and Plavix.  Continue Statin  neuro attending note to follow

## 2018-09-08 NOTE — DISCHARGE NOTE ADULT - CARE PLAN
Principal Discharge DX:	Slurred speech  Goal:	Monitor  Assessment and plan of treatment:	Take all medications as prescribed. Follow up with PCP and neurologist outpatient.

## 2018-09-08 NOTE — DISCHARGE NOTE ADULT - ADDITIONAL INSTRUCTIONS
Please follow up with your primary doctor within 1 week of discharge. Please follow up with neurology within 2 weeks. If your symptoms return or worsen please contact your primary care doctor or return to the emergency department.

## 2018-09-08 NOTE — PROGRESS NOTE ADULT - ATTENDING COMMENTS
Patient examined, family interviewed.  Patient's slurred speech has resolved.    MRI brain shows no new stroke no acute ischemia.  MRA head/neck pending formal reading but seems to show known occluded left ICA, patent right ICA and vertebrobasilar system.    Patient may be discharged home.  Outpatient neurologic f/u in 2-4 weeks.    Continue triple anitplatelet therapy (aspirin twice a week) and daily plavix and cilostazole.  Continue lipitor 10 mg/day with option to take additional 5 mg three times a week vs continue same dose currently on (I think family would prefer to keep it same).

## 2018-09-08 NOTE — PROGRESS NOTE ADULT - SUBJECTIVE AND OBJECTIVE BOX
09-08-18 @ 10:04    LILA VELA  85y  Female  Seen at [facility]. Lying in bed, comfortable.   Son Frantz, Dtr. Nancy at bedside.     Hx corroborated w/ son. Around 730 AM after wake up, pt had transient episodes X 2 of garbled speech, which didn't make sense. No other associated sx at the time. He called 911.     PMH  PVD (peripheral vascular disease)  COPD (chronic obstructive pulmonary disease)  Kidney disease, chronic, stage I (GFR over 89 ml/min)  Peripheral vascular disease  HTN (hypertension)  High cholesterol  CVA (cerebral vascular accident)        INTERVAL EVENTS: none. No recurrence of any sx since admission     MEDICATIONS  (STANDING):  atorvastatin Oral Tab/Cap - Peds 10 milliGRAM(s) Oral daily  buDESOnide 160 MICROgram(s)/formoterol 4.5 MICROgram(s) Inhaler 2 Puff(s) Inhalation two times a day  cilostazol 100 milliGRAM(s) Oral two times a day  clopidogrel Tablet 75 milliGRAM(s) Oral daily  docusate sodium 100 milliGRAM(s) Oral three times a day  heparin  Injectable 5000 Unit(s) SubCutaneous every 8 hours  levETIRAcetam 500 milliGRAM(s) Oral two times a day  lisinopril 10 milliGRAM(s) Oral daily  metoprolol tartrate 25 milliGRAM(s) Oral two times a day  nystatin Cream 1 Application(s) Topical two times a day    MEDICATIONS  (PRN):  aluminum hydroxide/magnesium hydroxide/simethicone Suspension 30 milliLiter(s) Oral every 4 hours PRN Dyspepsia  ondansetron Injectable 4 milliGRAM(s) IV Push every 6 hours PRN Nausea  senna 2 Tablet(s) Oral at bedtime PRN Constipation      T(C): 36.6 (09-08-18 @ 07:50), Max: 37 (09-07-18 @ 15:19)  HR: 74 (09-08-18 @ 07:50) (70 - 74)  BP: 153/66 (09-08-18 @ 07:50) (122/57 - 153/66)  RR: 18 (09-08-18 @ 07:50) (18 - 20)  SpO2: 99% (09-08-18 @ 10:41) (97% - 99%)  Wt(kg): --Vital Signs Last 24 Hrs  T(C): 36.6 (08 Sep 2018 07:50), Max: 37 (07 Sep 2018 15:19)  T(F): 97.9 (08 Sep 2018 07:50), Max: 98.6 (07 Sep 2018 15:19)  HR: 74 (08 Sep 2018 07:50) (70 - 74)  BP: 153/66 (08 Sep 2018 07:50) (122/57 - 153/66)  BP(mean): --  RR: 18 (08 Sep 2018 07:50) (18 - 20)  SpO2: 99% (08 Sep 2018 10:41) (97% - 99%)    PHYSICAL EXAM:  GENERAL: Thin built NAD  NECK: Supple, Healed scar. R sided bruit  CHEST/LUNG: Clear to a/p  HEART: S1S2, reg  ABDOMEN: benign.   EXTREMITIES: no CCE, palp perph pulse  No focal neuro deficit.   Mild dementia evident.                           11.1   5.09  )-----------( 202      ( 07 Sep 2018 07:11 )             35.1     09-07    144  |  103  |  21<H>  ----------------------------<  80  4.5   |  30  |  1.1    Ca    9.1      07 Sep 2018 07:11    TPro  6.0  /  Alb  4.2  /  TBili  0.3  /  DBili  x   /  AST  25  /  ALT  15  /  AlkPhos  56  09-07      RADIOLOGY & ADDITIONAL TESTS:    ECG :     Ventricular Rate 79 BPM    Atrial Rate 79 BPM    P-R Interval 146 ms    QRS Duration 66 ms    Q-T Interval 378 ms    QTC Calculation(Bezet) 433 ms    P Axis 75 degrees    R Axis 53 degrees    T Axis 53 degrees    Diagnosis Line Normal sinus rhythm  Normal ECG    Confirmed by Ravin Nicholson ([id number]) on 9/6/2018 12:16:22 PM    CXR :     Findings:    Support devices: Telemetry leads overlie patient. Again seen is a stent   overlying the region of the trachea and right neck.    Cardiac/mediastinum/hilum: Heart is prominent. Aorta is calcified.    Lung parenchyma/Pleura: Lung fields are hyperinflated. There is improved   aeration to the lung bases with residual interstitial disease present.   There is right upper lobe emphysematous disease.    Skeleton/soft tissues: Degenerative change    Impression:      Improved aeration atthe lung bases with residual interstitial disease   present.    Hyperinflation and emphysematous disease in the right upper lobe.        ROBB CHAVEZ M.D., ATTENDING RADIOLOGIST  This document has been electronically signed. Sep  6 2018 12:22PM        IMPRESSION:    1.  No CT evidence of acute intracranial hemorrhage or infarct.    2.  Stable chronic infarcts of the left anterior frontal and posterior   parietal lobes.    3.  If the patient continues to be symptomatic follow-up CT scan and/or   MRI of the brain may be helpful for further evaluation.      Dr. Cal Sadler discussed preliminary findings with SHANNON HODGSON   on 9/6/2018 at 9:37 AM with read back.        CAL SADLER M.D., RESIDENT RADIOLOGIST    ECHO : 9/7/18  Summary:   1. Normal global left ventricular systolic function.   2. LV Ejection Fraction by Gould's Method with a biplane EF of 63 %.   3. Increased LV wall thickness.   4. Spectral Doppler shows impaired relaxation pattern of left   ventricular myocardial filling (Grade I diastolic dysfunction).   5. Normal left atrial size.   6. Normal right atrial size.   7. Degenerative mitral valve.   8. Mild mitral stenosis.   9. Peak transmitral mean gradient equals 2.8 mmHg, calculated mitral   valve area by pressure half time equals 1.90 cm² consistent with mild   mitral stenosis.  10. Severe mitral annular calcification.  11. Thickening of the anterior and posterior mitral valve leaflets.  12. Trace tricuspid regurgitation.  13. Heavily calcified aortic valve with mildly decreased leaflet opening   although no significant gradient.  14. LA volume Index is 16.0 ml/m² ml/m2.  15. There is moderate aortic root calcification.        ASSESSMENT / PLAN  :    HEALTH ISSUES - PROBLEM Dx:  TIA : Possibility. NEuro eval noted. Await MRI. Currently asymptomatic.   PVD ; Stable. Cont current rx.   COPD : Stable. does use Rx as being advised. Home O2  Kidney disease, chronic, stage I : No issue now.   HTN : Stable in Rx.   DLD : cont same.

## 2018-09-08 NOTE — DISCHARGE NOTE ADULT - HOSPITAL COURSE
85 y o f with pmh of HTN, DLD, COPD on 2 L home O2, pulmonary HTN, h/o CVA in 2015, h/o carotid artery occulusion with subclavian to vertebral bypass, h/o femoral bypass and h/o seizures presented to ER with c/o 2 episodes of slurred speech since morning of admission that resolved on its own. As per son, pt was in her usual state of health last night, when he woke up in the morning, he noticed that she was having difficulty speaking; she was not making comprehensible sounds, this lasted for about 5 minutes and then it resolved. She did not c/o any visual changes, facial asymmetry, weakness, dizziness, or any other symptom. A few minutes after this pt had another episode of slurred speech, that also lasted for 5 minutes and then it resolved again; son called EMS and brought pt to ER. In ER stroke code was called, but TPA was not given as NIHSS score was low. CTH did not show any intracranial pathology. Pt reported complete resolution of symptoms shortly after admission. MRI showed _____. MRA of the head and neck showed _____. No further episodes of slurred speech occurred inpatient. To follow up with PCP and neurology outpatient. 85 y o f with pmh of HTN, DLD, COPD on 2 L home O2, pulmonary HTN, h/o CVA in 2015, h/o carotid artery occulusion with subclavian to vertebral bypass, h/o femoral bypass and h/o seizures presented to ER with c/o 2 episodes of slurred speech since morning of admission that resolved on its own. As per son, pt was in her usual state of health last night, when he woke up in the morning, he noticed that she was having difficulty speaking; she was not making comprehensible sounds, this lasted for about 5 minutes and then it resolved. She did not c/o any visual changes, facial asymmetry, weakness, dizziness, or any other symptom. A few minutes after this pt had another episode of slurred speech, that also lasted for 5 minutes and then it resolved again; son called EMS and brought pt to ER. In ER stroke code was called, but TPA was not given as NIHSS score was low. CTH did not show any intracranial pathology. Pt reported complete resolution of symptoms shortly after admission. MRI showed no acute infarcts or ischemia with some chronic infarcts of the left frontal and parietal.. MRA of the head and neck showed a left common carotid occlusion in the neck with 60-70 stenosis of proximal right internal carotid just beyond the bifurcation. In th brain there was no flow enhancement of the left internal carotid with the Left MCA/MARLEE cross filled from an anterior communicating artery . No further episodes of slurred speech occurred inpatient. To follow up with PCP and neurology outpatient.

## 2018-09-08 NOTE — DISCHARGE NOTE ADULT - MEDICATION SUMMARY - MEDICATIONS TO TAKE
I will START or STAY ON the medications listed below when I get home from the hospital:    aspirin 81 mg oral tablet  -- 1 tab(s) by mouth 2 times a week   -- Indication: For H/O aorto-femoral bypass    benazepril 10 mg oral tablet  -- 1 tab(s) by mouth once a day   -- Do not take this drug if you are pregnant.  It is very important that you take or use this exactly as directed.  Do not skip doses or discontinue unless directed by your doctor.  Some non-prescription drugs may aggravate your condition.  Read all labels carefully.  If a warning appears, check with your doctor before taking.    -- Indication: For Hypertension    Keppra 500 mg oral tablet  -- 1 tab(s) by mouth 2 times a day  -- Indication: For Slurred speech    Claritin 10 mg oral tablet  -- 1 tab(s) by mouth once a day  -- Indication: For Allergies     Lipitor 10 mg oral tablet  -- 1 tab(s) by mouth once a day  -- Indication: For H/O aorto-femoral bypass    clopidogrel 75 mg oral tablet  -- 1 tab(s) by mouth once a day  -- Indication: For H/O aorto-femoral bypass    metoprolol tartrate 25 mg oral tablet  -- 1 tab(s) by mouth 2 times a day  -- Indication: For Hypertension    budesonide-formoterol 160 mcg-4.5 mcg/inh inhalation aerosol  -- 2 puff(s) inhaled 2 times a day   -- Indication: For COPD (chronic obstructive pulmonary disease)    albuterol 2.5 mg/3 mL (0.083%) inhalation solution  -- 3 milliliter(s) inhaled every 6 hours  -- Indication: For COPD (chronic obstructive pulmonary disease)    furosemide 20 mg oral tablet  -- 1 tab(s) by mouth every 7 days  -- Indication: For Hypertension    Pletal 100 mg oral tablet  -- 1 tab(s) by mouth 2 times a day  -- Indication: For PVD (peripheral vascular disease)    Centrum oral tablet  -- 1 tab(s) by mouth once a day  -- Indication: For Vitamin

## 2018-09-09 NOTE — PROGRESS NOTE ADULT - SUBJECTIVE AND OBJECTIVE BOX
09-09-18 @ 11:43    LILA VELA  85y  Female  Resting comfortably, dtr. Nancy at bedside.   No c/o at all. Feels good.      INTERVAL EVENTS: None    MEDICATIONS  (STANDING):  atorvastatin Oral Tab/Cap - Peds 10 milliGRAM(s) Oral daily  buDESOnide 160 MICROgram(s)/formoterol 4.5 MICROgram(s) Inhaler 2 Puff(s) Inhalation two times a day  cilostazol 100 milliGRAM(s) Oral two times a day  clopidogrel Tablet 75 milliGRAM(s) Oral daily  docusate sodium 100 milliGRAM(s) Oral three times a day  heparin  Injectable 5000 Unit(s) SubCutaneous every 8 hours  levETIRAcetam 500 milliGRAM(s) Oral two times a day  lisinopril 10 milliGRAM(s) Oral daily  metoprolol tartrate 25 milliGRAM(s) Oral two times a day  nystatin Cream 1 Application(s) Topical two times a day    MEDICATIONS  (PRN):  aluminum hydroxide/magnesium hydroxide/simethicone Suspension 30 milliLiter(s) Oral every 4 hours PRN Dyspepsia  ondansetron Injectable 4 milliGRAM(s) IV Push every 6 hours PRN Nausea  senna 2 Tablet(s) Oral at bedtime PRN Constipation      T(C): 36.6 (09-09-18 @ 07:21), Max: 37 (09-09-18 @ 00:56)  HR: 67 (09-09-18 @ 07:21) (67 - 79)  BP: 129/58 (09-09-18 @ 07:21) (129/58 - 150/69)  RR: 20 (09-09-18 @ 07:21) (19 - 20)  SpO2: 98% (09-09-18 @ 07:21) (97% - 98%)  Wt(kg): --Vital Signs Last 24 Hrs  T(C): 36.6 (09 Sep 2018 07:21), Max: 37 (09 Sep 2018 00:56)  T(F): 97.9 (09 Sep 2018 07:21), Max: 98.6 (09 Sep 2018 00:56)  HR: 67 (09 Sep 2018 07:21) (67 - 79)  BP: 129/58 (09 Sep 2018 07:21) (129/58 - 150/69)  BP(mean): --  RR: 20 (09 Sep 2018 07:21) (19 - 20)  SpO2: 98% (09 Sep 2018 07:21) (97% - 98%)    PHYSICAL EXAM:  GENERAL: NAD  NECK: supple. Stable bruit R  CHEST/LUNG: clear, good AE  HEART: S1S2, reg  ABDOMEN: benign  EXTREMITIES: no CCE; No weakness, good SLR range.   No focal neuro deficit.             RADIOLOGY & ADDITIONAL TESTS:      ASSESSMENT / PLAN  :    TIA : Possibility. NEuro eval noted. Await MRI report. Contd asymptomatic, rather normal neuro exam.   Carotid stenosis : Awaiting MR angio report to clarify. chronic. Hx L car bypass. If stable, poss disch home today.   PVD ; Stable. Cont current rx.   COPD : Stable. does use Rx as being advised. Home O2  Kidney disease, chronic, stage I : No issue now.   HTN : Stable in Rx.   DLD : cont same.

## 2018-09-10 NOTE — PROGRESS NOTE ADULT - SUBJECTIVE AND OBJECTIVE BOX
Hospital Day:  4d    Subjective:  Overnight no events. The patient reports that she didn't sleep too well. She was seen and examined at bedside.     Past Medical Hx:   PVD (peripheral vascular disease)  COPD (chronic obstructive pulmonary disease)  Kidney disease, chronic, stage I (GFR over 89 ml/min)  Peripheral vascular disease  HTN (hypertension)  High cholesterol  CVA (cerebral vascular accident)    Past Sx:  H/O aorto-femoral bypass  History of right common carotid artery stent placement    Allergies:  No Known Allergies    Current Meds:   Standng Meds:  atorvastatin Oral Tab/Cap - Peds 10 milliGRAM(s) Oral daily  buDESOnide 160 MICROgram(s)/formoterol 4.5 MICROgram(s) Inhaler 2 Puff(s) Inhalation two times a day  cilostazol 100 milliGRAM(s) Oral two times a day  clopidogrel Tablet 75 milliGRAM(s) Oral daily  docusate sodium 100 milliGRAM(s) Oral three times a day  heparin  Injectable 5000 Unit(s) SubCutaneous every 8 hours  levETIRAcetam 500 milliGRAM(s) Oral two times a day  lisinopril 10 milliGRAM(s) Oral daily  metoprolol tartrate 25 milliGRAM(s) Oral two times a day  nystatin Cream 1 Application(s) Topical two times a day    PRN Meds:  aluminum hydroxide/magnesium hydroxide/simethicone Suspension 30 milliLiter(s) Oral every 4 hours PRN Dyspepsia  ondansetron Injectable 4 milliGRAM(s) IV Push every 6 hours PRN Nausea  senna 2 Tablet(s) Oral at bedtime PRN Constipation    Vital Signs:   T(F): 98.5 (09-10-18 @ 07:09), Max: 98.5 (09-10-18 @ 07:09)  HR: 81 (09-10-18 @ 07:09) (77 - 81)  BP: 137/62 (09-10-18 @ 07:09) (137/62 - 183/77)  RR: 20 (09-10-18 @ 07:09) (18 - 20)  SpO2: 98% (09-10-18 @ 07:09) (98% - 98%)      09-09-18 @ 07:01  -  09-10-18 @ 07:00  --------------------------------------------------------  IN: 240 mL / OUT: 0 mL / NET: 240 mL        Physical Exam:   GENERAL: NAD  HEENT: NCAT  CHEST/LUNG: CTAB  HEART: Regular rate and rhythm; s1 s2 appreciated, No murmurs, rubs, or gallops  ABDOMEN: Soft, Nontender, Nondistended; Bowel sounds present  EXTREMITIES: No LE edema b/l  NERVOUS SYSTEM:  Alert & Oriented X3        Labs:                         11.4   7.30  )-----------( 229      ( 10 Sep 2018 10:14 )             36.6       Hemoglobin A1C, Whole Blood: 5.6 % (09-06-18 @ 09:15)    Radiology:       Assessment and Plan:     DVT ppx:    GI ppx:     Code Status:     DISPO: Hospital Day:  4d    Subjective:  Overnight no events. The patient reports that she didn't sleep too well. She was seen and examined at bedside.     Past Medical Hx:   PVD (peripheral vascular disease)  COPD (chronic obstructive pulmonary disease)  Kidney disease, chronic, stage I (GFR over 89 ml/min)  Peripheral vascular disease  HTN (hypertension)  High cholesterol  CVA (cerebral vascular accident)    Past Sx:  H/O aorto-femoral bypass  History of right common carotid artery stent placement    Allergies:  No Known Allergies    Current Meds:   Standng Meds:  atorvastatin Oral Tab/Cap - Peds 10 milliGRAM(s) Oral daily  buDESOnide 160 MICROgram(s)/formoterol 4.5 MICROgram(s) Inhaler 2 Puff(s) Inhalation two times a day  cilostazol 100 milliGRAM(s) Oral two times a day  clopidogrel Tablet 75 milliGRAM(s) Oral daily  docusate sodium 100 milliGRAM(s) Oral three times a day  heparin  Injectable 5000 Unit(s) SubCutaneous every 8 hours  levETIRAcetam 500 milliGRAM(s) Oral two times a day  lisinopril 10 milliGRAM(s) Oral daily  metoprolol tartrate 25 milliGRAM(s) Oral two times a day  nystatin Cream 1 Application(s) Topical two times a day    PRN Meds:  aluminum hydroxide/magnesium hydroxide/simethicone Suspension 30 milliLiter(s) Oral every 4 hours PRN Dyspepsia  ondansetron Injectable 4 milliGRAM(s) IV Push every 6 hours PRN Nausea  senna 2 Tablet(s) Oral at bedtime PRN Constipation    Vital Signs:   T(F): 98.5 (09-10-18 @ 07:09), Max: 98.5 (09-10-18 @ 07:09)  HR: 81 (09-10-18 @ 07:09) (77 - 81)  BP: 137/62 (09-10-18 @ 07:09) (137/62 - 183/77)  RR: 20 (09-10-18 @ 07:09) (18 - 20)  SpO2: 98% (09-10-18 @ 07:09) (98% - 98%)      09-09-18 @ 07:01  -  09-10-18 @ 07:00  --------------------------------------------------------  IN: 240 mL / OUT: 0 mL / NET: 240 mL        Physical Exam:   GENERAL: NAD  HEENT: NCAT  CHEST/LUNG: CTAB  HEART: Regular rate and rhythm; s1 s2 appreciated, No murmurs, rubs, or gallops  ABDOMEN: Soft, Nontender, Nondistended; Bowel sounds present  EXTREMITIES: No LE edema b/l  NERVOUS SYSTEM:  Alert & Oriented X3        Labs:                         11.4   7.30  )-----------( 229      ( 10 Sep 2018 10:14 )             36.6       Hemoglobin A1C, Whole Blood: 5.6 % (09-06-18 @ 09:15)    Radiology:   MR Head No Cont (09.08.18 @ 15:09)  IMPRESSION:  1.  No MRI evidence of acute ischemia/infarcts.  2.  Chronic infarcts of the left frontal and left parietal lobes.  3.  Periventricular and subcortical chronic microvessel ischemic changes.    MR Angio Head No Cont (09.08.18 @ 15:10)  IMPRESSION:   1.  No signal flow enhancement of the left internal carotid artery which   may be due to slow or no flow/occlusion.  2.  Left MCA and MARLEE demonstrate normal signal flow enhancement likely   due to cross filling from right to left via anterior communicating artery.  3.  Dominant right vertebral artery.    MR Angio Neck No Cont (09.08.18 @ 15:10)   IMPRESSION:   1.  Occlusion of the left common carotid artery from its origin off the   aortic arch.  2.  Moderate (60-69%) stenosis of the proximal right internal carotid   artery just beyond the bifurcation with extensive motion/swallowing   artifact.  3.  Postcontrast MRA and/or CTA of the neck may be helpful for further   evaluation if clinically indicated.  4.  Dominant right vertebral artery.     Assessment and Plan:     This is an 85 year old female with history of HTN, DLD, COPD on 2L Home O2, Pulmonary HTN, h/o CVA, h/o carotid artery occulusion with subclavian to vertebral bypass, h/o femoral bypass, and h/o seizures who presented with slurred speech for a TIA vs Stroke    #Slurred Speech (resolved)   - Likely TIA as symptoms fully resolved within 24 hours   - CTH old infarcts with possible interval extension  - MRI/MRA as noted above. Left carotid occlusion is known to neuro and not new finding.   - Neuro consulted; can be discharged from their perspective. Will need neuro follow up in 2-4 weeks.   - Continue with Plavix and lipitor. Aspirin 2x a week.     #Peripheral Vascular Disease (stable)   - Continue with cilostazol     #COPD (stable)   - Continue with 2L O2 (on that at home)   - Duonebs PRN    #Hypertension  - Continue with home medication; lisinopril 10mg QD, metoprolol 25 BID,    #CKD stage 3  - Stable      DVT ppx: Heparin SubQ     GI ppx:     Code Status:     DISPO: From home and can return home. Patient actively ambulating throughout hospitalization and will not require any rehab. Medically stable for discharge.

## 2018-09-10 NOTE — PROGRESS NOTE ADULT - REASON FOR ADMISSION
Expressive Aphasia
Slurred speech
Slurred speech
Transient aphasia.
Slurred speech

## 2018-09-10 NOTE — PROGRESS NOTE ADULT - NSHPATTENDINGPLANDISCUSS_GEN_ALL_CORE
DISCUSSED WITH PATIENTS SON
Daughter Nancy, patient. Resident Dr. Waters.
patient, family, Dr. James, resident.
Dr. aPlacios

## 2018-09-10 NOTE — PROGRESS NOTE ADULT - SUBJECTIVE AND OBJECTIVE BOX
09-10-18 @ 10:55    LILA VELA  85y  Female  Tired today, worried of results, since didn't go home yesterday.     Discussed w/ resident Dr. Etienne Castano.    INTERVAL EVENTS:    MEDICATIONS  (STANDING):  atorvastatin Oral Tab/Cap - Peds 10 milliGRAM(s) Oral daily  buDESOnide 160 MICROgram(s)/formoterol 4.5 MICROgram(s) Inhaler 2 Puff(s) Inhalation two times a day  cilostazol 100 milliGRAM(s) Oral two times a day  clopidogrel Tablet 75 milliGRAM(s) Oral daily  docusate sodium 100 milliGRAM(s) Oral three times a day  heparin  Injectable 5000 Unit(s) SubCutaneous every 8 hours  levETIRAcetam 500 milliGRAM(s) Oral two times a day  lisinopril 10 milliGRAM(s) Oral daily  metoprolol tartrate 25 milliGRAM(s) Oral two times a day  nystatin Cream 1 Application(s) Topical two times a day    MEDICATIONS  (PRN):  aluminum hydroxide/magnesium hydroxide/simethicone Suspension 30 milliLiter(s) Oral every 4 hours PRN Dyspepsia  ondansetron Injectable 4 milliGRAM(s) IV Push every 6 hours PRN Nausea  senna 2 Tablet(s) Oral at bedtime PRN Constipation      T(C): 36.9 (09-10-18 @ 07:09), Max: 36.9 (09-10-18 @ 07:09)  HR: 81 (09-10-18 @ 07:09) (77 - 81)  BP: 137/62 (09-10-18 @ 07:09) (137/62 - 183/77)  RR: 20 (09-10-18 @ 07:09) (18 - 20)  SpO2: 98% (09-10-18 @ 07:09) (98% - 98%)  Wt(kg): --Vital Signs Last 24 Hrs  T(C): 36.9 (10 Sep 2018 07:09), Max: 36.9 (10 Sep 2018 07:09)  T(F): 98.5 (10 Sep 2018 07:09), Max: 98.5 (10 Sep 2018 07:09)  HR: 81 (10 Sep 2018 07:09) (77 - 81)  BP: 137/62 (10 Sep 2018 07:09) (137/62 - 183/77)  BP(mean): --  RR: 20 (10 Sep 2018 07:09) (18 - 20)  SpO2: 98% (10 Sep 2018 07:09) (98% - 98%)    PHYSICAL EXAM:  GENERAL:   NECK:   CHEST/LUNG:  HEART: S1  ABDOMEN:   EXTREMITIES:                           11.4   7.30  )-----------( 229      ( 10 Sep 2018 10:14 )             36.6                   RADIOLOGY & ADDITIONAL TESTS:      ASSESSMENT / PLAN  :    HEALTH ISSUES - PROBLEM Dx: 09-10-18 @ 10:55    LILA VELA  85y  Female  Tired today, worried of results, since didn't go home yesterday.     Discussed w/ resident Dr. Etienne Castano.    INTERVAL EVENTS: none    MEDICATIONS  (STANDING):  atorvastatin Oral Tab/Cap - Peds 10 milliGRAM(s) Oral daily  buDESOnide 160 MICROgram(s)/formoterol 4.5 MICROgram(s) Inhaler 2 Puff(s) Inhalation two times a day  cilostazol 100 milliGRAM(s) Oral two times a day  clopidogrel Tablet 75 milliGRAM(s) Oral daily  docusate sodium 100 milliGRAM(s) Oral three times a day  heparin  Injectable 5000 Unit(s) SubCutaneous every 8 hours  levETIRAcetam 500 milliGRAM(s) Oral two times a day  lisinopril 10 milliGRAM(s) Oral daily  metoprolol tartrate 25 milliGRAM(s) Oral two times a day  nystatin Cream 1 Application(s) Topical two times a day    MEDICATIONS  (PRN):  aluminum hydroxide/magnesium hydroxide/simethicone Suspension 30 milliLiter(s) Oral every 4 hours PRN Dyspepsia  ondansetron Injectable 4 milliGRAM(s) IV Push every 6 hours PRN Nausea  senna 2 Tablet(s) Oral at bedtime PRN Constipation      T(C): 36.9 (09-10-18 @ 07:09), Max: 36.9 (09-10-18 @ 07:09)  HR: 81 (09-10-18 @ 07:09) (77 - 81)  BP: 137/62 (09-10-18 @ 07:09) (137/62 - 183/77)  RR: 20 (09-10-18 @ 07:09) (18 - 20)  SpO2: 98% (09-10-18 @ 07:09) (98% - 98%)  Wt(kg): --Vital Signs Last 24 Hrs  T(C): 36.9 (10 Sep 2018 07:09), Max: 36.9 (10 Sep 2018 07:09)  T(F): 98.5 (10 Sep 2018 07:09), Max: 98.5 (10 Sep 2018 07:09)  HR: 81 (10 Sep 2018 07:09) (77 - 81)  BP: 137/62 (10 Sep 2018 07:09) (137/62 - 183/77)  BP(mean): --  RR: 20 (10 Sep 2018 07:09) (18 - 20)  SpO2: 98% (10 Sep 2018 07:09) (98% - 98%)    PHYSICAL EXAM:  GENERAL: slim built. very alert. Tired.   NECK: supple. R carotid bruit audible.   CHEST/LUNG: Clear  HEART: S1S2, reg  ABDOMEN: bening.   EXTREMITIES: no CCE                          11.4   7.30  )-----------( 229      ( 10 Sep 2018 10:14 )             36.6         RADIOLOGY & ADDITIONAL TESTS:    MR angio neck :  IMPRESSION:     1.  Occlusion of the left common carotid artery from its origin off the   aortic arch.    2.  Moderate (60-69%) stenosis of the proximal right internal carotid   artery just beyond the bifurcation with extensive motion/swallowing   artifact.    3.  Postcontrast MRA and/or CTA of the neck may be helpful for further   evaluation if clinically indicated.    4.  Dominant right vertebral artery.         VESNA PRATT M.D., ATTENDING RADIOLOGIST  This document has been electronically signed. Sep  9 2018  9:26PM    MR Angio Head :    IMPRESSION:     1.  No signal flow enhancement of the left internal carotid artery which   may be due to slow or no flow/occlusion.    2.  Left MCA and MARLEE demonstrate normal signal flow enhancement likely   due to cross filling from right to left via anterior communicating artery.    3.  Dominant right vertebral artery.        VESNA PRATT M.D., ATTENDING RADIOLOGIST  This document has been electronically signed. Sep  9 2018  9:12PM          ASSESSMENT / PLAN  :    TIA : No recurrent sx of speech. CT, MR no evidence of new issue. To cont current mrd tx. & f/u as OP. Was evaluated by neuro.    Carotid stenosis : Mod R car st. in MR angio. chronic. Hx L car bypass. Stable, disch home today.   PVD ; Stable. Cont current rx.   COPD : Stable. does use Rx as being advised. Home O2  Kidney disease, chronic, stage I : No issue now.   HTN : Stable in Rx.   DLD : cont same.

## 2019-01-01 ENCOUNTER — INPATIENT (INPATIENT)
Facility: HOSPITAL | Age: 84
LOS: 29 days | End: 2019-02-20
Attending: INTERNAL MEDICINE | Admitting: INTERNAL MEDICINE
Payer: MEDICARE

## 2019-01-01 ENCOUNTER — INPATIENT (INPATIENT)
Facility: HOSPITAL | Age: 84
LOS: 2 days | Discharge: HOME | End: 2019-01-13
Attending: INTERNAL MEDICINE | Admitting: INTERNAL MEDICINE

## 2019-01-01 ENCOUNTER — TRANSCRIPTION ENCOUNTER (OUTPATIENT)
Age: 84
End: 2019-01-01

## 2019-01-01 VITALS
TEMPERATURE: 97 F | HEART RATE: 130 BPM | RESPIRATION RATE: 20 BRPM | SYSTOLIC BLOOD PRESSURE: 138 MMHG | DIASTOLIC BLOOD PRESSURE: 65 MMHG | OXYGEN SATURATION: 79 %

## 2019-01-01 VITALS
HEART RATE: 144 BPM | OXYGEN SATURATION: 88 % | SYSTOLIC BLOOD PRESSURE: 121 MMHG | DIASTOLIC BLOOD PRESSURE: 56 MMHG | TEMPERATURE: 98 F | RESPIRATION RATE: 26 BRPM

## 2019-01-01 VITALS
RESPIRATION RATE: 16 BRPM | SYSTOLIC BLOOD PRESSURE: 88 MMHG | HEART RATE: 103 BPM | TEMPERATURE: 97 F | DIASTOLIC BLOOD PRESSURE: 55 MMHG

## 2019-01-01 VITALS — WEIGHT: 99.87 LBS

## 2019-01-01 DIAGNOSIS — J18.1 LOBAR PNEUMONIA, UNSPECIFIED ORGANISM: ICD-10-CM

## 2019-01-01 DIAGNOSIS — F41.9 ANXIETY DISORDER, UNSPECIFIED: ICD-10-CM

## 2019-01-01 DIAGNOSIS — Z95.820 PERIPHERAL VASCULAR ANGIOPLASTY STATUS WITH IMPLANTS AND GRAFTS: ICD-10-CM

## 2019-01-01 DIAGNOSIS — I73.9 PERIPHERAL VASCULAR DISEASE, UNSPECIFIED: ICD-10-CM

## 2019-01-01 DIAGNOSIS — I82.441 ACUTE EMBOLISM AND THROMBOSIS OF RIGHT TIBIAL VEIN: ICD-10-CM

## 2019-01-01 DIAGNOSIS — Z95.828 PRESENCE OF OTHER VASCULAR IMPLANTS AND GRAFTS: Chronic | ICD-10-CM

## 2019-01-01 DIAGNOSIS — Z66 DO NOT RESUSCITATE: ICD-10-CM

## 2019-01-01 DIAGNOSIS — A41.9 SEPSIS, UNSPECIFIED ORGANISM: ICD-10-CM

## 2019-01-01 DIAGNOSIS — B97.29 OTHER CORONAVIRUS AS THE CAUSE OF DISEASES CLASSIFIED ELSEWHERE: ICD-10-CM

## 2019-01-01 DIAGNOSIS — R06.02 SHORTNESS OF BREATH: ICD-10-CM

## 2019-01-01 DIAGNOSIS — N17.0 ACUTE KIDNEY FAILURE WITH TUBULAR NECROSIS: ICD-10-CM

## 2019-01-01 DIAGNOSIS — J96.21 ACUTE AND CHRONIC RESPIRATORY FAILURE WITH HYPOXIA: ICD-10-CM

## 2019-01-01 DIAGNOSIS — I50.33 ACUTE ON CHRONIC DIASTOLIC (CONGESTIVE) HEART FAILURE: ICD-10-CM

## 2019-01-01 DIAGNOSIS — N18.3 CHRONIC KIDNEY DISEASE, STAGE 3 (MODERATE): ICD-10-CM

## 2019-01-01 DIAGNOSIS — J15.6 PNEUMONIA DUE TO OTHER GRAM-NEGATIVE BACTERIA: ICD-10-CM

## 2019-01-01 DIAGNOSIS — R18.8 OTHER ASCITES: ICD-10-CM

## 2019-01-01 DIAGNOSIS — Z87.891 PERSONAL HISTORY OF NICOTINE DEPENDENCE: ICD-10-CM

## 2019-01-01 DIAGNOSIS — T38.0X5A ADVERSE EFFECT OF GLUCOCORTICOIDS AND SYNTHETIC ANALOGUES, INITIAL ENCOUNTER: ICD-10-CM

## 2019-01-01 DIAGNOSIS — E78.00 PURE HYPERCHOLESTEROLEMIA, UNSPECIFIED: ICD-10-CM

## 2019-01-01 DIAGNOSIS — J44.1 CHRONIC OBSTRUCTIVE PULMONARY DISEASE WITH (ACUTE) EXACERBATION: ICD-10-CM

## 2019-01-01 DIAGNOSIS — E78.5 HYPERLIPIDEMIA, UNSPECIFIED: ICD-10-CM

## 2019-01-01 DIAGNOSIS — R74.0 NONSPECIFIC ELEVATION OF LEVELS OF TRANSAMINASE AND LACTIC ACID DEHYDROGENASE [LDH]: ICD-10-CM

## 2019-01-01 DIAGNOSIS — R31.0 GROSS HEMATURIA: ICD-10-CM

## 2019-01-01 DIAGNOSIS — I27.20 PULMONARY HYPERTENSION, UNSPECIFIED: ICD-10-CM

## 2019-01-01 DIAGNOSIS — I13.0 HYPERTENSIVE HEART AND CHRONIC KIDNEY DISEASE WITH HEART FAILURE AND STAGE 1 THROUGH STAGE 4 CHRONIC KIDNEY DISEASE, OR UNSPECIFIED CHRONIC KIDNEY DISEASE: ICD-10-CM

## 2019-01-01 DIAGNOSIS — R79.89 OTHER SPECIFIED ABNORMAL FINDINGS OF BLOOD CHEMISTRY: ICD-10-CM

## 2019-01-01 DIAGNOSIS — K59.00 CONSTIPATION, UNSPECIFIED: ICD-10-CM

## 2019-01-01 DIAGNOSIS — N28.1 CYST OF KIDNEY, ACQUIRED: ICD-10-CM

## 2019-01-01 DIAGNOSIS — Z86.73 PERSONAL HISTORY OF TRANSIENT ISCHEMIC ATTACK (TIA), AND CEREBRAL INFARCTION WITHOUT RESIDUAL DEFICITS: ICD-10-CM

## 2019-01-01 DIAGNOSIS — E87.0 HYPEROSMOLALITY AND HYPERNATREMIA: ICD-10-CM

## 2019-01-01 DIAGNOSIS — N32.9 BLADDER DISORDER, UNSPECIFIED: ICD-10-CM

## 2019-01-01 DIAGNOSIS — J44.0 CHRONIC OBSTRUCTIVE PULMONARY DISEASE WITH (ACUTE) LOWER RESPIRATORY INFECTION: ICD-10-CM

## 2019-01-01 DIAGNOSIS — Z98.890 OTHER SPECIFIED POSTPROCEDURAL STATES: Chronic | ICD-10-CM

## 2019-01-01 DIAGNOSIS — E87.5 HYPERKALEMIA: ICD-10-CM

## 2019-01-01 DIAGNOSIS — D64.9 ANEMIA, UNSPECIFIED: ICD-10-CM

## 2019-01-01 DIAGNOSIS — I50.32 CHRONIC DIASTOLIC (CONGESTIVE) HEART FAILURE: ICD-10-CM

## 2019-01-01 DIAGNOSIS — I95.9 HYPOTENSION, UNSPECIFIED: ICD-10-CM

## 2019-01-01 DIAGNOSIS — D72.828 OTHER ELEVATED WHITE BLOOD CELL COUNT: ICD-10-CM

## 2019-01-01 DIAGNOSIS — K76.9 LIVER DISEASE, UNSPECIFIED: ICD-10-CM

## 2019-01-01 DIAGNOSIS — Z79.82 LONG TERM (CURRENT) USE OF ASPIRIN: ICD-10-CM

## 2019-01-01 DIAGNOSIS — J96.12 CHRONIC RESPIRATORY FAILURE WITH HYPERCAPNIA: ICD-10-CM

## 2019-01-01 DIAGNOSIS — I21.A1 MYOCARDIAL INFARCTION TYPE 2: ICD-10-CM

## 2019-01-01 LAB
ALBUMIN SERPL ELPH-MCNC: 3.1 G/DL — LOW (ref 3.5–5.2)
ALBUMIN SERPL ELPH-MCNC: 3.2 G/DL — LOW (ref 3.5–5.2)
ALBUMIN SERPL ELPH-MCNC: 3.3 G/DL — LOW (ref 3.5–5.2)
ALBUMIN SERPL ELPH-MCNC: 3.4 G/DL — LOW (ref 3.5–5.2)
ALBUMIN SERPL ELPH-MCNC: 3.5 G/DL — SIGNIFICANT CHANGE UP (ref 3.5–5.2)
ALBUMIN SERPL ELPH-MCNC: 3.5 G/DL — SIGNIFICANT CHANGE UP (ref 3.5–5.2)
ALBUMIN SERPL ELPH-MCNC: 3.6 G/DL — SIGNIFICANT CHANGE UP (ref 3.5–5.2)
ALBUMIN SERPL ELPH-MCNC: 3.9 G/DL — SIGNIFICANT CHANGE UP (ref 3.5–5.2)
ALBUMIN SERPL ELPH-MCNC: 4.1 G/DL — SIGNIFICANT CHANGE UP (ref 3.5–5.2)
ALP SERPL-CCNC: 56 U/L — SIGNIFICANT CHANGE UP (ref 30–115)
ALP SERPL-CCNC: 59 U/L — SIGNIFICANT CHANGE UP (ref 30–115)
ALP SERPL-CCNC: 60 U/L — SIGNIFICANT CHANGE UP (ref 30–115)
ALP SERPL-CCNC: 61 U/L — SIGNIFICANT CHANGE UP (ref 30–115)
ALP SERPL-CCNC: 62 U/L — SIGNIFICANT CHANGE UP (ref 30–115)
ALP SERPL-CCNC: 62 U/L — SIGNIFICANT CHANGE UP (ref 30–115)
ALP SERPL-CCNC: 63 U/L — SIGNIFICANT CHANGE UP (ref 30–115)
ALP SERPL-CCNC: 67 U/L — SIGNIFICANT CHANGE UP (ref 30–115)
ALP SERPL-CCNC: 68 U/L — SIGNIFICANT CHANGE UP (ref 30–115)
ALP SERPL-CCNC: 69 U/L — SIGNIFICANT CHANGE UP (ref 30–115)
ALP SERPL-CCNC: 70 U/L — SIGNIFICANT CHANGE UP (ref 30–115)
ALP SERPL-CCNC: 71 U/L — SIGNIFICANT CHANGE UP (ref 30–115)
ALP SERPL-CCNC: 71 U/L — SIGNIFICANT CHANGE UP (ref 30–115)
ALP SERPL-CCNC: 73 U/L — SIGNIFICANT CHANGE UP (ref 30–115)
ALP SERPL-CCNC: 73 U/L — SIGNIFICANT CHANGE UP (ref 30–115)
ALP SERPL-CCNC: 75 U/L — SIGNIFICANT CHANGE UP (ref 30–115)
ALP SERPL-CCNC: 77 U/L — SIGNIFICANT CHANGE UP (ref 30–115)
ALP SERPL-CCNC: 78 U/L — SIGNIFICANT CHANGE UP (ref 30–115)
ALT FLD-CCNC: 100 U/L — HIGH (ref 0–41)
ALT FLD-CCNC: 102 U/L — HIGH (ref 0–41)
ALT FLD-CCNC: 109 U/L — HIGH (ref 0–41)
ALT FLD-CCNC: 23 U/L — SIGNIFICANT CHANGE UP (ref 0–41)
ALT FLD-CCNC: 24 U/L — SIGNIFICANT CHANGE UP (ref 0–41)
ALT FLD-CCNC: 32 U/L — SIGNIFICANT CHANGE UP (ref 0–41)
ALT FLD-CCNC: 32 U/L — SIGNIFICANT CHANGE UP (ref 0–41)
ALT FLD-CCNC: 41 U/L — SIGNIFICANT CHANGE UP (ref 0–41)
ALT FLD-CCNC: 49 U/L — HIGH (ref 0–41)
ALT FLD-CCNC: 53 U/L — HIGH (ref 0–41)
ALT FLD-CCNC: 77 U/L — HIGH (ref 0–41)
ALT FLD-CCNC: 79 U/L — HIGH (ref 0–41)
ALT FLD-CCNC: 82 U/L — HIGH (ref 0–41)
ALT FLD-CCNC: 84 U/L — HIGH (ref 0–41)
ALT FLD-CCNC: 86 U/L — HIGH (ref 0–41)
ALT FLD-CCNC: 91 U/L — HIGH (ref 0–41)
ALT FLD-CCNC: 94 U/L — HIGH (ref 0–41)
ALT FLD-CCNC: 99 U/L — HIGH (ref 0–41)
ANION GAP SERPL CALC-SCNC: 11 MMOL/L — SIGNIFICANT CHANGE UP (ref 7–14)
ANION GAP SERPL CALC-SCNC: 11 MMOL/L — SIGNIFICANT CHANGE UP (ref 7–14)
ANION GAP SERPL CALC-SCNC: 13 MMOL/L — SIGNIFICANT CHANGE UP (ref 7–14)
ANION GAP SERPL CALC-SCNC: 14 MMOL/L — SIGNIFICANT CHANGE UP (ref 7–14)
ANION GAP SERPL CALC-SCNC: 15 MMOL/L — HIGH (ref 7–14)
ANION GAP SERPL CALC-SCNC: 16 MMOL/L — HIGH (ref 7–14)
ANION GAP SERPL CALC-SCNC: 17 MMOL/L — HIGH (ref 7–14)
ANION GAP SERPL CALC-SCNC: 18 MMOL/L — HIGH (ref 7–14)
ANION GAP SERPL CALC-SCNC: 19 MMOL/L — HIGH (ref 7–14)
ANION GAP SERPL CALC-SCNC: 20 MMOL/L — HIGH (ref 7–14)
ANION GAP SERPL CALC-SCNC: 20 MMOL/L — HIGH (ref 7–14)
ANION GAP SERPL CALC-SCNC: 23 MMOL/L — HIGH (ref 7–14)
ANION GAP SERPL CALC-SCNC: 9 MMOL/L — SIGNIFICANT CHANGE UP (ref 7–14)
ANISOCYTOSIS BLD QL: SLIGHT — SIGNIFICANT CHANGE UP
ANISOCYTOSIS BLD QL: SLIGHT — SIGNIFICANT CHANGE UP
APPEARANCE UR: ABNORMAL
APPEARANCE UR: ABNORMAL
APTT BLD: 27.4 SEC — SIGNIFICANT CHANGE UP (ref 27–39.2)
APTT BLD: 30.1 SEC — SIGNIFICANT CHANGE UP (ref 27–39.2)
APTT BLD: 30.5 SEC — SIGNIFICANT CHANGE UP (ref 27–39.2)
APTT BLD: 35.6 SEC — SIGNIFICANT CHANGE UP (ref 27–39.2)
APTT BLD: 41.3 SEC — HIGH (ref 27–39.2)
APTT BLD: 42.1 SEC — HIGH (ref 27–39.2)
APTT BLD: 68.9 SEC — HIGH (ref 27–39.2)
APTT BLD: 77 SEC — CRITICAL HIGH (ref 27–39.2)
AST SERPL-CCNC: 25 U/L — SIGNIFICANT CHANGE UP (ref 0–41)
AST SERPL-CCNC: 28 U/L — SIGNIFICANT CHANGE UP (ref 0–41)
AST SERPL-CCNC: 29 U/L — SIGNIFICANT CHANGE UP (ref 0–41)
AST SERPL-CCNC: 30 U/L — SIGNIFICANT CHANGE UP (ref 0–41)
AST SERPL-CCNC: 32 U/L — SIGNIFICANT CHANGE UP (ref 0–41)
AST SERPL-CCNC: 35 U/L — SIGNIFICANT CHANGE UP (ref 0–41)
AST SERPL-CCNC: 41 U/L — SIGNIFICANT CHANGE UP (ref 0–41)
AST SERPL-CCNC: 42 U/L — HIGH (ref 0–41)
AST SERPL-CCNC: 43 U/L — HIGH (ref 0–41)
AST SERPL-CCNC: 43 U/L — HIGH (ref 0–41)
AST SERPL-CCNC: 44 U/L — HIGH (ref 0–41)
AST SERPL-CCNC: 45 U/L — HIGH (ref 0–41)
AST SERPL-CCNC: 50 U/L — HIGH (ref 0–41)
AST SERPL-CCNC: 52 U/L — HIGH (ref 0–41)
AST SERPL-CCNC: 55 U/L — HIGH (ref 0–41)
AST SERPL-CCNC: 57 U/L — HIGH (ref 0–41)
AST SERPL-CCNC: 58 U/L — HIGH (ref 0–41)
AST SERPL-CCNC: 90 U/L — HIGH (ref 0–41)
BACTERIA # UR AUTO: ABNORMAL /HPF
BACTERIA # UR AUTO: ABNORMAL /HPF
BASE EXCESS BLDA CALC-SCNC: 1.5 MMOL/L — SIGNIFICANT CHANGE UP (ref -2–2)
BASOPHILS # BLD AUTO: 0 K/UL — SIGNIFICANT CHANGE UP (ref 0–0.2)
BASOPHILS # BLD AUTO: 0.01 K/UL — SIGNIFICANT CHANGE UP (ref 0–0.2)
BASOPHILS # BLD AUTO: 0.02 K/UL — SIGNIFICANT CHANGE UP (ref 0–0.2)
BASOPHILS # BLD AUTO: 0.03 K/UL — SIGNIFICANT CHANGE UP (ref 0–0.2)
BASOPHILS # BLD AUTO: 0.04 K/UL — SIGNIFICANT CHANGE UP (ref 0–0.2)
BASOPHILS # BLD AUTO: 0.04 K/UL — SIGNIFICANT CHANGE UP (ref 0–0.2)
BASOPHILS # BLD AUTO: 0.05 K/UL — SIGNIFICANT CHANGE UP (ref 0–0.2)
BASOPHILS NFR BLD AUTO: 0 % — SIGNIFICANT CHANGE UP (ref 0–1)
BASOPHILS NFR BLD AUTO: 0.1 % — SIGNIFICANT CHANGE UP (ref 0–1)
BASOPHILS NFR BLD AUTO: 0.2 % — SIGNIFICANT CHANGE UP (ref 0–1)
BASOPHILS NFR BLD AUTO: 0.3 % — SIGNIFICANT CHANGE UP (ref 0–1)
BASOPHILS NFR BLD AUTO: 0.4 % — SIGNIFICANT CHANGE UP (ref 0–1)
BILIRUB DIRECT SERPL-MCNC: 0.2 MG/DL — SIGNIFICANT CHANGE UP (ref 0–0.2)
BILIRUB DIRECT SERPL-MCNC: <0.2 MG/DL — SIGNIFICANT CHANGE UP (ref 0–0.2)
BILIRUB INDIRECT FLD-MCNC: 0.3 MG/DL — SIGNIFICANT CHANGE UP (ref 0.2–1.2)
BILIRUB INDIRECT FLD-MCNC: SIGNIFICANT CHANGE UP MG/DL (ref 0.2–1.2)
BILIRUB SERPL-MCNC: 0.3 MG/DL — SIGNIFICANT CHANGE UP (ref 0.2–1.2)
BILIRUB SERPL-MCNC: 0.4 MG/DL — SIGNIFICANT CHANGE UP (ref 0.2–1.2)
BILIRUB SERPL-MCNC: 0.5 MG/DL — SIGNIFICANT CHANGE UP (ref 0.2–1.2)
BILIRUB SERPL-MCNC: 0.6 MG/DL — SIGNIFICANT CHANGE UP (ref 0.2–1.2)
BILIRUB SERPL-MCNC: 0.7 MG/DL — SIGNIFICANT CHANGE UP (ref 0.2–1.2)
BILIRUB SERPL-MCNC: <0.2 MG/DL — SIGNIFICANT CHANGE UP (ref 0.2–1.2)
BILIRUB UR-MCNC: ABNORMAL
BILIRUB UR-MCNC: ABNORMAL
BLD GP AB SCN SERPL QL: SIGNIFICANT CHANGE UP
BUN SERPL-MCNC: 20 MG/DL — SIGNIFICANT CHANGE UP (ref 10–20)
BUN SERPL-MCNC: 23 MG/DL — HIGH (ref 10–20)
BUN SERPL-MCNC: 23 MG/DL — HIGH (ref 10–20)
BUN SERPL-MCNC: 25 MG/DL — HIGH (ref 10–20)
BUN SERPL-MCNC: 27 MG/DL — HIGH (ref 10–20)
BUN SERPL-MCNC: 28 MG/DL — HIGH (ref 10–20)
BUN SERPL-MCNC: 29 MG/DL — HIGH (ref 10–20)
BUN SERPL-MCNC: 29 MG/DL — HIGH (ref 10–20)
BUN SERPL-MCNC: 30 MG/DL — HIGH (ref 10–20)
BUN SERPL-MCNC: 30 MG/DL — HIGH (ref 10–20)
BUN SERPL-MCNC: 31 MG/DL — HIGH (ref 10–20)
BUN SERPL-MCNC: 32 MG/DL — HIGH (ref 10–20)
BUN SERPL-MCNC: 33 MG/DL — HIGH (ref 10–20)
BUN SERPL-MCNC: 33 MG/DL — HIGH (ref 10–20)
BUN SERPL-MCNC: 34 MG/DL — HIGH (ref 10–20)
BUN SERPL-MCNC: 34 MG/DL — HIGH (ref 10–20)
BUN SERPL-MCNC: 35 MG/DL — HIGH (ref 10–20)
BUN SERPL-MCNC: 36 MG/DL — HIGH (ref 10–20)
BUN SERPL-MCNC: 37 MG/DL — HIGH (ref 10–20)
BUN SERPL-MCNC: 37 MG/DL — HIGH (ref 10–20)
BUN SERPL-MCNC: 38 MG/DL — HIGH (ref 10–20)
BUN SERPL-MCNC: 40 MG/DL — HIGH (ref 10–20)
BUN SERPL-MCNC: 43 MG/DL — HIGH (ref 10–20)
BUN SERPL-MCNC: 44 MG/DL — HIGH (ref 10–20)
BUN SERPL-MCNC: 48 MG/DL — HIGH (ref 10–20)
BUN SERPL-MCNC: 49 MG/DL — HIGH (ref 10–20)
BUN SERPL-MCNC: 49 MG/DL — HIGH (ref 10–20)
BUN SERPL-MCNC: 50 MG/DL — HIGH (ref 10–20)
BUN SERPL-MCNC: 51 MG/DL — HIGH (ref 10–20)
CALCIUM SERPL-MCNC: 8.1 MG/DL — LOW (ref 8.5–10.1)
CALCIUM SERPL-MCNC: 8.1 MG/DL — LOW (ref 8.5–10.1)
CALCIUM SERPL-MCNC: 8.2 MG/DL — LOW (ref 8.5–10.1)
CALCIUM SERPL-MCNC: 8.3 MG/DL — LOW (ref 8.5–10.1)
CALCIUM SERPL-MCNC: 8.4 MG/DL — LOW (ref 8.5–10.1)
CALCIUM SERPL-MCNC: 8.5 MG/DL — SIGNIFICANT CHANGE UP (ref 8.5–10.1)
CALCIUM SERPL-MCNC: 8.6 MG/DL — SIGNIFICANT CHANGE UP (ref 8.5–10.1)
CALCIUM SERPL-MCNC: 8.6 MG/DL — SIGNIFICANT CHANGE UP (ref 8.5–10.1)
CALCIUM SERPL-MCNC: 8.7 MG/DL — SIGNIFICANT CHANGE UP (ref 8.5–10.1)
CALCIUM SERPL-MCNC: 8.8 MG/DL — SIGNIFICANT CHANGE UP (ref 8.5–10.1)
CALCIUM SERPL-MCNC: 8.9 MG/DL — SIGNIFICANT CHANGE UP (ref 8.5–10.1)
CALCIUM SERPL-MCNC: 9 MG/DL — SIGNIFICANT CHANGE UP (ref 8.5–10.1)
CALCIUM SERPL-MCNC: 9.1 MG/DL — SIGNIFICANT CHANGE UP (ref 8.5–10.1)
CALCIUM SERPL-MCNC: 9.2 MG/DL — SIGNIFICANT CHANGE UP (ref 8.5–10.1)
CALCIUM SERPL-MCNC: 9.3 MG/DL — SIGNIFICANT CHANGE UP (ref 8.5–10.1)
CALCIUM SERPL-MCNC: 9.4 MG/DL — SIGNIFICANT CHANGE UP (ref 8.5–10.1)
CALCIUM SERPL-MCNC: 9.6 MG/DL — SIGNIFICANT CHANGE UP (ref 8.5–10.1)
CALCIUM SERPL-MCNC: 9.7 MG/DL — SIGNIFICANT CHANGE UP (ref 8.5–10.1)
CALCIUM SERPL-MCNC: 9.9 MG/DL — SIGNIFICANT CHANGE UP (ref 8.5–10.1)
CHLORIDE SERPL-SCNC: 101 MMOL/L — SIGNIFICANT CHANGE UP (ref 98–110)
CHLORIDE SERPL-SCNC: 102 MMOL/L — SIGNIFICANT CHANGE UP (ref 98–110)
CHLORIDE SERPL-SCNC: 103 MMOL/L — SIGNIFICANT CHANGE UP (ref 98–110)
CHLORIDE SERPL-SCNC: 104 MMOL/L — SIGNIFICANT CHANGE UP (ref 98–110)
CHLORIDE SERPL-SCNC: 105 MMOL/L — SIGNIFICANT CHANGE UP (ref 98–110)
CHLORIDE SERPL-SCNC: 106 MMOL/L — SIGNIFICANT CHANGE UP (ref 98–110)
CHLORIDE SERPL-SCNC: 106 MMOL/L — SIGNIFICANT CHANGE UP (ref 98–110)
CHLORIDE SERPL-SCNC: 107 MMOL/L — SIGNIFICANT CHANGE UP (ref 98–110)
CHLORIDE SERPL-SCNC: 94 MMOL/L — LOW (ref 98–110)
CHLORIDE SERPL-SCNC: 95 MMOL/L — LOW (ref 98–110)
CHLORIDE SERPL-SCNC: 96 MMOL/L — LOW (ref 98–110)
CHLORIDE SERPL-SCNC: 97 MMOL/L — LOW (ref 98–110)
CHLORIDE SERPL-SCNC: 99 MMOL/L — SIGNIFICANT CHANGE UP (ref 98–110)
CHOLEST SERPL-MCNC: 171 MG/DL — SIGNIFICANT CHANGE UP (ref 100–200)
CK MB CFR SERPL CALC: 3 NG/ML — SIGNIFICANT CHANGE UP (ref 0.6–6.3)
CK MB CFR SERPL CALC: 3.2 NG/ML — SIGNIFICANT CHANGE UP (ref 0.6–6.3)
CK MB CFR SERPL CALC: 3.4 NG/ML — SIGNIFICANT CHANGE UP (ref 0.6–6.3)
CK SERPL-CCNC: 58 U/L — SIGNIFICANT CHANGE UP (ref 0–225)
CK SERPL-CCNC: 58 U/L — SIGNIFICANT CHANGE UP (ref 0–225)
CK SERPL-CCNC: 65 U/L — SIGNIFICANT CHANGE UP (ref 0–225)
CK SERPL-CCNC: 91 U/L — SIGNIFICANT CHANGE UP (ref 0–225)
CO2 SERPL-SCNC: 17 MMOL/L — SIGNIFICANT CHANGE UP (ref 17–32)
CO2 SERPL-SCNC: 23 MMOL/L — SIGNIFICANT CHANGE UP (ref 17–32)
CO2 SERPL-SCNC: 25 MMOL/L — SIGNIFICANT CHANGE UP (ref 17–32)
CO2 SERPL-SCNC: 26 MMOL/L — SIGNIFICANT CHANGE UP (ref 17–32)
CO2 SERPL-SCNC: 26 MMOL/L — SIGNIFICANT CHANGE UP (ref 17–32)
CO2 SERPL-SCNC: 27 MMOL/L — SIGNIFICANT CHANGE UP (ref 17–32)
CO2 SERPL-SCNC: 28 MMOL/L — SIGNIFICANT CHANGE UP (ref 17–32)
CO2 SERPL-SCNC: 29 MMOL/L — SIGNIFICANT CHANGE UP (ref 17–32)
CO2 SERPL-SCNC: 30 MMOL/L — SIGNIFICANT CHANGE UP (ref 17–32)
CO2 SERPL-SCNC: 31 MMOL/L — SIGNIFICANT CHANGE UP (ref 17–32)
CO2 SERPL-SCNC: 34 MMOL/L — HIGH (ref 17–32)
CO2 SERPL-SCNC: 37 MMOL/L — HIGH (ref 17–32)
COLOR SPEC: ABNORMAL
COLOR SPEC: ABNORMAL
COMMENT - URINE: SIGNIFICANT CHANGE UP
CREAT ?TM UR-MCNC: 212 MG/DL — SIGNIFICANT CHANGE UP
CREAT SERPL-MCNC: 0.8 MG/DL — SIGNIFICANT CHANGE UP (ref 0.7–1.5)
CREAT SERPL-MCNC: 0.9 MG/DL — SIGNIFICANT CHANGE UP (ref 0.7–1.5)
CREAT SERPL-MCNC: 1 MG/DL — SIGNIFICANT CHANGE UP (ref 0.7–1.5)
CREAT SERPL-MCNC: 1.1 MG/DL — SIGNIFICANT CHANGE UP (ref 0.7–1.5)
CREAT SERPL-MCNC: 1.2 MG/DL — SIGNIFICANT CHANGE UP (ref 0.7–1.5)
CREAT SERPL-MCNC: 1.3 MG/DL — SIGNIFICANT CHANGE UP (ref 0.7–1.5)
CREAT SERPL-MCNC: 1.4 MG/DL — SIGNIFICANT CHANGE UP (ref 0.7–1.5)
CREAT SERPL-MCNC: 1.4 MG/DL — SIGNIFICANT CHANGE UP (ref 0.7–1.5)
CREAT SERPL-MCNC: 1.7 MG/DL — HIGH (ref 0.7–1.5)
CREAT SERPL-MCNC: 2.3 MG/DL — HIGH (ref 0.7–1.5)
CULTURE RESULTS: NO GROWTH — SIGNIFICANT CHANGE UP
CULTURE RESULTS: SIGNIFICANT CHANGE UP
DIFF PNL FLD: ABNORMAL
DIFF PNL FLD: ABNORMAL
EOSINOPHIL # BLD AUTO: 0 K/UL — SIGNIFICANT CHANGE UP (ref 0–0.7)
EOSINOPHIL # BLD AUTO: 0.01 K/UL — SIGNIFICANT CHANGE UP (ref 0–0.7)
EOSINOPHIL # BLD AUTO: 0.05 K/UL — SIGNIFICANT CHANGE UP (ref 0–0.7)
EOSINOPHIL # BLD AUTO: 0.06 K/UL — SIGNIFICANT CHANGE UP (ref 0–0.7)
EOSINOPHIL # BLD AUTO: 0.08 K/UL — SIGNIFICANT CHANGE UP (ref 0–0.7)
EOSINOPHIL # BLD AUTO: 0.1 K/UL — SIGNIFICANT CHANGE UP (ref 0–0.7)
EOSINOPHIL # BLD AUTO: 0.11 K/UL — SIGNIFICANT CHANGE UP (ref 0–0.7)
EOSINOPHIL # BLD AUTO: 0.12 K/UL — SIGNIFICANT CHANGE UP (ref 0–0.7)
EOSINOPHIL # BLD AUTO: 0.17 K/UL — SIGNIFICANT CHANGE UP (ref 0–0.7)
EOSINOPHIL NFR BLD AUTO: 0 % — SIGNIFICANT CHANGE UP (ref 0–8)
EOSINOPHIL NFR BLD AUTO: 0.1 % — SIGNIFICANT CHANGE UP (ref 0–8)
EOSINOPHIL NFR BLD AUTO: 0.3 % — SIGNIFICANT CHANGE UP (ref 0–8)
EOSINOPHIL NFR BLD AUTO: 0.3 % — SIGNIFICANT CHANGE UP (ref 0–8)
EOSINOPHIL NFR BLD AUTO: 0.6 % — SIGNIFICANT CHANGE UP (ref 0–8)
EOSINOPHIL NFR BLD AUTO: 0.7 % — SIGNIFICANT CHANGE UP (ref 0–8)
EOSINOPHIL NFR BLD AUTO: 0.8 % — SIGNIFICANT CHANGE UP (ref 0–8)
EOSINOPHIL NFR BLD AUTO: 0.8 % — SIGNIFICANT CHANGE UP (ref 0–8)
EOSINOPHIL NFR BLD AUTO: 1.4 % — SIGNIFICANT CHANGE UP (ref 0–8)
ESTIMATED AVERAGE GLUCOSE: 117 MG/DL — HIGH (ref 68–114)
FLU A RESULT: NEGATIVE — SIGNIFICANT CHANGE UP
FLU A RESULT: NEGATIVE — SIGNIFICANT CHANGE UP
FLUAV AG NPH QL: NEGATIVE — SIGNIFICANT CHANGE UP
FLUBV AG NPH QL: NEGATIVE — SIGNIFICANT CHANGE UP
GAS PNL BLDA: SIGNIFICANT CHANGE UP
GAS PNL BLDV: SIGNIFICANT CHANGE UP
GIANT PLATELETS BLD QL SMEAR: PRESENT — SIGNIFICANT CHANGE UP
GLUCOSE BLDC GLUCOMTR-MCNC: 147 MG/DL — HIGH (ref 70–99)
GLUCOSE BLDC GLUCOMTR-MCNC: 174 MG/DL — HIGH (ref 70–99)
GLUCOSE SERPL-MCNC: 101 MG/DL — HIGH (ref 70–99)
GLUCOSE SERPL-MCNC: 104 MG/DL — HIGH (ref 70–99)
GLUCOSE SERPL-MCNC: 105 MG/DL — HIGH (ref 70–99)
GLUCOSE SERPL-MCNC: 110 MG/DL — HIGH (ref 70–99)
GLUCOSE SERPL-MCNC: 111 MG/DL — HIGH (ref 70–99)
GLUCOSE SERPL-MCNC: 112 MG/DL — HIGH (ref 70–99)
GLUCOSE SERPL-MCNC: 114 MG/DL — HIGH (ref 70–99)
GLUCOSE SERPL-MCNC: 118 MG/DL — HIGH (ref 70–99)
GLUCOSE SERPL-MCNC: 126 MG/DL — HIGH (ref 70–99)
GLUCOSE SERPL-MCNC: 127 MG/DL — HIGH (ref 70–99)
GLUCOSE SERPL-MCNC: 134 MG/DL — HIGH (ref 70–99)
GLUCOSE SERPL-MCNC: 135 MG/DL — HIGH (ref 70–99)
GLUCOSE SERPL-MCNC: 138 MG/DL — HIGH (ref 70–99)
GLUCOSE SERPL-MCNC: 138 MG/DL — HIGH (ref 70–99)
GLUCOSE SERPL-MCNC: 140 MG/DL — HIGH (ref 70–99)
GLUCOSE SERPL-MCNC: 160 MG/DL — HIGH (ref 70–99)
GLUCOSE SERPL-MCNC: 163 MG/DL — HIGH (ref 70–99)
GLUCOSE SERPL-MCNC: 169 MG/DL — HIGH (ref 70–99)
GLUCOSE SERPL-MCNC: 170 MG/DL — HIGH (ref 70–99)
GLUCOSE SERPL-MCNC: 177 MG/DL — HIGH (ref 70–99)
GLUCOSE SERPL-MCNC: 206 MG/DL — HIGH (ref 70–99)
GLUCOSE SERPL-MCNC: 245 MG/DL — HIGH (ref 70–99)
GLUCOSE SERPL-MCNC: 271 MG/DL — HIGH (ref 70–99)
GLUCOSE SERPL-MCNC: 79 MG/DL — SIGNIFICANT CHANGE UP (ref 70–99)
GLUCOSE SERPL-MCNC: 82 MG/DL — SIGNIFICANT CHANGE UP (ref 70–99)
GLUCOSE SERPL-MCNC: 89 MG/DL — SIGNIFICANT CHANGE UP (ref 70–99)
GLUCOSE SERPL-MCNC: 91 MG/DL — SIGNIFICANT CHANGE UP (ref 70–99)
GLUCOSE SERPL-MCNC: 95 MG/DL — SIGNIFICANT CHANGE UP (ref 70–99)
GLUCOSE SERPL-MCNC: 97 MG/DL — SIGNIFICANT CHANGE UP (ref 70–99)
GLUCOSE SERPL-MCNC: 98 MG/DL — SIGNIFICANT CHANGE UP (ref 70–99)
GLUCOSE SERPL-MCNC: 98 MG/DL — SIGNIFICANT CHANGE UP (ref 70–99)
GLUCOSE SERPL-MCNC: 99 MG/DL — SIGNIFICANT CHANGE UP (ref 70–99)
GLUCOSE SERPL-MCNC: 99 MG/DL — SIGNIFICANT CHANGE UP (ref 70–99)
GLUCOSE UR QL: 100 MG/DL
GLUCOSE UR QL: NEGATIVE MG/DL — SIGNIFICANT CHANGE UP
GRAN CASTS # UR COMP ASSIST: ABNORMAL /LPF
HBA1C BLD-MCNC: 5.7 % — HIGH (ref 4–5.6)
HCO3 BLDA-SCNC: 27 MMOL/L — SIGNIFICANT CHANGE UP (ref 23–27)
HCOV PNL SPEC NAA+PROBE: DETECTED
HCT VFR BLD CALC: 25.2 % — LOW (ref 37–47)
HCT VFR BLD CALC: 25.7 % — LOW (ref 37–47)
HCT VFR BLD CALC: 26.3 % — LOW (ref 37–47)
HCT VFR BLD CALC: 27.3 % — LOW (ref 37–47)
HCT VFR BLD CALC: 28.5 % — LOW (ref 37–47)
HCT VFR BLD CALC: 28.7 % — LOW (ref 37–47)
HCT VFR BLD CALC: 29.1 % — LOW (ref 37–47)
HCT VFR BLD CALC: 29.4 % — LOW (ref 37–47)
HCT VFR BLD CALC: 29.5 % — LOW (ref 37–47)
HCT VFR BLD CALC: 29.6 % — LOW (ref 37–47)
HCT VFR BLD CALC: 29.8 % — LOW (ref 37–47)
HCT VFR BLD CALC: 29.9 % — LOW (ref 37–47)
HCT VFR BLD CALC: 30.2 % — LOW (ref 37–47)
HCT VFR BLD CALC: 30.9 % — LOW (ref 37–47)
HCT VFR BLD CALC: 31.2 % — LOW (ref 37–47)
HCT VFR BLD CALC: 31.4 % — LOW (ref 37–47)
HCT VFR BLD CALC: 31.5 % — LOW (ref 37–47)
HCT VFR BLD CALC: 31.6 % — LOW (ref 37–47)
HCT VFR BLD CALC: 31.7 % — LOW (ref 37–47)
HCT VFR BLD CALC: 31.8 % — LOW (ref 37–47)
HCT VFR BLD CALC: 32.8 % — LOW (ref 37–47)
HCT VFR BLD CALC: 32.8 % — LOW (ref 37–47)
HCT VFR BLD CALC: 32.9 % — LOW (ref 37–47)
HCT VFR BLD CALC: 33.3 % — LOW (ref 37–47)
HCT VFR BLD CALC: 33.3 % — LOW (ref 37–47)
HCT VFR BLD CALC: 33.9 % — LOW (ref 37–47)
HCT VFR BLD CALC: 35.1 % — LOW (ref 37–47)
HCT VFR BLD CALC: 35.6 % — LOW (ref 37–47)
HCT VFR BLD CALC: 36.7 % — LOW (ref 37–47)
HCT VFR BLD CALC: 39.8 % — SIGNIFICANT CHANGE UP (ref 37–47)
HDLC SERPL-MCNC: 90 MG/DL — SIGNIFICANT CHANGE UP
HGB BLD-MCNC: 10 G/DL — LOW (ref 12–16)
HGB BLD-MCNC: 10.2 G/DL — LOW (ref 12–16)
HGB BLD-MCNC: 10.3 G/DL — LOW (ref 12–16)
HGB BLD-MCNC: 10.3 G/DL — LOW (ref 12–16)
HGB BLD-MCNC: 10.4 G/DL — LOW (ref 12–16)
HGB BLD-MCNC: 11.1 G/DL — LOW (ref 12–16)
HGB BLD-MCNC: 11.1 G/DL — LOW (ref 12–16)
HGB BLD-MCNC: 11.4 G/DL — LOW (ref 12–16)
HGB BLD-MCNC: 12.3 G/DL — SIGNIFICANT CHANGE UP (ref 12–16)
HGB BLD-MCNC: 8.2 G/DL — LOW (ref 12–16)
HGB BLD-MCNC: 8.3 G/DL — LOW (ref 12–16)
HGB BLD-MCNC: 8.5 G/DL — LOW (ref 12–16)
HGB BLD-MCNC: 8.7 G/DL — LOW (ref 12–16)
HGB BLD-MCNC: 8.8 G/DL — LOW (ref 12–16)
HGB BLD-MCNC: 9 G/DL — LOW (ref 12–16)
HGB BLD-MCNC: 9 G/DL — LOW (ref 12–16)
HGB BLD-MCNC: 9.1 G/DL — LOW (ref 12–16)
HGB BLD-MCNC: 9.2 G/DL — LOW (ref 12–16)
HGB BLD-MCNC: 9.2 G/DL — LOW (ref 12–16)
HGB BLD-MCNC: 9.3 G/DL — LOW (ref 12–16)
HGB BLD-MCNC: 9.3 G/DL — LOW (ref 12–16)
HGB BLD-MCNC: 9.5 G/DL — LOW (ref 12–16)
HGB BLD-MCNC: 9.5 G/DL — LOW (ref 12–16)
HGB BLD-MCNC: 9.7 G/DL — LOW (ref 12–16)
HGB BLD-MCNC: 9.7 G/DL — LOW (ref 12–16)
HGB BLD-MCNC: 9.8 G/DL — LOW (ref 12–16)
HGB BLD-MCNC: 9.9 G/DL — LOW (ref 12–16)
HOROWITZ INDEX BLDA+IHG-RTO: 55 — SIGNIFICANT CHANGE UP
IMM GRANULOCYTES NFR BLD AUTO: 0.7 % — HIGH (ref 0.1–0.3)
IMM GRANULOCYTES NFR BLD AUTO: 1 % — HIGH (ref 0.1–0.3)
IMM GRANULOCYTES NFR BLD AUTO: 1 % — HIGH (ref 0.1–0.3)
IMM GRANULOCYTES NFR BLD AUTO: 1.7 % — HIGH (ref 0.1–0.3)
IMM GRANULOCYTES NFR BLD AUTO: 1.8 % — HIGH (ref 0.1–0.3)
IMM GRANULOCYTES NFR BLD AUTO: 1.9 % — HIGH (ref 0.1–0.3)
IMM GRANULOCYTES NFR BLD AUTO: 2 % — HIGH (ref 0.1–0.3)
IMM GRANULOCYTES NFR BLD AUTO: 2 % — HIGH (ref 0.1–0.3)
IMM GRANULOCYTES NFR BLD AUTO: 2.2 % — HIGH (ref 0.1–0.3)
IMM GRANULOCYTES NFR BLD AUTO: 2.6 % — HIGH (ref 0.1–0.3)
IMM GRANULOCYTES NFR BLD AUTO: 3.5 % — HIGH (ref 0.1–0.3)
INR BLD: 0.98 RATIO — SIGNIFICANT CHANGE UP (ref 0.65–1.3)
INR BLD: 0.98 RATIO — SIGNIFICANT CHANGE UP (ref 0.65–1.3)
INR BLD: 1.02 RATIO — SIGNIFICANT CHANGE UP (ref 0.65–1.3)
KETONES UR-MCNC: 15
KETONES UR-MCNC: 40
LACTATE SERPL-SCNC: 1.3 MMOL/L — SIGNIFICANT CHANGE UP (ref 0.5–2.2)
LACTATE SERPL-SCNC: 1.7 MMOL/L — SIGNIFICANT CHANGE UP (ref 0.5–2.2)
LACTATE SERPL-SCNC: 2.2 MMOL/L — SIGNIFICANT CHANGE UP (ref 0.5–2.2)
LEGIONELLA AG UR QL: NEGATIVE — SIGNIFICANT CHANGE UP
LEUKOCYTE ESTERASE UR-ACNC: ABNORMAL
LEUKOCYTE ESTERASE UR-ACNC: ABNORMAL
LIPID PNL WITH DIRECT LDL SERPL: 78 MG/DL — SIGNIFICANT CHANGE UP (ref 4–129)
LYMPHOCYTES # BLD AUTO: 0 % — LOW (ref 20.5–51.1)
LYMPHOCYTES # BLD AUTO: 0 K/UL — LOW (ref 1.2–3.4)
LYMPHOCYTES # BLD AUTO: 0.17 K/UL — LOW (ref 1.2–3.4)
LYMPHOCYTES # BLD AUTO: 0.19 K/UL — LOW (ref 1.2–3.4)
LYMPHOCYTES # BLD AUTO: 0.34 K/UL — LOW (ref 1.2–3.4)
LYMPHOCYTES # BLD AUTO: 0.44 K/UL — LOW (ref 1.2–3.4)
LYMPHOCYTES # BLD AUTO: 0.45 K/UL — LOW (ref 1.2–3.4)
LYMPHOCYTES # BLD AUTO: 0.45 K/UL — LOW (ref 1.2–3.4)
LYMPHOCYTES # BLD AUTO: 0.46 K/UL — LOW (ref 1.2–3.4)
LYMPHOCYTES # BLD AUTO: 0.46 K/UL — LOW (ref 1.2–3.4)
LYMPHOCYTES # BLD AUTO: 0.49 K/UL — LOW (ref 1.2–3.4)
LYMPHOCYTES # BLD AUTO: 0.52 K/UL — LOW (ref 1.2–3.4)
LYMPHOCYTES # BLD AUTO: 0.57 K/UL — LOW (ref 1.2–3.4)
LYMPHOCYTES # BLD AUTO: 0.58 K/UL — LOW (ref 1.2–3.4)
LYMPHOCYTES # BLD AUTO: 0.66 K/UL — LOW (ref 1.2–3.4)
LYMPHOCYTES # BLD AUTO: 0.73 K/UL — LOW (ref 1.2–3.4)
LYMPHOCYTES # BLD AUTO: 0.8 % — LOW (ref 20.5–51.1)
LYMPHOCYTES # BLD AUTO: 0.99 K/UL — LOW (ref 1.2–3.4)
LYMPHOCYTES # BLD AUTO: 1.6 % — LOW (ref 20.5–51.1)
LYMPHOCYTES # BLD AUTO: 2.6 % — LOW (ref 20.5–51.1)
LYMPHOCYTES # BLD AUTO: 2.6 % — LOW (ref 20.5–51.1)
LYMPHOCYTES # BLD AUTO: 2.7 % — LOW (ref 20.5–51.1)
LYMPHOCYTES # BLD AUTO: 2.7 % — LOW (ref 20.5–51.1)
LYMPHOCYTES # BLD AUTO: 2.9 % — LOW (ref 20.5–51.1)
LYMPHOCYTES # BLD AUTO: 3 % — LOW (ref 20.5–51.1)
LYMPHOCYTES # BLD AUTO: 3.1 % — LOW (ref 20.5–51.1)
LYMPHOCYTES # BLD AUTO: 3.4 % — LOW (ref 20.5–51.1)
LYMPHOCYTES # BLD AUTO: 3.5 % — LOW (ref 20.5–51.1)
LYMPHOCYTES # BLD AUTO: 4.2 % — LOW (ref 20.5–51.1)
LYMPHOCYTES # BLD AUTO: 4.7 % — LOW (ref 20.5–51.1)
LYMPHOCYTES # BLD AUTO: 5.1 % — LOW (ref 20.5–51.1)
LYMPHOCYTES # BLD AUTO: 7.9 % — LOW (ref 20.5–51.1)
MAGNESIUM SERPL-MCNC: 1.7 MG/DL — LOW (ref 1.8–2.4)
MAGNESIUM SERPL-MCNC: 1.8 MG/DL — SIGNIFICANT CHANGE UP (ref 1.8–2.4)
MAGNESIUM SERPL-MCNC: 1.8 MG/DL — SIGNIFICANT CHANGE UP (ref 1.8–2.4)
MAGNESIUM SERPL-MCNC: 1.9 MG/DL — SIGNIFICANT CHANGE UP (ref 1.8–2.4)
MAGNESIUM SERPL-MCNC: 2 MG/DL — SIGNIFICANT CHANGE UP (ref 1.8–2.4)
MAGNESIUM SERPL-MCNC: 2.1 MG/DL — SIGNIFICANT CHANGE UP (ref 1.8–2.4)
MAGNESIUM SERPL-MCNC: 2.2 MG/DL — SIGNIFICANT CHANGE UP (ref 1.8–2.4)
MAGNESIUM SERPL-MCNC: 2.3 MG/DL — SIGNIFICANT CHANGE UP (ref 1.8–2.4)
MAGNESIUM SERPL-MCNC: 2.3 MG/DL — SIGNIFICANT CHANGE UP (ref 1.8–2.4)
MAGNESIUM SERPL-MCNC: 2.4 MG/DL — SIGNIFICANT CHANGE UP (ref 1.8–2.4)
MANUAL SMEAR VERIFICATION: SIGNIFICANT CHANGE UP
MANUAL SMEAR VERIFICATION: SIGNIFICANT CHANGE UP
MCHC RBC-ENTMCNC: 27 PG — SIGNIFICANT CHANGE UP (ref 27–31)
MCHC RBC-ENTMCNC: 27.5 PG — SIGNIFICANT CHANGE UP (ref 27–31)
MCHC RBC-ENTMCNC: 27.6 PG — SIGNIFICANT CHANGE UP (ref 27–31)
MCHC RBC-ENTMCNC: 27.8 PG — SIGNIFICANT CHANGE UP (ref 27–31)
MCHC RBC-ENTMCNC: 27.9 PG — SIGNIFICANT CHANGE UP (ref 27–31)
MCHC RBC-ENTMCNC: 28.1 PG — SIGNIFICANT CHANGE UP (ref 27–31)
MCHC RBC-ENTMCNC: 28.3 PG — SIGNIFICANT CHANGE UP (ref 27–31)
MCHC RBC-ENTMCNC: 28.4 PG — SIGNIFICANT CHANGE UP (ref 27–31)
MCHC RBC-ENTMCNC: 28.4 PG — SIGNIFICANT CHANGE UP (ref 27–31)
MCHC RBC-ENTMCNC: 28.8 PG — SIGNIFICANT CHANGE UP (ref 27–31)
MCHC RBC-ENTMCNC: 28.8 PG — SIGNIFICANT CHANGE UP (ref 27–31)
MCHC RBC-ENTMCNC: 28.9 PG — SIGNIFICANT CHANGE UP (ref 27–31)
MCHC RBC-ENTMCNC: 29 PG — SIGNIFICANT CHANGE UP (ref 27–31)
MCHC RBC-ENTMCNC: 29.1 PG — SIGNIFICANT CHANGE UP (ref 27–31)
MCHC RBC-ENTMCNC: 29.2 PG — SIGNIFICANT CHANGE UP (ref 27–31)
MCHC RBC-ENTMCNC: 29.2 PG — SIGNIFICANT CHANGE UP (ref 27–31)
MCHC RBC-ENTMCNC: 29.3 PG — SIGNIFICANT CHANGE UP (ref 27–31)
MCHC RBC-ENTMCNC: 29.3 PG — SIGNIFICANT CHANGE UP (ref 27–31)
MCHC RBC-ENTMCNC: 29.6 PG — SIGNIFICANT CHANGE UP (ref 27–31)
MCHC RBC-ENTMCNC: 29.7 PG — SIGNIFICANT CHANGE UP (ref 27–31)
MCHC RBC-ENTMCNC: 30 G/DL — LOW (ref 32–37)
MCHC RBC-ENTMCNC: 30 PG — SIGNIFICANT CHANGE UP (ref 27–31)
MCHC RBC-ENTMCNC: 30 PG — SIGNIFICANT CHANGE UP (ref 27–31)
MCHC RBC-ENTMCNC: 30.2 G/DL — LOW (ref 32–37)
MCHC RBC-ENTMCNC: 30.4 G/DL — LOW (ref 32–37)
MCHC RBC-ENTMCNC: 30.5 G/DL — LOW (ref 32–37)
MCHC RBC-ENTMCNC: 30.5 G/DL — LOW (ref 32–37)
MCHC RBC-ENTMCNC: 30.6 G/DL — LOW (ref 32–37)
MCHC RBC-ENTMCNC: 30.7 G/DL — LOW (ref 32–37)
MCHC RBC-ENTMCNC: 30.9 G/DL — LOW (ref 32–37)
MCHC RBC-ENTMCNC: 31 G/DL — LOW (ref 32–37)
MCHC RBC-ENTMCNC: 31 G/DL — LOW (ref 32–37)
MCHC RBC-ENTMCNC: 31.1 G/DL — LOW (ref 32–37)
MCHC RBC-ENTMCNC: 31.2 G/DL — LOW (ref 32–37)
MCHC RBC-ENTMCNC: 31.2 G/DL — LOW (ref 32–37)
MCHC RBC-ENTMCNC: 31.3 G/DL — LOW (ref 32–37)
MCHC RBC-ENTMCNC: 31.3 G/DL — LOW (ref 32–37)
MCHC RBC-ENTMCNC: 31.4 G/DL — LOW (ref 32–37)
MCHC RBC-ENTMCNC: 31.6 G/DL — LOW (ref 32–37)
MCHC RBC-ENTMCNC: 31.6 G/DL — LOW (ref 32–37)
MCHC RBC-ENTMCNC: 31.9 G/DL — LOW (ref 32–37)
MCHC RBC-ENTMCNC: 32.2 G/DL — SIGNIFICANT CHANGE UP (ref 32–37)
MCHC RBC-ENTMCNC: 32.3 G/DL — SIGNIFICANT CHANGE UP (ref 32–37)
MCHC RBC-ENTMCNC: 32.9 G/DL — SIGNIFICANT CHANGE UP (ref 32–37)
MCV RBC AUTO: 88.1 FL — SIGNIFICANT CHANGE UP (ref 81–99)
MCV RBC AUTO: 88.3 FL — SIGNIFICANT CHANGE UP (ref 81–99)
MCV RBC AUTO: 88.5 FL — SIGNIFICANT CHANGE UP (ref 81–99)
MCV RBC AUTO: 88.6 FL — SIGNIFICANT CHANGE UP (ref 81–99)
MCV RBC AUTO: 88.9 FL — SIGNIFICANT CHANGE UP (ref 81–99)
MCV RBC AUTO: 89 FL — SIGNIFICANT CHANGE UP (ref 81–99)
MCV RBC AUTO: 89.3 FL — SIGNIFICANT CHANGE UP (ref 81–99)
MCV RBC AUTO: 89.4 FL — SIGNIFICANT CHANGE UP (ref 81–99)
MCV RBC AUTO: 90.8 FL — SIGNIFICANT CHANGE UP (ref 81–99)
MCV RBC AUTO: 91 FL — SIGNIFICANT CHANGE UP (ref 81–99)
MCV RBC AUTO: 91.1 FL — SIGNIFICANT CHANGE UP (ref 81–99)
MCV RBC AUTO: 91.6 FL — SIGNIFICANT CHANGE UP (ref 81–99)
MCV RBC AUTO: 91.6 FL — SIGNIFICANT CHANGE UP (ref 81–99)
MCV RBC AUTO: 91.8 FL — SIGNIFICANT CHANGE UP (ref 81–99)
MCV RBC AUTO: 92 FL — SIGNIFICANT CHANGE UP (ref 81–99)
MCV RBC AUTO: 92.2 FL — SIGNIFICANT CHANGE UP (ref 81–99)
MCV RBC AUTO: 92.4 FL — SIGNIFICANT CHANGE UP (ref 81–99)
MCV RBC AUTO: 92.6 FL — SIGNIFICANT CHANGE UP (ref 81–99)
MCV RBC AUTO: 92.8 FL — SIGNIFICANT CHANGE UP (ref 81–99)
MCV RBC AUTO: 93.1 FL — SIGNIFICANT CHANGE UP (ref 81–99)
MCV RBC AUTO: 93.2 FL — SIGNIFICANT CHANGE UP (ref 81–99)
MCV RBC AUTO: 93.2 FL — SIGNIFICANT CHANGE UP (ref 81–99)
MCV RBC AUTO: 93.4 FL — SIGNIFICANT CHANGE UP (ref 81–99)
MCV RBC AUTO: 93.5 FL — SIGNIFICANT CHANGE UP (ref 81–99)
MCV RBC AUTO: 93.7 FL — SIGNIFICANT CHANGE UP (ref 81–99)
MCV RBC AUTO: 94.5 FL — SIGNIFICANT CHANGE UP (ref 81–99)
MCV RBC AUTO: 94.8 FL — SIGNIFICANT CHANGE UP (ref 81–99)
MCV RBC AUTO: 94.8 FL — SIGNIFICANT CHANGE UP (ref 81–99)
MCV RBC AUTO: 95.7 FL — SIGNIFICANT CHANGE UP (ref 81–99)
MCV RBC AUTO: 96.1 FL — SIGNIFICANT CHANGE UP (ref 81–99)
MCV RBC AUTO: 96.6 FL — SIGNIFICANT CHANGE UP (ref 81–99)
MCV RBC AUTO: 97.4 FL — SIGNIFICANT CHANGE UP (ref 81–99)
MICROCYTES BLD QL: SLIGHT — SIGNIFICANT CHANGE UP
MONOCYTES # BLD AUTO: 0.26 K/UL — SIGNIFICANT CHANGE UP (ref 0.1–0.6)
MONOCYTES # BLD AUTO: 0.44 K/UL — SIGNIFICANT CHANGE UP (ref 0.1–0.6)
MONOCYTES # BLD AUTO: 0.48 K/UL — SIGNIFICANT CHANGE UP (ref 0.1–0.6)
MONOCYTES # BLD AUTO: 0.62 K/UL — HIGH (ref 0.1–0.6)
MONOCYTES # BLD AUTO: 0.68 K/UL — HIGH (ref 0.1–0.6)
MONOCYTES # BLD AUTO: 0.71 K/UL — HIGH (ref 0.1–0.6)
MONOCYTES # BLD AUTO: 0.73 K/UL — HIGH (ref 0.1–0.6)
MONOCYTES # BLD AUTO: 0.74 K/UL — HIGH (ref 0.1–0.6)
MONOCYTES # BLD AUTO: 0.74 K/UL — HIGH (ref 0.1–0.6)
MONOCYTES # BLD AUTO: 0.76 K/UL — HIGH (ref 0.1–0.6)
MONOCYTES # BLD AUTO: 0.76 K/UL — HIGH (ref 0.1–0.6)
MONOCYTES # BLD AUTO: 0.77 K/UL — HIGH (ref 0.1–0.6)
MONOCYTES # BLD AUTO: 0.78 K/UL — HIGH (ref 0.1–0.6)
MONOCYTES # BLD AUTO: 0.84 K/UL — HIGH (ref 0.1–0.6)
MONOCYTES # BLD AUTO: 0.89 K/UL — HIGH (ref 0.1–0.6)
MONOCYTES # BLD AUTO: 0.99 K/UL — HIGH (ref 0.1–0.6)
MONOCYTES NFR BLD AUTO: 2.2 % — SIGNIFICANT CHANGE UP (ref 1.7–9.3)
MONOCYTES NFR BLD AUTO: 3.1 % — SIGNIFICANT CHANGE UP (ref 1.7–9.3)
MONOCYTES NFR BLD AUTO: 3.5 % — SIGNIFICANT CHANGE UP (ref 1.7–9.3)
MONOCYTES NFR BLD AUTO: 3.5 % — SIGNIFICANT CHANGE UP (ref 1.7–9.3)
MONOCYTES NFR BLD AUTO: 3.6 % — SIGNIFICANT CHANGE UP (ref 1.7–9.3)
MONOCYTES NFR BLD AUTO: 3.7 % — SIGNIFICANT CHANGE UP (ref 1.7–9.3)
MONOCYTES NFR BLD AUTO: 4.3 % — SIGNIFICANT CHANGE UP (ref 1.7–9.3)
MONOCYTES NFR BLD AUTO: 4.4 % — SIGNIFICANT CHANGE UP (ref 1.7–9.3)
MONOCYTES NFR BLD AUTO: 4.4 % — SIGNIFICANT CHANGE UP (ref 1.7–9.3)
MONOCYTES NFR BLD AUTO: 4.8 % — SIGNIFICANT CHANGE UP (ref 1.7–9.3)
MONOCYTES NFR BLD AUTO: 5 % — SIGNIFICANT CHANGE UP (ref 1.7–9.3)
MONOCYTES NFR BLD AUTO: 5.2 % — SIGNIFICANT CHANGE UP (ref 1.7–9.3)
MONOCYTES NFR BLD AUTO: 5.4 % — SIGNIFICANT CHANGE UP (ref 1.7–9.3)
MONOCYTES NFR BLD AUTO: 5.4 % — SIGNIFICANT CHANGE UP (ref 1.7–9.3)
MONOCYTES NFR BLD AUTO: 5.5 % — SIGNIFICANT CHANGE UP (ref 1.7–9.3)
MONOCYTES NFR BLD AUTO: 7.9 % — SIGNIFICANT CHANGE UP (ref 1.7–9.3)
MRSA PCR RESULT.: NEGATIVE — SIGNIFICANT CHANGE UP
MYELOCYTES NFR BLD: 0.9 % — HIGH (ref 0–0)
NEUTROPHILS # BLD AUTO: 10.2 K/UL — HIGH (ref 1.4–6.5)
NEUTROPHILS # BLD AUTO: 11.14 K/UL — HIGH (ref 1.4–6.5)
NEUTROPHILS # BLD AUTO: 12.27 K/UL — HIGH (ref 1.4–6.5)
NEUTROPHILS # BLD AUTO: 12.31 K/UL — HIGH (ref 1.4–6.5)
NEUTROPHILS # BLD AUTO: 12.92 K/UL — HIGH (ref 1.4–6.5)
NEUTROPHILS # BLD AUTO: 13.86 K/UL — HIGH (ref 1.4–6.5)
NEUTROPHILS # BLD AUTO: 14.1 K/UL — HIGH (ref 1.4–6.5)
NEUTROPHILS # BLD AUTO: 14.53 K/UL — HIGH (ref 1.4–6.5)
NEUTROPHILS # BLD AUTO: 14.72 K/UL — HIGH (ref 1.4–6.5)
NEUTROPHILS # BLD AUTO: 15.4 K/UL — HIGH (ref 1.4–6.5)
NEUTROPHILS # BLD AUTO: 16.15 K/UL — HIGH (ref 1.4–6.5)
NEUTROPHILS # BLD AUTO: 16.79 K/UL — HIGH (ref 1.4–6.5)
NEUTROPHILS # BLD AUTO: 18.21 K/UL — HIGH (ref 1.4–6.5)
NEUTROPHILS # BLD AUTO: 19.64 K/UL — HIGH (ref 1.4–6.5)
NEUTROPHILS # BLD AUTO: 20.93 K/UL — HIGH (ref 1.4–6.5)
NEUTROPHILS # BLD AUTO: 7.22 K/UL — HIGH (ref 1.4–6.5)
NEUTROPHILS NFR BLD AUTO: 81.4 % — HIGH (ref 42.2–75.2)
NEUTROPHILS NFR BLD AUTO: 86.6 % — HIGH (ref 42.2–75.2)
NEUTROPHILS NFR BLD AUTO: 87.5 % — HIGH (ref 42.2–75.2)
NEUTROPHILS NFR BLD AUTO: 89.2 % — HIGH (ref 42.2–75.2)
NEUTROPHILS NFR BLD AUTO: 89.3 % — HIGH (ref 42.2–75.2)
NEUTROPHILS NFR BLD AUTO: 89.4 % — HIGH (ref 42.2–75.2)
NEUTROPHILS NFR BLD AUTO: 89.5 % — HIGH (ref 42.2–75.2)
NEUTROPHILS NFR BLD AUTO: 89.6 % — HIGH (ref 42.2–75.2)
NEUTROPHILS NFR BLD AUTO: 89.7 % — HIGH (ref 42.2–75.2)
NEUTROPHILS NFR BLD AUTO: 90.3 % — HIGH (ref 42.2–75.2)
NEUTROPHILS NFR BLD AUTO: 91.1 % — HIGH (ref 42.2–75.2)
NEUTROPHILS NFR BLD AUTO: 91.7 % — HIGH (ref 42.2–75.2)
NEUTROPHILS NFR BLD AUTO: 91.8 % — HIGH (ref 42.2–75.2)
NEUTROPHILS NFR BLD AUTO: 92.2 % — HIGH (ref 42.2–75.2)
NEUTROPHILS NFR BLD AUTO: 94 % — HIGH (ref 42.2–75.2)
NEUTROPHILS NFR BLD AUTO: 95.6 % — HIGH (ref 42.2–75.2)
NEUTS BAND # BLD: 0.9 % — SIGNIFICANT CHANGE UP (ref 0–6)
NITRITE UR-MCNC: NEGATIVE — SIGNIFICANT CHANGE UP
NITRITE UR-MCNC: POSITIVE
NRBC # BLD: 0 /100 WBCS — SIGNIFICANT CHANGE UP (ref 0–0)
NRBC # BLD: 1 /100 — HIGH (ref 0–0)
NRBC # BLD: SIGNIFICANT CHANGE UP /100 WBCS (ref 0–0)
NT-PROBNP SERPL-SCNC: 3600 PG/ML — HIGH (ref 0–300)
NT-PROBNP SERPL-SCNC: 504 PG/ML — HIGH (ref 0–300)
OSMOLALITY UR: 1375 MOS/KG — SIGNIFICANT CHANGE UP (ref 50–1400)
PCO2 BLDA: 44 MMHG — HIGH (ref 38–42)
PH BLDA: 7.39 — SIGNIFICANT CHANGE UP (ref 7.38–7.42)
PH UR: 5 — SIGNIFICANT CHANGE UP (ref 5–8)
PH UR: 5.5 — SIGNIFICANT CHANGE UP (ref 5–8)
PHOSPHATE SERPL-MCNC: 2.7 MG/DL — SIGNIFICANT CHANGE UP (ref 2.1–4.9)
PHOSPHATE SERPL-MCNC: 3 MG/DL — SIGNIFICANT CHANGE UP (ref 2.1–4.9)
PHOSPHATE SERPL-MCNC: 3.1 MG/DL — SIGNIFICANT CHANGE UP (ref 2.1–4.9)
PHOSPHATE SERPL-MCNC: 3.2 MG/DL — SIGNIFICANT CHANGE UP (ref 2.1–4.9)
PHOSPHATE SERPL-MCNC: 3.3 MG/DL — SIGNIFICANT CHANGE UP (ref 2.1–4.9)
PHOSPHATE SERPL-MCNC: 3.5 MG/DL — SIGNIFICANT CHANGE UP (ref 2.1–4.9)
PHOSPHATE SERPL-MCNC: 3.6 MG/DL — SIGNIFICANT CHANGE UP (ref 2.1–4.9)
PHOSPHATE SERPL-MCNC: 3.7 MG/DL — SIGNIFICANT CHANGE UP (ref 2.1–4.9)
PHOSPHATE SERPL-MCNC: 4.1 MG/DL — SIGNIFICANT CHANGE UP (ref 2.1–4.9)
PHOSPHATE SERPL-MCNC: 4.2 MG/DL — SIGNIFICANT CHANGE UP (ref 2.1–4.9)
PHOSPHATE SERPL-MCNC: 4.3 MG/DL — SIGNIFICANT CHANGE UP (ref 2.1–4.9)
PHOSPHATE SERPL-MCNC: 4.3 MG/DL — SIGNIFICANT CHANGE UP (ref 2.1–4.9)
PHOSPHATE SERPL-MCNC: 4.6 MG/DL — SIGNIFICANT CHANGE UP (ref 2.1–4.9)
PLAT MORPH BLD: NORMAL — SIGNIFICANT CHANGE UP
PLAT MORPH BLD: NORMAL — SIGNIFICANT CHANGE UP
PLATELET # BLD AUTO: 191 K/UL — SIGNIFICANT CHANGE UP (ref 130–400)
PLATELET # BLD AUTO: 208 K/UL — SIGNIFICANT CHANGE UP (ref 130–400)
PLATELET # BLD AUTO: 212 K/UL — SIGNIFICANT CHANGE UP (ref 130–400)
PLATELET # BLD AUTO: 219 K/UL — SIGNIFICANT CHANGE UP (ref 130–400)
PLATELET # BLD AUTO: 228 K/UL — SIGNIFICANT CHANGE UP (ref 130–400)
PLATELET # BLD AUTO: 228 K/UL — SIGNIFICANT CHANGE UP (ref 130–400)
PLATELET # BLD AUTO: 231 K/UL — SIGNIFICANT CHANGE UP (ref 130–400)
PLATELET # BLD AUTO: 239 K/UL — SIGNIFICANT CHANGE UP (ref 130–400)
PLATELET # BLD AUTO: 241 K/UL — SIGNIFICANT CHANGE UP (ref 130–400)
PLATELET # BLD AUTO: 246 K/UL — SIGNIFICANT CHANGE UP (ref 130–400)
PLATELET # BLD AUTO: 248 K/UL — SIGNIFICANT CHANGE UP (ref 130–400)
PLATELET # BLD AUTO: 254 K/UL — SIGNIFICANT CHANGE UP (ref 130–400)
PLATELET # BLD AUTO: 258 K/UL — SIGNIFICANT CHANGE UP (ref 130–400)
PLATELET # BLD AUTO: 261 K/UL — SIGNIFICANT CHANGE UP (ref 130–400)
PLATELET # BLD AUTO: 265 K/UL — SIGNIFICANT CHANGE UP (ref 130–400)
PLATELET # BLD AUTO: 268 K/UL — SIGNIFICANT CHANGE UP (ref 130–400)
PLATELET # BLD AUTO: 269 K/UL — SIGNIFICANT CHANGE UP (ref 130–400)
PLATELET # BLD AUTO: 273 K/UL — SIGNIFICANT CHANGE UP (ref 130–400)
PLATELET # BLD AUTO: 277 K/UL — SIGNIFICANT CHANGE UP (ref 130–400)
PLATELET # BLD AUTO: 278 K/UL — SIGNIFICANT CHANGE UP (ref 130–400)
PLATELET # BLD AUTO: 279 K/UL — SIGNIFICANT CHANGE UP (ref 130–400)
PLATELET # BLD AUTO: 289 K/UL — SIGNIFICANT CHANGE UP (ref 130–400)
PLATELET # BLD AUTO: 290 K/UL — SIGNIFICANT CHANGE UP (ref 130–400)
PLATELET # BLD AUTO: 291 K/UL — SIGNIFICANT CHANGE UP (ref 130–400)
PLATELET # BLD AUTO: 291 K/UL — SIGNIFICANT CHANGE UP (ref 130–400)
PLATELET # BLD AUTO: 295 K/UL — SIGNIFICANT CHANGE UP (ref 130–400)
PLATELET # BLD AUTO: 301 K/UL — SIGNIFICANT CHANGE UP (ref 130–400)
PLATELET # BLD AUTO: 303 K/UL — SIGNIFICANT CHANGE UP (ref 130–400)
PLATELET # BLD AUTO: 307 K/UL — SIGNIFICANT CHANGE UP (ref 130–400)
PLATELET # BLD AUTO: 307 K/UL — SIGNIFICANT CHANGE UP (ref 130–400)
PLATELET # BLD AUTO: 315 K/UL — SIGNIFICANT CHANGE UP (ref 130–400)
PLATELET # BLD AUTO: 330 K/UL — SIGNIFICANT CHANGE UP (ref 130–400)
PO2 BLDA: 78 MMHG — SIGNIFICANT CHANGE UP (ref 78–95)
POIKILOCYTOSIS BLD QL AUTO: SIGNIFICANT CHANGE UP
POIKILOCYTOSIS BLD QL AUTO: SLIGHT — SIGNIFICANT CHANGE UP
POLYCHROMASIA BLD QL SMEAR: SLIGHT — SIGNIFICANT CHANGE UP
POTASSIUM SERPL-MCNC: 3.1 MMOL/L — LOW (ref 3.5–5)
POTASSIUM SERPL-MCNC: 3.5 MMOL/L — SIGNIFICANT CHANGE UP (ref 3.5–5)
POTASSIUM SERPL-MCNC: 3.6 MMOL/L — SIGNIFICANT CHANGE UP (ref 3.5–5)
POTASSIUM SERPL-MCNC: 3.7 MMOL/L — SIGNIFICANT CHANGE UP (ref 3.5–5)
POTASSIUM SERPL-MCNC: 3.7 MMOL/L — SIGNIFICANT CHANGE UP (ref 3.5–5)
POTASSIUM SERPL-MCNC: 3.8 MMOL/L — SIGNIFICANT CHANGE UP (ref 3.5–5)
POTASSIUM SERPL-MCNC: 3.9 MMOL/L — SIGNIFICANT CHANGE UP (ref 3.5–5)
POTASSIUM SERPL-MCNC: 4 MMOL/L — SIGNIFICANT CHANGE UP (ref 3.5–5)
POTASSIUM SERPL-MCNC: 4.1 MMOL/L — SIGNIFICANT CHANGE UP (ref 3.5–5)
POTASSIUM SERPL-MCNC: 4.1 MMOL/L — SIGNIFICANT CHANGE UP (ref 3.5–5)
POTASSIUM SERPL-MCNC: 4.2 MMOL/L — SIGNIFICANT CHANGE UP (ref 3.5–5)
POTASSIUM SERPL-MCNC: 4.3 MMOL/L — SIGNIFICANT CHANGE UP (ref 3.5–5)
POTASSIUM SERPL-MCNC: 4.4 MMOL/L — SIGNIFICANT CHANGE UP (ref 3.5–5)
POTASSIUM SERPL-MCNC: 4.4 MMOL/L — SIGNIFICANT CHANGE UP (ref 3.5–5)
POTASSIUM SERPL-MCNC: 4.5 MMOL/L — SIGNIFICANT CHANGE UP (ref 3.5–5)
POTASSIUM SERPL-MCNC: 4.6 MMOL/L — SIGNIFICANT CHANGE UP (ref 3.5–5)
POTASSIUM SERPL-MCNC: 4.7 MMOL/L — SIGNIFICANT CHANGE UP (ref 3.5–5)
POTASSIUM SERPL-MCNC: 4.8 MMOL/L — SIGNIFICANT CHANGE UP (ref 3.5–5)
POTASSIUM SERPL-MCNC: 5.3 MMOL/L — HIGH (ref 3.5–5)
POTASSIUM SERPL-SCNC: 3.1 MMOL/L — LOW (ref 3.5–5)
POTASSIUM SERPL-SCNC: 3.5 MMOL/L — SIGNIFICANT CHANGE UP (ref 3.5–5)
POTASSIUM SERPL-SCNC: 3.6 MMOL/L — SIGNIFICANT CHANGE UP (ref 3.5–5)
POTASSIUM SERPL-SCNC: 3.7 MMOL/L — SIGNIFICANT CHANGE UP (ref 3.5–5)
POTASSIUM SERPL-SCNC: 3.7 MMOL/L — SIGNIFICANT CHANGE UP (ref 3.5–5)
POTASSIUM SERPL-SCNC: 3.8 MMOL/L — SIGNIFICANT CHANGE UP (ref 3.5–5)
POTASSIUM SERPL-SCNC: 3.9 MMOL/L — SIGNIFICANT CHANGE UP (ref 3.5–5)
POTASSIUM SERPL-SCNC: 4 MMOL/L — SIGNIFICANT CHANGE UP (ref 3.5–5)
POTASSIUM SERPL-SCNC: 4.1 MMOL/L — SIGNIFICANT CHANGE UP (ref 3.5–5)
POTASSIUM SERPL-SCNC: 4.1 MMOL/L — SIGNIFICANT CHANGE UP (ref 3.5–5)
POTASSIUM SERPL-SCNC: 4.2 MMOL/L — SIGNIFICANT CHANGE UP (ref 3.5–5)
POTASSIUM SERPL-SCNC: 4.3 MMOL/L — SIGNIFICANT CHANGE UP (ref 3.5–5)
POTASSIUM SERPL-SCNC: 4.4 MMOL/L — SIGNIFICANT CHANGE UP (ref 3.5–5)
POTASSIUM SERPL-SCNC: 4.4 MMOL/L — SIGNIFICANT CHANGE UP (ref 3.5–5)
POTASSIUM SERPL-SCNC: 4.5 MMOL/L — SIGNIFICANT CHANGE UP (ref 3.5–5)
POTASSIUM SERPL-SCNC: 4.6 MMOL/L — SIGNIFICANT CHANGE UP (ref 3.5–5)
POTASSIUM SERPL-SCNC: 4.7 MMOL/L — SIGNIFICANT CHANGE UP (ref 3.5–5)
POTASSIUM SERPL-SCNC: 4.8 MMOL/L — SIGNIFICANT CHANGE UP (ref 3.5–5)
POTASSIUM SERPL-SCNC: 5.3 MMOL/L — HIGH (ref 3.5–5)
PROT SERPL-MCNC: 4.9 G/DL — LOW (ref 6–8)
PROT SERPL-MCNC: 5 G/DL — LOW (ref 6–8)
PROT SERPL-MCNC: 5 G/DL — LOW (ref 6–8)
PROT SERPL-MCNC: 5.1 G/DL — LOW (ref 6–8)
PROT SERPL-MCNC: 5.3 G/DL — LOW (ref 6–8)
PROT SERPL-MCNC: 5.4 G/DL — LOW (ref 6–8)
PROT SERPL-MCNC: 5.5 G/DL — LOW (ref 6–8)
PROT SERPL-MCNC: 5.7 G/DL — LOW (ref 6–8)
PROT SERPL-MCNC: 5.7 G/DL — LOW (ref 6–8)
PROT SERPL-MCNC: 5.8 G/DL — LOW (ref 6–8)
PROT SERPL-MCNC: 6.4 G/DL — SIGNIFICANT CHANGE UP (ref 6–8)
PROT SERPL-MCNC: 6.7 G/DL — SIGNIFICANT CHANGE UP (ref 6–8)
PROT UR-MCNC: >=300
PROT UR-MCNC: >=300 MG/DL
PROTHROM AB SERPL-ACNC: 11.3 SEC — SIGNIFICANT CHANGE UP (ref 9.95–12.87)
PROTHROM AB SERPL-ACNC: 11.3 SEC — SIGNIFICANT CHANGE UP (ref 9.95–12.87)
PROTHROM AB SERPL-ACNC: 11.7 SEC — SIGNIFICANT CHANGE UP (ref 9.95–12.87)
RAPID RVP RESULT: DETECTED
RBC # BLD: 2.77 M/UL — LOW (ref 4.2–5.4)
RBC # BLD: 2.8 M/UL — LOW (ref 4.2–5.4)
RBC # BLD: 2.86 M/UL — LOW (ref 4.2–5.4)
RBC # BLD: 2.93 M/UL — LOW (ref 4.2–5.4)
RBC # BLD: 3.06 M/UL — LOW (ref 4.2–5.4)
RBC # BLD: 3.09 M/UL — LOW (ref 4.2–5.4)
RBC # BLD: 3.1 M/UL — LOW (ref 4.2–5.4)
RBC # BLD: 3.19 M/UL — LOW (ref 4.2–5.4)
RBC # BLD: 3.2 M/UL — LOW (ref 4.2–5.4)
RBC # BLD: 3.21 M/UL — LOW (ref 4.2–5.4)
RBC # BLD: 3.22 M/UL — LOW (ref 4.2–5.4)
RBC # BLD: 3.23 M/UL — LOW (ref 4.2–5.4)
RBC # BLD: 3.26 M/UL — LOW (ref 4.2–5.4)
RBC # BLD: 3.27 M/UL — LOW (ref 4.2–5.4)
RBC # BLD: 3.27 M/UL — LOW (ref 4.2–5.4)
RBC # BLD: 3.29 M/UL — LOW (ref 4.2–5.4)
RBC # BLD: 3.31 M/UL — LOW (ref 4.2–5.4)
RBC # BLD: 3.34 M/UL — LOW (ref 4.2–5.4)
RBC # BLD: 3.37 M/UL — LOW (ref 4.2–5.4)
RBC # BLD: 3.4 M/UL — LOW (ref 4.2–5.4)
RBC # BLD: 3.42 M/UL — LOW (ref 4.2–5.4)
RBC # BLD: 3.45 M/UL — LOW (ref 4.2–5.4)
RBC # BLD: 3.46 M/UL — LOW (ref 4.2–5.4)
RBC # BLD: 3.47 M/UL — LOW (ref 4.2–5.4)
RBC # BLD: 3.47 M/UL — LOW (ref 4.2–5.4)
RBC # BLD: 3.5 M/UL — LOW (ref 4.2–5.4)
RBC # BLD: 3.51 M/UL — LOW (ref 4.2–5.4)
RBC # BLD: 3.78 M/UL — LOW (ref 4.2–5.4)
RBC # BLD: 3.95 M/UL — LOW (ref 4.2–5.4)
RBC # BLD: 4 M/UL — LOW (ref 4.2–5.4)
RBC # BLD: 4.14 M/UL — LOW (ref 4.2–5.4)
RBC # BLD: 4.45 M/UL — SIGNIFICANT CHANGE UP (ref 4.2–5.4)
RBC # FLD: 14.6 % — HIGH (ref 11.5–14.5)
RBC # FLD: 14.6 % — HIGH (ref 11.5–14.5)
RBC # FLD: 14.8 % — HIGH (ref 11.5–14.5)
RBC # FLD: 15 % — HIGH (ref 11.5–14.5)
RBC # FLD: 16.1 % — HIGH (ref 11.5–14.5)
RBC # FLD: 16.1 % — HIGH (ref 11.5–14.5)
RBC # FLD: 16.2 % — HIGH (ref 11.5–14.5)
RBC # FLD: 16.3 % — HIGH (ref 11.5–14.5)
RBC # FLD: 16.3 % — HIGH (ref 11.5–14.5)
RBC # FLD: 16.4 % — HIGH (ref 11.5–14.5)
RBC # FLD: 16.5 % — HIGH (ref 11.5–14.5)
RBC # FLD: 16.6 % — HIGH (ref 11.5–14.5)
RBC # FLD: 16.7 % — HIGH (ref 11.5–14.5)
RBC # FLD: 17.2 % — HIGH (ref 11.5–14.5)
RBC # FLD: 17.2 % — HIGH (ref 11.5–14.5)
RBC # FLD: 17.3 % — HIGH (ref 11.5–14.5)
RBC # FLD: 17.4 % — HIGH (ref 11.5–14.5)
RBC # FLD: 17.8 % — HIGH (ref 11.5–14.5)
RBC # FLD: 17.9 % — HIGH (ref 11.5–14.5)
RBC # FLD: 18.1 % — HIGH (ref 11.5–14.5)
RBC # FLD: 18.2 % — HIGH (ref 11.5–14.5)
RBC # FLD: 18.3 % — HIGH (ref 11.5–14.5)
RBC # FLD: 18.4 % — HIGH (ref 11.5–14.5)
RBC # FLD: 18.5 % — HIGH (ref 11.5–14.5)
RBC # FLD: 18.5 % — HIGH (ref 11.5–14.5)
RBC # FLD: 18.6 % — HIGH (ref 11.5–14.5)
RBC BLD AUTO: NORMAL — SIGNIFICANT CHANGE UP
RBC BLD AUTO: NORMAL — SIGNIFICANT CHANGE UP
RBC CASTS # UR COMP ASSIST: >50 /HPF
RBC CASTS # UR COMP ASSIST: >50 /HPF
RSV RESULT: NEGATIVE — SIGNIFICANT CHANGE UP
RSV RNA RESP QL NAA+PROBE: NEGATIVE — SIGNIFICANT CHANGE UP
SAO2 % BLDA: 94 % — SIGNIFICANT CHANGE UP (ref 94–98)
SODIUM SERPL-SCNC: 135 MMOL/L — SIGNIFICANT CHANGE UP (ref 135–146)
SODIUM SERPL-SCNC: 138 MMOL/L — SIGNIFICANT CHANGE UP (ref 135–146)
SODIUM SERPL-SCNC: 138 MMOL/L — SIGNIFICANT CHANGE UP (ref 135–146)
SODIUM SERPL-SCNC: 139 MMOL/L — SIGNIFICANT CHANGE UP (ref 135–146)
SODIUM SERPL-SCNC: 140 MMOL/L — SIGNIFICANT CHANGE UP (ref 135–146)
SODIUM SERPL-SCNC: 141 MMOL/L — SIGNIFICANT CHANGE UP (ref 135–146)
SODIUM SERPL-SCNC: 143 MMOL/L — SIGNIFICANT CHANGE UP (ref 135–146)
SODIUM SERPL-SCNC: 144 MMOL/L — SIGNIFICANT CHANGE UP (ref 135–146)
SODIUM SERPL-SCNC: 144 MMOL/L — SIGNIFICANT CHANGE UP (ref 135–146)
SODIUM SERPL-SCNC: 145 MMOL/L — SIGNIFICANT CHANGE UP (ref 135–146)
SODIUM SERPL-SCNC: 146 MMOL/L — SIGNIFICANT CHANGE UP (ref 135–146)
SODIUM SERPL-SCNC: 146 MMOL/L — SIGNIFICANT CHANGE UP (ref 135–146)
SODIUM SERPL-SCNC: 147 MMOL/L — HIGH (ref 135–146)
SODIUM SERPL-SCNC: 148 MMOL/L — HIGH (ref 135–146)
SODIUM SERPL-SCNC: 149 MMOL/L — HIGH (ref 135–146)
SODIUM SERPL-SCNC: 152 MMOL/L — HIGH (ref 135–146)
SODIUM SERPL-SCNC: 152 MMOL/L — HIGH (ref 135–146)
SODIUM UR-SCNC: 217 MMOL/L — SIGNIFICANT CHANGE UP
SP GR SPEC: 1.02 — SIGNIFICANT CHANGE UP (ref 1.01–1.03)
SP GR SPEC: >=1.03 — SIGNIFICANT CHANGE UP (ref 1.01–1.03)
SPECIMEN SOURCE: SIGNIFICANT CHANGE UP
TOTAL CHOLESTEROL/HDL RATIO MEASUREMENT: 1.9 RATIO — LOW (ref 4–5.5)
TRI-PHOS CRY UR QL COMP ASSIST: NEGATIVE — SIGNIFICANT CHANGE UP
TRIGL SERPL-MCNC: 43 MG/DL — SIGNIFICANT CHANGE UP (ref 10–149)
TROPONIN T SERPL-MCNC: 0.02 NG/ML — HIGH
TROPONIN T SERPL-MCNC: 0.32 NG/ML — CRITICAL HIGH
TROPONIN T SERPL-MCNC: 0.33 NG/ML — CRITICAL HIGH
TROPONIN T SERPL-MCNC: 0.38 NG/ML — CRITICAL HIGH
TROPONIN T SERPL-MCNC: <0.01 NG/ML — SIGNIFICANT CHANGE UP
TYPE + AB SCN PNL BLD: SIGNIFICANT CHANGE UP
UROBILINOGEN FLD QL: 0.2 MG/DL — SIGNIFICANT CHANGE UP (ref 0.2–0.2)
UROBILINOGEN FLD QL: 1 (ref 0.2–0.2)
UUN UR-MCNC: 1782 MG/DL — SIGNIFICANT CHANGE UP
VARIANT LYMPHS # BLD: 2.6 % — SIGNIFICANT CHANGE UP (ref 0–5)
WBC # BLD: 11.1 K/UL — HIGH (ref 4.8–10.8)
WBC # BLD: 11.54 K/UL — HIGH (ref 4.8–10.8)
WBC # BLD: 11.85 K/UL — HIGH (ref 4.8–10.8)
WBC # BLD: 12.52 K/UL — HIGH (ref 4.8–10.8)
WBC # BLD: 12.77 K/UL — HIGH (ref 4.8–10.8)
WBC # BLD: 13.01 K/UL — HIGH (ref 4.8–10.8)
WBC # BLD: 13.07 K/UL — HIGH (ref 4.8–10.8)
WBC # BLD: 13.65 K/UL — HIGH (ref 4.8–10.8)
WBC # BLD: 13.77 K/UL — HIGH (ref 4.8–10.8)
WBC # BLD: 14.03 K/UL — HIGH (ref 4.8–10.8)
WBC # BLD: 14.2 K/UL — HIGH (ref 4.8–10.8)
WBC # BLD: 14.47 K/UL — HIGH (ref 4.8–10.8)
WBC # BLD: 14.47 K/UL — HIGH (ref 4.8–10.8)
WBC # BLD: 14.92 K/UL — HIGH (ref 4.8–10.8)
WBC # BLD: 15.05 K/UL — HIGH (ref 4.8–10.8)
WBC # BLD: 15.39 K/UL — HIGH (ref 4.8–10.8)
WBC # BLD: 15.46 K/UL — HIGH (ref 4.8–10.8)
WBC # BLD: 15.62 K/UL — HIGH (ref 4.8–10.8)
WBC # BLD: 15.62 K/UL — HIGH (ref 4.8–10.8)
WBC # BLD: 16.25 K/UL — HIGH (ref 4.8–10.8)
WBC # BLD: 16.41 K/UL — HIGH (ref 4.8–10.8)
WBC # BLD: 16.88 K/UL — HIGH (ref 4.8–10.8)
WBC # BLD: 17.25 K/UL — HIGH (ref 4.8–10.8)
WBC # BLD: 17.58 K/UL — HIGH (ref 4.8–10.8)
WBC # BLD: 18.27 K/UL — HIGH (ref 4.8–10.8)
WBC # BLD: 18.42 K/UL — HIGH (ref 4.8–10.8)
WBC # BLD: 20.17 K/UL — HIGH (ref 4.8–10.8)
WBC # BLD: 20.22 K/UL — HIGH (ref 4.8–10.8)
WBC # BLD: 21.1 K/UL — HIGH (ref 4.8–10.8)
WBC # BLD: 21.89 K/UL — HIGH (ref 4.8–10.8)
WBC # BLD: 22.61 K/UL — HIGH (ref 4.8–10.8)
WBC # BLD: 8.07 K/UL — SIGNIFICANT CHANGE UP (ref 4.8–10.8)
WBC # FLD AUTO: 11.1 K/UL — HIGH (ref 4.8–10.8)
WBC # FLD AUTO: 11.54 K/UL — HIGH (ref 4.8–10.8)
WBC # FLD AUTO: 11.85 K/UL — HIGH (ref 4.8–10.8)
WBC # FLD AUTO: 12.52 K/UL — HIGH (ref 4.8–10.8)
WBC # FLD AUTO: 12.77 K/UL — HIGH (ref 4.8–10.8)
WBC # FLD AUTO: 13.01 K/UL — HIGH (ref 4.8–10.8)
WBC # FLD AUTO: 13.07 K/UL — HIGH (ref 4.8–10.8)
WBC # FLD AUTO: 13.65 K/UL — HIGH (ref 4.8–10.8)
WBC # FLD AUTO: 13.77 K/UL — HIGH (ref 4.8–10.8)
WBC # FLD AUTO: 14.03 K/UL — HIGH (ref 4.8–10.8)
WBC # FLD AUTO: 14.2 K/UL — HIGH (ref 4.8–10.8)
WBC # FLD AUTO: 14.47 K/UL — HIGH (ref 4.8–10.8)
WBC # FLD AUTO: 14.47 K/UL — HIGH (ref 4.8–10.8)
WBC # FLD AUTO: 14.92 K/UL — HIGH (ref 4.8–10.8)
WBC # FLD AUTO: 15.05 K/UL — HIGH (ref 4.8–10.8)
WBC # FLD AUTO: 15.39 K/UL — HIGH (ref 4.8–10.8)
WBC # FLD AUTO: 15.46 K/UL — HIGH (ref 4.8–10.8)
WBC # FLD AUTO: 15.62 K/UL — HIGH (ref 4.8–10.8)
WBC # FLD AUTO: 15.62 K/UL — HIGH (ref 4.8–10.8)
WBC # FLD AUTO: 16.25 K/UL — HIGH (ref 4.8–10.8)
WBC # FLD AUTO: 16.41 K/UL — HIGH (ref 4.8–10.8)
WBC # FLD AUTO: 16.88 K/UL — HIGH (ref 4.8–10.8)
WBC # FLD AUTO: 17.25 K/UL — HIGH (ref 4.8–10.8)
WBC # FLD AUTO: 17.58 K/UL — HIGH (ref 4.8–10.8)
WBC # FLD AUTO: 18.27 K/UL — HIGH (ref 4.8–10.8)
WBC # FLD AUTO: 18.42 K/UL — HIGH (ref 4.8–10.8)
WBC # FLD AUTO: 20.17 K/UL — HIGH (ref 4.8–10.8)
WBC # FLD AUTO: 20.22 K/UL — HIGH (ref 4.8–10.8)
WBC # FLD AUTO: 21.1 K/UL — HIGH (ref 4.8–10.8)
WBC # FLD AUTO: 21.89 K/UL — HIGH (ref 4.8–10.8)
WBC # FLD AUTO: 22.61 K/UL — HIGH (ref 4.8–10.8)
WBC # FLD AUTO: 8.07 K/UL — SIGNIFICANT CHANGE UP (ref 4.8–10.8)

## 2019-01-01 PROCEDURE — 93970 EXTREMITY STUDY: CPT | Mod: 26

## 2019-01-01 RX ORDER — HALOPERIDOL DECANOATE 100 MG/ML
0.5 INJECTION INTRAMUSCULAR ONCE
Qty: 0 | Refills: 0 | Status: DISCONTINUED | OUTPATIENT
Start: 2019-01-01 | End: 2019-01-01

## 2019-01-01 RX ORDER — IPRATROPIUM/ALBUTEROL SULFATE 18-103MCG
3 AEROSOL WITH ADAPTER (GRAM) INHALATION ONCE
Qty: 0 | Refills: 0 | Status: COMPLETED | OUTPATIENT
Start: 2019-01-01 | End: 2019-01-01

## 2019-01-01 RX ORDER — ALPRAZOLAM 0.25 MG
0.25 TABLET ORAL ONCE
Qty: 0 | Refills: 0 | Status: DISCONTINUED | OUTPATIENT
Start: 2019-01-01 | End: 2019-01-01

## 2019-01-01 RX ORDER — IPRATROPIUM/ALBUTEROL SULFATE 18-103MCG
3 AEROSOL WITH ADAPTER (GRAM) INHALATION EVERY 6 HOURS
Qty: 0 | Refills: 0 | Status: DISCONTINUED | OUTPATIENT
Start: 2019-01-01 | End: 2019-01-01

## 2019-01-01 RX ORDER — HALOPERIDOL DECANOATE 100 MG/ML
0.5 INJECTION INTRAMUSCULAR ONCE
Qty: 0 | Refills: 0 | Status: COMPLETED | OUTPATIENT
Start: 2019-01-01 | End: 2019-01-01

## 2019-01-01 RX ORDER — CLOPIDOGREL BISULFATE 75 MG/1
75 TABLET, FILM COATED ORAL DAILY
Qty: 0 | Refills: 0 | Status: DISCONTINUED | OUTPATIENT
Start: 2019-01-01 | End: 2019-01-01

## 2019-01-01 RX ORDER — SODIUM CHLORIDE 9 MG/ML
1000 INJECTION INTRAMUSCULAR; INTRAVENOUS; SUBCUTANEOUS
Qty: 0 | Refills: 0 | Status: COMPLETED | OUTPATIENT
Start: 2019-01-01 | End: 2019-01-01

## 2019-01-01 RX ORDER — MEROPENEM 1 G/30ML
1000 INJECTION INTRAVENOUS EVERY 8 HOURS
Qty: 0 | Refills: 0 | Status: DISCONTINUED | OUTPATIENT
Start: 2019-01-01 | End: 2019-01-01

## 2019-01-01 RX ORDER — MAGNESIUM SULFATE 500 MG/ML
2 VIAL (ML) INJECTION ONCE
Qty: 0 | Refills: 0 | Status: COMPLETED | OUTPATIENT
Start: 2019-01-01 | End: 2019-01-01

## 2019-01-01 RX ORDER — FUROSEMIDE 40 MG
40 TABLET ORAL DAILY
Qty: 0 | Refills: 0 | Status: DISCONTINUED | OUTPATIENT
Start: 2019-01-01 | End: 2019-01-01

## 2019-01-01 RX ORDER — METOPROLOL TARTRATE 50 MG
25 TABLET ORAL ONCE
Qty: 0 | Refills: 0 | Status: DISCONTINUED | OUTPATIENT
Start: 2019-01-01 | End: 2019-01-01

## 2019-01-01 RX ORDER — MEROPENEM 1 G/30ML
1000 INJECTION INTRAVENOUS ONCE
Qty: 0 | Refills: 0 | Status: COMPLETED | OUTPATIENT
Start: 2019-01-01 | End: 2019-01-01

## 2019-01-01 RX ORDER — LINEZOLID 600 MG/300ML
600 INJECTION, SOLUTION INTRAVENOUS EVERY 12 HOURS
Qty: 0 | Refills: 0 | Status: DISCONTINUED | OUTPATIENT
Start: 2019-01-01 | End: 2019-01-01

## 2019-01-01 RX ORDER — SODIUM CHLORIDE 9 MG/ML
500 INJECTION INTRAMUSCULAR; INTRAVENOUS; SUBCUTANEOUS
Qty: 0 | Refills: 0 | Status: DISCONTINUED | OUTPATIENT
Start: 2019-01-01 | End: 2019-01-01

## 2019-01-01 RX ORDER — PANTOPRAZOLE SODIUM 20 MG/1
1 TABLET, DELAYED RELEASE ORAL
Qty: 5 | Refills: 0 | OUTPATIENT
Start: 2019-01-01 | End: 2019-01-01

## 2019-01-01 RX ORDER — LABETALOL HCL 100 MG
100 TABLET ORAL ONCE
Qty: 0 | Refills: 0 | Status: COMPLETED | OUTPATIENT
Start: 2019-01-01 | End: 2019-01-01

## 2019-01-01 RX ORDER — POTASSIUM CHLORIDE 20 MEQ
20 PACKET (EA) ORAL
Qty: 0 | Refills: 0 | Status: COMPLETED | OUTPATIENT
Start: 2019-01-01 | End: 2019-01-01

## 2019-01-01 RX ORDER — BUDESONIDE AND FORMOTEROL FUMARATE DIHYDRATE 160; 4.5 UG/1; UG/1
2 AEROSOL RESPIRATORY (INHALATION)
Qty: 0 | Refills: 0 | Status: DISCONTINUED | OUTPATIENT
Start: 2019-01-01 | End: 2019-01-01

## 2019-01-01 RX ORDER — VANCOMYCIN HCL 1 G
750 VIAL (EA) INTRAVENOUS EVERY 24 HOURS
Qty: 0 | Refills: 0 | Status: DISCONTINUED | OUTPATIENT
Start: 2019-01-01 | End: 2019-01-01

## 2019-01-01 RX ORDER — FUROSEMIDE 40 MG
20 TABLET ORAL
Qty: 0 | Refills: 0 | Status: DISCONTINUED | OUTPATIENT
Start: 2019-01-01 | End: 2019-01-01

## 2019-01-01 RX ORDER — HEPARIN SODIUM 5000 [USP'U]/ML
5000 INJECTION INTRAVENOUS; SUBCUTANEOUS EVERY 8 HOURS
Qty: 0 | Refills: 0 | Status: DISCONTINUED | OUTPATIENT
Start: 2019-01-01 | End: 2019-01-01

## 2019-01-01 RX ORDER — PANTOPRAZOLE SODIUM 20 MG/1
40 TABLET, DELAYED RELEASE ORAL
Qty: 0 | Refills: 0 | Status: DISCONTINUED | OUTPATIENT
Start: 2019-01-01 | End: 2019-01-01

## 2019-01-01 RX ORDER — METOPROLOL TARTRATE 50 MG
12.5 TABLET ORAL ONCE
Qty: 0 | Refills: 0 | Status: DISCONTINUED | OUTPATIENT
Start: 2019-01-01 | End: 2019-01-01

## 2019-01-01 RX ORDER — LACTULOSE 10 G/15ML
10 SOLUTION ORAL EVERY 6 HOURS
Qty: 0 | Refills: 0 | Status: DISCONTINUED | OUTPATIENT
Start: 2019-01-01 | End: 2019-01-01

## 2019-01-01 RX ORDER — NYSTATIN CREAM 100000 [USP'U]/G
1 CREAM TOPICAL
Qty: 0 | Refills: 0 | Status: DISCONTINUED | OUTPATIENT
Start: 2019-01-01 | End: 2019-01-01

## 2019-01-01 RX ORDER — MAGNESIUM OXIDE 400 MG ORAL TABLET 241.3 MG
400 TABLET ORAL
Qty: 0 | Refills: 0 | Status: COMPLETED | OUTPATIENT
Start: 2019-01-01 | End: 2019-01-01

## 2019-01-01 RX ORDER — ATORVASTATIN CALCIUM 80 MG/1
10 TABLET, FILM COATED ORAL AT BEDTIME
Qty: 0 | Refills: 0 | Status: DISCONTINUED | OUTPATIENT
Start: 2019-01-01 | End: 2019-01-01

## 2019-01-01 RX ORDER — CHLORHEXIDINE GLUCONATE 213 G/1000ML
1 SOLUTION TOPICAL
Qty: 0 | Refills: 0 | Status: DISCONTINUED | OUTPATIENT
Start: 2019-01-01 | End: 2019-01-01

## 2019-01-01 RX ORDER — MEROPENEM 1 G/30ML
1000 INJECTION INTRAVENOUS EVERY 12 HOURS
Qty: 0 | Refills: 0 | Status: DISCONTINUED | OUTPATIENT
Start: 2019-01-01 | End: 2019-01-01

## 2019-01-01 RX ORDER — SENNA PLUS 8.6 MG/1
2 TABLET ORAL AT BEDTIME
Qty: 0 | Refills: 0 | Status: DISCONTINUED | OUTPATIENT
Start: 2019-01-01 | End: 2019-01-01

## 2019-01-01 RX ORDER — IPRATROPIUM/ALBUTEROL SULFATE 18-103MCG
3 AEROSOL WITH ADAPTER (GRAM) INHALATION
Qty: 0 | Refills: 0 | Status: COMPLETED | OUTPATIENT
Start: 2019-01-01 | End: 2019-01-01

## 2019-01-01 RX ORDER — VANCOMYCIN HCL 1 G
VIAL (EA) INTRAVENOUS
Qty: 0 | Refills: 0 | Status: DISCONTINUED | OUTPATIENT
Start: 2019-01-01 | End: 2019-01-01

## 2019-01-01 RX ORDER — VANCOMYCIN HCL 1 G
750 VIAL (EA) INTRAVENOUS EVERY 12 HOURS
Qty: 0 | Refills: 0 | Status: DISCONTINUED | OUTPATIENT
Start: 2019-01-01 | End: 2019-01-01

## 2019-01-01 RX ORDER — LEVETIRACETAM 250 MG/1
500 TABLET, FILM COATED ORAL
Qty: 0 | Refills: 0 | Status: DISCONTINUED | OUTPATIENT
Start: 2019-01-01 | End: 2019-01-01

## 2019-01-01 RX ORDER — METOPROLOL TARTRATE 50 MG
5 TABLET ORAL ONCE
Qty: 0 | Refills: 0 | Status: COMPLETED | OUTPATIENT
Start: 2019-01-01 | End: 2019-01-01

## 2019-01-01 RX ORDER — SODIUM CHLORIDE 9 MG/ML
1500 INJECTION, SOLUTION INTRAVENOUS ONCE
Qty: 0 | Refills: 0 | Status: COMPLETED | OUTPATIENT
Start: 2019-01-01 | End: 2019-01-01

## 2019-01-01 RX ORDER — MEROPENEM 1 G/30ML
INJECTION INTRAVENOUS
Qty: 0 | Refills: 0 | Status: DISCONTINUED | OUTPATIENT
Start: 2019-01-01 | End: 2019-01-01

## 2019-01-01 RX ORDER — ENOXAPARIN SODIUM 100 MG/ML
40 INJECTION SUBCUTANEOUS DAILY
Qty: 0 | Refills: 0 | Status: DISCONTINUED | OUTPATIENT
Start: 2019-01-01 | End: 2019-01-01

## 2019-01-01 RX ORDER — CEFTRIAXONE 500 MG/1
1 INJECTION, POWDER, FOR SOLUTION INTRAMUSCULAR; INTRAVENOUS ONCE
Qty: 0 | Refills: 0 | Status: COMPLETED | OUTPATIENT
Start: 2019-01-01 | End: 2019-01-01

## 2019-01-01 RX ORDER — ACETYLCYSTEINE 200 MG/ML
10 VIAL (ML) MISCELLANEOUS THREE TIMES A DAY
Qty: 0 | Refills: 0 | Status: COMPLETED | OUTPATIENT
Start: 2019-01-01 | End: 2019-01-01

## 2019-01-01 RX ORDER — AZITHROMYCIN 500 MG/1
500 TABLET, FILM COATED ORAL ONCE
Qty: 0 | Refills: 0 | Status: COMPLETED | OUTPATIENT
Start: 2019-01-01 | End: 2019-01-01

## 2019-01-01 RX ORDER — DOCUSATE SODIUM 100 MG
100 CAPSULE ORAL
Qty: 0 | Refills: 0 | Status: DISCONTINUED | OUTPATIENT
Start: 2019-01-01 | End: 2019-01-01

## 2019-01-01 RX ORDER — FUROSEMIDE 40 MG
40 TABLET ORAL EVERY 24 HOURS
Qty: 0 | Refills: 0 | Status: DISCONTINUED | OUTPATIENT
Start: 2019-01-01 | End: 2019-01-01

## 2019-01-01 RX ORDER — CILOSTAZOL 100 MG/1
100 TABLET ORAL
Qty: 0 | Refills: 0 | Status: DISCONTINUED | OUTPATIENT
Start: 2019-01-01 | End: 2019-01-01

## 2019-01-01 RX ORDER — ACETAMINOPHEN 500 MG
650 TABLET ORAL EVERY 6 HOURS
Qty: 0 | Refills: 0 | Status: DISCONTINUED | OUTPATIENT
Start: 2019-01-01 | End: 2019-01-01

## 2019-01-01 RX ORDER — LEVETIRACETAM 250 MG/1
500 TABLET, FILM COATED ORAL EVERY 12 HOURS
Qty: 0 | Refills: 0 | Status: DISCONTINUED | OUTPATIENT
Start: 2019-01-01 | End: 2019-01-01

## 2019-01-01 RX ORDER — CEFTRIAXONE 500 MG/1
1 INJECTION, POWDER, FOR SOLUTION INTRAMUSCULAR; INTRAVENOUS ONCE
Qty: 0 | Refills: 0 | Status: DISCONTINUED | OUTPATIENT
Start: 2019-01-01 | End: 2019-01-01

## 2019-01-01 RX ORDER — ALBUTEROL 90 UG/1
1 AEROSOL, METERED ORAL EVERY 4 HOURS
Qty: 0 | Refills: 0 | Status: DISCONTINUED | OUTPATIENT
Start: 2019-01-01 | End: 2019-01-01

## 2019-01-01 RX ORDER — NYSTATIN CREAM 100000 [USP'U]/G
1 CREAM TOPICAL
Qty: 0 | Refills: 0 | COMMUNITY
Start: 2019-01-01

## 2019-01-01 RX ORDER — ENOXAPARIN SODIUM 100 MG/ML
40 INJECTION SUBCUTANEOUS EVERY 12 HOURS
Qty: 0 | Refills: 0 | Status: DISCONTINUED | OUTPATIENT
Start: 2019-01-01 | End: 2019-01-01

## 2019-01-01 RX ORDER — PANTOPRAZOLE SODIUM 20 MG/1
1 TABLET, DELAYED RELEASE ORAL
Qty: 0 | Refills: 0 | COMMUNITY
Start: 2019-01-01

## 2019-01-01 RX ORDER — METOPROLOL TARTRATE 50 MG
1 TABLET ORAL
Qty: 0 | Refills: 0 | COMMUNITY
Start: 2019-01-01

## 2019-01-01 RX ORDER — AZITHROMYCIN 500 MG/1
500 TABLET, FILM COATED ORAL EVERY 24 HOURS
Qty: 0 | Refills: 0 | Status: DISCONTINUED | OUTPATIENT
Start: 2019-01-01 | End: 2019-01-01

## 2019-01-01 RX ORDER — ACETAMINOPHEN 500 MG
650 TABLET ORAL ONCE
Qty: 0 | Refills: 0 | Status: COMPLETED | OUTPATIENT
Start: 2019-01-01 | End: 2019-01-01

## 2019-01-01 RX ORDER — SODIUM CHLORIDE 9 MG/ML
1000 INJECTION, SOLUTION INTRAVENOUS
Qty: 0 | Refills: 0 | Status: COMPLETED | OUTPATIENT
Start: 2019-01-01 | End: 2019-01-01

## 2019-01-01 RX ORDER — HEPARIN SODIUM 5000 [USP'U]/ML
900 INJECTION INTRAVENOUS; SUBCUTANEOUS
Qty: 25000 | Refills: 0 | Status: DISCONTINUED | OUTPATIENT
Start: 2019-01-01 | End: 2019-01-01

## 2019-01-01 RX ORDER — METOPROLOL TARTRATE 50 MG
25 TABLET ORAL
Qty: 0 | Refills: 0 | Status: DISCONTINUED | OUTPATIENT
Start: 2019-01-01 | End: 2019-01-01

## 2019-01-01 RX ORDER — ASPIRIN/CALCIUM CARB/MAGNESIUM 324 MG
81 TABLET ORAL
Qty: 0 | Refills: 0 | Status: DISCONTINUED | OUTPATIENT
Start: 2019-01-01 | End: 2019-01-01

## 2019-01-01 RX ORDER — FUROSEMIDE 40 MG
40 TABLET ORAL
Qty: 0 | Refills: 0 | Status: DISCONTINUED | OUTPATIENT
Start: 2019-01-01 | End: 2019-01-01

## 2019-01-01 RX ORDER — ALPRAZOLAM 0.25 MG
0.12 TABLET ORAL EVERY 12 HOURS
Qty: 0 | Refills: 0 | Status: DISCONTINUED | OUTPATIENT
Start: 2019-01-01 | End: 2019-01-01

## 2019-01-01 RX ORDER — PIPERACILLIN AND TAZOBACTAM 4; .5 G/20ML; G/20ML
3.38 INJECTION, POWDER, LYOPHILIZED, FOR SOLUTION INTRAVENOUS EVERY 6 HOURS
Qty: 0 | Refills: 0 | Status: DISCONTINUED | OUTPATIENT
Start: 2019-01-01 | End: 2019-01-01

## 2019-01-01 RX ORDER — CEFTRIAXONE 500 MG/1
1 INJECTION, POWDER, FOR SOLUTION INTRAMUSCULAR; INTRAVENOUS EVERY 24 HOURS
Qty: 0 | Refills: 0 | Status: DISCONTINUED | OUTPATIENT
Start: 2019-01-01 | End: 2019-01-01

## 2019-01-01 RX ORDER — ASPIRIN/CALCIUM CARB/MAGNESIUM 324 MG
81 TABLET ORAL DAILY
Qty: 0 | Refills: 0 | Status: DISCONTINUED | OUTPATIENT
Start: 2019-01-01 | End: 2019-01-01

## 2019-01-01 RX ORDER — VANCOMYCIN HCL 1 G
750 VIAL (EA) INTRAVENOUS ONCE
Qty: 0 | Refills: 0 | Status: COMPLETED | OUTPATIENT
Start: 2019-01-01 | End: 2019-01-01

## 2019-01-01 RX ORDER — FUROSEMIDE 40 MG
40 TABLET ORAL ONCE
Qty: 0 | Refills: 0 | Status: COMPLETED | OUTPATIENT
Start: 2019-01-01 | End: 2019-01-01

## 2019-01-01 RX ORDER — CEFEPIME 1 G/1
2000 INJECTION, POWDER, FOR SOLUTION INTRAMUSCULAR; INTRAVENOUS ONCE
Qty: 0 | Refills: 0 | Status: COMPLETED | OUTPATIENT
Start: 2019-01-01 | End: 2019-01-01

## 2019-01-01 RX ORDER — MEROPENEM 1 G/30ML
500 INJECTION INTRAVENOUS EVERY 8 HOURS
Qty: 0 | Refills: 0 | Status: DISCONTINUED | OUTPATIENT
Start: 2019-01-01 | End: 2019-01-01

## 2019-01-01 RX ORDER — TIOTROPIUM BROMIDE 18 UG/1
1 CAPSULE ORAL; RESPIRATORY (INHALATION) DAILY
Qty: 0 | Refills: 0 | Status: DISCONTINUED | OUTPATIENT
Start: 2019-01-01 | End: 2019-01-01

## 2019-01-01 RX ORDER — LISINOPRIL 2.5 MG/1
5 TABLET ORAL DAILY
Qty: 0 | Refills: 0 | Status: DISCONTINUED | OUTPATIENT
Start: 2019-01-01 | End: 2019-01-01

## 2019-01-01 RX ORDER — FUROSEMIDE 40 MG
1 TABLET ORAL
Qty: 0 | Refills: 0 | COMMUNITY

## 2019-01-01 RX ORDER — SODIUM CHLORIDE 9 MG/ML
500 INJECTION, SOLUTION INTRAVENOUS ONCE
Qty: 0 | Refills: 0 | Status: COMPLETED | OUTPATIENT
Start: 2019-01-01 | End: 2019-01-01

## 2019-01-01 RX ORDER — FUROSEMIDE 40 MG
20 TABLET ORAL DAILY
Qty: 0 | Refills: 0 | Status: DISCONTINUED | OUTPATIENT
Start: 2019-01-01 | End: 2019-01-01

## 2019-01-01 RX ORDER — CIPROFLOXACIN LACTATE 400MG/40ML
1 VIAL (ML) INTRAVENOUS
Qty: 5 | Refills: 0 | OUTPATIENT
Start: 2019-01-01 | End: 2019-01-01

## 2019-01-01 RX ADMIN — NYSTATIN CREAM 1 APPLICATION(S): 100000 CREAM TOPICAL at 05:22

## 2019-01-01 RX ADMIN — Medication 25 MILLIGRAM(S): at 18:22

## 2019-01-01 RX ADMIN — NYSTATIN CREAM 1 APPLICATION(S): 100000 CREAM TOPICAL at 17:51

## 2019-01-01 RX ADMIN — LEVETIRACETAM 420 MILLIGRAM(S): 250 TABLET, FILM COATED ORAL at 06:21

## 2019-01-01 RX ADMIN — NYSTATIN CREAM 1 APPLICATION(S): 100000 CREAM TOPICAL at 06:31

## 2019-01-01 RX ADMIN — Medication 3 MILLILITER(S): at 08:34

## 2019-01-01 RX ADMIN — LEVETIRACETAM 500 MILLIGRAM(S): 250 TABLET, FILM COATED ORAL at 17:35

## 2019-01-01 RX ADMIN — MEROPENEM 100 MILLIGRAM(S): 1 INJECTION INTRAVENOUS at 05:04

## 2019-01-01 RX ADMIN — CILOSTAZOL 100 MILLIGRAM(S): 100 TABLET ORAL at 05:48

## 2019-01-01 RX ADMIN — NYSTATIN CREAM 1 APPLICATION(S): 100000 CREAM TOPICAL at 18:18

## 2019-01-01 RX ADMIN — Medication 60 MILLIGRAM(S): at 13:55

## 2019-01-01 RX ADMIN — Medication 0.12 MILLIGRAM(S): at 09:56

## 2019-01-01 RX ADMIN — LEVETIRACETAM 500 MILLIGRAM(S): 250 TABLET, FILM COATED ORAL at 05:41

## 2019-01-01 RX ADMIN — LEVETIRACETAM 420 MILLIGRAM(S): 250 TABLET, FILM COATED ORAL at 05:16

## 2019-01-01 RX ADMIN — Medication 25 MILLIGRAM(S): at 18:09

## 2019-01-01 RX ADMIN — BUDESONIDE AND FORMOTEROL FUMARATE DIHYDRATE 2 PUFF(S): 160; 4.5 AEROSOL RESPIRATORY (INHALATION) at 21:53

## 2019-01-01 RX ADMIN — LEVETIRACETAM 420 MILLIGRAM(S): 250 TABLET, FILM COATED ORAL at 17:57

## 2019-01-01 RX ADMIN — CILOSTAZOL 100 MILLIGRAM(S): 100 TABLET ORAL at 17:51

## 2019-01-01 RX ADMIN — BUDESONIDE AND FORMOTEROL FUMARATE DIHYDRATE 2 PUFF(S): 160; 4.5 AEROSOL RESPIRATORY (INHALATION) at 21:16

## 2019-01-01 RX ADMIN — HEPARIN SODIUM 5000 UNIT(S): 5000 INJECTION INTRAVENOUS; SUBCUTANEOUS at 05:18

## 2019-01-01 RX ADMIN — NYSTATIN CREAM 1 APPLICATION(S): 100000 CREAM TOPICAL at 06:33

## 2019-01-01 RX ADMIN — LINEZOLID 300 MILLIGRAM(S): 600 INJECTION, SOLUTION INTRAVENOUS at 06:14

## 2019-01-01 RX ADMIN — CILOSTAZOL 100 MILLIGRAM(S): 100 TABLET ORAL at 18:30

## 2019-01-01 RX ADMIN — ENOXAPARIN SODIUM 40 MILLIGRAM(S): 100 INJECTION SUBCUTANEOUS at 12:09

## 2019-01-01 RX ADMIN — CLOPIDOGREL BISULFATE 75 MILLIGRAM(S): 75 TABLET, FILM COATED ORAL at 11:51

## 2019-01-01 RX ADMIN — HEPARIN SODIUM 5000 UNIT(S): 5000 INJECTION INTRAVENOUS; SUBCUTANEOUS at 13:38

## 2019-01-01 RX ADMIN — SODIUM CHLORIDE 1500 MILLILITER(S): 9 INJECTION, SOLUTION INTRAVENOUS at 21:03

## 2019-01-01 RX ADMIN — Medication 20 MILLIGRAM(S): at 18:49

## 2019-01-01 RX ADMIN — CLOPIDOGREL BISULFATE 75 MILLIGRAM(S): 75 TABLET, FILM COATED ORAL at 12:42

## 2019-01-01 RX ADMIN — Medication 100 MILLIGRAM(S): at 18:40

## 2019-01-01 RX ADMIN — BUDESONIDE AND FORMOTEROL FUMARATE DIHYDRATE 2 PUFF(S): 160; 4.5 AEROSOL RESPIRATORY (INHALATION) at 10:27

## 2019-01-01 RX ADMIN — HEPARIN SODIUM 5000 UNIT(S): 5000 INJECTION INTRAVENOUS; SUBCUTANEOUS at 06:33

## 2019-01-01 RX ADMIN — NYSTATIN CREAM 1 APPLICATION(S): 100000 CREAM TOPICAL at 06:14

## 2019-01-01 RX ADMIN — Medication 3 MILLILITER(S): at 14:31

## 2019-01-01 RX ADMIN — PANTOPRAZOLE SODIUM 40 MILLIGRAM(S): 20 TABLET, DELAYED RELEASE ORAL at 06:28

## 2019-01-01 RX ADMIN — Medication 3 MILLILITER(S): at 13:47

## 2019-01-01 RX ADMIN — HEPARIN SODIUM 5000 UNIT(S): 5000 INJECTION INTRAVENOUS; SUBCUTANEOUS at 21:41

## 2019-01-01 RX ADMIN — Medication 100 MILLIGRAM(S): at 05:21

## 2019-01-01 RX ADMIN — Medication 100 MILLIGRAM(S): at 17:35

## 2019-01-01 RX ADMIN — NYSTATIN CREAM 1 APPLICATION(S): 100000 CREAM TOPICAL at 05:07

## 2019-01-01 RX ADMIN — Medication 25 MILLIGRAM(S): at 18:48

## 2019-01-01 RX ADMIN — Medication 250 MILLIGRAM(S): at 05:56

## 2019-01-01 RX ADMIN — NYSTATIN CREAM 1 APPLICATION(S): 100000 CREAM TOPICAL at 06:01

## 2019-01-01 RX ADMIN — PIPERACILLIN AND TAZOBACTAM 25 GRAM(S): 4; .5 INJECTION, POWDER, LYOPHILIZED, FOR SOLUTION INTRAVENOUS at 18:45

## 2019-01-01 RX ADMIN — LEVETIRACETAM 420 MILLIGRAM(S): 250 TABLET, FILM COATED ORAL at 18:41

## 2019-01-01 RX ADMIN — CHLORHEXIDINE GLUCONATE 1 APPLICATION(S): 213 SOLUTION TOPICAL at 06:01

## 2019-01-01 RX ADMIN — NYSTATIN CREAM 1 APPLICATION(S): 100000 CREAM TOPICAL at 05:30

## 2019-01-01 RX ADMIN — HEPARIN SODIUM 5000 UNIT(S): 5000 INJECTION INTRAVENOUS; SUBCUTANEOUS at 05:19

## 2019-01-01 RX ADMIN — BUDESONIDE AND FORMOTEROL FUMARATE DIHYDRATE 2 PUFF(S): 160; 4.5 AEROSOL RESPIRATORY (INHALATION) at 08:57

## 2019-01-01 RX ADMIN — PIPERACILLIN AND TAZOBACTAM 25 GRAM(S): 4; .5 INJECTION, POWDER, LYOPHILIZED, FOR SOLUTION INTRAVENOUS at 00:20

## 2019-01-01 RX ADMIN — BUDESONIDE AND FORMOTEROL FUMARATE DIHYDRATE 2 PUFF(S): 160; 4.5 AEROSOL RESPIRATORY (INHALATION) at 21:36

## 2019-01-01 RX ADMIN — Medication 3 MILLILITER(S): at 14:11

## 2019-01-01 RX ADMIN — Medication 25 MILLIGRAM(S): at 17:41

## 2019-01-01 RX ADMIN — Medication 2 GRAM(S): at 11:46

## 2019-01-01 RX ADMIN — HEPARIN SODIUM 5000 UNIT(S): 5000 INJECTION INTRAVENOUS; SUBCUTANEOUS at 21:32

## 2019-01-01 RX ADMIN — Medication 25 MILLIGRAM(S): at 06:21

## 2019-01-01 RX ADMIN — LEVETIRACETAM 420 MILLIGRAM(S): 250 TABLET, FILM COATED ORAL at 18:17

## 2019-01-01 RX ADMIN — MEROPENEM 100 MILLIGRAM(S): 1 INJECTION INTRAVENOUS at 05:07

## 2019-01-01 RX ADMIN — Medication 25 MILLIGRAM(S): at 18:21

## 2019-01-01 RX ADMIN — Medication 3 MILLILITER(S): at 20:06

## 2019-01-01 RX ADMIN — MEROPENEM 100 MILLIGRAM(S): 1 INJECTION INTRAVENOUS at 22:04

## 2019-01-01 RX ADMIN — CILOSTAZOL 100 MILLIGRAM(S): 100 TABLET ORAL at 06:28

## 2019-01-01 RX ADMIN — Medication 10 MILLIGRAM(S): at 13:16

## 2019-01-01 RX ADMIN — Medication 3 MILLILITER(S): at 13:19

## 2019-01-01 RX ADMIN — HEPARIN SODIUM 5000 UNIT(S): 5000 INJECTION INTRAVENOUS; SUBCUTANEOUS at 14:04

## 2019-01-01 RX ADMIN — Medication 3 MILLILITER(S): at 07:52

## 2019-01-01 RX ADMIN — BUDESONIDE AND FORMOTEROL FUMARATE DIHYDRATE 2 PUFF(S): 160; 4.5 AEROSOL RESPIRATORY (INHALATION) at 09:13

## 2019-01-01 RX ADMIN — LINEZOLID 300 MILLIGRAM(S): 600 INJECTION, SOLUTION INTRAVENOUS at 17:43

## 2019-01-01 RX ADMIN — Medication 20 MILLIGRAM(S): at 06:21

## 2019-01-01 RX ADMIN — CILOSTAZOL 100 MILLIGRAM(S): 100 TABLET ORAL at 07:02

## 2019-01-01 RX ADMIN — PANTOPRAZOLE SODIUM 40 MILLIGRAM(S): 20 TABLET, DELAYED RELEASE ORAL at 05:17

## 2019-01-01 RX ADMIN — Medication 81 MILLIGRAM(S): at 06:42

## 2019-01-01 RX ADMIN — Medication 650 MILLIGRAM(S): at 16:46

## 2019-01-01 RX ADMIN — HEPARIN SODIUM 5000 UNIT(S): 5000 INJECTION INTRAVENOUS; SUBCUTANEOUS at 14:12

## 2019-01-01 RX ADMIN — Medication 3 MILLILITER(S): at 20:18

## 2019-01-01 RX ADMIN — Medication 25 MILLIGRAM(S): at 06:27

## 2019-01-01 RX ADMIN — HEPARIN SODIUM 5000 UNIT(S): 5000 INJECTION INTRAVENOUS; SUBCUTANEOUS at 06:01

## 2019-01-01 RX ADMIN — Medication 100 MILLIGRAM(S): at 17:55

## 2019-01-01 RX ADMIN — Medication 20 MILLIGRAM(S): at 06:33

## 2019-01-01 RX ADMIN — BUDESONIDE AND FORMOTEROL FUMARATE DIHYDRATE 2 PUFF(S): 160; 4.5 AEROSOL RESPIRATORY (INHALATION) at 11:43

## 2019-01-01 RX ADMIN — LEVETIRACETAM 500 MILLIGRAM(S): 250 TABLET, FILM COATED ORAL at 19:02

## 2019-01-01 RX ADMIN — LEVETIRACETAM 420 MILLIGRAM(S): 250 TABLET, FILM COATED ORAL at 18:35

## 2019-01-01 RX ADMIN — LEVETIRACETAM 500 MILLIGRAM(S): 250 TABLET, FILM COATED ORAL at 18:03

## 2019-01-01 RX ADMIN — CLOPIDOGREL BISULFATE 75 MILLIGRAM(S): 75 TABLET, FILM COATED ORAL at 13:44

## 2019-01-01 RX ADMIN — Medication 0.12 MILLIGRAM(S): at 21:13

## 2019-01-01 RX ADMIN — LEVETIRACETAM 500 MILLIGRAM(S): 250 TABLET, FILM COATED ORAL at 17:51

## 2019-01-01 RX ADMIN — Medication 60 MILLIGRAM(S): at 05:47

## 2019-01-01 RX ADMIN — PIPERACILLIN AND TAZOBACTAM 25 GRAM(S): 4; .5 INJECTION, POWDER, LYOPHILIZED, FOR SOLUTION INTRAVENOUS at 00:10

## 2019-01-01 RX ADMIN — CILOSTAZOL 100 MILLIGRAM(S): 100 TABLET ORAL at 06:14

## 2019-01-01 RX ADMIN — SENNA PLUS 2 TABLET(S): 8.6 TABLET ORAL at 21:35

## 2019-01-01 RX ADMIN — PANTOPRAZOLE SODIUM 40 MILLIGRAM(S): 20 TABLET, DELAYED RELEASE ORAL at 07:03

## 2019-01-01 RX ADMIN — Medication 3 MILLILITER(S): at 01:32

## 2019-01-01 RX ADMIN — PANTOPRAZOLE SODIUM 40 MILLIGRAM(S): 20 TABLET, DELAYED RELEASE ORAL at 06:40

## 2019-01-01 RX ADMIN — PIPERACILLIN AND TAZOBACTAM 25 GRAM(S): 4; .5 INJECTION, POWDER, LYOPHILIZED, FOR SOLUTION INTRAVENOUS at 18:12

## 2019-01-01 RX ADMIN — LINEZOLID 300 MILLIGRAM(S): 600 INJECTION, SOLUTION INTRAVENOUS at 05:43

## 2019-01-01 RX ADMIN — Medication 60 MILLIGRAM(S): at 13:46

## 2019-01-01 RX ADMIN — HEPARIN SODIUM 5000 UNIT(S): 5000 INJECTION INTRAVENOUS; SUBCUTANEOUS at 21:36

## 2019-01-01 RX ADMIN — BUDESONIDE AND FORMOTEROL FUMARATE DIHYDRATE 2 PUFF(S): 160; 4.5 AEROSOL RESPIRATORY (INHALATION) at 08:12

## 2019-01-01 RX ADMIN — BUDESONIDE AND FORMOTEROL FUMARATE DIHYDRATE 2 PUFF(S): 160; 4.5 AEROSOL RESPIRATORY (INHALATION) at 08:52

## 2019-01-01 RX ADMIN — Medication 5 MILLIGRAM(S): at 18:50

## 2019-01-01 RX ADMIN — ATORVASTATIN CALCIUM 10 MILLIGRAM(S): 80 TABLET, FILM COATED ORAL at 22:25

## 2019-01-01 RX ADMIN — ATORVASTATIN CALCIUM 10 MILLIGRAM(S): 80 TABLET, FILM COATED ORAL at 21:01

## 2019-01-01 RX ADMIN — BUDESONIDE AND FORMOTEROL FUMARATE DIHYDRATE 2 PUFF(S): 160; 4.5 AEROSOL RESPIRATORY (INHALATION) at 07:58

## 2019-01-01 RX ADMIN — HEPARIN SODIUM 5000 UNIT(S): 5000 INJECTION INTRAVENOUS; SUBCUTANEOUS at 21:13

## 2019-01-01 RX ADMIN — Medication 3 MILLILITER(S): at 01:15

## 2019-01-01 RX ADMIN — Medication 100 MILLIGRAM(S): at 07:04

## 2019-01-01 RX ADMIN — MEROPENEM 100 MILLIGRAM(S): 1 INJECTION INTRAVENOUS at 05:56

## 2019-01-01 RX ADMIN — LEVETIRACETAM 500 MILLIGRAM(S): 250 TABLET, FILM COATED ORAL at 18:32

## 2019-01-01 RX ADMIN — Medication 25 MILLIGRAM(S): at 06:12

## 2019-01-01 RX ADMIN — Medication 100 MILLIGRAM(S): at 06:01

## 2019-01-01 RX ADMIN — BUDESONIDE AND FORMOTEROL FUMARATE DIHYDRATE 2 PUFF(S): 160; 4.5 AEROSOL RESPIRATORY (INHALATION) at 07:40

## 2019-01-01 RX ADMIN — Medication 100 MILLIGRAM(S): at 06:41

## 2019-01-01 RX ADMIN — NYSTATIN CREAM 1 APPLICATION(S): 100000 CREAM TOPICAL at 07:08

## 2019-01-01 RX ADMIN — Medication 3 MILLILITER(S): at 19:27

## 2019-01-01 RX ADMIN — Medication 100 MILLIGRAM(S): at 06:26

## 2019-01-01 RX ADMIN — NYSTATIN CREAM 1 APPLICATION(S): 100000 CREAM TOPICAL at 18:54

## 2019-01-01 RX ADMIN — BUDESONIDE AND FORMOTEROL FUMARATE DIHYDRATE 2 PUFF(S): 160; 4.5 AEROSOL RESPIRATORY (INHALATION) at 07:57

## 2019-01-01 RX ADMIN — ENOXAPARIN SODIUM 40 MILLIGRAM(S): 100 INJECTION SUBCUTANEOUS at 11:53

## 2019-01-01 RX ADMIN — NYSTATIN CREAM 1 APPLICATION(S): 100000 CREAM TOPICAL at 06:15

## 2019-01-01 RX ADMIN — HEPARIN SODIUM 5000 UNIT(S): 5000 INJECTION INTRAVENOUS; SUBCUTANEOUS at 22:03

## 2019-01-01 RX ADMIN — Medication 60 MILLIGRAM(S): at 06:45

## 2019-01-01 RX ADMIN — SENNA PLUS 2 TABLET(S): 8.6 TABLET ORAL at 21:42

## 2019-01-01 RX ADMIN — CILOSTAZOL 100 MILLIGRAM(S): 100 TABLET ORAL at 18:49

## 2019-01-01 RX ADMIN — Medication 3 MILLILITER(S): at 07:55

## 2019-01-01 RX ADMIN — NYSTATIN CREAM 1 APPLICATION(S): 100000 CREAM TOPICAL at 17:29

## 2019-01-01 RX ADMIN — LEVETIRACETAM 500 MILLIGRAM(S): 250 TABLET, FILM COATED ORAL at 05:19

## 2019-01-01 RX ADMIN — LEVETIRACETAM 500 MILLIGRAM(S): 250 TABLET, FILM COATED ORAL at 05:29

## 2019-01-01 RX ADMIN — Medication 81 MILLIGRAM(S): at 08:31

## 2019-01-01 RX ADMIN — Medication 40 MILLIGRAM(S): at 18:21

## 2019-01-01 RX ADMIN — Medication 60 MILLIGRAM(S): at 05:43

## 2019-01-01 RX ADMIN — CLOPIDOGREL BISULFATE 75 MILLIGRAM(S): 75 TABLET, FILM COATED ORAL at 11:34

## 2019-01-01 RX ADMIN — Medication 3 MILLILITER(S): at 10:02

## 2019-01-01 RX ADMIN — Medication 10 MILLIGRAM(S): at 11:45

## 2019-01-01 RX ADMIN — LEVETIRACETAM 500 MILLIGRAM(S): 250 TABLET, FILM COATED ORAL at 17:01

## 2019-01-01 RX ADMIN — PIPERACILLIN AND TAZOBACTAM 25 GRAM(S): 4; .5 INJECTION, POWDER, LYOPHILIZED, FOR SOLUTION INTRAVENOUS at 18:33

## 2019-01-01 RX ADMIN — Medication 25 MILLIGRAM(S): at 17:35

## 2019-01-01 RX ADMIN — LINEZOLID 300 MILLIGRAM(S): 600 INJECTION, SOLUTION INTRAVENOUS at 21:42

## 2019-01-01 RX ADMIN — PIPERACILLIN AND TAZOBACTAM 25 GRAM(S): 4; .5 INJECTION, POWDER, LYOPHILIZED, FOR SOLUTION INTRAVENOUS at 18:48

## 2019-01-01 RX ADMIN — ATORVASTATIN CALCIUM 10 MILLIGRAM(S): 80 TABLET, FILM COATED ORAL at 21:23

## 2019-01-01 RX ADMIN — CILOSTAZOL 100 MILLIGRAM(S): 100 TABLET ORAL at 06:41

## 2019-01-01 RX ADMIN — MAGNESIUM OXIDE 400 MG ORAL TABLET 400 MILLIGRAM(S): 241.3 TABLET ORAL at 07:41

## 2019-01-01 RX ADMIN — Medication 20 MILLIGRAM(S): at 06:34

## 2019-01-01 RX ADMIN — Medication 10 MILLIGRAM(S): at 05:29

## 2019-01-01 RX ADMIN — LEVETIRACETAM 500 MILLIGRAM(S): 250 TABLET, FILM COATED ORAL at 06:01

## 2019-01-01 RX ADMIN — CHLORHEXIDINE GLUCONATE 1 APPLICATION(S): 213 SOLUTION TOPICAL at 05:36

## 2019-01-01 RX ADMIN — LEVETIRACETAM 500 MILLIGRAM(S): 250 TABLET, FILM COATED ORAL at 05:48

## 2019-01-01 RX ADMIN — Medication 3 MILLILITER(S): at 08:14

## 2019-01-01 RX ADMIN — CILOSTAZOL 100 MILLIGRAM(S): 100 TABLET ORAL at 18:15

## 2019-01-01 RX ADMIN — Medication 20 MILLIGRAM(S): at 09:59

## 2019-01-01 RX ADMIN — CILOSTAZOL 100 MILLIGRAM(S): 100 TABLET ORAL at 18:22

## 2019-01-01 RX ADMIN — Medication 60 MILLIGRAM(S): at 18:32

## 2019-01-01 RX ADMIN — Medication 25 MILLIGRAM(S): at 17:46

## 2019-01-01 RX ADMIN — Medication 25 MILLIGRAM(S): at 05:41

## 2019-01-01 RX ADMIN — Medication 25 MILLIGRAM(S): at 17:52

## 2019-01-01 RX ADMIN — PANTOPRAZOLE SODIUM 40 MILLIGRAM(S): 20 TABLET, DELAYED RELEASE ORAL at 12:25

## 2019-01-01 RX ADMIN — NYSTATIN CREAM 1 APPLICATION(S): 100000 CREAM TOPICAL at 17:56

## 2019-01-01 RX ADMIN — BUDESONIDE AND FORMOTEROL FUMARATE DIHYDRATE 2 PUFF(S): 160; 4.5 AEROSOL RESPIRATORY (INHALATION) at 21:23

## 2019-01-01 RX ADMIN — Medication 3 MILLILITER(S): at 14:56

## 2019-01-01 RX ADMIN — LEVETIRACETAM 500 MILLIGRAM(S): 250 TABLET, FILM COATED ORAL at 05:35

## 2019-01-01 RX ADMIN — CLOPIDOGREL BISULFATE 75 MILLIGRAM(S): 75 TABLET, FILM COATED ORAL at 11:28

## 2019-01-01 RX ADMIN — Medication 3 MILLILITER(S): at 09:14

## 2019-01-01 RX ADMIN — Medication 3 MILLILITER(S): at 09:13

## 2019-01-01 RX ADMIN — Medication 3 MILLILITER(S): at 20:36

## 2019-01-01 RX ADMIN — CHLORHEXIDINE GLUCONATE 1 APPLICATION(S): 213 SOLUTION TOPICAL at 06:39

## 2019-01-01 RX ADMIN — MEROPENEM 100 MILLIGRAM(S): 1 INJECTION INTRAVENOUS at 13:42

## 2019-01-01 RX ADMIN — Medication 25 MILLIGRAM(S): at 05:19

## 2019-01-01 RX ADMIN — Medication 25 MILLIGRAM(S): at 05:30

## 2019-01-01 RX ADMIN — Medication 40 MILLIGRAM(S): at 06:26

## 2019-01-01 RX ADMIN — ATORVASTATIN CALCIUM 10 MILLIGRAM(S): 80 TABLET, FILM COATED ORAL at 21:29

## 2019-01-01 RX ADMIN — MEROPENEM 100 MILLIGRAM(S): 1 INJECTION INTRAVENOUS at 21:31

## 2019-01-01 RX ADMIN — Medication 3 MILLILITER(S): at 20:42

## 2019-01-01 RX ADMIN — LEVETIRACETAM 500 MILLIGRAM(S): 250 TABLET, FILM COATED ORAL at 06:08

## 2019-01-01 RX ADMIN — BUDESONIDE AND FORMOTEROL FUMARATE DIHYDRATE 2 PUFF(S): 160; 4.5 AEROSOL RESPIRATORY (INHALATION) at 07:00

## 2019-01-01 RX ADMIN — Medication 60 MILLIGRAM(S): at 18:21

## 2019-01-01 RX ADMIN — LEVETIRACETAM 500 MILLIGRAM(S): 250 TABLET, FILM COATED ORAL at 06:31

## 2019-01-01 RX ADMIN — MEROPENEM 100 MILLIGRAM(S): 1 INJECTION INTRAVENOUS at 21:13

## 2019-01-01 RX ADMIN — MEROPENEM 100 MILLIGRAM(S): 1 INJECTION INTRAVENOUS at 14:32

## 2019-01-01 RX ADMIN — Medication 25 MILLIGRAM(S): at 06:13

## 2019-01-01 RX ADMIN — HEPARIN SODIUM 5000 UNIT(S): 5000 INJECTION INTRAVENOUS; SUBCUTANEOUS at 21:48

## 2019-01-01 RX ADMIN — Medication 100 MILLIGRAM(S): at 18:22

## 2019-01-01 RX ADMIN — NYSTATIN CREAM 1 APPLICATION(S): 100000 CREAM TOPICAL at 06:13

## 2019-01-01 RX ADMIN — HEPARIN SODIUM 5000 UNIT(S): 5000 INJECTION INTRAVENOUS; SUBCUTANEOUS at 22:50

## 2019-01-01 RX ADMIN — Medication 3 MILLILITER(S): at 19:50

## 2019-01-01 RX ADMIN — HEPARIN SODIUM 5000 UNIT(S): 5000 INJECTION INTRAVENOUS; SUBCUTANEOUS at 05:06

## 2019-01-01 RX ADMIN — CHLORHEXIDINE GLUCONATE 1 APPLICATION(S): 213 SOLUTION TOPICAL at 05:20

## 2019-01-01 RX ADMIN — CILOSTAZOL 100 MILLIGRAM(S): 100 TABLET ORAL at 05:19

## 2019-01-01 RX ADMIN — ATORVASTATIN CALCIUM 10 MILLIGRAM(S): 80 TABLET, FILM COATED ORAL at 23:02

## 2019-01-01 RX ADMIN — CHLORHEXIDINE GLUCONATE 1 APPLICATION(S): 213 SOLUTION TOPICAL at 05:17

## 2019-01-01 RX ADMIN — PANTOPRAZOLE SODIUM 40 MILLIGRAM(S): 20 TABLET, DELAYED RELEASE ORAL at 09:42

## 2019-01-01 RX ADMIN — Medication 30 MILLIGRAM(S): at 18:30

## 2019-01-01 RX ADMIN — CILOSTAZOL 100 MILLIGRAM(S): 100 TABLET ORAL at 18:40

## 2019-01-01 RX ADMIN — CILOSTAZOL 100 MILLIGRAM(S): 100 TABLET ORAL at 18:09

## 2019-01-01 RX ADMIN — LINEZOLID 300 MILLIGRAM(S): 600 INJECTION, SOLUTION INTRAVENOUS at 22:52

## 2019-01-01 RX ADMIN — PANTOPRAZOLE SODIUM 40 MILLIGRAM(S): 20 TABLET, DELAYED RELEASE ORAL at 06:11

## 2019-01-01 RX ADMIN — LINEZOLID 300 MILLIGRAM(S): 600 INJECTION, SOLUTION INTRAVENOUS at 17:28

## 2019-01-01 RX ADMIN — CILOSTAZOL 100 MILLIGRAM(S): 100 TABLET ORAL at 17:44

## 2019-01-01 RX ADMIN — LINEZOLID 300 MILLIGRAM(S): 600 INJECTION, SOLUTION INTRAVENOUS at 09:11

## 2019-01-01 RX ADMIN — Medication 25 MILLIGRAM(S): at 06:33

## 2019-01-01 RX ADMIN — Medication 60 MILLIGRAM(S): at 17:52

## 2019-01-01 RX ADMIN — ATORVASTATIN CALCIUM 10 MILLIGRAM(S): 80 TABLET, FILM COATED ORAL at 21:42

## 2019-01-01 RX ADMIN — MEROPENEM 100 MILLIGRAM(S): 1 INJECTION INTRAVENOUS at 05:16

## 2019-01-01 RX ADMIN — Medication 60 MILLIGRAM(S): at 05:36

## 2019-01-01 RX ADMIN — Medication 40 MILLIGRAM(S): at 06:12

## 2019-01-01 RX ADMIN — PIPERACILLIN AND TAZOBACTAM 25 GRAM(S): 4; .5 INJECTION, POWDER, LYOPHILIZED, FOR SOLUTION INTRAVENOUS at 18:51

## 2019-01-01 RX ADMIN — BUDESONIDE AND FORMOTEROL FUMARATE DIHYDRATE 2 PUFF(S): 160; 4.5 AEROSOL RESPIRATORY (INHALATION) at 20:31

## 2019-01-01 RX ADMIN — HEPARIN SODIUM 5000 UNIT(S): 5000 INJECTION INTRAVENOUS; SUBCUTANEOUS at 21:33

## 2019-01-01 RX ADMIN — Medication 3 MILLILITER(S): at 08:03

## 2019-01-01 RX ADMIN — CLOPIDOGREL BISULFATE 75 MILLIGRAM(S): 75 TABLET, FILM COATED ORAL at 11:45

## 2019-01-01 RX ADMIN — LINEZOLID 300 MILLIGRAM(S): 600 INJECTION, SOLUTION INTRAVENOUS at 06:13

## 2019-01-01 RX ADMIN — HEPARIN SODIUM 5000 UNIT(S): 5000 INJECTION INTRAVENOUS; SUBCUTANEOUS at 06:21

## 2019-01-01 RX ADMIN — BUDESONIDE AND FORMOTEROL FUMARATE DIHYDRATE 2 PUFF(S): 160; 4.5 AEROSOL RESPIRATORY (INHALATION) at 09:40

## 2019-01-01 RX ADMIN — MAGNESIUM OXIDE 400 MG ORAL TABLET 400 MILLIGRAM(S): 241.3 TABLET ORAL at 17:39

## 2019-01-01 RX ADMIN — MEROPENEM 100 MILLIGRAM(S): 1 INJECTION INTRAVENOUS at 13:55

## 2019-01-01 RX ADMIN — LINEZOLID 300 MILLIGRAM(S): 600 INJECTION, SOLUTION INTRAVENOUS at 18:30

## 2019-01-01 RX ADMIN — Medication 60 MILLIGRAM(S): at 06:14

## 2019-01-01 RX ADMIN — PIPERACILLIN AND TAZOBACTAM 25 GRAM(S): 4; .5 INJECTION, POWDER, LYOPHILIZED, FOR SOLUTION INTRAVENOUS at 00:32

## 2019-01-01 RX ADMIN — CHLORHEXIDINE GLUCONATE 1 APPLICATION(S): 213 SOLUTION TOPICAL at 05:29

## 2019-01-01 RX ADMIN — LINEZOLID 300 MILLIGRAM(S): 600 INJECTION, SOLUTION INTRAVENOUS at 18:11

## 2019-01-01 RX ADMIN — ATORVASTATIN CALCIUM 10 MILLIGRAM(S): 80 TABLET, FILM COATED ORAL at 22:35

## 2019-01-01 RX ADMIN — Medication 3 MILLILITER(S): at 19:26

## 2019-01-01 RX ADMIN — Medication 25 MILLIGRAM(S): at 06:26

## 2019-01-01 RX ADMIN — Medication 25 MILLIGRAM(S): at 18:30

## 2019-01-01 RX ADMIN — LEVETIRACETAM 500 MILLIGRAM(S): 250 TABLET, FILM COATED ORAL at 18:35

## 2019-01-01 RX ADMIN — MEROPENEM 100 MILLIGRAM(S): 1 INJECTION INTRAVENOUS at 21:29

## 2019-01-01 RX ADMIN — LINEZOLID 300 MILLIGRAM(S): 600 INJECTION, SOLUTION INTRAVENOUS at 17:48

## 2019-01-01 RX ADMIN — NYSTATIN CREAM 1 APPLICATION(S): 100000 CREAM TOPICAL at 18:10

## 2019-01-01 RX ADMIN — PIPERACILLIN AND TAZOBACTAM 25 GRAM(S): 4; .5 INJECTION, POWDER, LYOPHILIZED, FOR SOLUTION INTRAVENOUS at 01:21

## 2019-01-01 RX ADMIN — LEVETIRACETAM 500 MILLIGRAM(S): 250 TABLET, FILM COATED ORAL at 17:55

## 2019-01-01 RX ADMIN — NYSTATIN CREAM 1 APPLICATION(S): 100000 CREAM TOPICAL at 17:35

## 2019-01-01 RX ADMIN — Medication 25 MILLIGRAM(S): at 17:24

## 2019-01-01 RX ADMIN — Medication 3 MILLILITER(S): at 07:51

## 2019-01-01 RX ADMIN — CILOSTAZOL 100 MILLIGRAM(S): 100 TABLET ORAL at 17:41

## 2019-01-01 RX ADMIN — SENNA PLUS 2 TABLET(S): 8.6 TABLET ORAL at 21:30

## 2019-01-01 RX ADMIN — PANTOPRAZOLE SODIUM 40 MILLIGRAM(S): 20 TABLET, DELAYED RELEASE ORAL at 06:24

## 2019-01-01 RX ADMIN — PANTOPRAZOLE SODIUM 40 MILLIGRAM(S): 20 TABLET, DELAYED RELEASE ORAL at 07:44

## 2019-01-01 RX ADMIN — BUDESONIDE AND FORMOTEROL FUMARATE DIHYDRATE 2 PUFF(S): 160; 4.5 AEROSOL RESPIRATORY (INHALATION) at 08:08

## 2019-01-01 RX ADMIN — BUDESONIDE AND FORMOTEROL FUMARATE DIHYDRATE 2 PUFF(S): 160; 4.5 AEROSOL RESPIRATORY (INHALATION) at 21:35

## 2019-01-01 RX ADMIN — NYSTATIN CREAM 1 APPLICATION(S): 100000 CREAM TOPICAL at 06:42

## 2019-01-01 RX ADMIN — CILOSTAZOL 100 MILLIGRAM(S): 100 TABLET ORAL at 06:30

## 2019-01-01 RX ADMIN — Medication 3 MILLILITER(S): at 19:00

## 2019-01-01 RX ADMIN — SODIUM CHLORIDE 500 MILLILITER(S): 9 INJECTION, SOLUTION INTRAVENOUS at 12:28

## 2019-01-01 RX ADMIN — CHLORHEXIDINE GLUCONATE 1 APPLICATION(S): 213 SOLUTION TOPICAL at 06:22

## 2019-01-01 RX ADMIN — BUDESONIDE AND FORMOTEROL FUMARATE DIHYDRATE 2 PUFF(S): 160; 4.5 AEROSOL RESPIRATORY (INHALATION) at 13:14

## 2019-01-01 RX ADMIN — PANTOPRAZOLE SODIUM 40 MILLIGRAM(S): 20 TABLET, DELAYED RELEASE ORAL at 05:25

## 2019-01-01 RX ADMIN — CLOPIDOGREL BISULFATE 75 MILLIGRAM(S): 75 TABLET, FILM COATED ORAL at 11:47

## 2019-01-01 RX ADMIN — Medication 40 MILLIGRAM(S): at 18:53

## 2019-01-01 RX ADMIN — Medication 3 MILLILITER(S): at 08:13

## 2019-01-01 RX ADMIN — CILOSTAZOL 100 MILLIGRAM(S): 100 TABLET ORAL at 05:41

## 2019-01-01 RX ADMIN — Medication 81 MILLIGRAM(S): at 11:11

## 2019-01-01 RX ADMIN — CHLORHEXIDINE GLUCONATE 1 APPLICATION(S): 213 SOLUTION TOPICAL at 09:30

## 2019-01-01 RX ADMIN — PANTOPRAZOLE SODIUM 40 MILLIGRAM(S): 20 TABLET, DELAYED RELEASE ORAL at 06:31

## 2019-01-01 RX ADMIN — PANTOPRAZOLE SODIUM 40 MILLIGRAM(S): 20 TABLET, DELAYED RELEASE ORAL at 06:02

## 2019-01-01 RX ADMIN — LEVETIRACETAM 420 MILLIGRAM(S): 250 TABLET, FILM COATED ORAL at 07:13

## 2019-01-01 RX ADMIN — HEPARIN SODIUM 5000 UNIT(S): 5000 INJECTION INTRAVENOUS; SUBCUTANEOUS at 06:31

## 2019-01-01 RX ADMIN — HEPARIN SODIUM 9 UNIT(S)/HR: 5000 INJECTION INTRAVENOUS; SUBCUTANEOUS at 22:42

## 2019-01-01 RX ADMIN — Medication 100 MILLIGRAM(S): at 19:02

## 2019-01-01 RX ADMIN — LINEZOLID 300 MILLIGRAM(S): 600 INJECTION, SOLUTION INTRAVENOUS at 06:29

## 2019-01-01 RX ADMIN — Medication 10 MILLIGRAM(S): at 18:35

## 2019-01-01 RX ADMIN — Medication 20 MILLIGRAM(S): at 06:41

## 2019-01-01 RX ADMIN — LEVETIRACETAM 420 MILLIGRAM(S): 250 TABLET, FILM COATED ORAL at 07:25

## 2019-01-01 RX ADMIN — HEPARIN SODIUM 5000 UNIT(S): 5000 INJECTION INTRAVENOUS; SUBCUTANEOUS at 21:05

## 2019-01-01 RX ADMIN — HEPARIN SODIUM 5000 UNIT(S): 5000 INJECTION INTRAVENOUS; SUBCUTANEOUS at 15:07

## 2019-01-01 RX ADMIN — SODIUM CHLORIDE 500 MILLILITER(S): 9 INJECTION, SOLUTION INTRAVENOUS at 13:21

## 2019-01-01 RX ADMIN — Medication 100 MILLIGRAM(S): at 17:22

## 2019-01-01 RX ADMIN — HEPARIN SODIUM 5000 UNIT(S): 5000 INJECTION INTRAVENOUS; SUBCUTANEOUS at 06:14

## 2019-01-01 RX ADMIN — Medication 3 MILLILITER(S): at 20:13

## 2019-01-01 RX ADMIN — CILOSTAZOL 100 MILLIGRAM(S): 100 TABLET ORAL at 06:08

## 2019-01-01 RX ADMIN — PIPERACILLIN AND TAZOBACTAM 25 GRAM(S): 4; .5 INJECTION, POWDER, LYOPHILIZED, FOR SOLUTION INTRAVENOUS at 17:28

## 2019-01-01 RX ADMIN — MEROPENEM 100 MILLIGRAM(S): 1 INJECTION INTRAVENOUS at 21:38

## 2019-01-01 RX ADMIN — Medication 3 MILLILITER(S): at 20:38

## 2019-01-01 RX ADMIN — BUDESONIDE AND FORMOTEROL FUMARATE DIHYDRATE 2 PUFF(S): 160; 4.5 AEROSOL RESPIRATORY (INHALATION) at 21:38

## 2019-01-01 RX ADMIN — BUDESONIDE AND FORMOTEROL FUMARATE DIHYDRATE 2 PUFF(S): 160; 4.5 AEROSOL RESPIRATORY (INHALATION) at 21:08

## 2019-01-01 RX ADMIN — Medication 60 MILLIGRAM(S): at 17:47

## 2019-01-01 RX ADMIN — CLOPIDOGREL BISULFATE 75 MILLIGRAM(S): 75 TABLET, FILM COATED ORAL at 11:11

## 2019-01-01 RX ADMIN — CHLORHEXIDINE GLUCONATE 1 APPLICATION(S): 213 SOLUTION TOPICAL at 05:46

## 2019-01-01 RX ADMIN — Medication 40 MILLIGRAM(S): at 09:40

## 2019-01-01 RX ADMIN — MEROPENEM 100 MILLIGRAM(S): 1 INJECTION INTRAVENOUS at 17:32

## 2019-01-01 RX ADMIN — HEPARIN SODIUM 5000 UNIT(S): 5000 INJECTION INTRAVENOUS; SUBCUTANEOUS at 21:44

## 2019-01-01 RX ADMIN — CILOSTAZOL 100 MILLIGRAM(S): 100 TABLET ORAL at 17:23

## 2019-01-01 RX ADMIN — Medication 250 MILLIGRAM(S): at 07:20

## 2019-01-01 RX ADMIN — Medication 40 MILLIGRAM(S): at 05:19

## 2019-01-01 RX ADMIN — MEROPENEM 100 MILLIGRAM(S): 1 INJECTION INTRAVENOUS at 06:33

## 2019-01-01 RX ADMIN — Medication 60 MILLIGRAM(S): at 05:19

## 2019-01-01 RX ADMIN — Medication 3 MILLILITER(S): at 20:20

## 2019-01-01 RX ADMIN — Medication 100 MILLIGRAM(S): at 05:30

## 2019-01-01 RX ADMIN — BUDESONIDE AND FORMOTEROL FUMARATE DIHYDRATE 2 PUFF(S): 160; 4.5 AEROSOL RESPIRATORY (INHALATION) at 20:55

## 2019-01-01 RX ADMIN — Medication 100 MILLIGRAM(S): at 18:19

## 2019-01-01 RX ADMIN — CLOPIDOGREL BISULFATE 75 MILLIGRAM(S): 75 TABLET, FILM COATED ORAL at 12:09

## 2019-01-01 RX ADMIN — Medication 25 MILLIGRAM(S): at 17:51

## 2019-01-01 RX ADMIN — CHLORHEXIDINE GLUCONATE 1 APPLICATION(S): 213 SOLUTION TOPICAL at 06:13

## 2019-01-01 RX ADMIN — CILOSTAZOL 100 MILLIGRAM(S): 100 TABLET ORAL at 17:16

## 2019-01-01 RX ADMIN — HEPARIN SODIUM 5000 UNIT(S): 5000 INJECTION INTRAVENOUS; SUBCUTANEOUS at 16:56

## 2019-01-01 RX ADMIN — Medication 81 MILLIGRAM(S): at 08:10

## 2019-01-01 RX ADMIN — NYSTATIN CREAM 1 APPLICATION(S): 100000 CREAM TOPICAL at 05:58

## 2019-01-01 RX ADMIN — Medication 20 MILLIEQUIVALENT(S): at 15:02

## 2019-01-01 RX ADMIN — LEVETIRACETAM 500 MILLIGRAM(S): 250 TABLET, FILM COATED ORAL at 18:48

## 2019-01-01 RX ADMIN — BUDESONIDE AND FORMOTEROL FUMARATE DIHYDRATE 2 PUFF(S): 160; 4.5 AEROSOL RESPIRATORY (INHALATION) at 20:25

## 2019-01-01 RX ADMIN — HEPARIN SODIUM 5000 UNIT(S): 5000 INJECTION INTRAVENOUS; SUBCUTANEOUS at 22:35

## 2019-01-01 RX ADMIN — Medication 25 MILLIGRAM(S): at 19:03

## 2019-01-01 RX ADMIN — LEVETIRACETAM 420 MILLIGRAM(S): 250 TABLET, FILM COATED ORAL at 05:07

## 2019-01-01 RX ADMIN — NYSTATIN CREAM 1 APPLICATION(S): 100000 CREAM TOPICAL at 05:29

## 2019-01-01 RX ADMIN — BUDESONIDE AND FORMOTEROL FUMARATE DIHYDRATE 2 PUFF(S): 160; 4.5 AEROSOL RESPIRATORY (INHALATION) at 20:34

## 2019-01-01 RX ADMIN — HEPARIN SODIUM 5000 UNIT(S): 5000 INJECTION INTRAVENOUS; SUBCUTANEOUS at 13:06

## 2019-01-01 RX ADMIN — Medication 3 MILLILITER(S): at 01:21

## 2019-01-01 RX ADMIN — Medication 25 MILLIGRAM(S): at 05:16

## 2019-01-01 RX ADMIN — Medication 20 MILLIEQUIVALENT(S): at 13:13

## 2019-01-01 RX ADMIN — Medication 81 MILLIGRAM(S): at 06:13

## 2019-01-01 RX ADMIN — CILOSTAZOL 100 MILLIGRAM(S): 100 TABLET ORAL at 05:16

## 2019-01-01 RX ADMIN — CILOSTAZOL 100 MILLIGRAM(S): 100 TABLET ORAL at 05:29

## 2019-01-01 RX ADMIN — HALOPERIDOL DECANOATE 0.5 MILLIGRAM(S): 100 INJECTION INTRAMUSCULAR at 21:27

## 2019-01-01 RX ADMIN — LEVETIRACETAM 420 MILLIGRAM(S): 250 TABLET, FILM COATED ORAL at 17:47

## 2019-01-01 RX ADMIN — Medication 10 MILLIGRAM(S): at 06:00

## 2019-01-01 RX ADMIN — PIPERACILLIN AND TAZOBACTAM 25 GRAM(S): 4; .5 INJECTION, POWDER, LYOPHILIZED, FOR SOLUTION INTRAVENOUS at 11:09

## 2019-01-01 RX ADMIN — Medication 3 MILLILITER(S): at 07:49

## 2019-01-01 RX ADMIN — BUDESONIDE AND FORMOTEROL FUMARATE DIHYDRATE 2 PUFF(S): 160; 4.5 AEROSOL RESPIRATORY (INHALATION) at 10:08

## 2019-01-01 RX ADMIN — CHLORHEXIDINE GLUCONATE 1 APPLICATION(S): 213 SOLUTION TOPICAL at 06:14

## 2019-01-01 RX ADMIN — HEPARIN SODIUM 5000 UNIT(S): 5000 INJECTION INTRAVENOUS; SUBCUTANEOUS at 21:16

## 2019-01-01 RX ADMIN — PANTOPRAZOLE SODIUM 40 MILLIGRAM(S): 20 TABLET, DELAYED RELEASE ORAL at 06:34

## 2019-01-01 RX ADMIN — Medication 3 MILLILITER(S): at 09:12

## 2019-01-01 RX ADMIN — Medication 20 MILLIGRAM(S): at 10:33

## 2019-01-01 RX ADMIN — Medication 3 MILLILITER(S): at 09:26

## 2019-01-01 RX ADMIN — HEPARIN SODIUM 5000 UNIT(S): 5000 INJECTION INTRAVENOUS; SUBCUTANEOUS at 13:42

## 2019-01-01 RX ADMIN — HEPARIN SODIUM 5000 UNIT(S): 5000 INJECTION INTRAVENOUS; SUBCUTANEOUS at 13:41

## 2019-01-01 RX ADMIN — Medication 25 MILLIGRAM(S): at 05:06

## 2019-01-01 RX ADMIN — HEPARIN SODIUM 5000 UNIT(S): 5000 INJECTION INTRAVENOUS; SUBCUTANEOUS at 06:27

## 2019-01-01 RX ADMIN — CILOSTAZOL 100 MILLIGRAM(S): 100 TABLET ORAL at 06:26

## 2019-01-01 RX ADMIN — HEPARIN SODIUM 5000 UNIT(S): 5000 INJECTION INTRAVENOUS; SUBCUTANEOUS at 14:18

## 2019-01-01 RX ADMIN — Medication 50 GRAM(S): at 17:58

## 2019-01-01 RX ADMIN — Medication 3 MILLILITER(S): at 19:53

## 2019-01-01 RX ADMIN — MEROPENEM 100 MILLIGRAM(S): 1 INJECTION INTRAVENOUS at 21:12

## 2019-01-01 RX ADMIN — BUDESONIDE AND FORMOTEROL FUMARATE DIHYDRATE 2 PUFF(S): 160; 4.5 AEROSOL RESPIRATORY (INHALATION) at 07:45

## 2019-01-01 RX ADMIN — Medication 650 MILLIGRAM(S): at 12:14

## 2019-01-01 RX ADMIN — CLOPIDOGREL BISULFATE 75 MILLIGRAM(S): 75 TABLET, FILM COATED ORAL at 11:24

## 2019-01-01 RX ADMIN — Medication 40 MILLIGRAM(S): at 05:17

## 2019-01-01 RX ADMIN — LEVETIRACETAM 500 MILLIGRAM(S): 250 TABLET, FILM COATED ORAL at 06:10

## 2019-01-01 RX ADMIN — PIPERACILLIN AND TAZOBACTAM 25 GRAM(S): 4; .5 INJECTION, POWDER, LYOPHILIZED, FOR SOLUTION INTRAVENOUS at 05:06

## 2019-01-01 RX ADMIN — Medication 25 MILLIGRAM(S): at 18:36

## 2019-01-01 RX ADMIN — NYSTATIN CREAM 1 APPLICATION(S): 100000 CREAM TOPICAL at 18:32

## 2019-01-01 RX ADMIN — Medication 100 MILLIGRAM(S): at 18:31

## 2019-01-01 RX ADMIN — Medication 25 MILLIGRAM(S): at 07:02

## 2019-01-01 RX ADMIN — Medication 3 MILLILITER(S): at 20:12

## 2019-01-01 RX ADMIN — LEVETIRACETAM 500 MILLIGRAM(S): 250 TABLET, FILM COATED ORAL at 18:30

## 2019-01-01 RX ADMIN — LEVETIRACETAM 500 MILLIGRAM(S): 250 TABLET, FILM COATED ORAL at 17:52

## 2019-01-01 RX ADMIN — PANTOPRAZOLE SODIUM 40 MILLIGRAM(S): 20 TABLET, DELAYED RELEASE ORAL at 07:41

## 2019-01-01 RX ADMIN — LEVETIRACETAM 420 MILLIGRAM(S): 250 TABLET, FILM COATED ORAL at 05:23

## 2019-01-01 RX ADMIN — Medication 50 GRAM(S): at 11:46

## 2019-01-01 RX ADMIN — PIPERACILLIN AND TAZOBACTAM 25 GRAM(S): 4; .5 INJECTION, POWDER, LYOPHILIZED, FOR SOLUTION INTRAVENOUS at 00:03

## 2019-01-01 RX ADMIN — ATORVASTATIN CALCIUM 10 MILLIGRAM(S): 80 TABLET, FILM COATED ORAL at 21:14

## 2019-01-01 RX ADMIN — CLOPIDOGREL BISULFATE 75 MILLIGRAM(S): 75 TABLET, FILM COATED ORAL at 13:41

## 2019-01-01 RX ADMIN — SENNA PLUS 2 TABLET(S): 8.6 TABLET ORAL at 21:16

## 2019-01-01 RX ADMIN — CHLORHEXIDINE GLUCONATE 1 APPLICATION(S): 213 SOLUTION TOPICAL at 05:24

## 2019-01-01 RX ADMIN — CHLORHEXIDINE GLUCONATE 1 APPLICATION(S): 213 SOLUTION TOPICAL at 06:33

## 2019-01-01 RX ADMIN — CLOPIDOGREL BISULFATE 75 MILLIGRAM(S): 75 TABLET, FILM COATED ORAL at 13:13

## 2019-01-01 RX ADMIN — NYSTATIN CREAM 1 APPLICATION(S): 100000 CREAM TOPICAL at 06:22

## 2019-01-01 RX ADMIN — Medication 125 MILLIGRAM(S): at 19:00

## 2019-01-01 RX ADMIN — Medication 10 MILLIGRAM(S): at 17:35

## 2019-01-01 RX ADMIN — BUDESONIDE AND FORMOTEROL FUMARATE DIHYDRATE 2 PUFF(S): 160; 4.5 AEROSOL RESPIRATORY (INHALATION) at 21:44

## 2019-01-01 RX ADMIN — Medication 30 MILLIGRAM(S): at 19:04

## 2019-01-01 RX ADMIN — HEPARIN SODIUM 5000 UNIT(S): 5000 INJECTION INTRAVENOUS; SUBCUTANEOUS at 07:09

## 2019-01-01 RX ADMIN — CILOSTAZOL 100 MILLIGRAM(S): 100 TABLET ORAL at 06:01

## 2019-01-01 RX ADMIN — CILOSTAZOL 100 MILLIGRAM(S): 100 TABLET ORAL at 18:31

## 2019-01-01 RX ADMIN — Medication 25 MILLIGRAM(S): at 05:59

## 2019-01-01 RX ADMIN — CEFTRIAXONE 100 GRAM(S): 500 INJECTION, POWDER, FOR SOLUTION INTRAMUSCULAR; INTRAVENOUS at 20:49

## 2019-01-01 RX ADMIN — Medication 3 MILLILITER(S): at 09:03

## 2019-01-01 RX ADMIN — SODIUM CHLORIDE 50 MILLILITER(S): 9 INJECTION INTRAMUSCULAR; INTRAVENOUS; SUBCUTANEOUS at 12:43

## 2019-01-01 RX ADMIN — CLOPIDOGREL BISULFATE 75 MILLIGRAM(S): 75 TABLET, FILM COATED ORAL at 11:44

## 2019-01-01 RX ADMIN — Medication 100 MILLIGRAM(S): at 18:15

## 2019-01-01 RX ADMIN — NYSTATIN CREAM 1 APPLICATION(S): 100000 CREAM TOPICAL at 17:42

## 2019-01-01 RX ADMIN — Medication 100 MILLIGRAM(S): at 05:57

## 2019-01-01 RX ADMIN — Medication 3 MILLILITER(S): at 07:44

## 2019-01-01 RX ADMIN — NYSTATIN CREAM 1 APPLICATION(S): 100000 CREAM TOPICAL at 05:20

## 2019-01-01 RX ADMIN — Medication 100 MILLIGRAM(S): at 05:19

## 2019-01-01 RX ADMIN — BUDESONIDE AND FORMOTEROL FUMARATE DIHYDRATE 2 PUFF(S): 160; 4.5 AEROSOL RESPIRATORY (INHALATION) at 11:46

## 2019-01-01 RX ADMIN — HEPARIN SODIUM 5000 UNIT(S): 5000 INJECTION INTRAVENOUS; SUBCUTANEOUS at 05:57

## 2019-01-01 RX ADMIN — NYSTATIN CREAM 1 APPLICATION(S): 100000 CREAM TOPICAL at 18:44

## 2019-01-01 RX ADMIN — HEPARIN SODIUM 5000 UNIT(S): 5000 INJECTION INTRAVENOUS; SUBCUTANEOUS at 05:44

## 2019-01-01 RX ADMIN — CILOSTAZOL 100 MILLIGRAM(S): 100 TABLET ORAL at 18:32

## 2019-01-01 RX ADMIN — NYSTATIN CREAM 1 APPLICATION(S): 100000 CREAM TOPICAL at 17:24

## 2019-01-01 RX ADMIN — CILOSTAZOL 100 MILLIGRAM(S): 100 TABLET ORAL at 05:35

## 2019-01-01 RX ADMIN — LINEZOLID 300 MILLIGRAM(S): 600 INJECTION, SOLUTION INTRAVENOUS at 05:17

## 2019-01-01 RX ADMIN — ATORVASTATIN CALCIUM 10 MILLIGRAM(S): 80 TABLET, FILM COATED ORAL at 21:37

## 2019-01-01 RX ADMIN — CILOSTAZOL 100 MILLIGRAM(S): 100 TABLET ORAL at 17:35

## 2019-01-01 RX ADMIN — Medication 3 MILLILITER(S): at 08:22

## 2019-01-01 RX ADMIN — MEROPENEM 100 MILLIGRAM(S): 1 INJECTION INTRAVENOUS at 13:43

## 2019-01-01 RX ADMIN — Medication 60 MILLIGRAM(S): at 17:55

## 2019-01-01 RX ADMIN — AZITHROMYCIN 255 MILLIGRAM(S): 500 TABLET, FILM COATED ORAL at 20:49

## 2019-01-01 RX ADMIN — Medication 3 MILLILITER(S): at 13:12

## 2019-01-01 RX ADMIN — Medication 81 MILLIGRAM(S): at 12:09

## 2019-01-01 RX ADMIN — HEPARIN SODIUM 5000 UNIT(S): 5000 INJECTION INTRAVENOUS; SUBCUTANEOUS at 22:08

## 2019-01-01 RX ADMIN — MEROPENEM 100 MILLIGRAM(S): 1 INJECTION INTRAVENOUS at 14:05

## 2019-01-01 RX ADMIN — LEVETIRACETAM 500 MILLIGRAM(S): 250 TABLET, FILM COATED ORAL at 18:15

## 2019-01-01 RX ADMIN — CILOSTAZOL 100 MILLIGRAM(S): 100 TABLET ORAL at 05:57

## 2019-01-01 RX ADMIN — CEFEPIME 100 MILLIGRAM(S): 1 INJECTION, POWDER, FOR SOLUTION INTRAMUSCULAR; INTRAVENOUS at 12:28

## 2019-01-01 RX ADMIN — BUDESONIDE AND FORMOTEROL FUMARATE DIHYDRATE 2 PUFF(S): 160; 4.5 AEROSOL RESPIRATORY (INHALATION) at 21:41

## 2019-01-01 RX ADMIN — LEVETIRACETAM 500 MILLIGRAM(S): 250 TABLET, FILM COATED ORAL at 18:22

## 2019-01-01 RX ADMIN — PANTOPRAZOLE SODIUM 40 MILLIGRAM(S): 20 TABLET, DELAYED RELEASE ORAL at 06:00

## 2019-01-01 RX ADMIN — CLOPIDOGREL BISULFATE 75 MILLIGRAM(S): 75 TABLET, FILM COATED ORAL at 11:15

## 2019-01-01 RX ADMIN — BUDESONIDE AND FORMOTEROL FUMARATE DIHYDRATE 2 PUFF(S): 160; 4.5 AEROSOL RESPIRATORY (INHALATION) at 20:13

## 2019-01-01 RX ADMIN — Medication 25 MILLIGRAM(S): at 05:21

## 2019-01-01 RX ADMIN — CILOSTAZOL 100 MILLIGRAM(S): 100 TABLET ORAL at 05:58

## 2019-01-01 RX ADMIN — HALOPERIDOL DECANOATE 0.5 MILLIGRAM(S): 100 INJECTION INTRAMUSCULAR at 21:24

## 2019-01-01 RX ADMIN — Medication 40 MILLIGRAM(S): at 05:57

## 2019-01-01 RX ADMIN — CILOSTAZOL 100 MILLIGRAM(S): 100 TABLET ORAL at 06:33

## 2019-01-01 RX ADMIN — NYSTATIN CREAM 1 APPLICATION(S): 100000 CREAM TOPICAL at 18:13

## 2019-01-01 RX ADMIN — Medication 3 MILLILITER(S): at 16:10

## 2019-01-01 RX ADMIN — HEPARIN SODIUM 5000 UNIT(S): 5000 INJECTION INTRAVENOUS; SUBCUTANEOUS at 21:14

## 2019-01-01 RX ADMIN — NYSTATIN CREAM 1 APPLICATION(S): 100000 CREAM TOPICAL at 05:42

## 2019-01-01 RX ADMIN — Medication 250 MILLIGRAM(S): at 16:37

## 2019-01-01 RX ADMIN — Medication 60 MILLIGRAM(S): at 05:20

## 2019-01-01 RX ADMIN — Medication 25 MILLIGRAM(S): at 18:47

## 2019-01-01 RX ADMIN — NYSTATIN CREAM 1 APPLICATION(S): 100000 CREAM TOPICAL at 19:11

## 2019-01-01 RX ADMIN — MAGNESIUM OXIDE 400 MG ORAL TABLET 400 MILLIGRAM(S): 241.3 TABLET ORAL at 13:12

## 2019-01-01 RX ADMIN — HALOPERIDOL DECANOATE 0.5 MILLIGRAM(S): 100 INJECTION INTRAMUSCULAR at 21:35

## 2019-01-01 RX ADMIN — Medication 3 MILLILITER(S): at 08:57

## 2019-01-01 RX ADMIN — Medication 100 MILLIGRAM(S): at 06:33

## 2019-01-01 RX ADMIN — Medication 3 MILLILITER(S): at 13:04

## 2019-01-01 RX ADMIN — Medication 250 MILLIGRAM(S): at 05:07

## 2019-01-01 RX ADMIN — MEROPENEM 100 MILLIGRAM(S): 1 INJECTION INTRAVENOUS at 06:13

## 2019-01-01 RX ADMIN — CLOPIDOGREL BISULFATE 75 MILLIGRAM(S): 75 TABLET, FILM COATED ORAL at 11:13

## 2019-01-01 RX ADMIN — CHLORHEXIDINE GLUCONATE 1 APPLICATION(S): 213 SOLUTION TOPICAL at 05:33

## 2019-01-01 RX ADMIN — HEPARIN SODIUM 5000 UNIT(S): 5000 INJECTION INTRAVENOUS; SUBCUTANEOUS at 13:52

## 2019-01-01 RX ADMIN — SODIUM CHLORIDE 50 MILLILITER(S): 9 INJECTION INTRAMUSCULAR; INTRAVENOUS; SUBCUTANEOUS at 05:23

## 2019-01-01 RX ADMIN — SENNA PLUS 2 TABLET(S): 8.6 TABLET ORAL at 21:14

## 2019-01-01 RX ADMIN — Medication 100 MILLIGRAM(S): at 18:09

## 2019-01-01 RX ADMIN — Medication 100 MILLIGRAM(S): at 06:08

## 2019-01-01 RX ADMIN — Medication 3 MILLILITER(S): at 20:14

## 2019-01-01 RX ADMIN — Medication 40 MILLIGRAM(S): at 18:17

## 2019-01-01 RX ADMIN — LEVETIRACETAM 500 MILLIGRAM(S): 250 TABLET, FILM COATED ORAL at 05:22

## 2019-01-01 RX ADMIN — Medication 25 MILLIGRAM(S): at 17:27

## 2019-01-01 RX ADMIN — Medication 3 MILLILITER(S): at 07:58

## 2019-01-01 RX ADMIN — NYSTATIN CREAM 1 APPLICATION(S): 100000 CREAM TOPICAL at 18:31

## 2019-01-01 RX ADMIN — HEPARIN SODIUM 5000 UNIT(S): 5000 INJECTION INTRAVENOUS; SUBCUTANEOUS at 13:13

## 2019-01-01 RX ADMIN — HEPARIN SODIUM 5000 UNIT(S): 5000 INJECTION INTRAVENOUS; SUBCUTANEOUS at 13:16

## 2019-01-01 RX ADMIN — ATORVASTATIN CALCIUM 10 MILLIGRAM(S): 80 TABLET, FILM COATED ORAL at 21:18

## 2019-01-01 RX ADMIN — Medication 3 MILLILITER(S): at 14:01

## 2019-01-01 RX ADMIN — HEPARIN SODIUM 5000 UNIT(S): 5000 INJECTION INTRAVENOUS; SUBCUTANEOUS at 21:28

## 2019-01-01 RX ADMIN — Medication 3 MILLILITER(S): at 19:30

## 2019-01-01 RX ADMIN — PANTOPRAZOLE SODIUM 40 MILLIGRAM(S): 20 TABLET, DELAYED RELEASE ORAL at 06:42

## 2019-01-01 RX ADMIN — Medication 40 MILLIGRAM(S): at 18:19

## 2019-01-01 RX ADMIN — Medication 3 MILLILITER(S): at 14:03

## 2019-01-01 RX ADMIN — Medication 25 MILLIGRAM(S): at 05:35

## 2019-01-01 RX ADMIN — LEVETIRACETAM 500 MILLIGRAM(S): 250 TABLET, FILM COATED ORAL at 05:30

## 2019-01-01 RX ADMIN — PANTOPRAZOLE SODIUM 40 MILLIGRAM(S): 20 TABLET, DELAYED RELEASE ORAL at 06:16

## 2019-01-01 RX ADMIN — BUDESONIDE AND FORMOTEROL FUMARATE DIHYDRATE 2 PUFF(S): 160; 4.5 AEROSOL RESPIRATORY (INHALATION) at 07:41

## 2019-01-01 RX ADMIN — PANTOPRAZOLE SODIUM 40 MILLIGRAM(S): 20 TABLET, DELAYED RELEASE ORAL at 05:26

## 2019-01-01 RX ADMIN — HEPARIN SODIUM 5000 UNIT(S): 5000 INJECTION INTRAVENOUS; SUBCUTANEOUS at 05:25

## 2019-01-01 RX ADMIN — Medication 30 MILLIGRAM(S): at 05:21

## 2019-01-01 RX ADMIN — Medication 40 MILLIGRAM(S): at 05:15

## 2019-01-01 RX ADMIN — CLOPIDOGREL BISULFATE 75 MILLIGRAM(S): 75 TABLET, FILM COATED ORAL at 11:29

## 2019-01-01 RX ADMIN — CLOPIDOGREL BISULFATE 75 MILLIGRAM(S): 75 TABLET, FILM COATED ORAL at 11:10

## 2019-01-01 RX ADMIN — Medication 3 MILLILITER(S): at 20:02

## 2019-01-01 RX ADMIN — LINEZOLID 300 MILLIGRAM(S): 600 INJECTION, SOLUTION INTRAVENOUS at 05:06

## 2019-01-01 RX ADMIN — Medication 20 MILLIGRAM(S): at 17:27

## 2019-01-01 RX ADMIN — LEVETIRACETAM 420 MILLIGRAM(S): 250 TABLET, FILM COATED ORAL at 05:43

## 2019-01-01 RX ADMIN — NYSTATIN CREAM 1 APPLICATION(S): 100000 CREAM TOPICAL at 17:02

## 2019-01-01 RX ADMIN — CLOPIDOGREL BISULFATE 75 MILLIGRAM(S): 75 TABLET, FILM COATED ORAL at 12:16

## 2019-01-01 RX ADMIN — NYSTATIN CREAM 1 APPLICATION(S): 100000 CREAM TOPICAL at 17:23

## 2019-01-01 RX ADMIN — Medication 3 MILLILITER(S): at 21:08

## 2019-01-01 RX ADMIN — HEPARIN SODIUM 5000 UNIT(S): 5000 INJECTION INTRAVENOUS; SUBCUTANEOUS at 21:37

## 2019-01-01 RX ADMIN — CILOSTAZOL 100 MILLIGRAM(S): 100 TABLET ORAL at 08:15

## 2019-01-01 RX ADMIN — MEROPENEM 100 MILLIGRAM(S): 1 INJECTION INTRAVENOUS at 05:18

## 2019-01-01 RX ADMIN — HEPARIN SODIUM 5000 UNIT(S): 5000 INJECTION INTRAVENOUS; SUBCUTANEOUS at 21:30

## 2019-01-01 RX ADMIN — Medication 60 MILLIGRAM(S): at 05:05

## 2019-01-01 RX ADMIN — NYSTATIN CREAM 1 APPLICATION(S): 100000 CREAM TOPICAL at 22:04

## 2019-01-01 RX ADMIN — PIPERACILLIN AND TAZOBACTAM 25 GRAM(S): 4; .5 INJECTION, POWDER, LYOPHILIZED, FOR SOLUTION INTRAVENOUS at 00:11

## 2019-01-01 RX ADMIN — Medication 3 MILLILITER(S): at 14:42

## 2019-01-01 RX ADMIN — Medication 3 MILLILITER(S): at 13:30

## 2019-01-01 RX ADMIN — Medication 3 MILLILITER(S): at 15:01

## 2019-01-01 RX ADMIN — LEVETIRACETAM 500 MILLIGRAM(S): 250 TABLET, FILM COATED ORAL at 06:12

## 2019-01-01 RX ADMIN — NYSTATIN CREAM 1 APPLICATION(S): 100000 CREAM TOPICAL at 17:16

## 2019-01-01 RX ADMIN — PIPERACILLIN AND TAZOBACTAM 25 GRAM(S): 4; .5 INJECTION, POWDER, LYOPHILIZED, FOR SOLUTION INTRAVENOUS at 07:01

## 2019-01-01 RX ADMIN — Medication 100 MILLIGRAM(S): at 06:14

## 2019-01-01 RX ADMIN — AZITHROMYCIN 255 MILLIGRAM(S): 500 TABLET, FILM COATED ORAL at 11:11

## 2019-01-01 RX ADMIN — SENNA PLUS 2 TABLET(S): 8.6 TABLET ORAL at 22:49

## 2019-01-01 RX ADMIN — Medication 3 MILLILITER(S): at 11:46

## 2019-01-01 RX ADMIN — CLOPIDOGREL BISULFATE 75 MILLIGRAM(S): 75 TABLET, FILM COATED ORAL at 11:25

## 2019-01-01 RX ADMIN — Medication 20 MILLIGRAM(S): at 18:15

## 2019-01-01 RX ADMIN — Medication 100 MILLIGRAM(S): at 05:06

## 2019-01-01 RX ADMIN — LEVETIRACETAM 500 MILLIGRAM(S): 250 TABLET, FILM COATED ORAL at 06:41

## 2019-01-01 RX ADMIN — Medication 3 MILLILITER(S): at 20:01

## 2019-01-01 RX ADMIN — LEVETIRACETAM 500 MILLIGRAM(S): 250 TABLET, FILM COATED ORAL at 17:23

## 2019-01-01 RX ADMIN — MEROPENEM 100 MILLIGRAM(S): 1 INJECTION INTRAVENOUS at 14:42

## 2019-01-01 RX ADMIN — LEVETIRACETAM 420 MILLIGRAM(S): 250 TABLET, FILM COATED ORAL at 17:29

## 2019-01-01 RX ADMIN — Medication 20 MILLIGRAM(S): at 05:41

## 2019-01-01 RX ADMIN — HEPARIN SODIUM 5000 UNIT(S): 5000 INJECTION INTRAVENOUS; SUBCUTANEOUS at 05:30

## 2019-01-01 RX ADMIN — Medication 3 MILLILITER(S): at 10:47

## 2019-01-01 RX ADMIN — ATORVASTATIN CALCIUM 10 MILLIGRAM(S): 80 TABLET, FILM COATED ORAL at 21:30

## 2019-01-01 RX ADMIN — NYSTATIN CREAM 1 APPLICATION(S): 100000 CREAM TOPICAL at 17:47

## 2019-01-01 RX ADMIN — SENNA PLUS 2 TABLET(S): 8.6 TABLET ORAL at 21:13

## 2019-01-01 RX ADMIN — LEVETIRACETAM 420 MILLIGRAM(S): 250 TABLET, FILM COATED ORAL at 18:11

## 2019-01-01 RX ADMIN — LEVETIRACETAM 420 MILLIGRAM(S): 250 TABLET, FILM COATED ORAL at 17:15

## 2019-01-01 RX ADMIN — CILOSTAZOL 100 MILLIGRAM(S): 100 TABLET ORAL at 05:43

## 2019-01-01 RX ADMIN — CILOSTAZOL 100 MILLIGRAM(S): 100 TABLET ORAL at 18:35

## 2019-01-01 RX ADMIN — Medication 3 MILLILITER(S): at 14:21

## 2019-01-01 RX ADMIN — HEPARIN SODIUM 5000 UNIT(S): 5000 INJECTION INTRAVENOUS; SUBCUTANEOUS at 05:42

## 2019-01-01 RX ADMIN — BUDESONIDE AND FORMOTEROL FUMARATE DIHYDRATE 2 PUFF(S): 160; 4.5 AEROSOL RESPIRATORY (INHALATION) at 20:26

## 2019-01-01 RX ADMIN — Medication 250 MILLIGRAM(S): at 17:21

## 2019-01-01 RX ADMIN — HEPARIN SODIUM 5000 UNIT(S): 5000 INJECTION INTRAVENOUS; SUBCUTANEOUS at 14:27

## 2019-01-01 RX ADMIN — NYSTATIN CREAM 1 APPLICATION(S): 100000 CREAM TOPICAL at 05:19

## 2019-01-01 RX ADMIN — Medication 25 MILLIGRAM(S): at 17:23

## 2019-01-01 RX ADMIN — Medication 3 MILLILITER(S): at 07:50

## 2019-01-01 RX ADMIN — BUDESONIDE AND FORMOTEROL FUMARATE DIHYDRATE 2 PUFF(S): 160; 4.5 AEROSOL RESPIRATORY (INHALATION) at 21:22

## 2019-01-01 RX ADMIN — NYSTATIN CREAM 1 APPLICATION(S): 100000 CREAM TOPICAL at 06:27

## 2019-01-01 RX ADMIN — LEVETIRACETAM 420 MILLIGRAM(S): 250 TABLET, FILM COATED ORAL at 05:17

## 2019-01-01 RX ADMIN — ATORVASTATIN CALCIUM 10 MILLIGRAM(S): 80 TABLET, FILM COATED ORAL at 21:35

## 2019-01-01 RX ADMIN — Medication 3 MILLILITER(S): at 22:46

## 2019-01-01 RX ADMIN — Medication 40 MILLIGRAM(S): at 15:47

## 2019-01-01 RX ADMIN — LEVETIRACETAM 420 MILLIGRAM(S): 250 TABLET, FILM COATED ORAL at 17:42

## 2019-01-01 RX ADMIN — Medication 25 MILLIGRAM(S): at 17:44

## 2019-01-01 RX ADMIN — PANTOPRAZOLE SODIUM 40 MILLIGRAM(S): 20 TABLET, DELAYED RELEASE ORAL at 05:57

## 2019-01-01 RX ADMIN — CILOSTAZOL 100 MILLIGRAM(S): 100 TABLET ORAL at 06:13

## 2019-01-01 RX ADMIN — Medication 3 MILLILITER(S): at 13:18

## 2019-01-01 RX ADMIN — LEVETIRACETAM 420 MILLIGRAM(S): 250 TABLET, FILM COATED ORAL at 18:31

## 2019-01-01 RX ADMIN — HEPARIN SODIUM 5000 UNIT(S): 5000 INJECTION INTRAVENOUS; SUBCUTANEOUS at 22:02

## 2019-01-01 RX ADMIN — Medication 81 MILLIGRAM(S): at 05:24

## 2019-01-01 RX ADMIN — Medication 20 MILLIGRAM(S): at 05:30

## 2019-01-01 RX ADMIN — ATORVASTATIN CALCIUM 10 MILLIGRAM(S): 80 TABLET, FILM COATED ORAL at 21:31

## 2019-01-01 RX ADMIN — HEPARIN SODIUM 5000 UNIT(S): 5000 INJECTION INTRAVENOUS; SUBCUTANEOUS at 14:02

## 2019-01-01 RX ADMIN — HEPARIN SODIUM 5000 UNIT(S): 5000 INJECTION INTRAVENOUS; SUBCUTANEOUS at 13:56

## 2019-01-01 RX ADMIN — CLOPIDOGREL BISULFATE 75 MILLIGRAM(S): 75 TABLET, FILM COATED ORAL at 11:05

## 2019-01-01 RX ADMIN — Medication 3 MILLILITER(S): at 07:56

## 2019-01-01 RX ADMIN — HEPARIN SODIUM 5000 UNIT(S): 5000 INJECTION INTRAVENOUS; SUBCUTANEOUS at 05:23

## 2019-01-01 RX ADMIN — LEVETIRACETAM 420 MILLIGRAM(S): 250 TABLET, FILM COATED ORAL at 05:55

## 2019-01-01 RX ADMIN — MEROPENEM 100 MILLIGRAM(S): 1 INJECTION INTRAVENOUS at 15:06

## 2019-01-01 RX ADMIN — CILOSTAZOL 100 MILLIGRAM(S): 100 TABLET ORAL at 17:22

## 2019-01-01 RX ADMIN — NYSTATIN CREAM 1 APPLICATION(S): 100000 CREAM TOPICAL at 05:17

## 2019-01-01 RX ADMIN — Medication 250 MILLIGRAM(S): at 18:31

## 2019-01-01 RX ADMIN — Medication 25 MILLIGRAM(S): at 06:31

## 2019-01-01 RX ADMIN — HEPARIN SODIUM 5000 UNIT(S): 5000 INJECTION INTRAVENOUS; SUBCUTANEOUS at 05:20

## 2019-01-01 RX ADMIN — CILOSTAZOL 100 MILLIGRAM(S): 100 TABLET ORAL at 17:55

## 2019-01-01 RX ADMIN — ATORVASTATIN CALCIUM 10 MILLIGRAM(S): 80 TABLET, FILM COATED ORAL at 21:28

## 2019-01-01 RX ADMIN — LINEZOLID 300 MILLIGRAM(S): 600 INJECTION, SOLUTION INTRAVENOUS at 06:20

## 2019-01-01 RX ADMIN — Medication 10 MILLILITER(S): at 16:42

## 2019-01-01 RX ADMIN — Medication 25 MILLIGRAM(S): at 17:55

## 2019-01-01 RX ADMIN — Medication 40 MILLIGRAM(S): at 07:02

## 2019-01-01 RX ADMIN — ATORVASTATIN CALCIUM 10 MILLIGRAM(S): 80 TABLET, FILM COATED ORAL at 21:48

## 2019-01-01 RX ADMIN — HEPARIN SODIUM 5000 UNIT(S): 5000 INJECTION INTRAVENOUS; SUBCUTANEOUS at 21:23

## 2019-01-01 RX ADMIN — CILOSTAZOL 100 MILLIGRAM(S): 100 TABLET ORAL at 06:12

## 2019-01-01 RX ADMIN — CHLORHEXIDINE GLUCONATE 1 APPLICATION(S): 213 SOLUTION TOPICAL at 07:04

## 2019-01-01 RX ADMIN — CLOPIDOGREL BISULFATE 75 MILLIGRAM(S): 75 TABLET, FILM COATED ORAL at 15:25

## 2019-01-01 RX ADMIN — ATORVASTATIN CALCIUM 10 MILLIGRAM(S): 80 TABLET, FILM COATED ORAL at 21:13

## 2019-01-01 RX ADMIN — Medication 25 MILLIGRAM(S): at 06:08

## 2019-01-01 RX ADMIN — PANTOPRAZOLE SODIUM 40 MILLIGRAM(S): 20 TABLET, DELAYED RELEASE ORAL at 08:17

## 2019-01-01 RX ADMIN — Medication 60 MILLIGRAM(S): at 21:32

## 2019-01-01 RX ADMIN — Medication 100 MILLIGRAM(S): at 18:35

## 2019-01-01 RX ADMIN — MEROPENEM 100 MILLIGRAM(S): 1 INJECTION INTRAVENOUS at 16:56

## 2019-01-01 RX ADMIN — MEROPENEM 100 MILLIGRAM(S): 1 INJECTION INTRAVENOUS at 14:27

## 2019-01-01 RX ADMIN — CHLORHEXIDINE GLUCONATE 1 APPLICATION(S): 213 SOLUTION TOPICAL at 05:41

## 2019-01-01 RX ADMIN — Medication 60 MILLIGRAM(S): at 06:42

## 2019-01-01 RX ADMIN — Medication 3 MILLILITER(S): at 08:16

## 2019-01-01 RX ADMIN — CILOSTAZOL 100 MILLIGRAM(S): 100 TABLET ORAL at 18:11

## 2019-01-01 RX ADMIN — CILOSTAZOL 100 MILLIGRAM(S): 100 TABLET ORAL at 05:06

## 2019-01-01 RX ADMIN — Medication 60 MILLIGRAM(S): at 18:03

## 2019-01-01 RX ADMIN — NYSTATIN CREAM 1 APPLICATION(S): 100000 CREAM TOPICAL at 18:22

## 2019-01-01 RX ADMIN — CLOPIDOGREL BISULFATE 75 MILLIGRAM(S): 75 TABLET, FILM COATED ORAL at 12:45

## 2019-01-01 RX ADMIN — HEPARIN SODIUM 5000 UNIT(S): 5000 INJECTION INTRAVENOUS; SUBCUTANEOUS at 14:21

## 2019-01-01 RX ADMIN — Medication 3 MILLILITER(S): at 08:24

## 2019-01-01 RX ADMIN — Medication 60 MILLIGRAM(S): at 05:17

## 2019-01-01 RX ADMIN — Medication 25 MILLIGRAM(S): at 05:57

## 2019-01-01 RX ADMIN — CLOPIDOGREL BISULFATE 75 MILLIGRAM(S): 75 TABLET, FILM COATED ORAL at 12:25

## 2019-01-01 RX ADMIN — Medication 3 MILLILITER(S): at 19:51

## 2019-01-01 RX ADMIN — Medication 60 MILLIGRAM(S): at 06:41

## 2019-01-01 RX ADMIN — BUDESONIDE AND FORMOTEROL FUMARATE DIHYDRATE 2 PUFF(S): 160; 4.5 AEROSOL RESPIRATORY (INHALATION) at 20:46

## 2019-01-01 RX ADMIN — CILOSTAZOL 100 MILLIGRAM(S): 100 TABLET ORAL at 17:01

## 2019-01-01 RX ADMIN — Medication 60 MILLIGRAM(S): at 21:12

## 2019-01-01 RX ADMIN — CILOSTAZOL 100 MILLIGRAM(S): 100 TABLET ORAL at 06:21

## 2019-01-01 RX ADMIN — Medication 3 MILLILITER(S): at 13:55

## 2019-01-01 RX ADMIN — SENNA PLUS 2 TABLET(S): 8.6 TABLET ORAL at 21:01

## 2019-01-01 RX ADMIN — Medication 100 MILLIGRAM(S): at 17:23

## 2019-01-01 RX ADMIN — PIPERACILLIN AND TAZOBACTAM 25 GRAM(S): 4; .5 INJECTION, POWDER, LYOPHILIZED, FOR SOLUTION INTRAVENOUS at 06:20

## 2019-01-01 RX ADMIN — Medication 3 MILLILITER(S): at 13:27

## 2019-01-01 RX ADMIN — NYSTATIN CREAM 1 APPLICATION(S): 100000 CREAM TOPICAL at 07:24

## 2019-01-01 RX ADMIN — HEPARIN SODIUM 5000 UNIT(S): 5000 INJECTION INTRAVENOUS; SUBCUTANEOUS at 21:19

## 2019-01-01 RX ADMIN — HEPARIN SODIUM 5000 UNIT(S): 5000 INJECTION INTRAVENOUS; SUBCUTANEOUS at 14:01

## 2019-01-01 RX ADMIN — PIPERACILLIN AND TAZOBACTAM 25 GRAM(S): 4; .5 INJECTION, POWDER, LYOPHILIZED, FOR SOLUTION INTRAVENOUS at 23:01

## 2019-01-01 RX ADMIN — CILOSTAZOL 100 MILLIGRAM(S): 100 TABLET ORAL at 17:27

## 2019-01-01 RX ADMIN — CILOSTAZOL 100 MILLIGRAM(S): 100 TABLET ORAL at 06:11

## 2019-01-01 RX ADMIN — BUDESONIDE AND FORMOTEROL FUMARATE DIHYDRATE 2 PUFF(S): 160; 4.5 AEROSOL RESPIRATORY (INHALATION) at 08:05

## 2019-01-01 RX ADMIN — Medication 3 MILLILITER(S): at 16:24

## 2019-01-01 RX ADMIN — Medication 81 MILLIGRAM(S): at 11:47

## 2019-01-01 RX ADMIN — MEROPENEM 100 MILLIGRAM(S): 1 INJECTION INTRAVENOUS at 21:34

## 2019-01-01 RX ADMIN — CLOPIDOGREL BISULFATE 75 MILLIGRAM(S): 75 TABLET, FILM COATED ORAL at 11:52

## 2019-01-01 RX ADMIN — Medication 3 MILLILITER(S): at 02:34

## 2019-01-01 RX ADMIN — Medication 25 MILLIGRAM(S): at 06:01

## 2019-01-01 RX ADMIN — PIPERACILLIN AND TAZOBACTAM 25 GRAM(S): 4; .5 INJECTION, POWDER, LYOPHILIZED, FOR SOLUTION INTRAVENOUS at 05:17

## 2019-01-01 RX ADMIN — SENNA PLUS 2 TABLET(S): 8.6 TABLET ORAL at 22:08

## 2019-01-01 RX ADMIN — Medication 3 MILLILITER(S): at 13:48

## 2019-01-01 RX ADMIN — PIPERACILLIN AND TAZOBACTAM 25 GRAM(S): 4; .5 INJECTION, POWDER, LYOPHILIZED, FOR SOLUTION INTRAVENOUS at 11:12

## 2019-01-01 RX ADMIN — LACTULOSE 10 GRAM(S): 10 SOLUTION ORAL at 12:12

## 2019-01-01 RX ADMIN — PANTOPRAZOLE SODIUM 40 MILLIGRAM(S): 20 TABLET, DELAYED RELEASE ORAL at 06:12

## 2019-01-01 RX ADMIN — CILOSTAZOL 100 MILLIGRAM(S): 100 TABLET ORAL at 17:24

## 2019-01-01 RX ADMIN — HEPARIN SODIUM 5000 UNIT(S): 5000 INJECTION INTRAVENOUS; SUBCUTANEOUS at 06:13

## 2019-01-01 RX ADMIN — Medication 81 MILLIGRAM(S): at 06:34

## 2019-01-01 RX ADMIN — BUDESONIDE AND FORMOTEROL FUMARATE DIHYDRATE 2 PUFF(S): 160; 4.5 AEROSOL RESPIRATORY (INHALATION) at 22:47

## 2019-01-01 RX ADMIN — Medication 100 MILLIGRAM(S): at 20:58

## 2019-01-01 RX ADMIN — HEPARIN SODIUM 5000 UNIT(S): 5000 INJECTION INTRAVENOUS; SUBCUTANEOUS at 13:59

## 2019-01-01 RX ADMIN — CEFEPIME 2000 MILLIGRAM(S): 1 INJECTION, POWDER, FOR SOLUTION INTRAMUSCULAR; INTRAVENOUS at 12:48

## 2019-01-01 RX ADMIN — Medication 100 MILLIGRAM(S): at 06:19

## 2019-01-01 RX ADMIN — CEFTRIAXONE 100 GRAM(S): 500 INJECTION, POWDER, FOR SOLUTION INTRAMUSCULAR; INTRAVENOUS at 12:09

## 2019-01-01 RX ADMIN — NYSTATIN CREAM 1 APPLICATION(S): 100000 CREAM TOPICAL at 06:08

## 2019-01-01 RX ADMIN — Medication 250 MILLIGRAM(S): at 18:02

## 2019-01-01 RX ADMIN — Medication 3 MILLILITER(S): at 16:45

## 2019-01-01 RX ADMIN — PIPERACILLIN AND TAZOBACTAM 25 GRAM(S): 4; .5 INJECTION, POWDER, LYOPHILIZED, FOR SOLUTION INTRAVENOUS at 23:35

## 2019-01-01 RX ADMIN — MEROPENEM 100 MILLIGRAM(S): 1 INJECTION INTRAVENOUS at 21:45

## 2019-01-01 RX ADMIN — NYSTATIN CREAM 1 APPLICATION(S): 100000 CREAM TOPICAL at 06:34

## 2019-01-01 RX ADMIN — BUDESONIDE AND FORMOTEROL FUMARATE DIHYDRATE 2 PUFF(S): 160; 4.5 AEROSOL RESPIRATORY (INHALATION) at 08:10

## 2019-01-01 RX ADMIN — Medication 10 MILLIGRAM(S): at 05:30

## 2019-01-01 RX ADMIN — PANTOPRAZOLE SODIUM 40 MILLIGRAM(S): 20 TABLET, DELAYED RELEASE ORAL at 07:55

## 2019-01-01 RX ADMIN — ATORVASTATIN CALCIUM 10 MILLIGRAM(S): 80 TABLET, FILM COATED ORAL at 21:15

## 2019-01-01 RX ADMIN — BUDESONIDE AND FORMOTEROL FUMARATE DIHYDRATE 2 PUFF(S): 160; 4.5 AEROSOL RESPIRATORY (INHALATION) at 08:32

## 2019-01-01 RX ADMIN — Medication 100 MILLIGRAM(S): at 17:51

## 2019-01-01 RX ADMIN — CEFTRIAXONE 100 GRAM(S): 500 INJECTION, POWDER, FOR SOLUTION INTRAMUSCULAR; INTRAVENOUS at 11:11

## 2019-01-01 RX ADMIN — HEPARIN SODIUM 5000 UNIT(S): 5000 INJECTION INTRAVENOUS; SUBCUTANEOUS at 21:42

## 2019-01-01 RX ADMIN — NYSTATIN CREAM 1 APPLICATION(S): 100000 CREAM TOPICAL at 05:21

## 2019-01-01 RX ADMIN — Medication 20 MILLIGRAM(S): at 05:22

## 2019-01-01 RX ADMIN — LEVETIRACETAM 500 MILLIGRAM(S): 250 TABLET, FILM COATED ORAL at 17:24

## 2019-01-01 RX ADMIN — HEPARIN SODIUM 5000 UNIT(S): 5000 INJECTION INTRAVENOUS; SUBCUTANEOUS at 13:46

## 2019-01-01 RX ADMIN — CILOSTAZOL 100 MILLIGRAM(S): 100 TABLET ORAL at 18:03

## 2019-01-01 RX ADMIN — Medication 100 MILLIGRAM(S): at 05:29

## 2019-01-01 RX ADMIN — CILOSTAZOL 100 MILLIGRAM(S): 100 TABLET ORAL at 19:00

## 2019-01-01 RX ADMIN — HEPARIN SODIUM 5000 UNIT(S): 5000 INJECTION INTRAVENOUS; SUBCUTANEOUS at 21:24

## 2019-01-01 RX ADMIN — CILOSTAZOL 100 MILLIGRAM(S): 100 TABLET ORAL at 17:52

## 2019-01-01 RX ADMIN — Medication 3 MILLILITER(S): at 08:52

## 2019-01-01 RX ADMIN — MEROPENEM 100 MILLIGRAM(S): 1 INJECTION INTRAVENOUS at 05:47

## 2019-01-01 RX ADMIN — Medication 3 MILLILITER(S): at 07:46

## 2019-01-01 RX ADMIN — HEPARIN SODIUM 5000 UNIT(S): 5000 INJECTION INTRAVENOUS; SUBCUTANEOUS at 17:52

## 2019-01-01 RX ADMIN — LEVETIRACETAM 500 MILLIGRAM(S): 250 TABLET, FILM COATED ORAL at 17:45

## 2019-01-01 RX ADMIN — HEPARIN SODIUM 5000 UNIT(S): 5000 INJECTION INTRAVENOUS; SUBCUTANEOUS at 14:48

## 2019-01-01 RX ADMIN — PIPERACILLIN AND TAZOBACTAM 25 GRAM(S): 4; .5 INJECTION, POWDER, LYOPHILIZED, FOR SOLUTION INTRAVENOUS at 05:43

## 2019-01-01 RX ADMIN — CHLORHEXIDINE GLUCONATE 1 APPLICATION(S): 213 SOLUTION TOPICAL at 10:33

## 2019-01-01 RX ADMIN — ATORVASTATIN CALCIUM 10 MILLIGRAM(S): 80 TABLET, FILM COATED ORAL at 21:05

## 2019-01-01 RX ADMIN — NYSTATIN CREAM 1 APPLICATION(S): 100000 CREAM TOPICAL at 18:36

## 2019-01-01 RX ADMIN — BUDESONIDE AND FORMOTEROL FUMARATE DIHYDRATE 2 PUFF(S): 160; 4.5 AEROSOL RESPIRATORY (INHALATION) at 21:06

## 2019-01-01 RX ADMIN — Medication 325 MILLIGRAM(S): at 15:33

## 2019-01-01 RX ADMIN — Medication 81 MILLIGRAM(S): at 06:00

## 2019-01-01 RX ADMIN — Medication 3 MILLILITER(S): at 13:34

## 2019-01-01 RX ADMIN — CHLORHEXIDINE GLUCONATE 1 APPLICATION(S): 213 SOLUTION TOPICAL at 06:25

## 2019-01-01 RX ADMIN — Medication 60 MILLIGRAM(S): at 05:59

## 2019-01-01 RX ADMIN — HEPARIN SODIUM 5000 UNIT(S): 5000 INJECTION INTRAVENOUS; SUBCUTANEOUS at 13:55

## 2019-01-01 RX ADMIN — Medication 40 MILLIGRAM(S): at 05:59

## 2019-01-01 RX ADMIN — CLOPIDOGREL BISULFATE 75 MILLIGRAM(S): 75 TABLET, FILM COATED ORAL at 11:23

## 2019-01-01 RX ADMIN — PANTOPRAZOLE SODIUM 40 MILLIGRAM(S): 20 TABLET, DELAYED RELEASE ORAL at 07:59

## 2019-01-01 RX ADMIN — Medication 25 MILLIGRAM(S): at 05:22

## 2019-01-01 RX ADMIN — BUDESONIDE AND FORMOTEROL FUMARATE DIHYDRATE 2 PUFF(S): 160; 4.5 AEROSOL RESPIRATORY (INHALATION) at 20:48

## 2019-01-01 RX ADMIN — BUDESONIDE AND FORMOTEROL FUMARATE DIHYDRATE 2 PUFF(S): 160; 4.5 AEROSOL RESPIRATORY (INHALATION) at 21:00

## 2019-01-01 RX ADMIN — Medication 100 MILLIGRAM(S): at 21:38

## 2019-01-01 RX ADMIN — LEVETIRACETAM 500 MILLIGRAM(S): 250 TABLET, FILM COATED ORAL at 18:09

## 2019-01-01 RX ADMIN — ATORVASTATIN CALCIUM 10 MILLIGRAM(S): 80 TABLET, FILM COATED ORAL at 22:49

## 2019-01-01 RX ADMIN — ATORVASTATIN CALCIUM 10 MILLIGRAM(S): 80 TABLET, FILM COATED ORAL at 21:12

## 2019-01-01 RX ADMIN — SENNA PLUS 2 TABLET(S): 8.6 TABLET ORAL at 21:48

## 2019-01-01 RX ADMIN — CHLORHEXIDINE GLUCONATE 1 APPLICATION(S): 213 SOLUTION TOPICAL at 06:59

## 2019-01-01 RX ADMIN — Medication 3 MILLILITER(S): at 13:36

## 2019-01-01 RX ADMIN — Medication 100 MILLIGRAM(S): at 17:27

## 2019-01-01 RX ADMIN — HEPARIN SODIUM 5000 UNIT(S): 5000 INJECTION INTRAVENOUS; SUBCUTANEOUS at 13:33

## 2019-01-01 RX ADMIN — Medication 3 MILLILITER(S): at 11:24

## 2019-01-01 RX ADMIN — HEPARIN SODIUM 5000 UNIT(S): 5000 INJECTION INTRAVENOUS; SUBCUTANEOUS at 13:17

## 2019-01-01 RX ADMIN — BUDESONIDE AND FORMOTEROL FUMARATE DIHYDRATE 2 PUFF(S): 160; 4.5 AEROSOL RESPIRATORY (INHALATION) at 07:56

## 2019-01-01 RX ADMIN — Medication 0.12 MILLIGRAM(S): at 22:26

## 2019-01-01 RX ADMIN — CHLORHEXIDINE GLUCONATE 1 APPLICATION(S): 213 SOLUTION TOPICAL at 05:22

## 2019-01-01 RX ADMIN — PANTOPRAZOLE SODIUM 40 MILLIGRAM(S): 20 TABLET, DELAYED RELEASE ORAL at 07:56

## 2019-01-01 RX ADMIN — LEVETIRACETAM 420 MILLIGRAM(S): 250 TABLET, FILM COATED ORAL at 18:28

## 2019-01-01 RX ADMIN — SODIUM CHLORIDE 40 MILLILITER(S): 9 INJECTION, SOLUTION INTRAVENOUS at 13:33

## 2019-01-01 RX ADMIN — CLOPIDOGREL BISULFATE 75 MILLIGRAM(S): 75 TABLET, FILM COATED ORAL at 14:06

## 2019-01-01 RX ADMIN — Medication 60 MILLIGRAM(S): at 16:14

## 2019-01-01 RX ADMIN — Medication 25 MILLIGRAM(S): at 06:41

## 2019-01-01 RX ADMIN — NYSTATIN CREAM 1 APPLICATION(S): 100000 CREAM TOPICAL at 05:47

## 2019-01-01 RX ADMIN — ENOXAPARIN SODIUM 40 MILLIGRAM(S): 100 INJECTION SUBCUTANEOUS at 11:12

## 2019-01-01 RX ADMIN — PIPERACILLIN AND TAZOBACTAM 25 GRAM(S): 4; .5 INJECTION, POWDER, LYOPHILIZED, FOR SOLUTION INTRAVENOUS at 17:45

## 2019-01-01 RX ADMIN — MEROPENEM 100 MILLIGRAM(S): 1 INJECTION INTRAVENOUS at 05:20

## 2019-01-01 RX ADMIN — Medication 3 MILLILITER(S): at 20:16

## 2019-01-01 RX ADMIN — MEROPENEM 100 MILLIGRAM(S): 1 INJECTION INTRAVENOUS at 16:38

## 2019-01-01 RX ADMIN — Medication 3 MILLILITER(S): at 08:47

## 2019-01-01 RX ADMIN — CHLORHEXIDINE GLUCONATE 1 APPLICATION(S): 213 SOLUTION TOPICAL at 06:30

## 2019-01-01 RX ADMIN — HEPARIN SODIUM 5000 UNIT(S): 5000 INJECTION INTRAVENOUS; SUBCUTANEOUS at 14:42

## 2019-01-01 RX ADMIN — CLOPIDOGREL BISULFATE 75 MILLIGRAM(S): 75 TABLET, FILM COATED ORAL at 13:24

## 2019-01-01 RX ADMIN — ATORVASTATIN CALCIUM 10 MILLIGRAM(S): 80 TABLET, FILM COATED ORAL at 21:41

## 2019-01-01 RX ADMIN — Medication 50 GRAM(S): at 19:01

## 2019-01-01 RX ADMIN — LEVETIRACETAM 500 MILLIGRAM(S): 250 TABLET, FILM COATED ORAL at 06:40

## 2019-01-01 RX ADMIN — HALOPERIDOL DECANOATE 0.5 MILLIGRAM(S): 100 INJECTION INTRAMUSCULAR at 21:22

## 2019-01-01 RX ADMIN — NYSTATIN CREAM 1 APPLICATION(S): 100000 CREAM TOPICAL at 05:45

## 2019-01-01 RX ADMIN — ATORVASTATIN CALCIUM 10 MILLIGRAM(S): 80 TABLET, FILM COATED ORAL at 21:38

## 2019-01-01 RX ADMIN — PANTOPRAZOLE SODIUM 40 MILLIGRAM(S): 20 TABLET, DELAYED RELEASE ORAL at 08:10

## 2019-01-01 RX ADMIN — LEVETIRACETAM 420 MILLIGRAM(S): 250 TABLET, FILM COATED ORAL at 06:29

## 2019-01-01 RX ADMIN — BUDESONIDE AND FORMOTEROL FUMARATE DIHYDRATE 2 PUFF(S): 160; 4.5 AEROSOL RESPIRATORY (INHALATION) at 20:38

## 2019-01-01 RX ADMIN — ATORVASTATIN CALCIUM 10 MILLIGRAM(S): 80 TABLET, FILM COATED ORAL at 21:33

## 2019-01-01 RX ADMIN — ATORVASTATIN CALCIUM 10 MILLIGRAM(S): 80 TABLET, FILM COATED ORAL at 21:45

## 2019-01-01 RX ADMIN — Medication 125 MILLIGRAM(S): at 11:46

## 2019-01-01 RX ADMIN — HEPARIN SODIUM 5000 UNIT(S): 5000 INJECTION INTRAVENOUS; SUBCUTANEOUS at 22:05

## 2019-01-01 RX ADMIN — HEPARIN SODIUM 5000 UNIT(S): 5000 INJECTION INTRAVENOUS; SUBCUTANEOUS at 06:08

## 2019-01-01 RX ADMIN — PIPERACILLIN AND TAZOBACTAM 25 GRAM(S): 4; .5 INJECTION, POWDER, LYOPHILIZED, FOR SOLUTION INTRAVENOUS at 06:43

## 2019-01-01 RX ADMIN — MEROPENEM 100 MILLIGRAM(S): 1 INJECTION INTRAVENOUS at 16:20

## 2019-01-01 RX ADMIN — Medication 3 MILLILITER(S): at 01:12

## 2019-01-01 RX ADMIN — Medication 10 MILLIGRAM(S): at 08:16

## 2019-01-01 RX ADMIN — Medication 3 MILLILITER(S): at 19:58

## 2019-01-01 RX ADMIN — AZITHROMYCIN 255 MILLIGRAM(S): 500 TABLET, FILM COATED ORAL at 12:40

## 2019-01-01 RX ADMIN — Medication 10 MILLILITER(S): at 07:49

## 2019-01-01 RX ADMIN — CILOSTAZOL 100 MILLIGRAM(S): 100 TABLET ORAL at 18:19

## 2019-01-01 RX ADMIN — Medication 20 MILLIGRAM(S): at 06:13

## 2019-01-01 RX ADMIN — CILOSTAZOL 100 MILLIGRAM(S): 100 TABLET ORAL at 05:22

## 2019-01-01 RX ADMIN — HEPARIN SODIUM 5000 UNIT(S): 5000 INJECTION INTRAVENOUS; SUBCUTANEOUS at 05:47

## 2019-01-01 RX ADMIN — CLOPIDOGREL BISULFATE 75 MILLIGRAM(S): 75 TABLET, FILM COATED ORAL at 12:11

## 2019-01-01 RX ADMIN — BUDESONIDE AND FORMOTEROL FUMARATE DIHYDRATE 2 PUFF(S): 160; 4.5 AEROSOL RESPIRATORY (INHALATION) at 21:52

## 2019-01-01 RX ADMIN — NYSTATIN CREAM 1 APPLICATION(S): 100000 CREAM TOPICAL at 18:03

## 2019-01-01 RX ADMIN — Medication 25 MILLIGRAM(S): at 05:17

## 2019-01-01 RX ADMIN — BUDESONIDE AND FORMOTEROL FUMARATE DIHYDRATE 2 PUFF(S): 160; 4.5 AEROSOL RESPIRATORY (INHALATION) at 21:19

## 2019-01-01 RX ADMIN — HEPARIN SODIUM 5000 UNIT(S): 5000 INJECTION INTRAVENOUS; SUBCUTANEOUS at 06:11

## 2019-01-01 RX ADMIN — Medication 3 MILLILITER(S): at 16:15

## 2019-01-01 RX ADMIN — HEPARIN SODIUM 5000 UNIT(S): 5000 INJECTION INTRAVENOUS; SUBCUTANEOUS at 21:01

## 2019-01-01 RX ADMIN — Medication 250 MILLIGRAM(S): at 17:24

## 2019-01-01 RX ADMIN — LEVETIRACETAM 500 MILLIGRAM(S): 250 TABLET, FILM COATED ORAL at 05:16

## 2019-01-01 RX ADMIN — PIPERACILLIN AND TAZOBACTAM 25 GRAM(S): 4; .5 INJECTION, POWDER, LYOPHILIZED, FOR SOLUTION INTRAVENOUS at 13:25

## 2019-01-01 RX ADMIN — Medication 25 MILLIGRAM(S): at 06:10

## 2019-01-01 RX ADMIN — HEPARIN SODIUM 5000 UNIT(S): 5000 INJECTION INTRAVENOUS; SUBCUTANEOUS at 21:38

## 2019-01-01 RX ADMIN — Medication 25 MILLIGRAM(S): at 05:42

## 2019-01-01 RX ADMIN — LISINOPRIL 5 MILLIGRAM(S): 2.5 TABLET ORAL at 18:49

## 2019-01-01 RX ADMIN — Medication 30 MILLIGRAM(S): at 06:14

## 2019-01-01 RX ADMIN — CHLORHEXIDINE GLUCONATE 1 APPLICATION(S): 213 SOLUTION TOPICAL at 05:08

## 2019-01-01 RX ADMIN — LEVETIRACETAM 500 MILLIGRAM(S): 250 TABLET, FILM COATED ORAL at 06:33

## 2019-01-01 RX ADMIN — Medication 100 MILLIGRAM(S): at 05:48

## 2019-01-01 RX ADMIN — Medication 60 MILLIGRAM(S): at 17:23

## 2019-01-01 RX ADMIN — CILOSTAZOL 100 MILLIGRAM(S): 100 TABLET ORAL at 05:17

## 2019-01-01 RX ADMIN — Medication 3 MILLILITER(S): at 13:13

## 2019-01-01 RX ADMIN — BUDESONIDE AND FORMOTEROL FUMARATE DIHYDRATE 2 PUFF(S): 160; 4.5 AEROSOL RESPIRATORY (INHALATION) at 12:25

## 2019-01-01 RX ADMIN — Medication 3 MILLILITER(S): at 19:37

## 2019-01-01 RX ADMIN — Medication 0.25 MILLIGRAM(S): at 22:07

## 2019-01-01 RX ADMIN — PANTOPRAZOLE SODIUM 40 MILLIGRAM(S): 20 TABLET, DELAYED RELEASE ORAL at 08:31

## 2019-01-01 RX ADMIN — LEVETIRACETAM 500 MILLIGRAM(S): 250 TABLET, FILM COATED ORAL at 05:18

## 2019-01-01 RX ADMIN — LEVETIRACETAM 500 MILLIGRAM(S): 250 TABLET, FILM COATED ORAL at 18:19

## 2019-01-01 RX ADMIN — SENNA PLUS 2 TABLET(S): 8.6 TABLET ORAL at 22:05

## 2019-01-01 RX ADMIN — Medication 3 MILLILITER(S): at 20:28

## 2019-01-01 RX ADMIN — Medication 100 MILLIGRAM(S): at 05:42

## 2019-01-01 RX ADMIN — HEPARIN SODIUM 5000 UNIT(S): 5000 INJECTION INTRAVENOUS; SUBCUTANEOUS at 07:23

## 2019-01-01 RX ADMIN — ATORVASTATIN CALCIUM 10 MILLIGRAM(S): 80 TABLET, FILM COATED ORAL at 22:08

## 2019-01-01 RX ADMIN — Medication 100 MILLIGRAM(S): at 05:41

## 2019-01-01 RX ADMIN — CHLORHEXIDINE GLUCONATE 1 APPLICATION(S): 213 SOLUTION TOPICAL at 06:08

## 2019-01-01 RX ADMIN — LEVETIRACETAM 500 MILLIGRAM(S): 250 TABLET, FILM COATED ORAL at 06:28

## 2019-01-01 RX ADMIN — NYSTATIN CREAM 1 APPLICATION(S): 100000 CREAM TOPICAL at 18:28

## 2019-01-01 RX ADMIN — Medication 40 MILLIGRAM(S): at 17:02

## 2019-01-01 RX ADMIN — Medication 3 MILLILITER(S): at 19:56

## 2019-01-01 RX ADMIN — PIPERACILLIN AND TAZOBACTAM 25 GRAM(S): 4; .5 INJECTION, POWDER, LYOPHILIZED, FOR SOLUTION INTRAVENOUS at 12:42

## 2019-01-01 RX ADMIN — Medication 25 MILLIGRAM(S): at 05:48

## 2019-01-01 RX ADMIN — PIPERACILLIN AND TAZOBACTAM 25 GRAM(S): 4; .5 INJECTION, POWDER, LYOPHILIZED, FOR SOLUTION INTRAVENOUS at 07:13

## 2019-01-01 RX ADMIN — Medication 3 MILLILITER(S): at 11:38

## 2019-01-01 RX ADMIN — PIPERACILLIN AND TAZOBACTAM 25 GRAM(S): 4; .5 INJECTION, POWDER, LYOPHILIZED, FOR SOLUTION INTRAVENOUS at 11:28

## 2019-01-01 RX ADMIN — MEROPENEM 100 MILLIGRAM(S): 1 INJECTION INTRAVENOUS at 06:40

## 2019-01-01 RX ADMIN — PIPERACILLIN AND TAZOBACTAM 25 GRAM(S): 4; .5 INJECTION, POWDER, LYOPHILIZED, FOR SOLUTION INTRAVENOUS at 11:48

## 2019-01-01 RX ADMIN — Medication 25 MILLIGRAM(S): at 18:03

## 2019-01-01 RX ADMIN — Medication 3 MILLILITER(S): at 08:11

## 2019-01-01 RX ADMIN — MEROPENEM 100 MILLIGRAM(S): 1 INJECTION INTRAVENOUS at 18:32

## 2019-01-01 RX ADMIN — LEVETIRACETAM 420 MILLIGRAM(S): 250 TABLET, FILM COATED ORAL at 06:15

## 2019-01-01 RX ADMIN — PIPERACILLIN AND TAZOBACTAM 25 GRAM(S): 4; .5 INJECTION, POWDER, LYOPHILIZED, FOR SOLUTION INTRAVENOUS at 11:05

## 2019-01-01 RX ADMIN — Medication 60 MILLIGRAM(S): at 21:15

## 2019-01-01 RX ADMIN — MEROPENEM 100 MILLIGRAM(S): 1 INJECTION INTRAVENOUS at 06:11

## 2019-01-01 RX ADMIN — LEVETIRACETAM 500 MILLIGRAM(S): 250 TABLET, FILM COATED ORAL at 06:14

## 2019-01-01 RX ADMIN — Medication 3 MILLILITER(S): at 13:51

## 2019-01-01 RX ADMIN — HEPARIN SODIUM 5000 UNIT(S): 5000 INJECTION INTRAVENOUS; SUBCUTANEOUS at 06:41

## 2019-01-01 RX ADMIN — Medication 25 MILLIGRAM(S): at 06:34

## 2019-01-01 RX ADMIN — ATORVASTATIN CALCIUM 10 MILLIGRAM(S): 80 TABLET, FILM COATED ORAL at 22:03

## 2019-01-01 RX ADMIN — BUDESONIDE AND FORMOTEROL FUMARATE DIHYDRATE 2 PUFF(S): 160; 4.5 AEROSOL RESPIRATORY (INHALATION) at 21:28

## 2019-01-01 RX ADMIN — CILOSTAZOL 100 MILLIGRAM(S): 100 TABLET ORAL at 05:21

## 2019-01-01 RX ADMIN — AZITHROMYCIN 255 MILLIGRAM(S): 500 TABLET, FILM COATED ORAL at 12:09

## 2019-01-01 RX ADMIN — HEPARIN SODIUM 5000 UNIT(S): 5000 INJECTION INTRAVENOUS; SUBCUTANEOUS at 05:59

## 2019-01-01 RX ADMIN — SODIUM CHLORIDE 50 MILLILITER(S): 9 INJECTION INTRAMUSCULAR; INTRAVENOUS; SUBCUTANEOUS at 08:33

## 2019-01-01 RX ADMIN — Medication 3 MILLILITER(S): at 20:23

## 2019-01-10 NOTE — H&P ADULT - NSHPLABSRESULTS_GEN_ALL_CORE
LABS:                        12.3   12.52 )-----------( 307      ( 10 Roland 2019 19:00 )             39.8     01-10    145  |  102  |  23<H>  ----------------------------<  118<H>  4.6   |  23  |  1.2    Ca    9.6      10 Roland 2019 19:00  Mg     2.0     01-10    TPro  6.7  /  Alb  4.1  /  TBili  0.3  /  DBili  x   /  AST  35  /  ALT  24  /  AlkPhos  73  01-10          Troponin T, Serum: <0.01 ng/mL (01-10-19 @ 19:00)  Creatine Kinase, Serum: 91 U/L (01-10-19 @ 19:00)  Lactate, Blood: 1.7 mmol/L (01-10-19 @ 19:00)      CARDIAC MARKERS ( 10 Roland 2019 19:00 )  x     / <0.01 ng/mL / 91 U/L / x     / x          < from: Transthoracic Echocardiogram (09.07.18 @ 08:04) >     1. Normal global left ventricular systolic function.   2. LV Ejection Fraction by Gould's Method with a biplane EF of 63 %.   3. Increased LV wall thickness.   4. Spectral Doppler shows impaired relaxation pattern of left   ventricular myocardial filling (Grade I diastolic dysfunction).   5. Normal left atrial size.   6. Normal right atrial size.   7. Degenerative mitral valve.   8. Mild mitral stenosis.   9. Peak transmitral mean gradient equals 2.8 mmHg, calculated mitral   valve area by pressure half time equals 1.90 cm² consistent with mild   mitral stenosis.  10. Severe mitral annular calcification.  11. Thickening of the anterior and posterior mitral valve leaflets.  12. Trace tricuspid regurgitation.  13. Heavily calcified aortic valve with mildly decreased leaflet opening   although no significant gradient.  14. LA volume Index is 16.0 ml/m² ml/m2.  15. There is moderate aortic root calcification.    < end of copied text >    < from: 12 Lead ECG (09.06.18 @ 09:46) >    Normal sinus rhythm  Normal ECG    < end of copied text >

## 2019-01-10 NOTE — ED PROVIDER NOTE - PHYSICAL EXAMINATION
CONSTITUTIONAL: Well-developed; well-nourished; in no acute distress.   SKIN: warm, dry  HEAD: Normocephalic; atraumatic.  EYES: no conjunctival injection. PERRL.   ENT: No nasal discharge; airway clear.  NECK: Supple; non tender.  CARD: S1, S2 normal; no murmurs, gallops, or rubs. Regular rate and rhythm.   RESP: Mild diffuse wheeze  ABD: soft ntnd  EXT: Normal ROM.  No clubbing, cyanosis or edema.   LYMPH: No acute cervical adenopathy.  NEURO: Alert, oriented, grossly unremarkable  PSYCH: Cooperative, appropriate.

## 2019-01-10 NOTE — H&P ADULT - NSHPPHYSICALEXAM_GEN_ALL_CORE
PHYSICAL EXAM:  GEN: No acute distress  HEENT: NCAT  LUNGS: Chest tightness bilaterally, worse in RLL  HEART: S1/S2 present. RRR.   ABD: Soft, non-tender, non-distended. Bowel sounds present  EXT: No pitting edema  NEURO: AAOX3

## 2019-01-10 NOTE — ED PROVIDER NOTE - CARE PLAN
Principal Discharge DX:	Pneumonia  Secondary Diagnosis:	COPD exacerbation Principal Discharge DX:	Sepsis  Secondary Diagnosis:	COPD exacerbation  Secondary Diagnosis:	Pneumonia of right lower lobe due to infectious organism

## 2019-01-10 NOTE — ED PROVIDER NOTE - ATTENDING CONTRIBUTION TO CARE
Pt is a 86yo female on 3L NC (hx of COPD, Dr. Eliana Milligan) with a few days of SOB that was worse today.  Mild cough with scant sputum.  No fever, no travel.  Got flu shot this year.  No CP or syncope.    Exam: mild tachypnea, no JVD, no LE edema, no calf tenderness, 2+ radial pulses, speaking in full sentences, mildly diminished breathsounds b/l  Imp: ?COPD exacerbation  Plan: labs, xr, nebs/steroid

## 2019-01-10 NOTE — ED ADULT NURSE REASSESSMENT NOTE - NS ED NURSE REASSESS COMMENT FT1
Pt resting comfortably in bed. Second nebulizer treatment running. No signs of distress noted. Pt on NC now, instead of mask for supplemental O2. Will continue to monitor

## 2019-01-10 NOTE — ED PROVIDER NOTE - PROGRESS NOTE DETAILS
pt feels much better, currently on neb, satting 98. Will continue to observe, trial on home o2 after nebs.

## 2019-01-10 NOTE — ED ADULT NURSE NOTE - INTERVENTIONS DEFINITIONS
[place] to call system/Monitor gait and stability/Instruct patient to call for assistance/Stretcher in lowest position, wheels locked, appropriate side rails in place/Call bell, personal items and telephone within reach/Review medications for side effects contributing to fall risk/Non-slip footwear when patient is off stretcher/Physically safe environment: no spills, clutter or unnecessary equipment

## 2019-01-10 NOTE — ED ADULT NURSE NOTE - OBJECTIVE STATEMENT
SOB x 3 days worsening today, GUSTAFSON x today, on home 02 normally 2L at 95%, increased to 3L and saturation was 88% as per her son. Intermittent non-productive cough, no fevers.

## 2019-01-10 NOTE — ED PROVIDER NOTE - OBJECTIVE STATEMENT
86 y/o female with PMH of PVD, COPD, Kidney disease, chronic, stage I, HTN, HLD, CVA who presents to ED for increased WOB today. Pt was recently started on home O2, 3L. No cough, congestion, chest pain.

## 2019-01-10 NOTE — H&P ADULT - ASSESSMENT
86 yo F with PMHx of COPD on 2 L NC, PVD, CKD3, essential HTN, DLD, hx of CVA, ex-smoker, recent admission in September 2018 for TIA who presented with worsening shortness of breath x 3 days.     #) COPD Exacerbation possibly 2/2 viral upper respiratory tract infection, community-acquired pneumonia  - Normally on 2 L NC at home   - Will follow-up with [facility]  - Put on contact isolation with [facility]  - IV Solumedrol 60 mg BID  - Symbicort, Duonebs  - Follow-up with Pulmonary  - Continue with Rocephin and Azithromycin     #) PVD   - Continue with Pletal 100 mg BID    #) CKD3A, stable  - Follow-up as outpatient    #) Essential HTN, controlled  - Continue with Lopressor 25 mg BID    #) Chronic HFpEF   - Continue with Lasix 20 mg QD, Lopressor 25 mg BID    #) DLD  - Continue with Lipitor 10 mg QHS    #) Hx of CVA/TIA  - Continue with DAPT, Keppra 500 mg BID    #) DVT ppx: Lovenox 40 mg QD  #) GI ppx: PO Protonix 40 mg QD    #) Activity: Ambulate as tolerated, OOBTC  #) Diet: Low sodium    #) Dispo: Home  #) Full Code 86 yo F with PMHx of COPD on 2 L NC, PVD, CKD3, essential HTN, DLD, hx of CVA, ex-smoker, recent admission in September 2018 for TIA who presented with worsening shortness of breath x 3 days.     #)  Acute on chronic respiratory failure - COPD exacerbation   poibly 2/2 viral upper respiratory tract infection, community-acquired pneumonia  - Normally on 2 L NC at home   - Will follow-up with [facility]  - Put on contact isolation with [facility]  - IV Solumedrol 60 mg BID  - Symbicort, Duonebs  - Follow-up with Pulmonary  - Continue with Rocephin and Azithromycin     # Pneumonia - Community acquired  - IV ceftriaxone and zithromax    #) PVD   - Continue with Pletal 100 mg BID    #) CKD3A, stable  - Follow-up as outpatient    #) Essential HTN, controlled  - Continue with Lopressor 25 mg BID    #) Chronic HFpEF   - Continue with Lasix 20 mg QD, Lopressor 25 mg BID    #) DLD  - Continue with Lipitor 10 mg QHS    #) Hx of CVA/TIA  - Continue with DAPT, Keppra 500 mg BID    #) DVT ppx: Lovenox 40 mg QD  #) GI ppx: PO Protonix 40 mg QD    #) Activity: Ambulate as tolerated, OOBTC  #) Diet: Low sodium    #) Dispo: Home  #) Full Code

## 2019-01-10 NOTE — ED PROVIDER NOTE - MEDICAL DECISION MAKING DETAILS
COPD exacebraiton - mildly improved with nebs/steroid/mg - new opacity on XR with hypoxia and leukocytosis and tachycardia - 30cc/kg given and admitted

## 2019-01-10 NOTE — ED ADULT NURSE NOTE - CHPI ED NUR SYMPTOMS NEG
no body aches/no headache/no shortness of breath/no diaphoresis/no hemoptysis/no chest pain/no chills/no wheezing/no fever

## 2019-01-10 NOTE — ED ADULT TRIAGE NOTE - CHIEF COMPLAINT QUOTE
Pt complains of increase shortness of breath. Pt has a pmh of pulmonary htn and copd. Pt states shes sob at rest. Pt normally on  2l/min of continuous oxygen

## 2019-01-10 NOTE — ED PROVIDER NOTE - NS ED ROS FT
Review of Systems:  •	CONSTITUTIONAL - No fever, No diaphoresis, No weight change  •	SKIN - No rash  •	HEMATOLOGIC - No abnormal bleeding or bruising  •	EYES - No eye pain, No blurred vision  •	ENT - No change in hearing, No sore throat, No neck pain, No rhinorrhea, No ear pain  •	RESPIRATORY - as per hpi  •	CARDIAC -No chest pain, No palpitations  •	GI - No abdominal pain, No nausea, No vomiting, No diarrhea, No constipation, No bright red blood per rectum or melena. No flank pain  •                 - No dysuria, frequency, hematuria.   •	ENDO - No polydypsia, No polyuria, No heat/cold intolerance  •	MUSCULOSKELETAL - No joint paint, No swelling, No back pain  •	NEUROLOGIC - No numbness, No focal weakness, No headache, No dizziness  All other systems negative, unless specified in HPI

## 2019-01-11 PROBLEM — J44.9 CHRONIC OBSTRUCTIVE PULMONARY DISEASE, UNSPECIFIED: Chronic | Status: ACTIVE | Noted: 2018-01-01

## 2019-01-11 PROBLEM — I73.9 PERIPHERAL VASCULAR DISEASE, UNSPECIFIED: Chronic | Status: ACTIVE | Noted: 2018-01-01

## 2019-01-11 NOTE — CONSULT NOTE ADULT - ASSESSMENT
acute on chronic hypoxic respiratory failure  pneumonia  copd exacerbation  chronic diastolic chf      agree with present management  oxygen per pulse oximetry  bronchodilators  iv steroids  empiric antibiotics  cardiac medications, lasix  gi/dvt prophylaxis    thank you for the courtesy of this consultation, will follow with you

## 2019-01-11 NOTE — PROGRESS NOTE ADULT - SUBJECTIVE AND OBJECTIVE BOX
Patient is a 85y old  Female who presents with a chief complaint of Shortness of breath x 3 days (10 Roland 2019 22:19)        Over Night Events:        ROS:  See HPI    PHYSICAL EXAM    ICU Vital Signs Last 24 Hrs  T(C): 36.7 (11 Jan 2019 08:06), Max: 36.9 (10 Roland 2019 18:44)  T(F): 98 (11 Jan 2019 08:06), Max: 98.4 (10 Roland 2019 18:44)  HR: 69 (11 Jan 2019 08:06) (69 - 144)  BP: 179/73 (11 Jan 2019 08:06) (121/56 - 179/73)  BP(mean): --  ABP: --  ABP(mean): --  RR: 18 (11 Jan 2019 08:06) (18 - 26)  SpO2: 99% (11 Jan 2019 08:06) (88% - 99%)      General:  HEENT: TOBIAS             Lymphatic system: No cervical LN   Lungs: Bilateral BS  Cardiovascular: Regular   Gastrointestinal: Soft, Positive BS  Extremities: No clubbing.  Moves extremities.  Full Range of motion   Skin: Warm, intact  Neurological: No motor or sensory deficit         LABS:                            10.2   8.07  )-----------( 239      ( 11 Jan 2019 07:14 )             33.3                                               01-11    147<H>  |  107  |  20  ----------------------------<  138<H>  4.8   |  27  |  0.8    Ca    9.0      11 Jan 2019 07:14  Phos  3.6     01-11  Mg     2.3     01-11    TPro  5.7<L>  /  Alb  3.6  /  TBili  <0.2  /  DBili  <0.2  /  AST  25  /  ALT  23  /  AlkPhos  59  01-11      PT/INR - ( 11 Jan 2019 07:14 )   PT: 11.70 sec;   INR: 1.02 ratio         PTT - ( 11 Jan 2019 07:14 )  PTT:30.5 sec                                           CARDIAC MARKERS ( 10 Roalnd 2019 19:00 )  x     / <0.01 ng/mL / 91 U/L / x     / x                                                LIVER FUNCTIONS - ( 11 Jan 2019 07:14 )  Alb: 3.6 g/dL / Pro: 5.7 g/dL / ALK PHOS: 59 U/L / ALT: 23 U/L / AST: 25 U/L / GGT: x                                                                                                                                       MEDICATIONS  (STANDING):  ALBUTerol/ipratropium for Nebulization 3 milliLiter(s) Nebulizer every 6 hours  aspirin  chewable 81 milliGRAM(s) Oral daily  atorvastatin 10 milliGRAM(s) Oral at bedtime  azithromycin  IVPB 500 milliGRAM(s) IV Intermittent every 24 hours  buDESOnide 160 MICROgram(s)/formoterol 4.5 MICROgram(s) Inhaler 2 Puff(s) Inhalation two times a day  cefTRIAXone   IVPB 1 Gram(s) IV Intermittent every 24 hours  chlorhexidine 4% Liquid 1 Application(s) Topical <User Schedule>  cilostazol 100 milliGRAM(s) Oral two times a day  clopidogrel Tablet 75 milliGRAM(s) Oral daily  enoxaparin Injectable 40 milliGRAM(s) SubCutaneous daily  furosemide    Tablet 20 milliGRAM(s) Oral <User Schedule>  levETIRAcetam 500 milliGRAM(s) Oral two times a day  methylPREDNISolone sodium succinate Injectable 60 milliGRAM(s) IV Push every 12 hours  metoprolol tartrate 25 milliGRAM(s) Oral two times a day  pantoprazole    Tablet 40 milliGRAM(s) Oral before breakfast    MEDICATIONS  (PRN):      Xrays:                                                                                     ECHO Patient is a 85y old  Female who presents with a chief complaint of Shortness of breath x 3 days (10 Roland 2019 22:19)        Over Night Events: no overnight events, patient feels better.        ROS:  See HPI    PHYSICAL EXAM    ICU Vital Signs Last 24 Hrs  T(C): 36.7 (11 Jan 2019 08:06), Max: 36.9 (10 Roland 2019 18:44)  T(F): 98 (11 Jan 2019 08:06), Max: 98.4 (10 Roland 2019 18:44)  HR: 69 (11 Jan 2019 08:06) (69 - 144)  BP: 179/73 (11 Jan 2019 08:06) (121/56 - 179/73)  BP(mean): --  ABP: --  ABP(mean): --  RR: 18 (11 Jan 2019 08:06) (18 - 26)  SpO2: 99% (11 Jan 2019 08:06) (88% - 99%)      General:  HEENT: TOBIAS             Lymphatic system: No cervical LN   Lungs: Bilateral BS  Cardiovascular: Regular   Gastrointestinal: Soft, Positive BS  Extremities: No clubbing.  Moves extremities.  Full Range of motion   Skin: Warm, intact  Neurological: AAO3., No motor or sensory deficit         LABS:                            10.2   8.07  )-----------( 239      ( 11 Jan 2019 07:14 )             33.3                                               01-11    147<H>  |  107  |  20  ----------------------------<  138<H>  4.8   |  27  |  0.8    Ca    9.0      11 Jan 2019 07:14  Phos  3.6     01-11  Mg     2.3     01-11    TPro  5.7<L>  /  Alb  3.6  /  TBili  <0.2  /  DBili  <0.2  /  AST  25  /  ALT  23  /  AlkPhos  59  01-11      PT/INR - ( 11 Jan 2019 07:14 )   PT: 11.70 sec;   INR: 1.02 ratio         PTT - ( 11 Jan 2019 07:14 )  PTT:30.5 sec                                           CARDIAC MARKERS ( 10 Roland 2019 19:00 )  x     / <0.01 ng/mL / 91 U/L / x     / x                                                LIVER FUNCTIONS - ( 11 Jan 2019 07:14 )  Alb: 3.6 g/dL / Pro: 5.7 g/dL / ALK PHOS: 59 U/L / ALT: 23 U/L / AST: 25 U/L / GGT: x                                                                                                                                       MEDICATIONS  (STANDING):  ALBUTerol/ipratropium for Nebulization 3 milliLiter(s) Nebulizer every 6 hours  aspirin  chewable 81 milliGRAM(s) Oral daily  atorvastatin 10 milliGRAM(s) Oral at bedtime  azithromycin  IVPB 500 milliGRAM(s) IV Intermittent every 24 hours  buDESOnide 160 MICROgram(s)/formoterol 4.5 MICROgram(s) Inhaler 2 Puff(s) Inhalation two times a day  cefTRIAXone   IVPB 1 Gram(s) IV Intermittent every 24 hours  chlorhexidine 4% Liquid 1 Application(s) Topical <User Schedule>  cilostazol 100 milliGRAM(s) Oral two times a day  clopidogrel Tablet 75 milliGRAM(s) Oral daily  enoxaparin Injectable 40 milliGRAM(s) SubCutaneous daily  furosemide    Tablet 20 milliGRAM(s) Oral <User Schedule>  levETIRAcetam 500 milliGRAM(s) Oral two times a day  methylPREDNISolone sodium succinate Injectable 60 milliGRAM(s) IV Push every 12 hours  metoprolol tartrate 25 milliGRAM(s) Oral two times a day  pantoprazole    Tablet 40 milliGRAM(s) Oral before breakfast    MEDICATIONS  (PRN):      Xrays:                                                                                     ECHO

## 2019-01-11 NOTE — ED ADULT NURSE REASSESSMENT NOTE - NS ED NURSE REASSESS COMMENT FT1
Received pt during change of shift, pt alert and oriented, speaking in full coherent sentences, respirations are even and unlabored upon rest. Family at bedside, vs wnl, no immediate concerns at this time.

## 2019-01-11 NOTE — PROGRESS NOTE ADULT - ASSESSMENT
86 yo F with PMHx of COPD on 2 L NC, PVD, CKD3, essential HTN, DLD, hx of CVA, ex-smoker, recent admission in September 2018 for TIA who presented with worsening shortness of breath x 3 days.     #) COPD Exacerbation possibly 2/2 viral upper respiratory tract infection, community-acquired pneumonia  - Normally on 2 L NC at home   - Will follow-up with [facility]  - Put on contact isolation with [facility]  - IV Solumedrol 60 mg BID  - Symbicort, Duonebs  - Follow-up with Pulmonary  - Continue with Rocephin and Azithromycin     #) PVD   - Continue with Pletal 100 mg BID    #) CKD3A, stable  - Follow-up as outpatient    #) Essential HTN, controlled  - Continue with Lopressor 25 mg BID    #) Chronic HFpEF   - Continue with Lasix 20 mg QD, Lopressor 25 mg BID    #) DLD  - Continue with Lipitor 10 mg QHS    #) Hx of CVA/TIA  - Continue with DAPT, Keppra 500 mg BID    #) DVT ppx: Lovenox 40 mg QD  #) GI ppx: PO Protonix 40 mg QD    #) Activity: Ambulate as tolerated, OOBTC  #) Diet: Low sodium    #) Dispo: Home  #) Full Code 86 yo F with PMHx of COPD on 2 L NC, PVD, CKD3, essential HTN, DLD, hx of CVA, ex-smoker, recent admission in September 2018 for TIA who presented with worsening shortness of breath x 3 days.     #) COPD Exacerbation possibly 2/2 viral upper respiratory tract infection vs GNR PNA  - Normally on 2 L NC at home   - patient feels better today  - no desaturation or respiratory distress  - IV Solumedrol 60 mg BID, switch to PO tomorrow  - Symbicort, Duonebs  - Follow-up with Pulmonary  - Continue with Rocephin and Azithromycin     #) PVD   - Continue with Pletal 100 mg BID    #) CKD3A, stable  - Follow-up as outpatient    #) Essential HTN, controlled  - Continue with Lopressor 25 mg BID    #) Chronic HFpEF   - Continue with Lasix 20 mg QD, Lopressor 25 mg BID    #) DLD  - Continue with Lipitor 10 mg QHS    #) Hx of CVA/TIA  - Continue with DAPT, Keppra 500 mg BID    #) DVT ppx: Lovenox 40 mg QD  #) GI ppx: PO Protonix 40 mg QD    #) Activity: Ambulate as tolerated, OOBTC  #) Diet: Low sodium    #) Dispo: Home  #) Full Code

## 2019-01-11 NOTE — CONSULT NOTE ADULT - SUBJECTIVE AND OBJECTIVE BOX
LILA VELA  MRN-[id number]      HISTORY OF PRESENT ILLNESS:  Patient is a 85y old  Female who presents with a chief complaint of Shortness of breath x 3 days (11 Jan 2019 09:27) or more since joy along with increased cough with scanty sputum; increased albuterol use without improvement; feels better since admission to the hospital; no fever, chills or sweats        PMH/PSH:  PAST MEDICAL & SURGICAL HISTORY:  PVD (peripheral vascular disease)  COPD (chronic obstructive pulmonary disease)  Kidney disease, chronic, stage I (GFR over 89 ml/min)  Peripheral vascular disease  HTN (hypertension)  High cholesterol  CVA (cerebral vascular accident)  H/O aorto-femoral bypass  History of right common carotid artery stent placement    ALLERGIES:  Allergies    No Known Allergies    Intolerances      SOCIAL HABITS: ex smoker  FAMILY HISTORY:   FAMILY HISTORY:  Family history of atherosclerosis (Mother)      REVIEW OF SYSTEM:  Elements of review of systems are negative or non-applicable except as noted above in HPI section.       HOME MEDICATIONS:  albuterol 2.5 mg/3 mL (0.083%) inhalation solution  Centrum oral tablet  furosemide 20 mg oral tablet  Pletal 100 mg oral tablet    MEDICATIONS:  MEDICATIONS  (STANDING):  ALBUTerol/ipratropium for Nebulization 3 milliLiter(s) Nebulizer every 6 hours  aspirin  chewable 81 milliGRAM(s) Oral daily  atorvastatin 10 milliGRAM(s) Oral at bedtime  azithromycin  IVPB 500 milliGRAM(s) IV Intermittent every 24 hours  buDESOnide 160 MICROgram(s)/formoterol 4.5 MICROgram(s) Inhaler 2 Puff(s) Inhalation two times a day  cefTRIAXone   IVPB 1 Gram(s) IV Intermittent every 24 hours  chlorhexidine 4% Liquid 1 Application(s) Topical <User Schedule>  cilostazol 100 milliGRAM(s) Oral two times a day  clopidogrel Tablet 75 milliGRAM(s) Oral daily  enoxaparin Injectable 40 milliGRAM(s) SubCutaneous daily  furosemide    Tablet 20 milliGRAM(s) Oral <User Schedule>  levETIRAcetam 500 milliGRAM(s) Oral two times a day  methylPREDNISolone sodium succinate Injectable 60 milliGRAM(s) IV Push every 12 hours  metoprolol tartrate 25 milliGRAM(s) Oral two times a day  pantoprazole    Tablet 40 milliGRAM(s) Oral before breakfast    MEDICATIONS  (PRN):        VITALS:   Vital Signs Last 24 Hrs  T(C): 36.7 (11 Jan 2019 08:06), Max: 36.9 (10 Roland 2019 18:44)  T(F): 98 (11 Jan 2019 08:06), Max: 98.4 (10 Roland 2019 18:44)  HR: 69 (11 Jan 2019 08:06) (69 - 144)  BP: 179/73 (11 Jan 2019 08:06) (121/56 - 179/73)  BP(mean): --  RR: 18 (11 Jan 2019 08:06) (18 - 26)  SpO2: 99% (11 Jan 2019 08:06) (88% - 99%)        PHYSICAL EXAM:    GENERAL: NAD, well-developed  HEAD:  Atraumatic, Normocephalic  NECK: Supple, No JVD  CHEST/LUNG: Clear to auscultation bilaterally; No wheeze  HEART: Regular rate and rhythm; No murmurs, rubs, or gallops  ABDOMEN: Soft, Nontender, Nondistended; Bowel sounds present  EXTREMITIES:  2+ Peripheral Pulses, No clubbing, cyanosis, or edema        LABS:                        10.2   8.07  )-----------( 239      ( 11 Jan 2019 07:14 )             33.3     01-11    147<H>  |  107  |  20  ----------------------------<  138<H>  4.8   |  27  |  0.8    Ca    9.0      11 Jan 2019 07:14  Phos  3.6     01-11  Mg     2.3     01-11    TPro  5.7<L>  /  Alb  3.6  /  TBili  <0.2  /  DBili  <0.2  /  AST  25  /  ALT  23  /  AlkPhos  59  01-11    LIVER FUNCTIONS - ( 11 Jan 2019 07:14 )  Alb: 3.6 g/dL / Pro: 5.7 g/dL / ALK PHOS: 59 U/L / ALT: 23 U/L / AST: 25 U/L / GGT: x           CARDIAC MARKERS ( 10 Roland 2019 19:00 )  x     / <0.01 ng/mL / 91 U/L / x     / x          PT/INR - ( 11 Jan 2019 07:14 )   PT: 11.70 sec;   INR: 1.02 ratio         PTT - ( 11 Jan 2019 07:14 )  PTT:30.5 sec          ABG & VENT SETTINGS (when applicable)    n/a    DIAGNOSTIC STUDIES:  < from: Xray Chest 1 View- PORTABLE-Urgent (01.10.19 @ 20:25) >    Impression:      Worsening bibasilar opacities, right greater than left.    < end of copied text >

## 2019-01-12 NOTE — PROGRESS NOTE ADULT - SUBJECTIVE AND OBJECTIVE BOX
Patient is a 85y old  Female who presents with a chief complaint of Shortness of breath x 3 days (12 Jan 2019 12:52)    Interval events:  Feel better today, no active complains    PAST MEDICAL & SURGICAL HISTORY:  PVD (peripheral vascular disease)  COPD (chronic obstructive pulmonary disease)  Kidney disease, chronic, stage I (GFR over 89 ml/min)  Peripheral vascular disease  HTN (hypertension)  High cholesterol  CVA (cerebral vascular accident)  H/O aorto-femoral bypass  History of right common carotid artery stent placement      MEDICATIONS  (STANDING):  ALBUTerol/ipratropium for Nebulization 3 milliLiter(s) Nebulizer every 6 hours  aspirin  chewable 81 milliGRAM(s) Oral daily  atorvastatin 10 milliGRAM(s) Oral at bedtime  azithromycin  IVPB 500 milliGRAM(s) IV Intermittent every 24 hours  buDESOnide 160 MICROgram(s)/formoterol 4.5 MICROgram(s) Inhaler 2 Puff(s) Inhalation two times a day  cefTRIAXone   IVPB 1 Gram(s) IV Intermittent every 24 hours  chlorhexidine 4% Liquid 1 Application(s) Topical <User Schedule>  cilostazol 100 milliGRAM(s) Oral two times a day  clopidogrel Tablet 75 milliGRAM(s) Oral daily  enoxaparin Injectable 40 milliGRAM(s) SubCutaneous daily  furosemide    Tablet 20 milliGRAM(s) Oral <User Schedule>  levETIRAcetam 500 milliGRAM(s) Oral two times a day  methylPREDNISolone sodium succinate Injectable 60 milliGRAM(s) IV Push every 12 hours  metoprolol tartrate 25 milliGRAM(s) Oral two times a day  pantoprazole    Tablet 40 milliGRAM(s) Oral before breakfast    MEDICATIONS  (PRN):          Vital Signs Last 24 Hrs  T(C): 36.4 (12 Jan 2019 15:00), Max: 36.7 (11 Jan 2019 19:51)  T(F): 97.6 (12 Jan 2019 15:00), Max: 98 (11 Jan 2019 19:51)  HR: 114 (12 Jan 2019 17:19) (70 - 115)  BP: 121/60 (12 Jan 2019 17:19) (109/59 - 213/90)  BP(mean): --  RR: 17 (12 Jan 2019 17:19) (16 - 18)  SpO2: 92% (12 Jan 2019 17:19) (90% - 95%)  CAPILLARY BLOOD GLUCOSE        I&O's Summary      Physical Exam:    -     General : lying in bed comfortably no acute distress    -      Cardiac: regular rhythm, no murmur    -      Pulm: b/l clear    -      GI: soft non tender    -      Musculoskeletal: no edema    -      Neuro: ao x 3, non focal        Labs:                        11.4   21.10 )-----------( 330      ( 12 Jan 2019 07:48 )             36.7             01-12    146  |  101  |  30<H>  ----------------------------<  111<H>  4.7   |  31  |  1.1    Ca    9.9      12 Jan 2019 11:51  Phos  3.6     01-11  Mg     2.3     01-11    TPro  5.7<L>  /  Alb  3.6  /  TBili  <0.2  /  DBili  <0.2  /  AST  25  /  ALT  23  /  AlkPhos  59  01-11    LIVER FUNCTIONS - ( 11 Jan 2019 07:14 )  Alb: 3.6 g/dL / Pro: 5.7 g/dL / ALK PHOS: 59 U/L / ALT: 23 U/L / AST: 25 U/L / GGT: x                 PT/INR - ( 11 Jan 2019 07:14 )   PT: 11.70 sec;   INR: 1.02 ratio         PTT - ( 11 Jan 2019 07:14 )  PTT:30.5 sec  CARDIAC MARKERS ( 10 Roland 2019 19:00 )  x     / <0.01 ng/mL / 91 U/L / x     / x                Culture - Blood (collected 10 Roland 2019 20:20)  Source: .Blood Blood  Preliminary Report (12 Jan 2019 02:02):    No growth to date.    Culture - Blood (collected 10 Roland 2019 20:17)  Source: .Blood Blood  Preliminary Report (12 Jan 2019 02:02):    No growth to date.        Imaging:    ECG:

## 2019-01-12 NOTE — PROGRESS NOTE ADULT - SUBJECTIVE AND OBJECTIVE BOX
LILA VELA  85y, Female  Allergy: No Known Allergies      LAST 24-Hr EVENTS:  feels better today  VITALS:  T(F): 97.7 (01-12-19 @ 07:51), Max: 98 (01-11-19 @ 19:51)  HR: 115 (01-12-19 @ 07:51)  BP: 189/89 (01-12-19 @ 07:52) (140/63 - 213/90)  RR: 16 (01-12-19 @ 07:51)  SpO2: 90% (01-12-19 @ 07:51)on low flow oxygen    PHYSICAL EXAM:    GENERAL: NAD, well-developed  HEAD:  Atraumatic, Normocephalic  NECK: Supple, No JVD  CHEST/LUNG: Clear to auscultation bilaterally; No wheeze  HEART: Regular rate and rhythm; No murmurs, rubs, or gallops  ABDOMEN: Soft, Nontender, Nondistended; Bowel sounds present  EXTREMITIES:  2+ Peripheral Pulses, No clubbing, cyanosis, or edema        TESTS & MEASUREMENTS:                          11.4   21.10 )-----------( 330      ( 12 Jan 2019 07:48 )             36.7     PT/INR - ( 11 Jan 2019 07:14 )   PT: 11.70 sec;   INR: 1.02 ratio         PTT - ( 11 Jan 2019 07:14 )  PTT:30.5 sec  01-12    148<H>  |  105  |  29<H>  ----------------------------<  98  4.6   |  25  |  1.1    Ca    9.7      12 Jan 2019 07:48  Phos  3.6     01-11  Mg     2.3     01-11    TPro  5.7<L>  /  Alb  3.6  /  TBili  <0.2  /  DBili  <0.2  /  AST  25  /  ALT  23  /  AlkPhos  59  01-11    LIVER FUNCTIONS - ( 11 Jan 2019 07:14 )  Alb: 3.6 g/dL / Pro: 5.7 g/dL / ALK PHOS: 59 U/L / ALT: 23 U/L / AST: 25 U/L / GGT: x           CARDIAC MARKERS ( 10 Roland 2019 19:00 )  x     / <0.01 ng/mL / 91 U/L / x     / x            Culture - Blood (collected 01-10-19 @ 20:20)  Source: .Blood Blood  Preliminary Report (01-12-19 @ 02:02):    No growth to date.    Culture - Blood (collected 01-10-19 @ 20:17)  Source: .Blood Blood  Preliminary Report (01-12-19 @ 02:02):    No growth to date.          ABG & VENT SETTINGS: (when applicable)    n/a    RADIOLOGY & ADDITIONAL TESTS:    MEDICATIONS:  MEDICATIONS  (STANDING):  ALBUTerol/ipratropium for Nebulization 3 milliLiter(s) Nebulizer every 6 hours  aspirin  chewable 81 milliGRAM(s) Oral daily  atorvastatin 10 milliGRAM(s) Oral at bedtime  azithromycin  IVPB 500 milliGRAM(s) IV Intermittent every 24 hours  buDESOnide 160 MICROgram(s)/formoterol 4.5 MICROgram(s) Inhaler 2 Puff(s) Inhalation two times a day  cefTRIAXone   IVPB 1 Gram(s) IV Intermittent every 24 hours  chlorhexidine 4% Liquid 1 Application(s) Topical <User Schedule>  cilostazol 100 milliGRAM(s) Oral two times a day  clopidogrel Tablet 75 milliGRAM(s) Oral daily  enoxaparin Injectable 40 milliGRAM(s) SubCutaneous daily  furosemide    Tablet 20 milliGRAM(s) Oral <User Schedule>  levETIRAcetam 500 milliGRAM(s) Oral two times a day  methylPREDNISolone sodium succinate Injectable 60 milliGRAM(s) IV Push every 12 hours  metoprolol tartrate 25 milliGRAM(s) Oral two times a day  pantoprazole    Tablet 40 milliGRAM(s) Oral before breakfast    MEDICATIONS  (PRN):

## 2019-01-12 NOTE — PROGRESS NOTE ADULT - SUBJECTIVE AND OBJECTIVE BOX
01-12-19 @ 10:12  LILA VELA  85yFemale    Patient is a 85y old  Female who presents with a chief complaint of Shortness of breath x 3 days (11 Jan 2019 13:04)      HPI:  86 yo F with PMHx of COPD on 2 L NC, PVD, CKD3, essential HTN, DLD, hx of CVA, ex-smoker, recent admission in September 2018 for TIA who presented with worsening shortness of breath x 3 days. Normally, she is able to ambulate with oxygen without issue; however, for the past 3 days, she could barely ambulate 30 ft before feeling winded. She felt very short of breath at 6 pm. On arrival, she was 78% on 3 L NC, which improved to 96% on 8 L NC. No need for BiPap. In the ED, she received azithromycin 500 mg x 1, Rocephin 1 g x 1, Mg 2 g x 1, Solumedrol 125 mg x 1, LR 1.5 L x 1, Duoneb x 2. She denied leg swelling, orthopnea, nocturnal dyspnea, fever, chills, N/V/D, denies increased sputum production. She had obtained her pneumovax/flu shots. Her son and daughter-in-law are down with the cold since Saturday-Sunday, and they are only recovering.     She is an ex-smoker, quit in 2005 after her stroke, she had smoked for 30+ years, 1 ppd. (10 Roland 2019 22:19)      REVIEW OF SYSTEMS      General:	    Skin/Breast:  	  Ophthalmologic:  	  ENMT:	    Respiratory and Thorax:  	  Cardiovascular:	    Gastrointestinal:	    Genitourinary:	    Musculoskeletal:	    Neurological:	    Psychiatric:	    Hematology/Lymphatics:	    Endocrine:	    Allergic/Immunologic:	    No Known Allergies      Home Medications:  albuterol 2.5 mg/3 mL (0.083%) inhalation solution: 3 milliliter(s) inhaled every 6 hours (06 Sep 2018 13:40)  Centrum oral tablet: 1 tab(s) orally once a day (06 Sep 2018 13:40)  furosemide 20 mg oral tablet: 1 tab(s) orally every 7 days (06 Sep 2018 13:40)  Pletal 100 mg oral tablet: 1 tab(s) orally 2 times a day (06 Sep 2018 13:40)      MEDICATIONS  (STANDING):  ALBUTerol/ipratropium for Nebulization 3 milliLiter(s) Nebulizer every 6 hours  aspirin  chewable 81 milliGRAM(s) Oral daily  atorvastatin 10 milliGRAM(s) Oral at bedtime  azithromycin  IVPB 500 milliGRAM(s) IV Intermittent every 24 hours  buDESOnide 160 MICROgram(s)/formoterol 4.5 MICROgram(s) Inhaler 2 Puff(s) Inhalation two times a day  cefTRIAXone   IVPB 1 Gram(s) IV Intermittent every 24 hours  chlorhexidine 4% Liquid 1 Application(s) Topical <User Schedule>  cilostazol 100 milliGRAM(s) Oral two times a day  clopidogrel Tablet 75 milliGRAM(s) Oral daily  enoxaparin Injectable 40 milliGRAM(s) SubCutaneous daily  furosemide    Tablet 20 milliGRAM(s) Oral <User Schedule>  levETIRAcetam 500 milliGRAM(s) Oral two times a day  methylPREDNISolone sodium succinate Injectable 60 milliGRAM(s) IV Push every 12 hours  metoprolol tartrate 25 milliGRAM(s) Oral two times a day  pantoprazole    Tablet 40 milliGRAM(s) Oral before breakfast    MEDICATIONS  (PRN):      PAST MEDICAL & SURGICAL HISTORY:  PVD (peripheral vascular disease)  COPD (chronic obstructive pulmonary disease)  Kidney disease, chronic, stage I (GFR over 89 ml/min)  Peripheral vascular disease  HTN (hypertension)  High cholesterol  CVA (cerebral vascular accident)  H/O aorto-femoral bypass  History of right common carotid artery stent placement      T(C): 36.5 (01-12-19 @ 07:51), Max: 36.7 (01-11-19 @ 19:51)  HR: 115 (01-12-19 @ 07:51) (70 - 115)  BP: 189/89 (01-12-19 @ 07:52) (140/63 - 213/90)  RR: 16 (01-12-19 @ 07:51) (16 - 18)  SpO2: 90% (01-12-19 @ 07:51) (90% - 95%)    PHYSICAL EXAM:      Constitutional:    Eyes:    ENMT:    Neck:    Breasts:    Back:    Respiratory:    Cardiovascular:    Gastrointestinal:    Genitourinary:    Rectal:    Extremities:    Vascular:    Neurological:    Skin:    Lymph Nodes:    Musculoskeletal:    Psychiatric:                              11.4   21.10 )-----------( 330      ( 12 Jan 2019 07:48 )             36.7       01-12    148<H>  |  105  |  29<H>  ----------------------------<  98  4.6   |  25  |  1.1    Ca    9.7      12 Jan 2019 07:48  Phos  3.6     01-11  Mg     2.3     01-11    TPro  5.7<L>  /  Alb  3.6  /  TBili  <0.2  /  DBili  <0.2  /  AST  25  /  ALT  23  /  AlkPhos  59  01-11    BL / C -ve so far.       PT/INR - ( 11 Jan 2019 07:14 )   PT: 11.70 sec;   INR: 1.02 ratio         PTT - ( 11 Jan 2019 07:14 )  PTT:30.5 sec    ECG :   Ventricular Rate 97 BPM    Atrial Rate 97 BPM    P-R Interval 140 ms    QRS Duration 84 ms    Q-T Interval 354 ms    QTC Calculation(Bezet) 449 ms    P Axis 76 degrees    R Axis -71 degrees    T Axis 77 degrees    Diagnosis Line Normal sinus rhythm  Left axis deviation  Left anterior fascicular block  Anteroseptal infarct , age undetermined  Abnormal ECG    Confirmed by WINTER LOPES MD ([id number]) on 1/10/2019 11:16:59 PM    CXR :    Findings:    Support devices: Telemetry leads overlie chest    Cardiac/mediastinum/hilum: Unchanged.    Lung parenchyma/Pleura: Worsening bibasilar opacities, right greater than   left. No pneumothorax.    Skeleton/soft tissues: Unchanged.    Impression:      Worsening bibasilar opacities, right greater than left.        ELLIOT LANDAU M.D., ATTENDING RADIOLOGIST  This document has been electronically signed. Jan 11 2019  7:44AM        PNEUMONIA;COPD EXACERBATION  PVD (peripheral vascular disease)  COPD (chronic obstructive pulmonary disease)  Kidney disease, chronic, stage I (GFR over 89 ml/min)  Peripheral vascular disease  HTN (hypertension)  High cholesterol  CVA (cerebral vascular accident)  Sepsis  Pneumonia  H/O aorto-femoral bypass  History of right common carotid artery stent placement  SOB  90+  Pneumonia of right lower lobe due to infectious organism  COPD exacerbation 01-12-19 @ 10:12  LILA VELA  85yFemale  Seen in an isolation room at ER.   Comfortable, SOB less than admission. Talking w/o distress, replying all qns. normally.     HPI : as noted in admitting note. Clarified w/ pt.   Patient is a 85y old  Female who presents with a chief complaint of Shortness of breath x 3 days (11 Jan 2019 13:04)      HPI:  84 yo F with PMHx of COPD on 2 L NC, PVD, CKD3, essential HTN, DLD, hx of CVA, ex-smoker, recent admission in September 2018 for TIA who presented with worsening shortness of breath x 3 days. Normally, she is able to ambulate with oxygen without issue; however, for the past 3 days, she could barely ambulate 30 ft before feeling winded. She felt very short of breath at 6 pm. On arrival, she was 78% on 3 L NC, which improved to 96% on 8 L NC. No need for BiPap. In the ED, she received azithromycin 500 mg x 1, Rocephin 1 g x 1, Mg 2 g x 1, Solumedrol 125 mg x 1, LR 1.5 L x 1, Duoneb x 2. She denied leg swelling, orthopnea, nocturnal dyspnea, fever, chills, N/V/D, denies increased sputum production. She had obtained her pneumovax/flu shots. Her son and daughter-in-law are down with the cold since Saturday-Sunday, and they are only recovering.     She is an ex-smoker, quit in 2005 after her stroke, she had smoked for 30+ years, 1 ppd. (10 Roland 2019 22:19)      REVIEW OF SYSTEMS      General: normally active in home appropriate for age. Lives w/ children. 	    ENMT:	No undue sx.     Respiratory and Thorax:  SOB as per HPI  	  Cardiovascular:	known PVD, been stable.     Gastrointestinal:	no sx    Genitourinary:	no sx    Musculoskeletal:	 no sx    Neurological:	no sx    Psychiatric:	stable    Hematology/Lymphatics: no active bleeding	    Allergic/Immunologic:	    No Known Allergies      Home Medications:  albuterol 2.5 mg/3 mL (0.083%) inhalation solution: 3 milliliter(s) inhaled every 6 hours (06 Sep 2018 13:40)  Centrum oral tablet: 1 tab(s) orally once a day (06 Sep 2018 13:40)  furosemide 20 mg oral tablet: 1 tab(s) orally every 7 days (06 Sep 2018 13:40)  Pletal 100 mg oral tablet: 1 tab(s) orally 2 times a day (06 Sep 2018 13:40)      MEDICATIONS  (STANDING):  ALBUTerol/ipratropium for Nebulization 3 milliLiter(s) Nebulizer every 6 hours  aspirin  chewable 81 milliGRAM(s) Oral daily  atorvastatin 10 milliGRAM(s) Oral at bedtime  azithromycin  IVPB 500 milliGRAM(s) IV Intermittent every 24 hours  buDESOnide 160 MICROgram(s)/formoterol 4.5 MICROgram(s) Inhaler 2 Puff(s) Inhalation two times a day  cefTRIAXone   IVPB 1 Gram(s) IV Intermittent every 24 hours  chlorhexidine 4% Liquid 1 Application(s) Topical <User Schedule>  cilostazol 100 milliGRAM(s) Oral two times a day  clopidogrel Tablet 75 milliGRAM(s) Oral daily  enoxaparin Injectable 40 milliGRAM(s) SubCutaneous daily  furosemide    Tablet 20 milliGRAM(s) Oral <User Schedule>  levETIRAcetam 500 milliGRAM(s) Oral two times a day  methylPREDNISolone sodium succinate Injectable 60 milliGRAM(s) IV Push every 12 hours  metoprolol tartrate 25 milliGRAM(s) Oral two times a day  pantoprazole    Tablet 40 milliGRAM(s) Oral before breakfast    MEDICATIONS  (PRN):      PAST MEDICAL & SURGICAL HISTORY:  PVD (peripheral vascular disease)  COPD (chronic obstructive pulmonary disease)  Kidney disease, chronic, stage I (GFR over 89 ml/min)  Peripheral vascular disease  HTN (hypertension)  High cholesterol  CVA (cerebral vascular accident)  H/O aorto-femoral bypass  History of right common carotid artery stent placement      T(C): 36.5 (01-12-19 @ 07:51), Max: 36.7 (01-11-19 @ 19:51)  HR: 115 (01-12-19 @ 07:51) (70 - 115)  BP: 189/89 (01-12-19 @ 07:52) (140/63 - 213/90)  RR: 16 (01-12-19 @ 07:51) (16 - 18)  SpO2: 90% (01-12-19 @ 07:51) (90% - 95%)    PHYSICAL EXAM:      Constitutional: Slim built, laying flat comfortably interacting with me. No distress apparent.     Eyes: EOMI    ENMT: moist mucosa    Neck: supple. No audible bruit    Respiratory: BL AE, prolonged expn. No adv sounds.     Cardiovascular: Normal S1, S2, regular    Gastrointestinal: benign abdomen.     Extremities: no CCE,     Vascular: dors. pedis palp    Neurological: no deficit    Skin: dry warm    Lymph Nodes: WNL    Musculoskeletal: moves all limbs    Psychiatric: WNL                              11.4   21.10 )-----------( 330      ( 12 Jan 2019 07:48 )             36.7       01-12    148<H>  |  105  |  29<H>  ----------------------------<  98  4.6   |  25  |  1.1    Ca    9.7      12 Jan 2019 07:48  Phos  3.6     01-11  Mg     2.3     01-11    TPro  5.7<L>  /  Alb  3.6  /  TBili  <0.2  /  DBili  <0.2  /  AST  25  /  ALT  23  /  AlkPhos  59  01-11    BL / C -ve so far.       PT/INR - ( 11 Jan 2019 07:14 )   PT: 11.70 sec;   INR: 1.02 ratio         PTT - ( 11 Jan 2019 07:14 )  PTT:30.5 sec    ECG :   Ventricular Rate 97 BPM    Atrial Rate 97 BPM    P-R Interval 140 ms    QRS Duration 84 ms    Q-T Interval 354 ms    QTC Calculation(Bezet) 449 ms    P Axis 76 degrees    R Axis -71 degrees    T Axis 77 degrees    Diagnosis Line Normal sinus rhythm  Left axis deviation  Left anterior fascicular block  Anteroseptal infarct , age undetermined  Abnormal ECG    Confirmed by WINTER LOPES MD ([id number]) on 1/10/2019 11:16:59 PM    CXR :    Findings:    Support devices: Telemetry leads overlie chest    Cardiac/mediastinum/hilum: Unchanged.    Lung parenchyma/Pleura: Worsening bibasilar opacities, right greater than   left. No pneumothorax.    Skeleton/soft tissues: Unchanged.    Impression:      Worsening bibasilar opacities, right greater than left.        ELLIOT LANDAU M.D., ATTENDING RADIOLOGIST  This document has been electronically signed. Jan 11 2019  7:44AM        COPD EXACERBATION : Poss viral etio. Seen by Pulm. Started on IV steroid, empiric Abx. Showing betterment.   PNEUMONIA; Has BL basilar worsening changes in CXR.   LEUCOCYTOSIS : Was normal at admission. c/w 2' steroid. will f/u  PVD  : Stable, cont same.   HTN : suboptimal, likely stress induced. Cont same. Observe.   Hx TIA : Risk fx control.     Routine GI, DVT prophylaxis.     Spent 30 min in patient care / coordination.

## 2019-01-12 NOTE — PROGRESS NOTE ADULT - ASSESSMENT
acute on chronic hypoxic respiratory failure  pneumonia   copd exacerbation  chronic diastolic chf      low flow oxygen(has at home)  bronchodilators  solumedrol  antibiotics, increased wbc likely due to steroids  continue cardiac medications  diuretic  out of bed/physical therapy  gi/dvt prophylaixs  repeat chest x-ray on 1/14

## 2019-01-12 NOTE — PROGRESS NOTE ADULT - ASSESSMENT
· Assessment		  86 yo F with PMHx of COPD on 2 L NC, PVD, CKD3, essential HTN, DLD, hx of CVA, ex-smoker, recent admission in September 2018 for TIA who presented with worsening shortness of breath x 3 days.     #COPD Exacerbation possibly 2/2 viral upper respiratory tract infection vs GNR PNA  - Normally on 2 L NC at home   - patient feels better today  - IV Solumedrol 60 mg BID, continue with this for today,   - Symbicort, Duonebs  - Follow-up with Pulmonary  - Continue with Rocephin and Azithromycin     # PVD   - Continue with Pletal 100 mg BID    # CKD3A, stable  - Follow-up as outpatient    # Essential HTN, controlled  - Continue with Lopressor 25 mg BID    # Chronic HFpEF   - Continue with Lasix 20 mg QD, Lopressor 25 mg BID    # DLD  - Continue with Lipitor 10 mg QHS    # Hx of CVA/TIA  - Continue with DAPT, Keppra 500 mg BID    # DVT ppx: Lovenox 40 mg QD  # GI ppx: PO Protonix 40 mg QD    # Activity: Ambulate as tolerated, OOBTC  # Diet: Low sodium    # Dispo: Home  # Full Code · Assessment		  86 yo F with PMHx of COPD on 2 L NC, PVD, CKD3, essential HTN, DLD, hx of CVA, ex-smoker, recent admission in September 2018 for TIA who presented with worsening shortness of breath x 3 days.     #COPD Exacerbation possibly 2/2 viral upper respiratory tract infection vs GNR PNA  - Normally on 2 L NC at home   - patient feels better today  - IV Solumedrol 60 mg BID, continue with this for today,   - Symbicort, Duonebs  - Follow-up with Pulmonary  - Continue with Rocephin and Azithromycin     # Hypernatremia:  Na 146, will monitor  encourage po fluid intake    # PVD   - Continue with Pletal 100 mg BID    # CKD3A, stable  - Follow-up as outpatient    # Essential HTN, controlled  - Continue with Lopressor 25 mg BID    # Chronic HFpEF   - Continue with Lasix 20 mg QD, Lopressor 25 mg BID    # DLD  - Continue with Lipitor 10 mg QHS    # Hx of CVA/TIA  - Continue with DAPT, Keppra 500 mg BID    # DVT ppx: Lovenox 40 mg QD  # GI ppx: PO Protonix 40 mg QD    # Activity: Ambulate as tolerated, OOBTC  # Diet: Low sodium    # Dispo: Home  # Full Code

## 2019-01-13 NOTE — DISCHARGE NOTE ADULT - PATIENT PORTAL LINK FT
You can access the [facility] Patient Portal, offered by [facility], by registering with the following website: [email or web address]

## 2019-01-13 NOTE — DISCHARGE NOTE ADULT - ADDITIONAL INSTRUCTIONS
Please follow up with your primary doctor within 1 week.  Follow up with your pulmonologist within 1 week.

## 2019-01-13 NOTE — DISCHARGE NOTE ADULT - HOSPITAL COURSE
86 yo F with PMHx of COPD on 2 L NC, PVD, CKD3, essential HTN, DLD, hx of CVA, ex-smoker, recent admission in September 2018 for TIA who presented with worsening shortness of breath x 3 days. Normally, she is able to ambulate with oxygen without issue; however, for the past 3 days, she could barely ambulate 30 ft before feeling winded. She felt very short of breath at 6 pm. On arrival, she was 78% on 3 L NC, which improved to 96% on 8 L NC. No need for BiPap. In the ED, she received azithromycin 500 mg x 1, Rocephin 1 g x 1, Mg 2 g x 1, Solumedrol 125 mg x 1, LR 1.5 L x 1, Duoneb x 2. She denied leg swelling, orthopnea, nocturnal dyspnea, fever, chills, N/V/D, denies increased sputum production. She had obtained her pneumovax/flu shots. Her son and daughter-in-law are down with the cold since Saturday-Sunday, and they are only recovering.     # COPD Exacerbation possibly 2/2 viral upper respiratory tract infection vs GNR PNA  - Normally on 2 L NC at home, O2 inpatient weaned to this, stable  - IV Solumedrol 60 mg BID, as per pulm, okay to switch to PO 40 mg daily  - Symbicort, Duonebs  - On Rocephin and Azithromycin - as per pulm, okay to switch to PO Levaquin x5 days and stable for OP f/u    # Hypernatremia:  -Encourage po fluid intake    Other chronic medical conditions stable throughout admission. Patient clinically much improved. Seen by primary and pulm, stable for OP follow up, has home O2 arrangements.

## 2019-01-13 NOTE — DISCHARGE NOTE ADULT - MEDICATION SUMMARY - MEDICATIONS TO TAKE
I will START or STAY ON the medications listed below when I get home from the hospital:    predniSONE 20 mg oral tablet  -- 2 tab(s) by mouth once a day   -- It is very important that you take or use this exactly as directed.  Do not skip doses or discontinue unless directed by your doctor.  Obtain medical advice before taking any non-prescription drugs as some may affect the action of this medication.  Take with food or milk.    -- Indication: For COPD exacerbation    aspirin 81 mg oral tablet  -- 1 tab(s) by mouth 2 times a week   -- Indication: For CAD    Keppra 500 mg oral tablet  -- 1 tab(s) by mouth 2 times a day  -- Indication: For Anticonvulsants    Lipitor 10 mg oral tablet  -- 1 tab(s) by mouth once a day  -- Indication: For CAD    clopidogrel 75 mg oral tablet  -- 1 tab(s) by mouth once a day  -- Indication: For CAD    metoprolol tartrate 25 mg oral tablet  -- 1 tab(s) by mouth 2 times a day  -- Indication: For CAD    budesonide-formoterol 160 mcg-4.5 mcg/inh inhalation aerosol  -- 2 puff(s) inhaled 2 times a day   -- Indication: For COPD exacerbation    albuterol 2.5 mg/3 mL (0.083%) inhalation solution  -- 3 milliliter(s) inhaled every 6 hours  -- Indication: For COPD exacerbation    furosemide 20 mg oral tablet  -- 1 tab(s) by mouth every 7 days  -- Indication: For CHF    Pletal 100 mg oral tablet  -- 1 tab(s) by mouth 2 times a day  -- Indication: For PVD    pantoprazole 40 mg oral delayed release tablet  -- 1 tab(s) by mouth once a day (before a meal)  -- Indication: For GI protection    Levaquin 500 mg oral tablet  -- 1 tab(s) by mouth once a day   -- Avoid prolonged or excessive exposure to direct and/or artificial sunlight while taking this medication.  Do not take dairy products, antacids, or iron preparations within one hour of this medication.  Finish all this medication unless otherwise directed by prescriber.  May cause drowsiness or dizziness.  Medication should be taken with plenty of water.    -- Indication: For Pneumonia    Centrum oral tablet  -- 1 tab(s) by mouth once a day  -- Indication: For Vitamin

## 2019-01-13 NOTE — DISCHARGE NOTE ADULT - PLAN OF CARE
Antibiotic therapy and repeat imaging Please continue to take antibiotics upon discharge as directed above for 5 more days following discharge. Follow up with your primary doctor Dr. Marquez within 1 week. Medical management Please continue steroids daily upon discharge for 5 more days (1/14 - 1/18) along with pantoprazole for GI protection. Resume your home COPD medications including oxygen and use your nebulizers as needed. Follow up with your pulmonologist within 1 week following discharge for further management and repeat x-ray outpatient. Please try to stay hydrate and drink water to reduce sodium levels in your blood and follow a low-sodium diet as well. Follow up with your primary doctor within 1 week for repeat blood work to check sodium.

## 2019-01-13 NOTE — PROGRESS NOTE ADULT - REASON FOR ADMISSION
Shortness of breath x 3 days

## 2019-01-13 NOTE — DISCHARGE NOTE ADULT - CARE PROVIDER_API CALL
Rigoberto Marquez), Internal Medicine  [address] [place] [address]  Phone: [phone]  Fax: [phone]    Quan Hinojosa), Medicine  [address] [place] [address]  Phone: [phone]  Fax: [phone] Rigoberto Marquez), Internal Medicine  [address] [place] [address]  Phone: [phone]  Fax: [phone]    Eliana Milligan), Internal Medicine  [address] [place] [address]  Phone: [phone]  Fax: [phone]

## 2019-01-13 NOTE — DISCHARGE NOTE ADULT - MEDICATION SUMMARY - MEDICATIONS TO STOP TAKING
I will STOP taking the medications listed below when I get home from the hospital:    benazepril 10 mg oral tablet  -- 1 tab(s) by mouth once a day   -- Do not take this drug if you are pregnant.  It is very important that you take or use this exactly as directed.  Do not skip doses or discontinue unless directed by your doctor.  Some non-prescription drugs may aggravate your condition.  Read all labels carefully.  If a warning appears, check with your doctor before taking.

## 2019-01-13 NOTE — DISCHARGE NOTE ADULT - PROVIDER TOKENS
TOKRHEA:'[id number]' TOKRHEA:'[id number]',TOKEN:'[id number]'

## 2019-01-13 NOTE — PROGRESS NOTE ADULT - ASSESSMENT
acute on chronic hypoxic respiratory failure, improved  pneumonia  copd exacerbation  chronic diastolic chf      low flow oxygen(has at home)  bronchodilators  iv to oral steroids, can discontinue solumedrol and begin prednisone 40mg daily for 5 days  could switch antibiotic to levofloxacin 500mg daily for 5 days on discharge  cardiac medications  can repeat chest x-ray as outpatient if discharged home today  outpatient pulmonary follow up suggested

## 2019-01-13 NOTE — DISCHARGE NOTE ADULT - CARE PLAN
Principal Discharge DX:	Pneumonia of right lower lobe due to infectious organism  Goal:	Antibiotic therapy and repeat imaging  Assessment and plan of treatment:	Please continue to take antibiotics upon discharge as directed above for 5 more days following discharge. Follow up with your primary doctor Dr. Marquez within 1 week.  Secondary Diagnosis:	COPD exacerbation  Goal:	Medical management  Assessment and plan of treatment:	Please continue steroids daily upon discharge for 5 more days (1/14 - 1/18) along with pantoprazole for GI protection. Resume your home COPD medications including oxygen and use your nebulizers as needed. Follow up with your pulmonologist within 1 week following discharge for further management and repeat x-ray outpatient.  Secondary Diagnosis:	Hypernatremia  Goal:	Medical management  Assessment and plan of treatment:	Please try to stay hydrate and drink water to reduce sodium levels in your blood and follow a low-sodium diet as well. Follow up with your primary doctor within 1 week for repeat blood work to check sodium.

## 2019-01-13 NOTE — PROGRESS NOTE ADULT - SUBJECTIVE AND OBJECTIVE BOX
01-13-19 @ 10:29    LILA VELA  85y  Female  Seen at [facility] unit 8.   Comfortable. Feels OK, no ac c/o.  Didn't today, but walked yesterday.     INTERVAL EVENTS: None.     MEDICATIONS  (STANDING):  ALBUTerol/ipratropium for Nebulization 3 milliLiter(s) Nebulizer every 6 hours  aspirin  chewable 81 milliGRAM(s) Oral daily  atorvastatin 10 milliGRAM(s) Oral at bedtime  azithromycin  IVPB 500 milliGRAM(s) IV Intermittent every 24 hours  buDESOnide 160 MICROgram(s)/formoterol 4.5 MICROgram(s) Inhaler 2 Puff(s) Inhalation two times a day  cefTRIAXone   IVPB 1 Gram(s) IV Intermittent every 24 hours  chlorhexidine 4% Liquid 1 Application(s) Topical <User Schedule>  cilostazol 100 milliGRAM(s) Oral two times a day  clopidogrel Tablet 75 milliGRAM(s) Oral daily  enoxaparin Injectable 40 milliGRAM(s) SubCutaneous daily  furosemide    Tablet 20 milliGRAM(s) Oral <User Schedule>  levETIRAcetam 500 milliGRAM(s) Oral two times a day  methylPREDNISolone sodium succinate Injectable 60 milliGRAM(s) IV Push every 12 hours  metoprolol tartrate 25 milliGRAM(s) Oral two times a day  pantoprazole    Tablet 40 milliGRAM(s) Oral before breakfast    MEDICATIONS  (PRN):      T(C): 35.9 (01-13-19 @ 07:20), Max: 36.9 (01-12-19 @ 20:36)  HR: 82 (01-13-19 @ 07:20) (74 - 114)  BP: 136/63 (01-13-19 @ 07:20) (107/54 - 164/74)  RR: 18 (01-13-19 @ 07:20) (16 - 18)  SpO2: 91% (01-13-19 @ 08:24) (91% - 94%)  Wt(kg): --Vital Signs Last 24 Hrs  T(C): 35.9 (13 Jan 2019 07:20), Max: 36.9 (12 Jan 2019 20:36)  T(F): 96.6 (13 Jan 2019 07:20), Max: 98.5 (12 Jan 2019 20:36)  HR: 82 (13 Jan 2019 07:20) (74 - 114)  BP: 136/63 (13 Jan 2019 07:20) (107/54 - 164/74)  BP(mean): 91 (13 Jan 2019 07:20) (91 - 106)  RR: 18 (13 Jan 2019 07:20) (16 - 18)  SpO2: 91% (13 Jan 2019 08:24) (91% - 94%)    PHYSICAL EXAM:  GENERAL: Slim built, NAD  NECK: supple no JVD or bruit  CHEST/LUNG: AP diameter > lat. Prolonged expn. Few end exp. rhonchi audible.   HEART: S1 S2, regular.   ABDOMEN: benign  EXTREMITIES: no CCE                          11.1   14.92 )-----------( 291      ( 13 Jan 2019 07:16 )             35.1     01-13    147<H>  |  103  |  36<H>  ----------------------------<  82  3.8   |  28  |  1.2    Ca    9.2      13 Jan 2019 07:16      Bl Cult -ve so far.         RADIOLOGY & ADDITIONAL TESTS:      ASSESSMENT / PLAN  :    COPD EXACERBATION : Poss viral etio. Seen by Pulm. Started on IV steroid, empiric Abx. Showing betterment. Will change to PO if OK by pulm.   PNEUMONIA; Has BL basilar worsening changes in CXR. Cult -ve, Clinically benign, f/u CXR if sx suggests. Switch to PO abx.   LEUCOCYTOSIS : Better, 2' steroid. cultures -ve.  PVD  : Stable, cont same Tx.   HTN : Stable on rx. Cont same. Observe.   Hx TIA : Risk fx control.   Mild Hypernatremia : Observe. Avoid dehydration.   Hx HFpEF : on low dose lasix, BB. stable clinically    Ambulate. Consider early disch. Avoid nosocomial complication.   Routine DVT/GI prophylaxis.    Discussed w/ Dr. Zeinab Retana, resident in details.     Spent 30 min in care & coordination.

## 2019-01-13 NOTE — PROGRESS NOTE ADULT - SUBJECTIVE AND OBJECTIVE BOX
LILA VELA  85y, Female  Allergy: No Known Allergies      LAST 24-Hr EVENTS:  no complaints, feels back to baseline  VITALS:  T(F): 96.6 (01-13-19 @ 07:20), Max: 98.5 (01-12-19 @ 20:36)  HR: 82 (01-13-19 @ 07:20)  BP: 136/63 (01-13-19 @ 07:20) (107/54 - 164/74)  RR: 18 (01-13-19 @ 07:20)  SpO2: 91% (01-13-19 @ 08:24)on low flow oxygen    PHYSICAL EXAM:    GENERAL: NAD, well-developed  HEAD:  Atraumatic, Normocephalic  NECK: Supple, No JVD  CHEST/LUNG: Clear to auscultation bilaterally; No wheeze  HEART: Regular rate and rhythm; No murmurs, rubs, or gallops  ABDOMEN: Soft, Nontender, Nondistended; Bowel sounds present  EXTREMITIES:  2+ Peripheral Pulses, No clubbing, cyanosis, or edema        TESTS & MEASUREMENTS:    IN: 50 mL / OUT: 0 mL / NET: 50 mL                            11.1   14.92 )-----------( 291      ( 13 Jan 2019 07:16 )             35.1       01-13    147<H>  |  103  |  36<H>  ----------------------------<  82  3.8   |  28  |  1.2    Ca    9.2      13 Jan 2019 07:16              Culture - Blood (collected 01-10-19 @ 20:20)  Source: .Blood Blood  Preliminary Report (01-12-19 @ 02:02):    No growth to date.    Culture - Blood (collected 01-10-19 @ 20:17)  Source: .Blood Blood  Preliminary Report (01-12-19 @ 02:02):    No growth to date.          ABG & VENT SETTINGS: (when applicable)  n/a      RADIOLOGY & ADDITIONAL TESTS:    MEDICATIONS:  MEDICATIONS  (STANDING):  ALBUTerol/ipratropium for Nebulization 3 milliLiter(s) Nebulizer every 6 hours  aspirin  chewable 81 milliGRAM(s) Oral daily  atorvastatin 10 milliGRAM(s) Oral at bedtime  azithromycin  IVPB 500 milliGRAM(s) IV Intermittent every 24 hours  buDESOnide 160 MICROgram(s)/formoterol 4.5 MICROgram(s) Inhaler 2 Puff(s) Inhalation two times a day  cefTRIAXone   IVPB 1 Gram(s) IV Intermittent every 24 hours  chlorhexidine 4% Liquid 1 Application(s) Topical <User Schedule>  cilostazol 100 milliGRAM(s) Oral two times a day  clopidogrel Tablet 75 milliGRAM(s) Oral daily  enoxaparin Injectable 40 milliGRAM(s) SubCutaneous daily  furosemide    Tablet 20 milliGRAM(s) Oral <User Schedule>  levETIRAcetam 500 milliGRAM(s) Oral two times a day  methylPREDNISolone sodium succinate Injectable 60 milliGRAM(s) IV Push every 12 hours  metoprolol tartrate 25 milliGRAM(s) Oral two times a day  pantoprazole    Tablet 40 milliGRAM(s) Oral before breakfast    MEDICATIONS  (PRN):

## 2019-01-21 NOTE — H&P ADULT - NSHPPHYSICALEXAM_GEN_ALL_CORE
T(C): 37.9 (01-21-19 @ 12:29), Max: 37.9 (01-21-19 @ 12:29)  HR: 123 (01-21-19 @ 12:29) (123 - 130)  BP: 142/83 (01-21-19 @ 12:29) (138/65 - 143/63)  RR: 20 (01-21-19 @ 12:29) (20 - 24)  SpO2: 99% (01-21-19 @ 12:29) (79% - 99%)    PHYSICAL EXAM:  GENERAL: NAD, well-developed, calm on BiPAP  HEAD:  Atraumatic, Normocephalic  EYES: EOMI, PERRLA, conjunctiva and sclera clear  NECK: Supple, No JVD  CHEST/LUNG:  bilateral diffuse rhonchi, wheezing  HEART: Regular rate and rhythm; No murmurs, rubs, or gallops  ABDOMEN: Soft, Nontender, Nondistended; Bowel sounds present  EXTREMITIES:  2+ Peripheral Pulses, No clubbing, cyanosis, or edema  PSYCH: AAOx3  NEUROLOGY: non-focal  SKIN: No rashes or lesions

## 2019-01-21 NOTE — ED ADULT TRIAGE NOTE - AS O2 DELIVERY
Pt in c/o left side chest pain and sob since yesterday pt states worse with cough. Pt states dry cough. Denies n/v/d. Pt given 5mg albuterol, 324mg aspirin and 1 nitro tablet by ems. No relief after nitro tablet. Pt bgl 180 with ems. Pt states pain worse with deep breathing. supplemental O2

## 2019-01-21 NOTE — CONSULT NOTE ADULT - ASSESSMENT
IMPRESSION:  Acute on chronic hypoxic respiratory failure  Chronic hypercapnic respiratory failure  COPD exacerbation with likely underlying HCAP  HO HFpEF    PLAN:    CNS: No excessive sedation    HEENT: Oral care    PULMONARY: HOB at 45 degrees; Wean NIV q 4hr on/off and qHS; Target So2 > 88%; Nebs q 4h and PRN; Solumedrol 60 q8    CARDIOVASCULAR: Keep I=<O; CE x2    GI: GI prophylaxis.  Feeding once off BiPAP; S/S eval    RENAL: FU lytes     INFECTIOUS DISEASE: Panculture; MRSA nasal swab; RVP swab;  Vanco, Olga Lidia, levaquin for now;     HEMATOLOGICAL:  DVT prophylaxis.    ENDOCRINE:  Follow up FS.  Insulin protocol if needed.    MICU monitoring for now IMPRESSION:  Acute on chronic hypoxic respiratory failure  Chronic hypercapnic respiratory failure  COPD exacerbation with likely underlying HCAP  HO HFpEF    PLAN:    CNS: No excessive sedation    HEENT: Oral care    PULMONARY: HOB at 45 degrees; Wean NIV q 4hr on/off and qHS; Target So2 > 88%; Nebs q 4h and PRN; Solumedrol 60 q12    CARDIOVASCULAR: Keep I=<O; CE x2    GI: GI prophylaxis.  Feeding once off BiPAP; S/S eval    RENAL: FU lytes     INFECTIOUS DISEASE: Panculture; MRSA nasal swab; RVP swab;  Vanco, Olga Lidia, levaquin for now;     HEMATOLOGICAL:  DVT prophylaxis. Duplex LE    ENDOCRINE:  Follow up FS.  Insulin protocol if needed.    MICU monitoring for now IMPRESSION:    Acute on chronic hypoxic respiratory failure  COPD exacerbation with likely underlying HCAP  HO HFpEF    PLAN:    CNS: avoid CNS depressant    HEENT: Oral care    PULMONARY: HOB at 45 degrees; Wean NIV q 4hr on/off and qHS; Target So2 > 88%; Nebs q 4h and PRN; Solumedrol 60 q12    CARDIOVASCULAR; CE x2 echo    GI: GI prophylaxis.  Feeding once off BiPAP; S/S eval    RENAL: FU lytes     INFECTIOUS DISEASE: Panculture; MRSA nasal swab; RVP swab;  Vanco, Olga Lidia,  for now;     HEMATOLOGICAL:  DVT prophylaxis. Duplex LE    ENDOCRINE:  Follow up FS.  Insulin protocol if needed.    MICU monitoring for now  very poor prognosis high risk for intubation, advance directives

## 2019-01-21 NOTE — ED ADULT NURSE NOTE - ED STAT RN HANDOFF DETAILS
endorsed pt to ccu rn, pt tolerating bipap well, o2 sat maintained. education and emotional support provided to pt and family

## 2019-01-21 NOTE — H&P ADULT - NSHPLABSRESULTS_GEN_ALL_CORE
11.1   20.17 )-----------( 212      ( 21 Jan 2019 11:16 )             35.6     01-21    141  |  97<L>  |  28<H>  ----------------------------<  112<H>  4.0   |  29  |  1.2    Ca    9.1      21 Jan 2019 11:16    TPro  6.4  /  Alb  3.9  /  TBili  0.7  /  DBili  x   /  AST  52<H>  /  ALT  49<H>  /  AlkPhos  78  01-21        Lactate Trend  01-21 @ 11:16 Lactate:2.2       CARDIAC MARKERS ( 21 Jan 2019 11:16 )  x     / 0.02 ng/mL / x     / x     / x            < from: Xray Chest 1 View AP/PA (01.21.19 @ 12:17) >    Worsening bibasilar opacities, right greater than left, and right pleural   effusion.      < end of copied text >    < from: 12 Lead ECG (01.21.19 @ 11:35) >    Diagnosis Line Sinus tachycardia with occasional Premature ventricular complexes  Premature atrial complexes  Possible Left atrial enlargement  Rightward axis  Nonspecific ST and T wave abnormality Anterolateral ischemia Possible  Abnormal ECG    < end of copied text >  < from: Transthoracic Echocardiogram (09.07.18 @ 08:04) >    1. Normal global left ventricular systolic function.   2. LV Ejection Fraction by Gould's Method with a biplane EF of 63 %.   3. Increased LV wall thickness.   4. Spectral Doppler shows impaired relaxation pattern of left   ventricular myocardial filling (Grade I diastolic dysfunction).   5. Normal left atrial size.   6. Normal right atrial size.   7. Degenerative mitral valve.   8. Mild mitral stenosis.   9. Peak transmitral mean gradient equals 2.8 mmHg, calculated mitral   valve area by pressure half time equals 1.90 cm² consistent with mild   mitral stenosis.  10. Severe mitral annular calcification.  11. Thickening of the anterior and posterior mitral valve leaflets.  12. Trace tricuspid regurgitation.  13. Heavily calcified aortic valve with mildly decreased leaflet opening   although no significant gradient.  14. LA volume Index is 16.0 ml/m² ml/m2.  15. There is moderate aortic root calcification.      < end of copied text >

## 2019-01-21 NOTE — ED PROVIDER NOTE - CARE PLAN
Principal Discharge DX:	Sepsis  Secondary Diagnosis:	Pneumonia  Secondary Diagnosis:	CHF (congestive heart failure)  Secondary Diagnosis:	Elevated troponin

## 2019-01-21 NOTE — PATIENT PROFILE ADULT - HAS THE PATIENT EXPERIENCED ANY OF THE FOLLOWING WITHIN THE WEEK PRIOR TO ADMISSION?
****ATTENDING**** 86yo male hx CAD, Afib on AC, AS, PPM BIB son with chest pressure and SOB. States he has been having progressive symptoms for 2 weeks w increasing ARMSTRONG w limited activity. Pt states he was eval for valve replacement in [facility] and told he is not a candidate. Feels that his symptoms are related to that possibly. No recent illness, cough, uri, fever, chills.   On exam, Patient is awake,alert,oriented x 3. Patient is well appearing and in no acute distress. Patient's chest is clear to ausculation, +s1s2. Abdomen is soft nd/nt +BS. Extremity with no swelling or calf tenderness.   Pt resting comfortably. Check labs, eval for ACS, CHF, valvulopathy. no

## 2019-01-21 NOTE — ED PROVIDER NOTE - NS ED ROS FT
Constitutional: See HPI.  Eyes: No visual changes, eye pain or discharge. No Photophobia  ENMT: No neck pain or stiffness. No limited ROM  Cardiac: No edema. No chest pain with exertion.  Respiratory: see hpi  GI: No nausea, vomiting, diarrhea or abdominal pain.  : No dysuria, frequency or burning. No Discharge  MS: No myalgia, muscle weakness, joint pain or back pain.  Neuro: No headache or weakness. No LOC.  Skin: No skin rash.  Except as documented in the HPI, all other systems are negative.

## 2019-01-21 NOTE — ED PROVIDER NOTE - PHYSICAL EXAMINATION
AOx4, frail elderly female, increased work of breathing, speaking few words at time.   Skin - warm and dry, no acute rash.   Head - normocephalic, atraumatic.   Eyes - PERRLA/EOMI, conjunctiva and sclera clear.   ENT- MM moist, no nasal discharge.  Pharynx unremarkable.   Neck - supple nt, no meningeal signs.   Heart - tachy rate, s1s2 nl, no rub/murmur.   Lungs- +supraclavicular retractions, wheezing diffusely.   Abdomen - soft ntnd no r/g.   Extremities-  No LE edema, calves nttp b/l.

## 2019-01-21 NOTE — CONSULT NOTE ADULT - SUBJECTIVE AND OBJECTIVE BOX
Patient is a 85y old  Female who presents with a chief complaint of     HPI:  4 yo F pmh of pulmonary HTN, COPD, CVA, PVD presents with shortness of breath. Recent hospitalization for pneumonia and was discharged on Levaquin, course of antibiotics finished 3 days ago. States that last night she started having some shortness of breath which worsened today. EMS found her to be hypoxic to the 70s on room air. No fevers, no chest pain, no n/v, no abdominal pain, no leg swelling. On arrival patient, hypoxic to 67%, + tachypnea, speaking few words at a time. + wheezing, bilaterally, clavicular retractions.  S1, S2, abdomen soft non tender, no pedal edema. On arrival patient was placed on bipap,     PAST MEDICAL & SURGICAL HISTORY:  PVD (peripheral vascular disease)  COPD (chronic obstructive pulmonary disease)  Kidney disease, chronic, stage I (GFR over 89 ml/min)  Peripheral vascular disease  HTN (hypertension)  High cholesterol  CVA (cerebral vascular accident)  H/O aorto-femoral bypass  History of right common carotid artery stent placement      SOCIAL HX:   Smoking   exsmoker          FAMILY HISTORY:  Family history of atherosclerosis (Mother)      Review of system:  See HPI    Allergies    No Known Allergies    Intolerances    PHYSICAL EXAM    ICU Vital Signs Last 24 Hrs  T(C): 37.9 (21 Jan 2019 12:29), Max: 37.9 (21 Jan 2019 12:29)  T(F): 100.3 (21 Jan 2019 12:29), Max: 100.3 (21 Jan 2019 12:29)  HR: 123 (21 Jan 2019 12:29) (123 - 130)  BP: 142/83 (21 Jan 2019 12:29) (138/65 - 143/63)  RR: 20 (21 Jan 2019 12:29) (20 - 24)  SpO2: 99% (21 Jan 2019 12:29) (79% - 99%)    I&O's Detail    General: Comfortable in bed; Cachectic on NIV  HEENT:  On NIV  Lymph node: No palpable LN             Lungs: Bilateral ronchi/wheezing  Cardiovascular: RRR, S1S2  Abdomen: BS+ve; soft non tender  Extremities: No LE edema  Skin:  No evident Rash  Neurological:   No focal deficit      LABS:                          11.1   20.17 )-----------( 212      ( 21 Jan 2019 11:16 )             35.6   01-21    141  |  97<L>  |  28<H>  ----------------------------<  112<H>  4.0   |  29  |  1.2    Ca    9.1      21 Jan 2019 11:16    TPro  6.4  /  Alb  3.9  /  TBili  0.7  /  DBili  x   /  AST  52<H>  /  ALT  49<H>  /  AlkPhos  78  01-21    CARDIAC MARKERS ( 21 Jan 2019 11:16 )  x     / 0.02 ng/mL / x     / x     / x        LIVER FUNCTIONS - ( 21 Jan 2019 11:16 )  Alb: 3.9 g/dL / Pro: 6.4 g/dL / ALK PHOS: 78 U/L / ALT: 49 U/L / AST: 52 U/L / GGT: x                                          Serum Pro-Brain Natriuretic Peptide: 3600 pg/mL (01-21-19 @ 11:16)    Blood Gas Profile - Venous (01.21.19 @ 12:03)    pH, Venous: 7.38    pCO2, Venous: 56 mmHg    Lactate (01-21-19 @ 12:03): 2.0<H>  Lactate (01-21-19 @ 11:16): 2.2      MEDICATIONS  (STANDING):    MEDICATIONS  (PRN):      Radiology:  Cxr: Worsening right basilar opacity; Patient is a 85y old  Female who presents with a chief complaint of     HPI:  4 yo F pmh of pulmonary HTN, COPD, CVA, PVD presents with shortness of breath. Recent hospitalization for pneumonia and was discharged on Levaquin, course of antibiotics finished 3 days ago. States that last night she started having some shortness of breath which worsened today. EMS found her to be hypoxic to the 70s on room air. No fevers, no chest pain, no n/v, no abdominal pain, no leg swelling. On arrival patient, hypoxic to 67%, + tachypnea, speaking few words at a time. + wheezing, bilaterally, clavicular retractions.  S1, S2, abdomen soft non tender, no pedal edema. On arrival patient was placed on bipap,     PAST MEDICAL & SURGICAL HISTORY:  PVD (peripheral vascular disease)  COPD (chronic obstructive pulmonary disease)  Kidney disease, chronic, stage I (GFR over 89 ml/min)  Peripheral vascular disease  HTN (hypertension)  High cholesterol  CVA (cerebral vascular accident)  H/O aorto-femoral bypass  History of right common carotid artery stent placement      SOCIAL HX:   Smoking   exsmoker          FAMILY HISTORY:  Family history of atherosclerosis (Mother)      Review of system:  See HPI    Allergies    No Known Allergies    Intolerances    PHYSICAL EXAM    ICU Vital Signs Last 24 Hrs  T(C): 37.9 (21 Jan 2019 12:29), Max: 37.9 (21 Jan 2019 12:29)  T(F): 100.3 (21 Jan 2019 12:29), Max: 100.3 (21 Jan 2019 12:29)  HR: 123 (21 Jan 2019 12:29) (123 - 130)  BP: 142/83 (21 Jan 2019 12:29) (138/65 - 143/63)  RR: 20 (21 Jan 2019 12:29) (20 - 24)  SpO2: 99% (21 Jan 2019 12:29) (79% - 99%)    I&O's Detail    General: Comfortable in bed; Cachectic on NIV  HEENT:  On NIV  Lymph node: No palpable LN             Lungs: Bilateral ronchi; Mild bilateral wheezing  Cardiovascular: RRR, S1S2  Abdomen: BS+ve; soft non tender  Extremities: No LE edema  Skin:  No evident Rash  Neurological:   No focal deficit      LABS:                          11.1   20.17 )-----------( 212      ( 21 Jan 2019 11:16 )             35.6   01-21    141  |  97<L>  |  28<H>  ----------------------------<  112<H>  4.0   |  29  |  1.2    Ca    9.1      21 Jan 2019 11:16    TPro  6.4  /  Alb  3.9  /  TBili  0.7  /  DBili  x   /  AST  52<H>  /  ALT  49<H>  /  AlkPhos  78  01-21    CARDIAC MARKERS ( 21 Jan 2019 11:16 )  x     / 0.02 ng/mL / x     / x     / x        LIVER FUNCTIONS - ( 21 Jan 2019 11:16 )  Alb: 3.9 g/dL / Pro: 6.4 g/dL / ALK PHOS: 78 U/L / ALT: 49 U/L / AST: 52 U/L / GGT: x                                          Serum Pro-Brain Natriuretic Peptide: 3600 pg/mL (01-21-19 @ 11:16)    Blood Gas Profile - Venous (01.21.19 @ 12:03)    pH, Venous: 7.38    pCO2, Venous: 56 mmHg    Lactate (01-21-19 @ 12:03): 2.0<H>  Lactate (01-21-19 @ 11:16): 2.2      MEDICATIONS  (STANDING):    MEDICATIONS  (PRN):      Radiology:  Cxr: Worsening right basilar opacity; Patient is a 85y old  Female who presents with a chief complaint of sob    HPI:  86 yo F pmh of pulmonary HTN, COPD home oxygen, CVA, PVD presents with shortness of breath. Recent hospitalization for pneumonia and was discharged on Levaquin, course of antibiotics finished 3 days ago. States that last night she started having some shortness of breath which worsened today. EMS found her to be hypoxic to the 70s on room air. No fevers, no chest pain, no n/v, no abdominal pain, no leg swelling. On arrival patient, hypoxic to 67%, + tachypnea, speaking few words at a time. + wheezing, bilaterally, clavicular retractions.  S1, S2, abdomen soft non tender, no pedal edema. On arrival patient was placed on bipap, CXR worsening infiltrates called to evaluate, son at bedside    PAST MEDICAL & SURGICAL HISTORY:  PVD (peripheral vascular disease)  COPD (chronic obstructive pulmonary disease)  Kidney disease, chronic, stage I (GFR over 89 ml/min)  Peripheral vascular disease  HTN (hypertension)  High cholesterol  CVA (cerebral vascular accident)  H/O aorto-femoral bypass  History of right common carotid artery stent placement      SOCIAL HX:   Smoking   exsmoker          FAMILY HISTORY:  Family history of atherosclerosis (Mother)      Review of system:  See HPI    Allergies    No Known Allergies    Intolerances    PHYSICAL EXAM    ICU Vital Signs Last 24 Hrs  T(C): 37.9 (21 Jan 2019 12:29), Max: 37.9 (21 Jan 2019 12:29)  T(F): 100.3 (21 Jan 2019 12:29), Max: 100.3 (21 Jan 2019 12:29)  HR: 123 (21 Jan 2019 12:29) (123 - 130)  BP: 142/83 (21 Jan 2019 12:29) (138/65 - 143/63)  RR: 20 (21 Jan 2019 12:29) (20 - 24)  SpO2: 99% (21 Jan 2019 12:29) (79% - 99%)    I&O's Detail    General: Comfortable in bed; Cachectic on NIV  HEENT:  On NIV  Lymph node: No palpable LN             Lungs: Bilateral ronchi; Mild bilateral wheezing  Cardiovascular: RRR, S1S2  Abdomen: BS+ve; soft non tender  Extremities: No LE edema  Skin:  No evident Rash  Neurological:   No focal deficit      LABS:                          11.1   20.17 )-----------( 212      ( 21 Jan 2019 11:16 )             35.6   01-21    141  |  97<L>  |  28<H>  ----------------------------<  112<H>  4.0   |  29  |  1.2    Ca    9.1      21 Jan 2019 11:16    TPro  6.4  /  Alb  3.9  /  TBili  0.7  /  DBili  x   /  AST  52<H>  /  ALT  49<H>  /  AlkPhos  78  01-21    CARDIAC MARKERS ( 21 Jan 2019 11:16 )  x     / 0.02 ng/mL / x     / x     / x        LIVER FUNCTIONS - ( 21 Jan 2019 11:16 )  Alb: 3.9 g/dL / Pro: 6.4 g/dL / ALK PHOS: 78 U/L / ALT: 49 U/L / AST: 52 U/L / GGT: x                                          Serum Pro-Brain Natriuretic Peptide: 3600 pg/mL (01-21-19 @ 11:16)    Blood Gas Profile - Venous (01.21.19 @ 12:03)    pH, Venous: 7.38    pCO2, Venous: 56 mmHg    Lactate (01-21-19 @ 12:03): 2.0<H>  Lactate (01-21-19 @ 11:16): 2.2      MEDICATIONS  (STANDING):    MEDICATIONS  (PRN):      Radiology:  Cxr: Worsening right basilar opacity;

## 2019-01-21 NOTE — ED ADULT NURSE NOTE - NSIMPLEMENTINTERV_GEN_ALL_ED
Implemented All Universal Safety Interventions:  [place] to call system. Call bell, personal items and telephone within reach. Instruct patient to call for assistance. Room bathroom lighting operational. Non-slip footwear when patient is off stretcher. Physically safe environment: no spills, clutter or unnecessary equipment. Stretcher in lowest position, wheels locked, appropriate side rails in place.

## 2019-01-21 NOTE — H&P ADULT - HISTORY OF PRESENT ILLNESS
84 yo F with PMHx of COPD on 2 L NC, PVD, CKD3, essential HTN, DLD, hx of CVA, ex-smoker, recently discharged from hospital Jan 13 after being admitted for pneumonia, presented for SOB of one day duration, patient has finished her course of Levaquin and prednisone on Friday and was doing well , until Sunday evening when she starting feeling SOB again that worsening today, patient denies any chest pain, cough, lower extremity swelling.  EMS found her to be hypoxic to the 70s on room air.  On arrival patient, hypoxic to 67%, + tachypnea, speaking few words at a time. + wheezing, bilaterally, clavicular retractions.  Patient was placed on BiPAP and SOB improved. In Ed patient received cefepime,  Levaquin , and solumedrol. Patient mentioned history of dysuria of 2 days duration.

## 2019-01-21 NOTE — ED PROVIDER NOTE - MEDICAL DECISION MAKING DETAILS
86 Y/O F COPD ON CONTINUOUS HOME OXYGEN, PRESENTED WITH SOB AND COUGH. PT WITH COPD EXECERBATION AND PNA. NIPPV INITIATED. SEPSIS TX INITIATED. PT ADMITTED TO ICU.

## 2019-01-21 NOTE — ED PROVIDER NOTE - OBJECTIVE STATEMENT
86 yo F with a pmhx of COPD on 2 L NC, PVD, CKD3, essential HTN, DLD, CVA, ex-smoker, presents in respiratory distress. SOB started last night, progressively worsened into this morning. Denies CP, leg swelling, abd pain, NVCD, back pain, fevers.

## 2019-01-21 NOTE — H&P ADULT - ASSESSMENT
IMPRESSION:  Acute on chronic hypoxic respiratory failure  Chronic hypercapnic respiratory failure  COPD exacerbation with likely underlying HCAP  HO HFpEF    PLAN:    CNS: No excessive sedation    HEENT: Oral care    PULMONARY: HOB at 45 degrees; Wean NIV q 4hr on/off and qHS; Target So2 > 88%; Nebs q 4h and PRN; Solumedrol 60 q12    CARDIOVASCULAR: Keep I=<O; CE x2    GI: GI prophylaxis.  Feeding once off BiPAP; S/S eval    RENAL: FU lytes     INFECTIOUS DISEASE: Panculture; MRSA nasal swab; RVP swab;  Vanco, Olga Lidia, levaquin for now;     HEMATOLOGICAL:  DVT prophylaxis. Duplex LE    ENDOCRINE:  Follow up FS.  Insulin protocol if needed.    MICU monitoring for now IMPRESSION:  Acute on chronic hypoxic respiratory failure  Chronic hypercapnic respiratory failure  COPD exacerbation Secondary to gram negative right lower lobe pneumonia   HO HFpEF    PLAN:    CNS: No excessive sedation    HEENT: Oral care    PULMONARY: HOB at 45 degrees; Wean NIV q 4hr on/off and qHS; Target So2 > 88%; Nebs q 4h and PRN; Solumedrol 60 q12    CARDIOVASCULAR: Keep I=<O; CE x2    GI: GI prophylaxis.  Feeding once off BiPAP; S/S eval    RENAL: FU lytes     INFECTIOUS DISEASE: Panculture; MRSA nasal swab; RVP swab;  Vanco, Olga Lidia, levaquin for now;     HEMATOLOGICAL:  DVT prophylaxis. Duplex LE    ENDOCRINE:  Follow up FS.  Insulin protocol if needed.    MICU monitoring for now

## 2019-01-21 NOTE — ED PROVIDER NOTE - PROGRESS NOTE DETAILS
I personally evaluated the patient. I reviewed the Resident’s or Physician Assistant’s note (as assigned above), and agree with the findings and plan except as documented in my note.   86 Y/O F COPD ON 2LNC CONTINUOUS AT HOME, PVD S/P LE BYPASS, CAROTID STENT, CVA, FORMER SMOKER, CKD STAGE 3 ADMITTED WITH COPD EXACERBATION/RSV NOW C/O SOB. Sepsis suspected at this time.   IVF bolus cannot be given due to: CKD Dr. Nair - 86 yo F pmh of pulmonary HTN, COPD, CVA, PVD presents with shortness of breath. Recent hospitalization for pneumonia and was discharged on Levaquin, course of antibiotics finished 3 days ago. States that last night she started having some shortness of breath which worsened today. EMS found her to be hypoxic to the 70s on room air. No fevers, no chest pain, no n/v, no abdominal pain, no leg swelling. On arrival patient, hypoxic to 67%, + tachypnea, speaking few words at a time. + wheezing, bilaterally, clavicular retractions.  S1, S2, abdomen soft non tender, no pedal edema. On arrival patient was placed on bipap, code sepsis was activated, antibiotics given. Will limit fluids due to CKD and pulmonary hypertension. B lines noted on ultrasound exam. I personally evaluated the patient. I reviewed the Resident’s or Physician Assistant’s note (as assigned above), and agree with the findings and plan except as documented in my note.   86 Y/O F COPD ON 2LNC CONTINUOUS AT HOME, PVD S/P LE BYPASS, CAROTID STENT, CVA, FORMER SMOKER, CKD STAGE 3 ADMITTED WITH COPD EXACERBATION/RSV NOW C/O SOB SINCE LAST NIGHT. + NONPRODUCTIVE COUGH. NO FEVER, CHILLS. NORMAL APPETITE AND PO INTAKE. NO CP. NO N/V/D. NO ABD PAIN. VITALS NOTED. ALERT OX3 MODERATE RESPIRATORY DISTRESS. + TACHYPNEA. + B/L RHONCHI AND WHEEZING. NO JVD. TACHYCARDIA REG RHYTHM. ABD- SOFT NONTENDER. NO LEG EDEMA. I personally evaluated the patient. I reviewed the Resident’s or Physician Assistant’s note (as assigned above), and agree with the findings and plan except as documented in my note.   86 Y/O F COPD ON 2LNC CONTINUOUS AT HOME, PVD S/P LE BYPASS, CAROTID STENT, CVA, FORMER SMOKER, CKD STAGE 3 ADMITTED WITH COPD EXACERBATION/RSV NOW C/O SOB SINCE LAST NIGHT. + NONPRODUCTIVE COUGH. NO FEVER, CHILLS. NORMAL APPETITE AND PO INTAKE. NO CP. NO N/V/D. NO ABD PAIN. PT ADMITTED 1/10-1/13 AND DISCHARGED ON ABX WHICH SHE COMPLETED 3 DAYS AGO. VITALS NOTED. ALERT OX3 MODERATE RESPIRATORY DISTRESS. + TACHYPNEA. + B/L RHONCHI AND WHEEZING. NO JVD. TACHYCARDIA REG RHYTHM. ABD- SOFT NONTENDER. NO LEG EDEMA. Sepsis suspected at this time.   IVF bolus cannot be given due to: CKD/CHF Attempted to contact Dr. Milligan several times. Will admit to ICU service.

## 2019-01-22 NOTE — SWALLOW BEDSIDE ASSESSMENT ADULT - ASR SWALLOW ASPIRATION MONITOR
pneumonia/throat clearing/cough/gurgly voice/change of breathing pattern/position upright (90Y)/fever/upper respiratory infection/oral hygiene

## 2019-01-22 NOTE — PROGRESS NOTE ADULT - SUBJECTIVE AND OBJECTIVE BOX
DANELILA  85y  Female      SUBJECTIVE:      Attending Progress  Note:  Hositalied 1.5  weeks ago  for pneumonia. Developed sob on 1/20/19,no chest pain,no fever.  Patient had mild cough. Sob got worse and came to ER.    REVIEW OF SYSTEMS:    T(C): 36.7 (01-22-19 @ 08:00), Max: 37.9 (01-21-19 @ 12:29)  HR: 92 (01-22-19 @ 08:00) (78 - 130)  BP: 180/86 (01-22-19 @ 08:00) (97/46 - 193/72)  RR: 20 (01-22-19 @ 08:00) (15 - 28)  SpO2: 98% (01-22-19 @ 08:00) (79% - 99%)  Wt(kg): --Vital Signs Last 24 Hrs  T(C): 36.7 (22 Jan 2019 08:00), Max: 37.9 (21 Jan 2019 12:29)  T(F): 98 (22 Jan 2019 08:00), Max: 100.3 (21 Jan 2019 12:29)  HR: 92 (22 Jan 2019 08:00) (78 - 130)  BP: 180/86 (22 Jan 2019 08:00) (97/46 - 193/72)  BP(mean): 120 (22 Jan 2019 08:00) (52 - 126)  RR: 20 (22 Jan 2019 08:00) (15 - 28)  SpO2: 98% (22 Jan 2019 08:00) (79% - 99%)      PMH:  COPD-O2 DEPENDENT  OLD CVA  HTD  HYPERLOPIDEMIA  PVD  AORTO-FEM BYPASS  RIGHT CAROTID STENT  CKD-STAGE3  DLD    PHYSICAL EXAM:  BIPAP  LUNGS-RARE RHONCHI  COR-REG, NL S1& S2  ABD-SOFT,NON TENDER  EXT-NO CALF TENDERNESS  NEURO-ALERT AND ORIENTED  LAB:                        9.5    16.25 )-----------( 191      ( 22 Jan 2019 04:11 )             29.5   01-22    140  |  99  |  30<H>  ----------------------------<  127<H>  4.4   |  25  |  1.2    Ca    8.6      22 Jan 2019 04:11  Phos  3.2     01-22  Mg     2.3     01-22    TPro  5.3<L>  /  Alb  3.3<L>  /  TBili  0.4  /  DBili  x   /  AST  30  /  ALT  32  /  AlkPhos  62  01-22      RADIOLOGY:  < from: [facility] Duplex Lower Ext Vein Scan, Bilat (01.21.19 @ 17:19) >  EXAM:  DUPLEX SCAN EXT VEINS LOWER BI            PROCEDURE DATE:  01/21/2019            INTERPRETATION:  Clinical History / Reason for exam: The patient is an   85-year-old female with history of shortness of breath. A venous duplex   examination was performed to evaluate the patient for deep venous   thrombosis of the lower extremities.    The common femoral, femoral, popliteal and lesser saphenous veins were   visualized bilaterally with no evidence of deep venous thrombosis. All   veins werefully compressible.  There was presence of spontaneous flow,   augmentation with distal compression and phasicity.    The left anterior tibial veins, posterior tibial veins, and peroneal   veins were patent.     There is thrombus present in the distal right greater saphenous vein. The   right posterior tibial vein has thrombus present, with loss of   compressibility. The peroneal and anterior tibial veins appear to be   patent.    Impression:    Thrombosis present in the right posterior tibial (calf) vein. Superficial   venous thrombophlebitis of the distal greater saphenous vein.    No evidence of deep venous thrombosis or superficial venous   thrombophlebitis of the left leg.    ICD-10: R06.02              JAVIER STEIN M.D., RESIDENT RADIOLOGIST  This document has been electronically signed.  MINDI BURGER M.D., ATTENDING VASCULAR  This document has been electronically signed. Jan 22 2019  7:17AM              < end of copied text >  < from: Xray Chest 1 View AP/PA (01.21.19 @ 12:17) >    EXAM:  XR CHEST FRONTAL 1V            PROCEDURE DATE:  01/21/2019            INTERPRETATION:  Clinical History / Reason for exam: Shortness of breath.    Comparison : Chest radiograph of 1/10/2019.    Technique/Positioning: Frontal view of the chest. Patient's chin obscures   the right apex.    Findings:    Support devices: None.    Cardiac/mediastinum/hilum: Aortic calcifications.    Lung parenchyma/Pleura: Worsening bibasilar opacities, right greater than   left, and right pleural effusion.    Skeleton/soft tissues: Stable.    Impression:      Worsening bibasilar opacities, right greater than left, and right pleural   effusion.        < end of copied text >      IMPRESSION:  ACUTE HYPOXIC  RESPIRATORY FAILURE  R/O PE  PNEUMONIA  RIGHT PLEURAL EFFUSION  RIGHT TIBIAL VEIN THROMBOSIS  ANEMIA  LEUKOCYTOSIS  CKD-STAGE 3  OLD CVA  AORTO-FEM BYPASS  RIGHT CAROTID STENT    PVD            PLAN:  CULTURE  IV ANTIBIOTICS  HEPARIN IV  DR. TANG PULMELINDA. CONSULT AND CONSIDER CT ANGIO  BIPAP  ID CONSULT-DR VALE ALEJANDRA SPENT 60 MINS. EXAMINING,EVALUATING AND COUNSELING  PATIENT AND COORDINATING CARE WITH RESIDENT

## 2019-01-22 NOTE — PROGRESS NOTE ADULT - SUBJECTIVE AND OBJECTIVE BOX
LILA VELA 85y Female  MRN#: [id number]   CODE STATUS: FULL CODE      SUBJECTIVE  Overnight events - SOB    Subjective complaints - On BiPaP. Breathing better.     OBJECTIVE  PAST MEDICAL & SURGICAL HISTORY  PVD (peripheral vascular disease)  COPD (chronic obstructive pulmonary disease)  Kidney disease, chronic, stage I (GFR over 89 ml/min)  Peripheral vascular disease  HTN (hypertension)  High cholesterol  CVA (cerebral vascular accident)  H/O aorto-femoral bypass  History of right common carotid artery stent placement                                            -----------------------------------------------------------  ALLERGIES:  No Known Allergies                                            ------------------------------------------------------------  MEDICATIONS:  STANDING MEDICATIONS  ALBUTerol    90 MICROgram(s) HFA Inhaler 1 Puff(s) Inhalation every 4 hours  ALBUTerol/ipratropium for Nebulization 3 milliLiter(s) Nebulizer every 6 hours  aspirin enteric coated 81 milliGRAM(s) Oral <User Schedule>  atorvastatin 10 milliGRAM(s) Oral at bedtime  buDESOnide 160 MICROgram(s)/formoterol 4.5 MICROgram(s) Inhaler 2 Puff(s) Inhalation two times a day  chlorhexidine 4% Liquid 1 Application(s) Topical <User Schedule>  cilostazol 100 milliGRAM(s) Oral two times a day  clopidogrel Tablet 75 milliGRAM(s) Oral daily  furosemide    Tablet 20 milliGRAM(s) Oral <User Schedule>  heparin  Infusion 900 Unit(s)/Hr IV Continuous <Continuous>  levETIRAcetam 500 milliGRAM(s) Oral two times a day  levoFLOXacin  Tablet 750 milliGRAM(s) Oral every 48 hours  meropenem  IVPB 1000 milliGRAM(s) IV Intermittent every 12 hours  methylPREDNISolone sodium succinate Injectable 60 milliGRAM(s) IV Push two times a day  metoprolol tartrate 25 milliGRAM(s) Oral two times a day  pantoprazole    Tablet 40 milliGRAM(s) Oral before breakfast  tiotropium 18 MICROgram(s) Capsule 1 Capsule(s) Inhalation daily  vancomycin  IVPB 750 milliGRAM(s) IV Intermittent every 24 hours    PRN MEDICATIONS                                            ------------------------------------------------------------  VITAL SIGNS: Last 24 Hours  T(C): 36.7 (22 Jan 2019 08:00), Max: 37.9 (21 Jan 2019 12:29)  T(F): 98 (22 Jan 2019 08:00), Max: 100.3 (21 Jan 2019 12:29)  HR: 90 (22 Jan 2019 10:00) (78 - 130)  BP: 114/56 (22 Jan 2019 10:00) (97/46 - 193/72)  BP(mean): 80 (22 Jan 2019 10:00) (52 - 126)  RR: 18 (22 Jan 2019 10:00) (15 - 28)  SpO2: 96% (22 Jan 2019 10:00) (95% - 99%)      01-21-19 @ 07:01  -  01-22-19 @ 07:00  --------------------------------------------------------  IN: 122 mL / OUT: 50 mL / NET: 72 mL                                             --------------------------------------------------------------  LABS:                        9.5    16.25 )-----------( 191      ( 22 Jan 2019 04:11 )             29.5     01-22    140  |  99  |  30<H>  ----------------------------<  127<H>  4.4   |  25  |  1.2    Ca    8.6      22 Jan 2019 04:11  Phos  3.2     01-22  Mg     2.3     01-22    TPro  5.3<L>  /  Alb  3.3<L>  /  TBili  0.4  /  DBili  x   /  AST  30  /  ALT  32  /  AlkPhos  62  01-22    PTT - ( 22 Jan 2019 04:11 )  PTT:77.0 sec    ABG - ( 21 Jan 2019 21:08 )  pH, Arterial: 7.51  pH, Blood: x     /  pCO2: 35    /  pO2: 71    / HCO3: 28    / Base Excess: 5.4   /  SaO2: 95        Troponin T, Serum: <0.01 ng/mL (01-22-19 @ 04:11)  Troponin T, Serum: <0.01 ng/mL (01-21-19 @ 21:55)    CARDIAC MARKERS ( 22 Jan 2019 04:11 )  x     / <0.01 ng/mL / x     / x     / x      CARDIAC MARKERS ( 21 Jan 2019 21:55 )  x     / <0.01 ng/mL / x     / x     / x      CARDIAC MARKERS ( 21 Jan 2019 11:16 )  x     / 0.02 ng/mL / x     / x     / x                                                  -------------------------------------------------------------  RADIOLOGY:  < from: Xray Chest 1 View- PORTABLE-Routine (01.22.19 @ 05:18) >  Impression:      Resolving right basilar opacity with residual small effusion.     Stable , less extensive, left basilar opacity. No pneumothorax    < from: Xray Chest 1 View AP/PA (01.21.19 @ 12:17) >    Impression:      Worsening bibasilar opacities, right greater than left, and right pleural   effusion.                                                --------------------------------------------------------------    PHYSICAL EXAM:    GENERAL: NAD, poorly built on bipap, AAOx3  HEENT:  Atraumatic, Normocephalic, PERRLA, No JVD  PULMONARY: Diffuse wheeze, scattered rhonchi  CARDIOVASCULAR: Regular rate and rhythm. S1 S2+; 2/6 systolic murmur  GASTROINTESTINAL: Soft, Nontender, Nondistended; Bowel sounds present  MUSCULOSKELETAL:  2+ Peripheral Pulses, No clubbing, cyanosis, or edema  NEUROLOGY: non-focal  SKIN: No rashes or lesions                                           --------------------------------------------------------------    ASSESSMENT & PLAN        #DVT prophylaxis Heparin gtt  #GI prophylaxis Protonix  #Diet   #Activity   #Code status   #Disposition                                                                              -------------------------------------------------------- LILA VELA 85y Female  MRN#: [id number]   CODE STATUS: FULL CODE      SUBJECTIVE  Overnight events - SOB    Subjective complaints - On BiPaP. Breathing better.     OBJECTIVE  PAST MEDICAL & SURGICAL HISTORY  PVD (peripheral vascular disease)  COPD (chronic obstructive pulmonary disease)  Kidney disease, chronic, stage I (GFR over 89 ml/min)  Peripheral vascular disease  HTN (hypertension)  High cholesterol  CVA (cerebral vascular accident)  H/O aorto-femoral bypass  History of right common carotid artery stent placement                                            -----------------------------------------------------------  ALLERGIES:  No Known Allergies                                            ------------------------------------------------------------  MEDICATIONS:  STANDING MEDICATIONS  ALBUTerol    90 MICROgram(s) HFA Inhaler 1 Puff(s) Inhalation every 4 hours  ALBUTerol/ipratropium for Nebulization 3 milliLiter(s) Nebulizer every 6 hours  aspirin enteric coated 81 milliGRAM(s) Oral <User Schedule>  atorvastatin 10 milliGRAM(s) Oral at bedtime  buDESOnide 160 MICROgram(s)/formoterol 4.5 MICROgram(s) Inhaler 2 Puff(s) Inhalation two times a day  chlorhexidine 4% Liquid 1 Application(s) Topical <User Schedule>  cilostazol 100 milliGRAM(s) Oral two times a day  clopidogrel Tablet 75 milliGRAM(s) Oral daily  furosemide    Tablet 20 milliGRAM(s) Oral <User Schedule>  heparin  Infusion 900 Unit(s)/Hr IV Continuous <Continuous>  levETIRAcetam 500 milliGRAM(s) Oral two times a day  levoFLOXacin  Tablet 750 milliGRAM(s) Oral every 48 hours  meropenem  IVPB 1000 milliGRAM(s) IV Intermittent every 12 hours  methylPREDNISolone sodium succinate Injectable 60 milliGRAM(s) IV Push two times a day  metoprolol tartrate 25 milliGRAM(s) Oral two times a day  pantoprazole    Tablet 40 milliGRAM(s) Oral before breakfast  tiotropium 18 MICROgram(s) Capsule 1 Capsule(s) Inhalation daily  vancomycin  IVPB 750 milliGRAM(s) IV Intermittent every 24 hours    PRN MEDICATIONS                                            ------------------------------------------------------------  VITAL SIGNS: Last 24 Hours  T(C): 36.7 (22 Jan 2019 08:00), Max: 37.9 (21 Jan 2019 12:29)  T(F): 98 (22 Jan 2019 08:00), Max: 100.3 (21 Jan 2019 12:29)  HR: 90 (22 Jan 2019 10:00) (78 - 130)  BP: 114/56 (22 Jan 2019 10:00) (97/46 - 193/72)  BP(mean): 80 (22 Jan 2019 10:00) (52 - 126)  RR: 18 (22 Jan 2019 10:00) (15 - 28)  SpO2: 96% (22 Jan 2019 10:00) (95% - 99%)      01-21-19 @ 07:01  -  01-22-19 @ 07:00  --------------------------------------------------------  IN: 122 mL / OUT: 50 mL / NET: 72 mL                                             --------------------------------------------------------------  LABS:                        9.5    16.25 )-----------( 191      ( 22 Jan 2019 04:11 )             29.5     01-22    140  |  99  |  30<H>  ----------------------------<  127<H>  4.4   |  25  |  1.2    Ca    8.6      22 Jan 2019 04:11  Phos  3.2     01-22  Mg     2.3     01-22    TPro  5.3<L>  /  Alb  3.3<L>  /  TBili  0.4  /  DBili  x   /  AST  30  /  ALT  32  /  AlkPhos  62  01-22    PTT - ( 22 Jan 2019 04:11 )  PTT:77.0 sec    ABG - ( 21 Jan 2019 21:08 )  pH, Arterial: 7.51  pH, Blood: x     /  pCO2: 35    /  pO2: 71    / HCO3: 28    / Base Excess: 5.4   /  SaO2: 95        Troponin T, Serum: <0.01 ng/mL (01-22-19 @ 04:11)  Troponin T, Serum: <0.01 ng/mL (01-21-19 @ 21:55)    CARDIAC MARKERS ( 22 Jan 2019 04:11 )  x     / <0.01 ng/mL / x     / x     / x      CARDIAC MARKERS ( 21 Jan 2019 21:55 )  x     / <0.01 ng/mL / x     / x     / x      CARDIAC MARKERS ( 21 Jan 2019 11:16 )  x     / 0.02 ng/mL / x     / x     / x                                                  -------------------------------------------------------------  RADIOLOGY:  < from: Xray Chest 1 View- PORTABLE-Routine (01.22.19 @ 05:18) >  Impression:      Resolving right basilar opacity with residual small effusion.     Stable , less extensive, left basilar opacity. No pneumothorax    < from: Xray Chest 1 View AP/PA (01.21.19 @ 12:17) >    Impression:      Worsening bibasilar opacities, right greater than left, and right pleural   effusion.                                                --------------------------------------------------------------    PHYSICAL EXAM:    GENERAL: NAD, poorly built on bipap, AAOx3  HEENT:  Atraumatic, Normocephalic, PERRLA, No JVD  PULMONARY: Diffuse wheeze, scattered rhonchi  CARDIOVASCULAR: Regular rate and rhythm. S1 S2+; 2/6 systolic murmur  GASTROINTESTINAL: Soft, Nontender, Nondistended; Bowel sounds present  MUSCULOSKELETAL:  2+ Peripheral Pulses, No clubbing, cyanosis, or edema  NEUROLOGY: non-focal  SKIN: No rashes or lesions                                           --------------------------------------------------------------    ASSESSMENT & PLAN    Acute on chronic hypoxic respiratory failure  Chronic hypercapnic respiratory failure  COPD exacerbation Secondary to gram negative right lower lobe pneumonia   HO HFpEF    PLAN:    CNS: No excessive sedation    HEENT: Oral care    PULMONARY: HOB at 45 degrees; Wean NIV q 4hr on/off and qHS; Target So2 > 88%; Nebs q 4h and PRN; Solumedrol 60 q12    CARDIOVASCULAR: Keep I=<O; CE x2    GI: GI prophylaxis.  Feeding once off BiPAP; S/S eval    RENAL: FU lytes     INFECTIOUS DISEASE: Panculture; MRSA nasal swab; RVP swab;  Vanco, Olga Lidia, levaquin for now;     HEMATOLOGICAL:  DVT prophylaxis. Duplex LE    ENDOCRINE:  Follow up FS.  Insulin protocol if needed.    MICU monitoring for now    #DVT prophylaxis Heparin gtt  #GI prophylaxis Protonix  #Diet   #Activity   #Code status   #Disposition                                                                              -------------------------------------------------------- LILA VELA 85y Female  MRN#: [id number]   CODE STATUS: FULL CODE      SUBJECTIVE  Overnight events - SOB    Subjective complaints - On BiPaP. Breathing better.     OBJECTIVE  PAST MEDICAL & SURGICAL HISTORY  PVD (peripheral vascular disease)  COPD (chronic obstructive pulmonary disease)  Kidney disease, chronic, stage I (GFR over 89 ml/min)  Peripheral vascular disease  HTN (hypertension)  High cholesterol  CVA (cerebral vascular accident)  H/O aorto-femoral bypass  History of right common carotid artery stent placement                                            -----------------------------------------------------------  ALLERGIES:  No Known Allergies                                            ------------------------------------------------------------  MEDICATIONS:  STANDING MEDICATIONS  ALBUTerol    90 MICROgram(s) HFA Inhaler 1 Puff(s) Inhalation every 4 hours  ALBUTerol/ipratropium for Nebulization 3 milliLiter(s) Nebulizer every 6 hours  aspirin enteric coated 81 milliGRAM(s) Oral <User Schedule>  atorvastatin 10 milliGRAM(s) Oral at bedtime  buDESOnide 160 MICROgram(s)/formoterol 4.5 MICROgram(s) Inhaler 2 Puff(s) Inhalation two times a day  chlorhexidine 4% Liquid 1 Application(s) Topical <User Schedule>  cilostazol 100 milliGRAM(s) Oral two times a day  clopidogrel Tablet 75 milliGRAM(s) Oral daily  furosemide    Tablet 20 milliGRAM(s) Oral <User Schedule>  heparin  Infusion 900 Unit(s)/Hr IV Continuous <Continuous>  levETIRAcetam 500 milliGRAM(s) Oral two times a day  levoFLOXacin  Tablet 750 milliGRAM(s) Oral every 48 hours  meropenem  IVPB 1000 milliGRAM(s) IV Intermittent every 12 hours  methylPREDNISolone sodium succinate Injectable 60 milliGRAM(s) IV Push two times a day  metoprolol tartrate 25 milliGRAM(s) Oral two times a day  pantoprazole    Tablet 40 milliGRAM(s) Oral before breakfast  tiotropium 18 MICROgram(s) Capsule 1 Capsule(s) Inhalation daily  vancomycin  IVPB 750 milliGRAM(s) IV Intermittent every 24 hours    PRN MEDICATIONS                                            ------------------------------------------------------------  VITAL SIGNS: Last 24 Hours  T(C): 36.7 (22 Jan 2019 08:00), Max: 37.9 (21 Jan 2019 12:29)  T(F): 98 (22 Jan 2019 08:00), Max: 100.3 (21 Jan 2019 12:29)  HR: 90 (22 Jan 2019 10:00) (78 - 130)  BP: 114/56 (22 Jan 2019 10:00) (97/46 - 193/72)  BP(mean): 80 (22 Jan 2019 10:00) (52 - 126)  RR: 18 (22 Jan 2019 10:00) (15 - 28)  SpO2: 96% (22 Jan 2019 10:00) (95% - 99%)      01-21-19 @ 07:01  -  01-22-19 @ 07:00  --------------------------------------------------------  IN: 122 mL / OUT: 50 mL / NET: 72 mL                                             --------------------------------------------------------------  LABS:                        9.5    16.25 )-----------( 191      ( 22 Jan 2019 04:11 )             29.5     01-22    140  |  99  |  30<H>  ----------------------------<  127<H>  4.4   |  25  |  1.2    Ca    8.6      22 Jan 2019 04:11  Phos  3.2     01-22  Mg     2.3     01-22    TPro  5.3<L>  /  Alb  3.3<L>  /  TBili  0.4  /  DBili  x   /  AST  30  /  ALT  32  /  AlkPhos  62  01-22    PTT - ( 22 Jan 2019 04:11 )  PTT:77.0 sec    ABG - ( 21 Jan 2019 21:08 )  pH, Arterial: 7.51  pH, Blood: x     /  pCO2: 35    /  pO2: 71    / HCO3: 28    / Base Excess: 5.4   /  SaO2: 95        Troponin T, Serum: <0.01 ng/mL (01-22-19 @ 04:11)  Troponin T, Serum: <0.01 ng/mL (01-21-19 @ 21:55)    CARDIAC MARKERS ( 22 Jan 2019 04:11 )  x     / <0.01 ng/mL / x     / x     / x      CARDIAC MARKERS ( 21 Jan 2019 21:55 )  x     / <0.01 ng/mL / x     / x     / x      CARDIAC MARKERS ( 21 Jan 2019 11:16 )  x     / 0.02 ng/mL / x     / x     / x                                                  -------------------------------------------------------------  RADIOLOGY:  < from: Xray Chest 1 View- PORTABLE-Routine (01.22.19 @ 05:18) >  Impression:      Resolving right basilar opacity with residual small effusion.     Stable , less extensive, left basilar opacity. No pneumothorax    < from: Xray Chest 1 View AP/PA (01.21.19 @ 12:17) >    Impression:      Worsening bibasilar opacities, right greater than left, and right pleural   effusion.                                                --------------------------------------------------------------    PHYSICAL EXAM:    GENERAL: NAD, poorly built on bipap, AAOx3  HEENT:  Atraumatic, Normocephalic, PERRLA, No JVD  PULMONARY: Diffuse wheeze, scattered rhonchi  CARDIOVASCULAR: Regular rate and rhythm. S1 S2+; 2/6 systolic murmur  GASTROINTESTINAL: Soft, Nontender, Nondistended; Bowel sounds present  MUSCULOSKELETAL:  2+ Peripheral Pulses, No clubbing, cyanosis, or edema  NEUROLOGY: non-focal  SKIN: No rashes or lesions                                           --------------------------------------------------------------    ASSESSMENT & PLAN    Acute on chronic hypoxic respiratory failure  Chronic hypercapnic respiratory failure  COPD exacerbation Secondary to gram negative right lower lobe pneumonia (HCAP?)  HO HFpEF    PLAN:    CNS: No excessive sedation    HEENT: Oral care    PULMONARY: HOB at 45 degrees; Wean NIV q 4hr on/off and qHS; Target So2 > 88%; Nebs q 4h and PRN; Solumedrol 60 q12; ABG; CTA on hold till pt is more stable to r/o PE; c/w heparin.     CARDIOVASCULAR: Keep I=<O; CE x2; ECHO repeat    GI: GI prophylaxis.  Feeding once off BiPAP; S/S- regular with thins    RENAL: FU lytes ; UA, urine cx.    INFECTIOUS DISEASE: Panculture; MRSA nasal swab; FLU, RSV negative;  Vanco, Olga Lidia, levaquin for now; ID eval    HEMATOLOGICAL:  DVT prophylaxis. Duplex LE- Right post tibial DVT- on heparin gtt    ENDOCRINE:  Follow up FS.  Insulin protocol if needed.    MICU monitoring for now    #DVT prophylaxis Heparin gtt  #GI prophylaxis Protonix  #Diet Regular with thins  #Activity IAT  #Code status FULL  #Disposition c/w CCU                                                                             --------------------------------------------------------

## 2019-01-22 NOTE — CONSULT NOTE ADULT - ASSESSMENT
IMPRESSION:  86 yo female with a history of O2 dependent COPD recently hospitalized for pneumonia  Readmitted for acute on chronic hypoxemic respiratory failure with worsening basilar opacity on CXR c/w HCAP  Elevated BNP suggestive of some degree of heart failure, probably HFPEF  Influenza testing NEGATIVE  Tolerating BiPAP with FiO2 40%    PLAN:  Continue BiPAP and supplemental O2 as necessary  Follow ABG and O2 sats  Agree with broad spectrum antibiotics, steroids and bronchodilators  Follow CXR  Consider repeat ECHO  Continue GI and DVT prophylaxis  Guarded prognosis  D/W CCU team

## 2019-01-22 NOTE — CONSULT NOTE ADULT - SUBJECTIVE AND OBJECTIVE BOX
Patient is a 85y old  Female who presents with a chief complaint of sob (21 Jan 2019 14:45)      HPI:  86 yo F with PMHx of COPD on 2 L NC, PVD, CKD3, essential HTN, DLD, hx of CVA, ex-smoker, recently discharged from hospital Jan 13 after being admitted for pneumonia, presented for SOB of one day duration, patient has finished her course of Levaquin and prednisone on Friday and was doing well , until Sunday evening when she starting feeling SOB again that worsening today, patient denies any chest pain, cough, lower extremity swelling.  EMS found her to be hypoxic to the 70s on room air.  On arrival patient, hypoxic to 67%, + tachypnea, speaking few words at a time. + wheezing, bilaterally, clavicular retractions.  Patient was placed on BiPAP and SOB improved. In Ed patient received cefepime,  Levaquin , and solumedrol. Patient mentioned history of dysuria of 2 days duration. (21 Jan 2019 14:45)      PAST MEDICAL & SURGICAL HISTORY:  PVD (peripheral vascular disease)  COPD (chronic obstructive pulmonary disease)  Kidney disease, chronic, stage I (GFR over 89 ml/min)  Peripheral vascular disease  HTN (hypertension)  High cholesterol  CVA (cerebral vascular accident)  H/O aorto-femoral bypass  History of right common carotid artery stent placement      Smoking History:  Ex smoker    Review of Systems:  Dyspnea  No pain, fever chills GI or  complaints    Allergies:  No Known Allergies    MEDICATIONS  (STANDING):  ALBUTerol    90 MICROgram(s) HFA Inhaler 1 Puff(s) Inhalation every 4 hours  ALBUTerol/ipratropium for Nebulization 3 milliLiter(s) Nebulizer every 6 hours  aspirin enteric coated 81 milliGRAM(s) Oral <User Schedule>  atorvastatin 10 milliGRAM(s) Oral at bedtime  buDESOnide 160 MICROgram(s)/formoterol 4.5 MICROgram(s) Inhaler 2 Puff(s) Inhalation two times a day  cilostazol 100 milliGRAM(s) Oral two times a day  clopidogrel Tablet 75 milliGRAM(s) Oral daily  furosemide    Tablet 20 milliGRAM(s) Oral <User Schedule>  heparin  Infusion 900 Unit(s)/Hr (9 mL/Hr) IV Continuous <Continuous>  levETIRAcetam 500 milliGRAM(s) Oral two times a day  levoFLOXacin  Tablet 750 milliGRAM(s) Oral every 48 hours  meropenem  IVPB 1000 milliGRAM(s) IV Intermittent every 12 hours  methylPREDNISolone sodium succinate Injectable 60 milliGRAM(s) IV Push two times a day  metoprolol tartrate 25 milliGRAM(s) Oral two times a day  pantoprazole    Tablet 40 milliGRAM(s) Oral before breakfast  tiotropium 18 MICROgram(s) Capsule 1 Capsule(s) Inhalation daily  vancomycin  IVPB 750 milliGRAM(s) IV Intermittent every 24 hours      PHYSICAL EXAM  Vital Signs Last 24 Hrs  T(C): 36.7 (22 Jan 2019 04:00), Max: 37.9 (21 Jan 2019 12:29)  T(F): 98 (22 Jan 2019 04:00), Max: 100.3 (21 Jan 2019 12:29)  HR: 94 (22 Jan 2019 06:00) (78 - 130)  BP: 178/75 (22 Jan 2019 06:00) (97/46 - 193/72)  BP(mean): 110 (22 Jan 2019 06:00) (52 - 126)  RR: 20 (22 Jan 2019 06:00) (15 - 28)  SpO2: 98% (22 Jan 2019 06:00) (79% - 99%) on BiPAP    I&O's Summary    21 Jan 2019 07:01  -  22 Jan 2019 07:00  --------------------------------------------------------  IN: 122 mL / OUT: 50 mL / NET: 72 mL    PHYSICAL EXAM:  Awake and alert moderate distress on BiPAP  Neck supple, no LN  S1 and S2 with 2/6 murmur  Lungs wheezes and few rhonchi bilaterally  Abdomen is soft and non tender  There is no edema  Neurologically non focal                        LABS:                        9.5    16.25 )-----------( 191      ( 22 Jan 2019 04:11 )             29.5                                               01-22    140  |  99  |  30<H>  ----------------------------<  127<H>  4.4   |  25  |  1.2    Ca    8.6      22 Jan 2019 04:11  Phos  3.2     01-22  Mg     2.3     01-22    TPro  5.3<L>  /  Alb  3.3<L>  /  TBili  0.4  /  DBili  x   /  AST  30  /  ALT  32  /  AlkPhos  62  01-22      PTT - ( 22 Jan 2019 04:11 )  PTT:77.0 sec                                           CARDIAC MARKERS ( 22 Jan 2019 04:11 )x     / <0.01 ng/mL / x     / x     / x      CARDIAC MARKERS ( 21 Jan 2019 21:55 )x     / <0.01 ng/mL / x     / x     / x      CARDIAC MARKERS ( 21 Jan 2019 11:16 )x     / 0.02 ng/mL / x     / x     / x                                                                                     ABG - ( 21 Jan 2019 21:08 )  pH, Arterial: 7.51  pH, Blood: x     /  pCO2: 35    /  pO2: 71    / HCO3: 28    / Base Excess: 5.4   /  SaO2: 95     on BiPAP        Serum Pro-Brain Natriuretic Peptide (01.21.19 @ 11:16)    Serum Pro-Brain Natriuretic Peptide: 3600 pg/mL      < from: Transthoracic Echocardiogram (09.07.18 @ 08:04) >   1. Normal global left ventricular systolic function.   2. LV Ejection Fraction by Gould's Method with a biplane EF of 63 %.   3. Increased LV wall thickness.   4. Spectral Doppler shows impaired relaxation pattern of left ventricular myocardial filling (Grade I diastolic dysfunction).   5. Normal left atrial size.   6. Normal right atrial size.   7. Degenerative mitral valve.   8. Mild mitral stenosis.   9. Peak transmitral mean gradient equals 2.8 mmHg, calculated mitral valve area by pressure half time equals 1.90 cm² consistent with mild mitral stenosis.  10. Severe mitral annular calcification.  11. Thickening of the anterior and posterior mitral valve leaflets.  12. Trace tricuspid regurgitation.  13. Heavily calcified aortic valve with mildly decreased leaflet opening although no significant gradient.  14. LA volume Index is 16.0 ml/m² ml/m2.  15. There is moderate aortic root calcification                  RADIOGRAPHS:  < from: Xray Chest 1 View AP/PA (01.21.19 @ 12:17) >  Comparison 1/10/19  Worsening bibasilar opacities, right greater than left, and right pleural effusion.

## 2019-01-23 NOTE — PROGRESS NOTE ADULT - SUBJECTIVE AND OBJECTIVE BOX
LILA VELA 85y Female  MRN#: [id number]   CODE STATUS:________      SUBJECTIVE  HPI    Overnight events     Subjective complaints     Present Today:   - Osorio  - Drains   - Central Line :                                             ----------------------------------------------------------  OBJECTIVE  PAST MEDICAL & SURGICAL HISTORY  PVD (peripheral vascular disease)  COPD (chronic obstructive pulmonary disease)  Kidney disease, chronic, stage I (GFR over 89 ml/min)  Peripheral vascular disease  HTN (hypertension)  High cholesterol  CVA (cerebral vascular accident)  H/O aorto-femoral bypass  History of right common carotid artery stent placement                                            -----------------------------------------------------------  ALLERGIES:  No Known Allergies                                            ------------------------------------------------------------  MEDICATIONS:  STANDING MEDICATIONS  ALBUTerol    90 MICROgram(s) HFA Inhaler 1 Puff(s) Inhalation every 4 hours  ALBUTerol/ipratropium for Nebulization 3 milliLiter(s) Nebulizer every 6 hours  aspirin enteric coated 81 milliGRAM(s) Oral <User Schedule>  atorvastatin 10 milliGRAM(s) Oral at bedtime  buDESOnide 160 MICROgram(s)/formoterol 4.5 MICROgram(s) Inhaler 2 Puff(s) Inhalation two times a day  chlorhexidine 4% Liquid 1 Application(s) Topical <User Schedule>  cilostazol 100 milliGRAM(s) Oral two times a day  clopidogrel Tablet 75 milliGRAM(s) Oral daily  enoxaparin Injectable 40 milliGRAM(s) SubCutaneous daily  furosemide    Tablet 20 milliGRAM(s) Oral <User Schedule>  levETIRAcetam 500 milliGRAM(s) Oral two times a day  meropenem  IVPB 500 milliGRAM(s) IV Intermittent every 8 hours  methylPREDNISolone sodium succinate Injectable 60 milliGRAM(s) IV Push two times a day  metoprolol tartrate 25 milliGRAM(s) Oral two times a day  pantoprazole    Tablet 40 milliGRAM(s) Oral before breakfast  tiotropium 18 MICROgram(s) Capsule 1 Capsule(s) Inhalation daily    PRN MEDICATIONS                                            ------------------------------------------------------------  VITAL SIGNS: Last 24 Hours  T(C): 36.2 (2019 08:00), Max: 36.2 (2019 00:00)  T(F): 97.2 (2019 08:00), Max: 97.2 (2019 00:00)  HR: 102 (2019 12:00) (74 - 108)  BP: 111/60 (2019 12:00) (98/57 - 180/76)  BP(mean): 86 (2019 12:00) (63 - 117)  RR: 28 (2019 12:00) (16 - 28)  SpO2: 98% (2019 12:00) (94% - 98%)      19 @ 07:19 @ 07:00  --------------------------------------------------------  IN: 887 mL / OUT: 950 mL / NET: -63 mL    19 @ 07:19 @ 13:19  --------------------------------------------------------  IN: 240 mL / OUT: 50 mL / NET: 190 mL                                             --------------------------------------------------------------  LABS:                        9.1    15.62 )-----------( 219      ( 2019 04:33 )             28.5         144  |  101  |  34<H>  ----------------------------<  140<H>  4.3   |  28  |  1.0    Ca    8.3<L>      2019 04:33  Phos  3.5       Mg     2.1         TPro  5.0<L>  /  Alb  3.2<L>  /  TBili  0.4  /  DBili  x   /  AST  28  /  ALT  32  /  AlkPhos  56      PTT - ( 2019 04:33 )  PTT:41.3 sec  Urinalysis Basic - ( 2019 02:32 )    Color: Red / Appearance: Turbid / S.025 / pH: x  Gluc: x / Ketone: 15  / Bili: Moderate / Urobili: 0.2 mg/dL   Blood: x / Protein: >=300 mg/dL / Nitrite: Negative   Leuk Esterase: Small / RBC: >50 /HPF / WBC x   Sq Epi: x / Non Sq Epi: x / Bacteria: Many /HPF      ABG - ( 2019 21:08 )  pH, Arterial: 7.51  pH, Blood: x     /  pCO2: 35    /  pO2: 71    / HCO3: 28    / Base Excess: 5.4   /  SaO2: 95                      Culture - Blood (collected 2019 04:11)  Source: .Blood None  Preliminary Report (2019 13:01):    No growth to date.    Culture - Blood (collected 2019 11:19)  Source: .Blood Blood  Preliminary Report (2019 16:01):    No growth to date.    Culture - Blood (collected 2019 11:19)  Source: .Blood Blood  Preliminary Report (2019 17:01):    No growth to date.        CARDIAC MARKERS ( 2019 04:11 )  x     / <0.01 ng/mL / x     / x     / x      CARDIAC MARKERS ( 2019 21:55 )  x     / <0.01 ng/mL / x     / x     / x                                                  -------------------------------------------------------------  RADIOLOGY:                                            --------------------------------------------------------------    PHYSICAL EXAM:    GENERAL: NAD, well-developed, AAOx3  HEENT:  Atraumatic, Normocephalic. EOMI, PERRLA, conjunctiva and sclera clear, No JVD  PULMONARY: Clear to auscultation bilaterally; No wheeze  CARDIOVASCULAR: Regular rate and rhythm; No murmurs, rubs, or gallops  GASTROINTESTINAL: Soft, Nontender, Nondistended; Bowel sounds present  MUSCULOSKELETAL:  2+ Peripheral Pulses, No clubbing, cyanosis, or edema  NEUROLOGY: non-focal  SKIN: No rashes or lesions                                           --------------------------------------------------------------    ASSESSMENT & PLAN    Past medical history and hospital course                                                                                                           ----------------------------------------------------  # DVT prophylaxis     # GI prophylaxis     # Diet     # Activity     # Code status     # Disposition                                                                              -------------------------------------------------------- LILA VELA 85y Female  MRN#: [id number]   CODE STATUS: FULL CODE    SUBJECTIVE  Overnight events - Hematuria+     Subjective complaints - Pt on BiPaP                                          ----------------------------------------------------------  OBJECTIVE  PAST MEDICAL & SURGICAL HISTORY  PVD (peripheral vascular disease)  COPD (chronic obstructive pulmonary disease)  Kidney disease, chronic, stage I (GFR over 89 ml/min)  Peripheral vascular disease  HTN (hypertension)  High cholesterol  CVA (cerebral vascular accident)  H/O aorto-femoral bypass  History of right common carotid artery stent placement                                            -----------------------------------------------------------  ALLERGIES:  No Known Allergies                                            ------------------------------------------------------------  MEDICATIONS:  STANDING MEDICATIONS  ALBUTerol    90 MICROgram(s) HFA Inhaler 1 Puff(s) Inhalation every 4 hours  ALBUTerol/ipratropium for Nebulization 3 milliLiter(s) Nebulizer every 6 hours  aspirin enteric coated 81 milliGRAM(s) Oral <User Schedule>  atorvastatin 10 milliGRAM(s) Oral at bedtime  buDESOnide 160 MICROgram(s)/formoterol 4.5 MICROgram(s) Inhaler 2 Puff(s) Inhalation two times a day  chlorhexidine 4% Liquid 1 Application(s) Topical <User Schedule>  cilostazol 100 milliGRAM(s) Oral two times a day  clopidogrel Tablet 75 milliGRAM(s) Oral daily  enoxaparin Injectable 40 milliGRAM(s) SubCutaneous daily  furosemide    Tablet 20 milliGRAM(s) Oral <User Schedule>  levETIRAcetam 500 milliGRAM(s) Oral two times a day  meropenem  IVPB 500 milliGRAM(s) IV Intermittent every 8 hours  methylPREDNISolone sodium succinate Injectable 60 milliGRAM(s) IV Push two times a day  metoprolol tartrate 25 milliGRAM(s) Oral two times a day  pantoprazole    Tablet 40 milliGRAM(s) Oral before breakfast  tiotropium 18 MICROgram(s) Capsule 1 Capsule(s) Inhalation daily    PRN MEDICATIONS                                            ------------------------------------------------------------  VITAL SIGNS: Last 24 Hours  T(C): 36.2 (2019 08:00), Max: 36.2 (2019 00:00)  T(F): 97.2 (2019 08:00), Max: 97.2 (2019 00:00)  HR: 102 (2019 12:00) (74 - 108)  BP: 111/60 (2019 12:00) (98/57 - 180/76)  BP(mean): 86 (2019 12:00) (63 - 117)  RR: 28 (2019 12:00) (16 - 28)  SpO2: 98% (2019 12:00) (94% - 98%)      19 @ 07:19 @ 07:00  --------------------------------------------------------  IN: 887 mL / OUT: 950 mL / NET: -63 mL    19 @ 07:19 @ 13:19  --------------------------------------------------------  IN: 240 mL / OUT: 50 mL / NET: 190 mL                                             --------------------------------------------------------------  LABS:                        9.1    15.62 )-----------( 219      ( 2019 04:33 )             28.5         144  |  101  |  34<H>  ----------------------------<  140<H>  4.3   |  28  |  1.0    Ca    8.3<L>      2019 04:33  Phos  3.5       Mg     2.1         TPro  5.0<L>  /  Alb  3.2<L>  /  TBili  0.4  /  DBili  x   /  AST  28  /  ALT  32  /  AlkPhos  56      PTT - ( 2019 04:33 )  PTT:41.3 sec  Urinalysis Basic - ( 2019 02:32 )    Color: Red / Appearance: Turbid / S.025 / pH: x  Gluc: x / Ketone: 15  / Bili: Moderate / Urobili: 0.2 mg/dL   Blood: x / Protein: >=300 mg/dL / Nitrite: Negative   Leuk Esterase: Small / RBC: >50 /HPF / WBC x   Sq Epi: x / Non Sq Epi: x / Bacteria: Many /HPF    ABG - ( 2019 21:08 )  pH, Arterial: 7.51  pH, Blood: x     /  pCO2: 35    /  pO2: 71    / HCO3: 28    / Base Excess: 5.4   /  SaO2: 95        Culture - Blood (collected 2019 04:11)  Source: .Blood None  Preliminary Report (2019 13:01):    No growth to date.    Culture - Blood (collected 2019 11:19)  Source: .Blood Blood  Preliminary Report (2019 16:01):    No growth to date.    CARDIAC MARKERS ( 2019 04:11 )  x     / <0.01 ng/mL / x     / x     / x      CARDIAC MARKERS ( 2019 21:55 )  x     / <0.01 ng/mL / x     / x     / x                                                -------------------------------------------------------------  RADIOLOGY:  < from: Xray Chest 1 View- PORTABLE-Routine (19 @ 05:20) >  Impression:      Unchanged right greater than left patchy bibasilar opacities.    < from: Transthoracic Echocardiogram (19 @ 13:29) >   1. Left ventricular ejection fraction, by visual estimation, is 55 to   60%.   2. Normal left ventricular size and wall thicknesses, with normal   systolic function.   3. Right ventricular volume and pressure overload.   4. Severely enlarged right ventricle.   5. Severely reduced RV systolic function.   6. Moderate ascites.   7. Severe mitral annular calcification.   8. Mild tricuspid regurgitation.   9. Estimated pulmonary artery systolic pressure is 64.6 mmHg assuming a   right atrial pressure of 3 mmHg, which is consistent with severe   pulmonary hypertension.  10. Pulmonary hypertension is present.    < from: [facility] Duplex Lower Ext Vein Scan, Bilat (19 @ 17:19) >    Thrombosis present in the right posterior tibial (calf) vein. Superficial   venous thrombophlebitis of the distal greater saphenous vein.    No evidence of deep venous thrombosis or superficial venous   thrombophlebitis of the left leg.                                          --------------------------------------------------------------    PHYSICAL EXAM:  GENERAL: NAD, poorly built on bipap, AAOx3  HEENT:  Atraumatic, Normocephalic, PERRLA, No JVD  PULMONARY: Decreased BS  CARDIOVASCULAR: Regular rate and rhythm. S1 S2+; 2/6 systolic murmur  GASTROINTESTINAL: Soft, Nontender, Nondistended; Bowel sounds present  MUSCULOSKELETAL:  2+ Peripheral Pulses, No clubbing, cyanosis, or edema  NEUROLOGY: non-focal  SKIN: No rashes or lesions    ASSESSMENT & PLAN    Acute on chronic hypoxic respiratory failure   Chronic hypercapnic respiratory failure  COPD exacerbation Secondary to possible gram negative right lower lobe pneumonia exacerbated by Coronavirus  H/O HFpEF  Right Tibial vein DVT - doubt PE      PLAN:    CNS: No excessive sedation    HEENT: Oral care    PULMONARY: HOB at 45 degrees; Wean NIV q 4hr on/off and qHS and alternating with HFNC; Target So2 > 88%; Nebs q 4h and PRN; Solumedrol 60 q12; ABG; CTA on hold till pt is more stable to r/o PE (doubt PE given clinical condition    CARDIOVASCULAR: Keep I=<O; CE x2; ECHO noted    GI: GI prophylaxis.  Feeding once off BiPAP; S/S- regular with thins    RENAL: UA- Small leukocyte esterase, +ve for blood. Urine cx pending. Heparin on hold. Repeat UA in the AM    INFECTIOUS DISEASE: Blood cx negative; Urine cx pending; F/U MRSA nasal swab; FLU, RSV negative;  Coronavirus+; Meropenem as per ID    HEMATOLOGICAL:  DVT prophylaxis. Duplex LE- Right post tibial DVT- was on heparin-on hold 2/2 hematuria. WIll f/u CBC and UA in the AM and restart if H/H stable and hematuria resolved.     ENDOCRINE:  Follow up FS.  Insulin protocol if needed.    MICU monitoring for now    #DVT prophylaxis Heparin  sq   #GI prophylaxis Protonix  #Diet Regular with thins  #Activity IAT  #Code status FULL  #Disposition c/w CCU -possible downgrade tomorrow                                          --------------------------------------------------------------

## 2019-01-23 NOTE — CONSULT NOTE ADULT - SUBJECTIVE AND OBJECTIVE BOX
LILA VELA  85y, Female  Allergy: No Known Allergies      HPI:  86 yo F with PMHx of COPD on 2 L NC, PVD, CKD3, essential HTN, DLD, hx of CVA, ex-smoker, recently discharged from hospital  after being admitted for pneumonia, presented for SOB of one day duration, patient has finished her course of Levaquin and prednisone on Friday and was doing well , until  evening when she starting feeling SOB again that worsening today, patient denies any chest pain, cough, lower extremity swelling.  EMS found her to be hypoxic to the 70s on room air.  On arrival patient, hypoxic to 67%, + tachypnea, speaking few words at a time. + wheezing, bilaterally, clavicular retractions.  Patient was placed on BiPAP and SOB improved. In Ed patient received cefepime,  Levaquin , and solumedrol. Patient mentioned history of dysuria of 2 days duration. (2019 14:45)    FAMILY HISTORY:  Family history of atherosclerosis (Mother)    PAST MEDICAL & SURGICAL HISTORY:  PVD (peripheral vascular disease)  COPD (chronic obstructive pulmonary disease)  Kidney disease, chronic, stage I (GFR over 89 ml/min)  Peripheral vascular disease  HTN (hypertension)  High cholesterol  CVA (cerebral vascular accident)  H/O aorto-femoral bypass  History of right common carotid artery stent placement        VITALS:  T(F): 96.7, Max: 98 (19 @ 08:00)  HR: 82  BP: 167/79  RR: 17Vital Signs Last 24 Hrs  T(C): 35.9 (2019 04:00), Max: 36.7 (2019 08:00)  T(F): 96.7 (2019 04:00), Max: 98 (2019 08:00)  HR: 82 (2019 04:00) (82 - 124)  BP: 167/79 (2019 04:00) (95/55 - 180/86)  BP(mean): 108 (2019 04:00) (63 - 120)  RR: 17 (2019 04:00) (16 - 24)  SpO2: 95% (2019 04:00) (95% - 98%)    TESTS & MEASUREMENTS:                        9.1    15.62 )-----------( 219      ( 2019 04:33 )             28.5         140  |  99  |  30<H>  ----------------------------<  127<H>  4.4   |  25  |  1.2    Ca    8.6      2019 04:11  Phos  3.2       Mg     2.3         TPro  5.3<L>  /  Alb  3.3<L>  /  TBili  0.4  /  DBili  x   /  AST  30  /  ALT  32  /  AlkPhos  62      LIVER FUNCTIONS - ( 2019 04:11 )  Alb: 3.3 g/dL / Pro: 5.3 g/dL / ALK PHOS: 62 U/L / ALT: 32 U/L / AST: 30 U/L / GGT: x             Culture - Blood (collected 19 @ 11:19)  Source: .Blood Blood  Preliminary Report (19 @ 16:01):    No growth to date.    Culture - Blood (collected 19 @ 11:19)  Source: .Blood Blood  Preliminary Report (19 @ 17:01):    No growth to date.      Urinalysis Basic - ( 2019 02:32 )    Color: Red / Appearance: Turbid / S.025 / pH: x  Gluc: x / Ketone: 15  / Bili: Moderate / Urobili: 0.2 mg/dL   Blood: x / Protein: >=300 mg/dL / Nitrite: Negative   Leuk Esterase: Small / RBC: >50 /HPF / WBC x   Sq Epi: x / Non Sq Epi: x / Bacteria: Many /HPF          RADIOLOGY & ADDITIONAL TESTS:    ANTIBIOTICS:  levoFLOXacin  Tablet 750 milliGRAM(s) Oral every 48 hours  meropenem  IVPB 1000 milliGRAM(s) IV Intermittent every 12 hours  vancomycin  IVPB 750 milliGRAM(s) IV Intermittent every 24 hours

## 2019-01-23 NOTE — CONSULT NOTE ADULT - ASSESSMENT
IMPRESSION:  Possibly RLL GNR PNA exacerbated by a Coronavirus coinfection.  CXR not significantly different than one from 1/10 although clinically does have PNA and seemingly has responded to ABx    RECOMMENDATIONS:  Nares for ORSA  Urine for legionella antigen  D/c vancomycin  Meropenem 500 mg iv q8h

## 2019-01-23 NOTE — PROGRESS NOTE ADULT - SUBJECTIVE AND OBJECTIVE BOX
DANELILA  85y  Female      SUBJECTIVE:    c/o better this morning,off bipap  Progress Note:      REVIEW OF SYSTEMS:    01-23      TPro  5.0<L>  /  Alb  3.2<L>  /  TBili  0.4  /  DBili  x   /  AST  28  /  ALT  32  /  AlkPhos  56  01-23  T(C): 36.2 (01-23-19 @ 08:00), Max: 36.2 (01-23-19 @ 00:00)  HR: 74 (01-23-19 @ 08:00) (74 - 124)  BP: 180/76 (01-23-19 @ 08:00) (95/55 - 180/76)  RR: 20 (01-23-19 @ 08:00) (16 - 24)  SpO2: 94% (01-23-19 @ 08:00) (94% - 98%)  Wt(kg): --Vital Signs Last 24 Hrs  T(C): 36.2 (23 Jan 2019 08:00), Max: 36.2 (23 Jan 2019 00:00)  T(F): 97.2 (23 Jan 2019 08:00), Max: 97.2 (23 Jan 2019 00:00)  HR: 74 (23 Jan 2019 08:00) (74 - 124)  BP: 180/76 (23 Jan 2019 08:00) (95/55 - 180/76)  BP(mean): 117 (23 Jan 2019 08:00) (63 - 117)  RR: 20 (23 Jan 2019 08:00) (16 - 24)  SpO2: 94% (23 Jan 2019 08:00) (94% - 98%)    PHYSICAL EXAM:  LUNGS-RARE RHONCHI  COR-REG, NL S1& S2  ABD-SOFT,NON TENDER  EXT-NO CALF TENDERNESS  LABS:                        9.1    15.62 )-----------( 219      ( 23 Jan 2019 04:33 )             28.5     01-23    144  |  101  |  34<H>  ----------------------------<  140<H>  4.3   |  28  |  1.0    Ca    8.3<L>      23 Jan 2019 04:33  Phos  3.5     01-23  Mg     2.1     01-23    TPro  5.0<L>  /  Alb  3.2<L>  /  TBili  0.4  /  DBili  x   /  AST  28  /  ALT  32  /  AlkPhos  56  01-23    RADIOLOGY:    < from: Xray Chest 1 View- PORTABLE-Routine (01.22.19 @ 05:18) >  ROCEDURE DATE:  01/22/2019            INTERPRETATION:  Clinical History / Reason for exam: Shortness of breath    Comparison : Chest radiograph 1/21/2019.    Technique/Positioning: Leads overlie thorax obscuring anatomy.    Findings:    Support devices: None.    Cardiac/mediastinum/hilum: Unremarkable.    Lung parenchyma/Pleura: Resolving right basilar opacity with residual   small effusion. Stable less extensive left basilar opacity. No   pneumothorax    Skeleton/soft tissues: Stable    Impression:      Resolving right basilar opacity with residual small effusion.     Stable , less extensive, left basilar opacity. No pneumothorax                      SARA DOUGLAS M.D., ATTENDING RADIOLOGIST  This document has been electronically signed. Jan 22 2019  9:05AM        < end of copied text >    IMPRESSION:    ACUTE HYPOXIC  RESPIRATORY FAILURE-IMPROVING  PNEUMONIA-CLINICALLY BETTER  RIGHT PLEURAL EFFUSION  RIGHT TIBIAL VEIN THROMBOSIS  ANEMIA  LEUKOCYTOSIS-IMPROVING  CKD-STAGE 3  OLD CVA  AORTO-FEM BYPASS  RIGHT CAROTID STENT  PVD            PLAN:  IV ANTIBIOTICS AS PER ID  HEPARIN IV  BIPAP AND NASAL O2 AS TOLERATED      I SPENT 60 MINS. EXAMINING,EVALUATING AND COUNSELING  PATIENT AND COORDINATING CARE BY ATTENDING PHYSICIAN

## 2019-01-23 NOTE — SWALLOW BEDSIDE ASSESSMENT ADULT - ASR SWALLOW ASPIRATION MONITOR
change of breathing pattern/position upright (90Y)/cough/throat clearing/gurgly voice/upper respiratory infection/oral hygiene/fever/pneumonia

## 2019-01-23 NOTE — PROGRESS NOTE ADULT - ASSESSMENT
IMPRESSION:  86 yo female with a history of O2 dependent COPD recently hospitalized for pneumonia  Readmitted for acute on chronic hypoxemic respiratory failure with worsening basilar opacity on CXR c/w HCAP  Elevated BNP suggestive of some degree of heart failure, probably HFPEF  Influenza testing NEGATIVE  Tolerating BiPAP with FiO2 40%  Calf vein thrombosis - doubt PE    PLAN:  Continue BiPAP and supplemental O2 as necessary  Can try High-Flow NC to maintain saturation  Follow ABG and O2 sats  Continue antibiotics, steroids and bronchodilators  Follow CXR  Consider repeat ECHO  Continue GI and DVT prophylaxis  Guarded prognosis  D/W CCU team

## 2019-01-23 NOTE — SWALLOW BEDSIDE ASSESSMENT ADULT - ASR SWALLOW LINGUAL MOBILITY
Pt resting in bed, family present and in no acute distress, sitter present. within functional limits

## 2019-01-23 NOTE — PROGRESS NOTE ADULT - SUBJECTIVE AND OBJECTIVE BOX
Interval history and ROS:    Seen and examined  D/W housestaff and RN  Remains on BiPAP  Desaturates off BiPAP  Feels better    MEDICATIONS:  ALBUTerol    90 MICROgram(s) HFA Inhaler 1 Puff(s) Inhalation every 4 hours  ALBUTerol/ipratropium for Nebulization 3 milliLiter(s) Nebulizer every 6 hours  aspirin enteric coated 81 milliGRAM(s) Oral <User Schedule>  atorvastatin 10 milliGRAM(s) Oral at bedtime  buDESOnide 160 MICROgram(s)/formoterol 4.5 MICROgram(s) Inhaler 2 Puff(s) Inhalation two times a day  chlorhexidine 4% Liquid 1 Application(s) Topical <User Schedule>  cilostazol 100 milliGRAM(s) Oral two times a day  clopidogrel Tablet 75 milliGRAM(s) Oral daily  furosemide    Tablet 20 milliGRAM(s) Oral <User Schedule>  heparin  Infusion 900 Unit(s)/Hr IV Continuous <Continuous>  levETIRAcetam 500 milliGRAM(s) Oral two times a day  meropenem  IVPB 500 milliGRAM(s) IV Intermittent every 8 hours  methylPREDNISolone sodium succinate Injectable 60 milliGRAM(s) IV Push two times a day  metoprolol tartrate 25 milliGRAM(s) Oral two times a day  pantoprazole    Tablet 40 milliGRAM(s) Oral before breakfast  tiotropium 18 MICROgram(s) Capsule 1 Capsule(s) Inhalation daily      VITAL SIGNS:  Vital Signs Last 24 Hrs  T(C): 36.2 (2019 08:00), Max: 36.2 (2019 00:00)  T(F): 97.2 (2019 08:00), Max: 97.2 (2019 00:00)  HR: 74 (2019 08:00) (74 - 124)  BP: 180/76 (2019 08:00) (95/55 - 180/76)  BP(mean): 117 (2019 08:00) (63 - 117)  RR: 20 (2019 08:00) (16 - 24)  SpO2: 94% (2019 08:00) (94% - 98%) on FiO2 40% BiPAP    I&O's Summary    2019 07:01  -  2019 07:00  --------------------------------------------------------  IN: 887 mL / OUT: 950 mL / NET: -63 mL    2019 07:01  -  2019 08:31  --------------------------------------------------------  IN: 240 mL / OUT: 0 mL / NET: 240 mL            PHYSICAL EXAM:  Awake and alert NAD  Neck supple, no LN  S1 and S2 2/6murmur  Lungs diminished BS  Abdomen is soft and non tender  There is no edema  Neurologically non focal        LABS:                        9.1    15.62 )-----------( 219      ( 2019 04:33 )             28.5         144  |  101  |  34<H>  ----------------------------<  140<H>  4.3   |  28  |  1.0    Ca    8.3<L>      2019 04:33  Phos  3.5       Mg     2.1         TPro  5.0<L>  /  Alb  3.2<L>  /  TBili  0.4  /  DBili  x   /  AST  28  /  ALT  32  /  AlkPhos  56      PTT - ( 2019 04:33 )  PTT:41.3 sec    Urinalysis Basic - ( 2019 02:32 )  Color: Red / Appearance: Turbid / S.025 / pH: x  Gluc: x / Ketone: 15  / Bili: Moderate / Urobili: 0.2 mg/dL   Blood: x / Protein: >=300 mg/dL / Nitrite: Negative   Leuk Esterase: Small / RBC: >50 /HPF / WBC x   Sq Epi: x / Non Sq Epi: x / Bacteria: Many /HPF      CARDIAC MARKERS ( 2019 04:11 )x     / <0.01 ng/mL / x     / x     / x      CARDIAC MARKERS ( 2019 21:55 )x     / <0.01 ng/mL / x     / x     / x      CARDIAC MARKERS ( 2019 11:16 )x     / 0.02 ng/mL / x     / x     / x          < from: [facility] Duplex Lower Ext Vein Scan, Bilat (19 @ 17:19) >  Thrombosis present in the right posterior tibial (calf) vein.   Superficial venous thrombophlebitis of the distal greater saphenous vein.  No evidence of deep venous thrombosis or superficial venous thrombophlebitis of the left leg.            RADIOLOGY & ADDITIONAL TESTS:   < from: Xray Chest 1 View- PORTABLE-Routine (19 @ 05:18) >  Resolving right basilar opacity with residual small effusion.   Stable , less extensive, left basilar opacity. No pneumothorax

## 2019-01-24 NOTE — PROGRESS NOTE ADULT - SUBJECTIVE AND OBJECTIVE BOX
LILA VELA  85y, Female      OVERNIGHT EVENTS:    On bipap, cough, no pressors, no diarrhea, no williamson    VITALS:  T(F): 97.3, Max: 97.4 (19 @ 16:00)  HR: 90  BP: 137/69  RR: 17Vital Signs Last 24 Hrs  T(C): 36.3 (2019 00:00), Max: 36.3 (2019 16:00)  T(F): 97.3 (2019 00:00), Max: 97.4 (2019 16:00)  HR: 90 (2019 04:00) (74 - 118)  BP: 137/69 (2019 02:00) (95/52 - 180/76)  BP(mean): 103 (2019 02:00) (61 - 117)  RR: 17 (2019 04:00) (16 - 30)  SpO2: 98% (2019 04:00) (93% - 98%)    TESTS & MEASUREMENTS:                        9.1    22.61 )-----------( 228      ( 2019 04:10 )             29.1         144  |  101  |  34<H>  ----------------------------<  140<H>  4.3   |  28  |  1.0    Ca    8.3<L>      2019 04:33  Phos  3.5       Mg     2.1         TPro  5.0<L>  /  Alb  3.2<L>  /  TBili  0.4  /  DBili  x   /  AST  28  /  ALT  32  /  AlkPhos  56      LIVER FUNCTIONS - ( 2019 04:33 )  Alb: 3.2 g/dL / Pro: 5.0 g/dL / ALK PHOS: 56 U/L / ALT: 32 U/L / AST: 28 U/L / GGT: x             Culture - Blood (collected 19 @ 04:11)  Source: .Blood None  Preliminary Report (19 @ 13:01):    No growth to date.    Culture - Blood (collected 19 @ 11:19)  Source: .Blood Blood  Preliminary Report (19 @ 16:01):    No growth to date.    Culture - Blood (collected 19 @ 11:19)  Source: .Blood Blood  Preliminary Report (19 @ 17:01):    No growth to date.      Urinalysis Basic - ( 2019 02:32 )    Color: Red / Appearance: Turbid / S.025 / pH: x  Gluc: x / Ketone: 15  / Bili: Moderate / Urobili: 0.2 mg/dL   Blood: x / Protein: >=300 mg/dL / Nitrite: Negative   Leuk Esterase: Small / RBC: >50 /HPF / WBC x   Sq Epi: x / Non Sq Epi: x / Bacteria: Many /HPF          RADIOLOGY & ADDITIONAL TESTS:    ANTIBIOTICS:  meropenem  IVPB 500 milliGRAM(s) IV Intermittent every 8 hours

## 2019-01-24 NOTE — PROGRESS NOTE ADULT - ASSESSMENT
IMPRESSION:  86 yo female with a history of O2 dependent COPD recently hospitalized for pneumonia  Readmitted for acute on chronic hypoxemic respiratory failure with worsening basilar opacity on CXR c/w HCAP  Elevated BNP suggestive of some degree of heart failure, probably HFPEF  Influenza testing NEGATIVE  Calf vein thrombosis - doubt PE  Appears to be clinically improving and able to wean from BiPAP    PLAN:  Continue BiPAP and supplemental O2 as necessary  Can try High-Flow NC to maintain saturation if necessary  Follow ABG and O2 sats  Continue antibiotics as per ID, steroids and bronchodilators  Follow CXR  Continue GI and DVT prophylaxis  Guarded prognosis

## 2019-01-24 NOTE — CHART NOTE - NSCHARTNOTEFT_GEN_A_CORE
LILA VELA 85y Female  MRN#: [id number]   CODE STATUS: FULL CODE    HPI:  84 yo F with PMHx of COPD on 2 L NC, PVD, CKD3, essential HTN, DLD, hx of CVA, ex-smoker, recently discharged from hospital Jan 13 after being admitted for pneumonia, presented for SOB of one day duration, patient has finished her course of Levaquin and prednisone on Friday and was doing well , until Sunday evening when she starting feeling SOB again that worsening today, patient denies any chest pain, cough, lower extremity swelling.  EMS found her to be hypoxic to the 70s on room air.  On arrival patient, hypoxic to 67%, + tachypnea, speaking few words at a time. + wheezing, bilaterally, clavicular retractions.  Patient was placed on BiPAP and SOB improved. In Ed patient received cefepime,  Levaquin , and solumedrol. Patient mentioned history of dysuria of 2 days duration. (21 Jan 2019 14:45)      CCU COURSE: Pt was admitted to CCU for acute on chronic LILA VELA 85y Female  MRN#: [id number]   CODE STATUS: FULL CODE    HPI:  86 yo F with PMHx of COPD on 2 L NC, PVD, CKD3, essential HTN, DLD, hx of CVA, ex-smoker, recently discharged from hospital Jan 13 after being admitted for pneumonia, presented for SOB of one day duration, patient has finished her course of Levaquin and prednisone on Friday and was doing well , until Sunday evening when she starting feeling SOB again that worsening today, patient denies any chest pain, cough, lower extremity swelling.  EMS found her to be hypoxic to the 70s on room air.  On arrival patient, hypoxic to 67%, + tachypnea, speaking few words at a time. + wheezing, bilaterally, clavicular retractions.  Patient was placed on BiPAP and SOB improved. In Ed patient received cefepime,  Levaquin , and solumedrol. Patient mentioned history of dysuria of 2 days duration. (21 Jan 2019 14:45)      CCU COURSE: Pt was admitted to CCU for acute on chronic hypoxic and chronic hypercapnic respiratory failure 2/2 COPD exacerbation 2/2 Possible RLL GNR pneumonia exacerbated by coronavirus coinfection.   Pt was treated with BiPaP prn and qhs, alternating with venti mask and oxygen via NC. Pt was treated with Abx as per ID, solumedrol, bronchodilators. Improved clinically.   Pt also had right tibial vein DVT- was on IV heparin which was stopped as pt had hematuria x2. Spoke to Dr Dunham and PMD- okay to d/c heparin as the DVT is distal and low risk to cause PE. Pt also had moderate ascites seen on 2D echo- RUQ USG ordered.   Pt had positive UA but urien cx negative, blood cx negative. On Abx for PNA. MRSA negative, Flu negative.   Can be downgraded to medicine floor.    A/P    Acute on chronic hypoxic respiratory failure   Chronic hypercapnic respiratory failure  COPD exacerbation Secondary to possible gram negative right lower lobe pneumonia exacerbated by Coronavirus  H/O HFpEF  Right Tibial vein DVT - doubt PE      PLAN:    CNS: No excessive sedation    HEENT: Oral care    PULMONARY: HOB at 45 degrees; BiPaP q 4hr on/off and qHS and alternating with NC - saturating well; Target So2 > 88%; Nebs q 4h and PRN; Solumedrol 60 q12; ABG; Doubt PE.    CARDIOVASCULAR: Keep I=<O; CE x2; ECHO noted; c/w home dose lasix; cxr daily    GI: GI prophylaxis.  Feeding once off BiPAP; S/S- regular with thins. Moderate ascites noted on echo. F/u RUQ USG for ascites.     RENAL: UA- Small leukocyte esterase, +ve for blood. Urine cx negative. c/w meropenem. Heparin gtt on hold.     INFECTIOUS DISEASE: UA positive; Blood cx negative; Urine cx negative; MRSA nasal swab negative; FLU, RSV negative;  Coronavirus+; Meropenem as per ID. f/u urine legionella Ag.     HEMATOLOGICAL:  DVT prophylaxis- heparin sq. Duplex LE- Right post tibial DVT- was on heparin gtt-on hold 2/2 hematuria. Spoke to Dr Dunham- will repeat Duplex in 1 week from the last duplex (around 1/27); keep off heparin gtt, as dvt is distal and PE is unlikely.     ENDOCRINE:  Follow up FS.  Insulin protocol if needed.    #DVT prophylaxis Heparin  sq   #GI prophylaxis Protonix  #Diet Regular with thins  #Activity IAT  #Code status FULL  #Disposition c/w CCU -possible downgrade tomorrow LILA VELA 85y Female  MRN#: [id number]   CODE STATUS: FULL CODE    HPI:  86 yo F with PMHx of COPD on 2 L NC, PVD, CKD3, essential HTN, DLD, hx of CVA, ex-smoker, recently discharged from hospital Jan 13 after being admitted for pneumonia, presented for SOB of one day duration, patient has finished her course of Levaquin and prednisone on Friday and was doing well , until Sunday evening when she starting feeling SOB again that worsening today, patient denies any chest pain, cough, lower extremity swelling.  EMS found her to be hypoxic to the 70s on room air.  On arrival patient, hypoxic to 67%, + tachypnea, speaking few words at a time. + wheezing, bilaterally, clavicular retractions.  Patient was placed on BiPAP and SOB improved. In Ed patient received cefepime,  Levaquin , and solumedrol. Patient mentioned history of dysuria of 2 days duration. (21 Jan 2019 14:45)      CCU COURSE: Pt was admitted to CCU for acute on chronic hypoxic and chronic hypercapnic respiratory failure 2/2 COPD exacerbation 2/2 Possible RLL GNR pneumonia exacerbated by coronavirus coinfection.   Pt was treated with BiPaP prn and qhs, alternating with venti mask and oxygen via NC. Pt was treated with Abx as per ID, solumedrol, bronchodilators. Improved clinically.   Pt also had right tibial vein DVT- was on IV heparin which was stopped as pt had hematuria x2. Spoke to Dr Dunham and PMD- okay to d/c heparin as the DVT is distal and low risk to cause PE. Pt also had moderate ascites seen on 2D echo- RUQ USG ordered.   Pt had positive UA but urien cx negative, blood cx negative. On Abx for PNA. MRSA negative, Flu negative.   Can be downgraded to medicine floor.    A/P    Acute on chronic hypoxic respiratory failure   Chronic hypercapnic respiratory failure  COPD exacerbation Secondary to possible gram negative right lower lobe pneumonia exacerbated by Coronavirus  H/O HFpEF  Right Tibial vein DVT - doubt PE      PLAN:    CNS: No excessive sedation    HEENT: Oral care    PULMONARY: HOB at 45 degrees; BiPaP q 4hr on/off and qHS and alternating with NC - saturating well; Target So2 > 88%; Nebs q 4h and PRN; Solumedrol 60 q12; ABG; Doubt PE.    CARDIOVASCULAR: Keep I=<O; CE x2; ECHO noted; c/w home dose lasix; cxr daily    GI: GI prophylaxis.  Feeding once off BiPAP; S/S- regular with thins. Moderate ascites noted on echo. F/u RUQ USG for ascites.     RENAL: UA- Small leukocyte esterase, +ve for blood. Urine cx negative. c/w meropenem. Heparin gtt on hold.     INFECTIOUS DISEASE: UA positive; Blood cx negative; Urine cx negative; MRSA nasal swab negative; FLU, RSV negative;  Coronavirus+; Meropenem as per ID. f/u urine legionella Ag.     HEMATOLOGICAL:  DVT prophylaxis- heparin sq. Duplex LE- Right post tibial DVT- was on heparin gtt-on hold 2/2 hematuria. Spoke to Dr Dunham- will repeat Duplex in 1 week from the last duplex (around 1/27); keep off heparin gtt, as dvt is distal and PE is unlikely.     ENDOCRINE:  Follow up FS.  Insulin protocol if needed.    #DVT prophylaxis Heparin  sq   #GI prophylaxis Protonix  #Diet Regular with thins  #Activity IAT  #Code status FULL  #Disposition transfer to medicine    Sign out given to PGY1 on call - Dr Retana @2.45pm

## 2019-01-24 NOTE — PROGRESS NOTE ADULT - ASSESSMENT
IMPRESSION:  Possibly RLL GNR PNA exacerbated by a Coronavirus coinfection.  CXR not significantly different than one from 1/10 although clinically does have PNA and seemingly has responded to ABx  CXR no change  WBC 22.6  BCx NTD    RECOMMENDATIONS:  Nares for ORSA  Urine for legionella antigen  Meropenem 500 mg iv q8h

## 2019-01-24 NOTE — PROGRESS NOTE ADULT - SUBJECTIVE AND OBJECTIVE BOX
Interval history and ROS:    Feeling better  Tolerating timeoff BIPAP to   No pain  Mild coughing    MEDICATIONS:  ALBUTerol    90 MICROgram(s) HFA Inhaler 1 Puff(s) Inhalation every 4 hours  ALBUTerol/ipratropium for Nebulization 3 milliLiter(s) Nebulizer every 6 hours  aspirin enteric coated 81 milliGRAM(s) Oral <User Schedule>  atorvastatin 10 milliGRAM(s) Oral at bedtime  buDESOnide 160 MICROgram(s)/formoterol 4.5 MICROgram(s) Inhaler 2 Puff(s) Inhalation two times a day  chlorhexidine 4% Liquid 1 Application(s) Topical <User Schedule>  cilostazol 100 milliGRAM(s) Oral two times a day  clopidogrel Tablet 75 milliGRAM(s) Oral daily  docusate sodium 100 milliGRAM(s) Oral two times a day  furosemide    Tablet 20 milliGRAM(s) Oral <User Schedule>  heparin  Injectable 5000 Unit(s) SubCutaneous every 8 hours  levETIRAcetam 500 milliGRAM(s) Oral two times a day  meropenem  IVPB 500 milliGRAM(s) IV Intermittent every 8 hours  methylPREDNISolone sodium succinate Injectable 60 milliGRAM(s) IV Push two times a day  metoprolol tartrate 25 milliGRAM(s) Oral two times a day  nystatin Powder 1 Application(s) Topical two times a day  pantoprazole    Tablet 40 milliGRAM(s) Oral before breakfast  senna 2 Tablet(s) Oral at bedtime  tiotropium 18 MICROgram(s) Capsule 1 Capsule(s) Inhalation daily      VITAL SIGNS:  Vital Signs Last 24 Hrs  T(C): 36.3 (24 Jan 2019 00:00), Max: 36.3 (23 Jan 2019 16:00)  T(F): 97.3 (24 Jan 2019 00:00), Max: 97.4 (23 Jan 2019 16:00)  HR: 98 (24 Jan 2019 06:00) (74 - 118)  BP: 170/71 (24 Jan 2019 06:00) (95/52 - 180/76)  BP(mean): 109 (24 Jan 2019 06:00) (61 - 117)  RR: 22 (24 Jan 2019 06:00) (16 - 30)  SpO2: 99% (24 Jan 2019 06:00) (93% - 99%) on     I&O's Summary    23 Jan 2019 07:01  -  24 Jan 2019 07:00  --------------------------------------------------------  IN: 440 mL / OUT: 350 mL / NET: 90 mL            PHYSICAL EXAM:  Awake and alert NAD  Neck supple, no LN  S1 and S2 1/6 murmur  Lungs are clear without wheeze, rhonchi or rales  Abdomen is soft and non tender  There is no edema  Neurologically non focal        LABS:                        9.1    22.61 )-----------( 228      ( 24 Jan 2019 04:10 )             29.1     01-24    145  |  103  |  44<H>  ----------------------------<  138<H>  4.7   |  29  |  1.3    Ca    8.8      24 Jan 2019 04:10  Phos  4.1     01-24  Mg     2.1     01-24    TPro  5.4<L>  /  Alb  3.4<L>  /  TBili  0.4  /  DBili  x   /  AST  45<H>  /  ALT  41  /  AlkPhos  62  01-24    PTT - ( 24 Jan 2019 04:10 )  PTT:30.1 sec    Urinalysis Basic - ( 24 Jan 2019 04:30 )  Color: Red / Appearance: Turbid / SG: >=1.030 / pH: x  Gluc: x / Ketone: 40  / Bili: Large / Urobili: 1.0   Blood: x / Protein: >=300 / Nitrite: Positive   Leuk Esterase: Moderate / RBC: >50 /HPF / WBC x   Sq Epi: x / Non Sq Epi: x / Bacteria: Moderate /HPF              RADIOLOGY & ADDITIONAL TESTS:   < from: Xray Chest 1 View- PORTABLE-Routine (01.23.19 @ 05:20) >  Unchanged right greater than left patchy bibasilar opacities.

## 2019-01-24 NOTE — PROGRESS NOTE ADULT - SUBJECTIVE AND OBJECTIVE BOX
LILA VELA 85y Female  MRN#: [id number]   CODE STATUS: FULL CODE      SUBJECTIVE    Overnight events - BiPaP overnight. had UA positive for blood (hematuria). Heparin IV still on hold.     Subjective complaints - Complains of cough this AM. Breathing better, On nasal cannula this AM.    OBJECTIVE  PAST MEDICAL & SURGICAL HISTORY  PVD (peripheral vascular disease)  COPD (chronic obstructive pulmonary disease)  Kidney disease, chronic, stage I (GFR over 89 ml/min)  Peripheral vascular disease  HTN (hypertension)  High cholesterol  CVA (cerebral vascular accident)  H/O aorto-femoral bypass  History of right common carotid artery stent placement                                            -----------------------------------------------------------  ALLERGIES:  No Known Allergies                                            ------------------------------------------------------------  MEDICATIONS:  STANDING MEDICATIONS  ALBUTerol    90 MICROgram(s) HFA Inhaler 1 Puff(s) Inhalation every 4 hours  ALBUTerol/ipratropium for Nebulization 3 milliLiter(s) Nebulizer every 6 hours  aspirin enteric coated 81 milliGRAM(s) Oral <User Schedule>  atorvastatin 10 milliGRAM(s) Oral at bedtime  buDESOnide 160 MICROgram(s)/formoterol 4.5 MICROgram(s) Inhaler 2 Puff(s) Inhalation two times a day  chlorhexidine 4% Liquid 1 Application(s) Topical <User Schedule>  cilostazol 100 milliGRAM(s) Oral two times a day  clopidogrel Tablet 75 milliGRAM(s) Oral daily  docusate sodium 100 milliGRAM(s) Oral two times a day  furosemide    Tablet 20 milliGRAM(s) Oral <User Schedule>  heparin  Injectable 5000 Unit(s) SubCutaneous every 8 hours  levETIRAcetam 500 milliGRAM(s) Oral two times a day  meropenem  IVPB 500 milliGRAM(s) IV Intermittent every 8 hours  methylPREDNISolone sodium succinate Injectable 60 milliGRAM(s) IV Push two times a day  metoprolol tartrate 25 milliGRAM(s) Oral two times a day  nystatin Powder 1 Application(s) Topical two times a day  pantoprazole    Tablet 40 milliGRAM(s) Oral before breakfast  senna 2 Tablet(s) Oral at bedtime  tiotropium 18 MICROgram(s) Capsule 1 Capsule(s) Inhalation daily    PRN MEDICATIONS                                            ------------------------------------------------------------  VITAL SIGNS: Last 24 Hours  T(C): 36.1 (24 Jan 2019 08:00), Max: 36.3 (23 Jan 2019 16:00)  T(F): 97 (24 Jan 2019 08:00), Max: 97.4 (23 Jan 2019 16:00)  HR: 82 (24 Jan 2019 08:00) (82 - 118)  BP: 74/53 (24 Jan 2019 08:00) (74/53 - 170/71)  BP(mean): 58 (24 Jan 2019 08:00) (58 - 109)  RR: 25 (24 Jan 2019 08:00) (16 - 30)  SpO2: 99% (24 Jan 2019 06:00) (93% - 99%)      01-23-19 @ 07:01  -  01-24-19 @ 07:00  --------------------------------------------------------  IN: 440 mL / OUT: 350 mL / NET: 90 mL                                             --------------------------------------------------------------  LABS:                        9.1    22.61 )-----------( 228      ( 24 Jan 2019 04:10 )             29.1     01-24    145  |  103  |  44<H>  ----------------------------<  138<H>  4.7   |  29  |  1.3    Ca    8.8      24 Jan 2019 04:10  Phos  4.1     01-24  Mg     2.1     01-24    TPro  5.4<L>  /  Alb  3.4<L>  /  TBili  0.4  /  DBili  x   /  AST  45<H>  /  ALT  41  /  AlkPhos  62  01-24    PTT - ( 24 Jan 2019 04:10 )  PTT:30.1 sec  Urinalysis Basic - ( 24 Jan 2019 04:30 )    Color: Red / Appearance: Turbid / SG: >=1.030 / pH: x  Gluc: x / Ketone: 40  / Bili: Large / Urobili: 1.0   Blood: x / Protein: >=300 / Nitrite: Positive   Leuk Esterase: Moderate / RBC: >50 /HPF / WBC x   Sq Epi: x / Non Sq Epi: x / Bacteria: Moderate /HPF    Culture - Blood (collected 22 Jan 2019 04:11)  Source: .Blood None  Preliminary Report (23 Jan 2019 13:01):    No growth to date.    Culture - Blood (collected 21 Jan 2019 11:19)  Source: .Blood Blood  Preliminary Report (22 Jan 2019 16:01):    No growth to date.    Culture - Blood (collected 21 Jan 2019 11:19)  Source: .Blood Blood  Preliminary Report (22 Jan 2019 17:01):    No growth to date.                                            -------------------------------------------------------------  RADIOLOGY:  < from: Xray Chest 1 View- PORTABLE-Routine (01.24.19 @ 05:08) >  Stable right greater than left bibasilar airspace opacities.    < from: Transthoracic Echocardiogram (01.22.19 @ 13:29) >   1. Left ventricular ejection fraction, by visual estimation, is 55 to   60%.   2. Normal left ventricular size and wall thicknesses, with normal   systolic function.   3. Right ventricular volume and pressure overload.   4. Severely enlarged right ventricle.   5. Severely reduced RV systolic function.   6. Moderate ascites.   7. Severe mitral annular calcification.   8. Mild tricuspid regurgitation.   9. Estimated pulmonary artery systolic pressure is 64.6 mmHg assuming a   right atrial pressure of 3 mmHg, which is consistent with severe   pulmonary hypertension.  10. Pulmonary hypertension is present.    < from: [facility] Duplex Lower Ext Vein Scan, Bilat (01.21.19 @ 17:19) >    Thrombosis present in the right posterior tibial (calf) vein. Superficial   venous thrombophlebitis of the distal greater saphenous vein.    No evidence of deep venous thrombosis or superficial venous   thrombophlebitis of the left leg.                                                                                    --------------------------------------------------------------  PHYSICAL EXAM:  GENERAL: NAD, poorly built on bipap, AAOx3  HEENT:  Atraumatic, Normocephalic, PERRLA, No JVD  PULMONARY: Decreased BS  CARDIOVASCULAR: Regular rate and rhythm. S1 S2+; 2/6 systolic murmur  GASTROINTESTINAL: Soft, Nontender, Nondistended; Bowel sounds present  MUSCULOSKELETAL:  2+ Peripheral Pulses, No clubbing, cyanosis, or edema  NEUROLOGY: non-focal  SKIN: No rashes or lesions                                         --------------------------------------------------------------    ASSESSMENT & PLAN    Acute on chronic hypoxic respiratory failure   Chronic hypercapnic respiratory failure  COPD exacerbation Secondary to possible gram negative right lower lobe pneumonia exacerbated by Coronavirus  H/O HFpEF  Right Tibial vein DVT - doubt PE      PLAN:    CNS: No excessive sedation    HEENT: Oral care    PULMONARY: HOB at 45 degrees; BiPaP q 4hr on/off and qHS and alternating with NC - saturating well; Target So2 > 88%; Nebs q 4h and PRN; Solumedrol 60 q12; ABG; CTA on hold till pt is more stable to r/o PE (doubt PE given clinical condition)    CARDIOVASCULAR: Keep I=<O; CE x2; ECHO noted    GI: GI prophylaxis.  Feeding once off BiPAP; S/S- regular with thins    RENAL: UA- Small leukocyte esterase, +ve for blood. Urine cx pending. Heparin on hold. Repeat UA in the AM    INFECTIOUS DISEASE: UA positive; Blood cx negative; Urine cx pending; MRSA nasal swab negative; FLU, RSV negative;  Coronavirus+; Meropenem as per ID    HEMATOLOGICAL:  DVT prophylaxis- heparin sq. Duplex LE- Right post tibial DVT- was on heparin gtt-on hold 2/2 hematuria.     ENDOCRINE:  Follow up FS.  Insulin protocol if needed.    MICU monitoring for now    #DVT prophylaxis Heparin  sq   #GI prophylaxis Protonix  #Diet Regular with thins  #Activity IAT  #Code status FULL  #Disposition c/w CCU -possible downgrade tomorrow LILA VELA 85y Female  MRN#: [id number]   CODE STATUS: FULL CODE      SUBJECTIVE    Overnight events - BiPaP overnight. had UA positive for blood (hematuria). Heparin IV still on hold.     Subjective complaints - Complains of cough this AM. Breathing better, On nasal cannula this AM.    OBJECTIVE  PAST MEDICAL & SURGICAL HISTORY  PVD (peripheral vascular disease)  COPD (chronic obstructive pulmonary disease)  Kidney disease, chronic, stage I (GFR over 89 ml/min)  Peripheral vascular disease  HTN (hypertension)  High cholesterol  CVA (cerebral vascular accident)  H/O aorto-femoral bypass  History of right common carotid artery stent placement                                            -----------------------------------------------------------  ALLERGIES:  No Known Allergies                                            ------------------------------------------------------------  MEDICATIONS:  STANDING MEDICATIONS  ALBUTerol    90 MICROgram(s) HFA Inhaler 1 Puff(s) Inhalation every 4 hours  ALBUTerol/ipratropium for Nebulization 3 milliLiter(s) Nebulizer every 6 hours  aspirin enteric coated 81 milliGRAM(s) Oral <User Schedule>  atorvastatin 10 milliGRAM(s) Oral at bedtime  buDESOnide 160 MICROgram(s)/formoterol 4.5 MICROgram(s) Inhaler 2 Puff(s) Inhalation two times a day  chlorhexidine 4% Liquid 1 Application(s) Topical <User Schedule>  cilostazol 100 milliGRAM(s) Oral two times a day  clopidogrel Tablet 75 milliGRAM(s) Oral daily  docusate sodium 100 milliGRAM(s) Oral two times a day  furosemide    Tablet 20 milliGRAM(s) Oral <User Schedule>  heparin  Injectable 5000 Unit(s) SubCutaneous every 8 hours  levETIRAcetam 500 milliGRAM(s) Oral two times a day  meropenem  IVPB 500 milliGRAM(s) IV Intermittent every 8 hours  methylPREDNISolone sodium succinate Injectable 60 milliGRAM(s) IV Push two times a day  metoprolol tartrate 25 milliGRAM(s) Oral two times a day  nystatin Powder 1 Application(s) Topical two times a day  pantoprazole    Tablet 40 milliGRAM(s) Oral before breakfast  senna 2 Tablet(s) Oral at bedtime  tiotropium 18 MICROgram(s) Capsule 1 Capsule(s) Inhalation daily    PRN MEDICATIONS                                            ------------------------------------------------------------  VITAL SIGNS: Last 24 Hours  T(C): 36.1 (24 Jan 2019 08:00), Max: 36.3 (23 Jan 2019 16:00)  T(F): 97 (24 Jan 2019 08:00), Max: 97.4 (23 Jan 2019 16:00)  HR: 82 (24 Jan 2019 08:00) (82 - 118)  BP: 74/53 (24 Jan 2019 08:00) (74/53 - 170/71)  BP(mean): 58 (24 Jan 2019 08:00) (58 - 109)  RR: 25 (24 Jan 2019 08:00) (16 - 30)  SpO2: 99% (24 Jan 2019 06:00) (93% - 99%)      01-23-19 @ 07:01  -  01-24-19 @ 07:00  --------------------------------------------------------  IN: 440 mL / OUT: 350 mL / NET: 90 mL                                             --------------------------------------------------------------  LABS:                        9.1    22.61 )-----------( 228      ( 24 Jan 2019 04:10 )             29.1     01-24    145  |  103  |  44<H>  ----------------------------<  138<H>  4.7   |  29  |  1.3    Ca    8.8      24 Jan 2019 04:10  Phos  4.1     01-24  Mg     2.1     01-24    TPro  5.4<L>  /  Alb  3.4<L>  /  TBili  0.4  /  DBili  x   /  AST  45<H>  /  ALT  41  /  AlkPhos  62  01-24    PTT - ( 24 Jan 2019 04:10 )  PTT:30.1 sec  Urinalysis Basic - ( 24 Jan 2019 04:30 )    Color: Red / Appearance: Turbid / SG: >=1.030 / pH: x  Gluc: x / Ketone: 40  / Bili: Large / Urobili: 1.0   Blood: x / Protein: >=300 / Nitrite: Positive   Leuk Esterase: Moderate / RBC: >50 /HPF / WBC x   Sq Epi: x / Non Sq Epi: x / Bacteria: Moderate /HPF    Culture - Blood (collected 22 Jan 2019 04:11)  Source: .Blood None  Preliminary Report (23 Jan 2019 13:01):    No growth to date.    Culture - Blood (collected 21 Jan 2019 11:19)  Source: .Blood Blood  Preliminary Report (22 Jan 2019 16:01):    No growth to date.    Culture - Blood (collected 21 Jan 2019 11:19)  Source: .Blood Blood  Preliminary Report (22 Jan 2019 17:01):    No growth to date.                                            -------------------------------------------------------------  RADIOLOGY:  < from: Xray Chest 1 View- PORTABLE-Routine (01.24.19 @ 05:08) >  Stable right greater than left bibasilar airspace opacities.    < from: Transthoracic Echocardiogram (01.22.19 @ 13:29) >   1. Left ventricular ejection fraction, by visual estimation, is 55 to   60%.   2. Normal left ventricular size and wall thicknesses, with normal   systolic function.   3. Right ventricular volume and pressure overload.   4. Severely enlarged right ventricle.   5. Severely reduced RV systolic function.   6. Moderate ascites.   7. Severe mitral annular calcification.   8. Mild tricuspid regurgitation.   9. Estimated pulmonary artery systolic pressure is 64.6 mmHg assuming a   right atrial pressure of 3 mmHg, which is consistent with severe   pulmonary hypertension.  10. Pulmonary hypertension is present.    < from: VA Duplex Lower Ext Vein Scan, Bilat (01.21.19 @ 17:19) >    Thrombosis present in the right posterior tibial (calf) vein. Superficial   venous thrombophlebitis of the distal greater saphenous vein.    No evidence of deep venous thrombosis or superficial venous   thrombophlebitis of the left leg.                                                                                    --------------------------------------------------------------  PHYSICAL EXAM:  GENERAL: NAD, poorly built on bipap, AAOx3  HEENT:  Atraumatic, Normocephalic, PERRLA, No JVD  PULMONARY: Decreased BS  CARDIOVASCULAR: Regular rate and rhythm. S1 S2+; 2/6 systolic murmur  GASTROINTESTINAL: Soft, Nontender, Nondistended; Bowel sounds present  MUSCULOSKELETAL:  2+ Peripheral Pulses, No clubbing, cyanosis, or edema  NEUROLOGY: non-focal  SKIN: No rashes or lesions                                         --------------------------------------------------------------    ASSESSMENT & PLAN    Acute on chronic hypoxic respiratory failure   Chronic hypercapnic respiratory failure  COPD exacerbation Secondary to possible gram negative right lower lobe pneumonia exacerbated by Coronavirus  H/O HFpEF  Right Tibial vein DVT - doubt PE      PLAN:    CNS: No excessive sedation    HEENT: Oral care    PULMONARY: HOB at 45 degrees; BiPaP q 4hr on/off and qHS and alternating with NC - saturating well; Target So2 > 88%; Nebs q 4h and PRN; Solumedrol 60 q12; ABG; CTA on hold till pt is more stable to r/o PE (doubt PE given clinical condition)    CARDIOVASCULAR: Keep I=<O; CE x2; ECHO noted    GI: GI prophylaxis.  Feeding once off BiPAP; S/S- regular with thins    RENAL: UA- Small leukocyte esterase, +ve for blood. Urine cx pending. Heparin on hold.     INFECTIOUS DISEASE: UA positive; Blood cx negative; Urine cx pending; MRSA nasal swab negative; FLU, RSV negative;  Coronavirus+; Meropenem as per ID    HEMATOLOGICAL:  DVT prophylaxis- heparin sq. Duplex LE- Right post tibial DVT- was on heparin gtt-on hold 2/2 hematuria. Spoke to Dr Dunham- will repeat Duplex in 1 week. keep off heparin, as dvt is distal and PE is unlikely.     ENDOCRINE:  Follow up FS.  Insulin protocol if needed.    MICU monitoring for now    #DVT prophylaxis Heparin  sq   #GI prophylaxis Protonix  #Diet Regular with thins  #Activity IAT  #Code status FULL  #Disposition c/w CCU -possible downgrade tomorrow

## 2019-01-24 NOTE — PROGRESS NOTE ADULT - SUBJECTIVE AND OBJECTIVE BOX
01-24-19 @ 12:25    LILA VELA  85y  Female  Seen at CCU, OOB to chair. comfortable but recurrent hacking cough.     INTERVAL EVENTS: None    MEDICATIONS  (STANDING):  ALBUTerol    90 MICROgram(s) HFA Inhaler 1 Puff(s) Inhalation every 4 hours  ALBUTerol/ipratropium for Nebulization 3 milliLiter(s) Nebulizer every 6 hours  aspirin enteric coated 81 milliGRAM(s) Oral <User Schedule>  atorvastatin 10 milliGRAM(s) Oral at bedtime  buDESOnide 160 MICROgram(s)/formoterol 4.5 MICROgram(s) Inhaler 2 Puff(s) Inhalation two times a day  chlorhexidine 4% Liquid 1 Application(s) Topical <User Schedule>  cilostazol 100 milliGRAM(s) Oral two times a day  clopidogrel Tablet 75 milliGRAM(s) Oral daily  docusate sodium 100 milliGRAM(s) Oral two times a day  furosemide    Tablet 20 milliGRAM(s) Oral <User Schedule>  heparin  Injectable 5000 Unit(s) SubCutaneous every 8 hours  levETIRAcetam 500 milliGRAM(s) Oral two times a day  meropenem  IVPB 500 milliGRAM(s) IV Intermittent every 8 hours  methylPREDNISolone sodium succinate Injectable 60 milliGRAM(s) IV Push two times a day  metoprolol tartrate 25 milliGRAM(s) Oral two times a day  nystatin Powder 1 Application(s) Topical two times a day  pantoprazole    Tablet 40 milliGRAM(s) Oral before breakfast  senna 2 Tablet(s) Oral at bedtime  tiotropium 18 MICROgram(s) Capsule 1 Capsule(s) Inhalation daily    MEDICATIONS  (PRN):      T(C): 36.1 (01-24-19 @ 12:00), Max: 36.3 (01-23-19 @ 16:00)  HR: 86 (01-24-19 @ 12:00) (82 - 118)  BP: 119/55 (01-24-19 @ 12:00) (74/53 - 170/71)  RR: 17 (01-24-19 @ 12:00) (16 - 30)  SpO2: 97% (01-24-19 @ 12:00) (93% - 99%)  Wt(kg): --Vital Signs Last 24 Hrs  T(C): 36.1 (24 Jan 2019 12:00), Max: 36.3 (23 Jan 2019 16:00)  T(F): 97 (24 Jan 2019 12:00), Max: 97.4 (23 Jan 2019 16:00)  HR: 86 (24 Jan 2019 12:00) (82 - 118)  BP: 119/55 (24 Jan 2019 12:00) (74/53 - 170/71)  BP(mean): 79 (24 Jan 2019 12:00) (58 - 109)  RR: 17 (24 Jan 2019 12:00) (16 - 30)  SpO2: 97% (24 Jan 2019 12:00) (93% - 99%)    PHYSICAL EXAM:  GENERAL:   NECK:   CHEST/LUNG:  HEART: S1  ABDOMEN:   EXTREMITIES:                           9.1    22.61 )-----------( 228      ( 24 Jan 2019 04:10 )             29.1     01-24    145  |  103  |  44<H>  ----------------------------<  138<H>  4.7   |  29  |  1.3    Ca    8.8      24 Jan 2019 04:10  Phos  4.1     01-24  Mg     2.1     01-24    TPro  5.4<L>  /  Alb  3.4<L>  /  TBili  0.4  /  DBili  x   /  AST  45<H>  /  ALT  41  /  AlkPhos  62  01-24      PTT - ( 24 Jan 2019 04:10 )  PTT:30.1 sec      RADIOLOGY & ADDITIONAL TESTS:    CXR :   Findings:    Support devices: None.    Cardiac/mediastinum/hilum: Unchanged.    Lung parenchyma/Pleura: Stable right greater than left bibasilar   opacities. No pneumothorax.    Skeleton/soft tissues: Unchanged.    Impression:      Stable right greater than left bibasilar airspace opacities.        DOMINGO OLSEN M.D., ATTENDING RADIOLOGIST  This document has been electronically signed. Jan 24 2019  8:07AM      EKG :     Ventricular Rate 66 BPM    Atrial Rate 66 BPM    P-R Interval 134 ms    QRS Duration 78 ms    Q-T Interval 420 ms    QTC Calculation(Bezet) 440 ms    P Axis 61 degrees    R Axis 61 degrees    T Axis -34 degrees    Diagnosis Line Normal sinus rhythm  T wave abnormality, consider inferior ischemia  Abnormal ECG    Confirmed by Ravin Nicholson ([id number]) on 1/23/2019 10:43:12 AM      ASSESSMENT / PLAN  :    ACUTE HYPOXIC  RESPIRATORY FAILURE : IMPROVING, Seen & f/u by pulm appreciated. clinically better.   PNEUMONIA ; Suspected w/ worsening of CXR finding R>L On Meropenem, as adv by ID, ID f/u appreciated.   COPD exacerbation ; has +viral. cChr. O2 dependent.   BL PLEURAL EFFUSION : See above.   RIGHT TIBIAL VEIN THROMBOSIS ; Conservative. Maintain ambulation.   ANEMIA ; chronic stable.   ? ASCIETES : incidental finding in Echo. Will get ABd sono. F/U.   LEUKOCYTOSIS : Likely 2' Steroid.   CKD III : Stable. Avoid dehydration   OLD CVA ; stable.   Hx. AORTO-FEM BYPASS  RIGHT CAROTID STENT  PVD ; Cont current Tx, risk fx control.     Discussed w/ resident Dr. Dani Gates.     Spent 30 min in care & coordination.

## 2019-01-25 NOTE — DIETITIAN INITIAL EVALUATION ADULT. - MD RECOMMEND
Please continue current diet, add Ensure Compact q 12 hrs (440kcal, 18gm protein), consider laxatives (no BM in 4 days), obtain biweekly weights encourage at meal times./other

## 2019-01-25 NOTE — PROGRESS NOTE ADULT - SUBJECTIVE AND OBJECTIVE BOX
LILA VELA  85y, Female      OVERNIGHT EVENTS:    no fevers, feels better, has a cough    VITALS:  T(F): 97, Max: 97 (01-24-19 @ 12:00)  HR: 97  BP: 124/65  RR: 18Vital Signs Last 24 Hrs  T(C): 36.1 (25 Jan 2019 05:30), Max: 36.1 (24 Jan 2019 12:00)  T(F): 97 (25 Jan 2019 05:30), Max: 97 (24 Jan 2019 12:00)  HR: 97 (25 Jan 2019 05:30) (86 - 119)  BP: 124/65 (25 Jan 2019 05:30) (65/43 - 124/65)  BP(mean): 65 (24 Jan 2019 14:22) (50 - 79)  RR: 18 (25 Jan 2019 05:30) (17 - 21)  SpO2: 87% (25 Jan 2019 08:06) (87% - 98%)    TESTS & MEASUREMENTS:                        9.2    18.27 )-----------( 231      ( 25 Jan 2019 06:13 )             29.8     01-25    148<H>  |  106  |  50<H>  ----------------------------<  97  4.2   |  31  |  1.4    Ca    9.3      25 Jan 2019 06:13  Phos  4.3     01-25  Mg     2.2     01-25    TPro  5.4<L>  /  Alb  3.6  /  TBili  0.4  /  DBili  x   /  AST  43<H>  /  ALT  53<H>  /  AlkPhos  60  01-25    LIVER FUNCTIONS - ( 25 Jan 2019 06:13 )  Alb: 3.6 g/dL / Pro: 5.4 g/dL / ALK PHOS: 60 U/L / ALT: 53 U/L / AST: 43 U/L / GGT: x             Culture - Urine (collected 01-23-19 @ 03:20)  Source: .Urine Clean Catch (Midstream)  Final Report (01-24-19 @ 11:51):    No growth    Culture - Blood (collected 01-22-19 @ 04:11)  Source: .Blood None  Preliminary Report (01-23-19 @ 13:01):    No growth to date.    Culture - Blood (collected 01-21-19 @ 11:19)  Source: .Blood Blood  Preliminary Report (01-22-19 @ 16:01):    No growth to date.    Culture - Blood (collected 01-21-19 @ 11:19)  Source: .Blood Blood  Preliminary Report (01-22-19 @ 17:01):    No growth to date.      Urinalysis Basic - ( 24 Jan 2019 04:30 )    Color: Red / Appearance: Turbid / SG: >=1.030 / pH: x  Gluc: x / Ketone: 40  / Bili: Large / Urobili: 1.0   Blood: x / Protein: >=300 / Nitrite: Positive   Leuk Esterase: Moderate / RBC: >50 /HPF / WBC x   Sq Epi: x / Non Sq Epi: x / Bacteria: Moderate /HPF          RADIOLOGY & ADDITIONAL TESTS:    ANTIBIOTICS:  meropenem  IVPB 500 milliGRAM(s) IV Intermittent every 8 hours

## 2019-01-25 NOTE — PROGRESS NOTE ADULT - SUBJECTIVE AND OBJECTIVE BOX
Interval history and ROS:    Feeling better  No new complaints  On BiPAP overnight  Tolerated VM and NC yesterday    MEDICATIONS:  ALBUTerol    90 MICROgram(s) HFA Inhaler 1 Puff(s) Inhalation every 4 hours  ALBUTerol/ipratropium for Nebulization 3 milliLiter(s) Nebulizer every 6 hours  aspirin enteric coated 81 milliGRAM(s) Oral <User Schedule>  atorvastatin 10 milliGRAM(s) Oral at bedtime  buDESOnide 160 MICROgram(s)/formoterol 4.5 MICROgram(s) Inhaler 2 Puff(s) Inhalation two times a day  chlorhexidine 4% Liquid 1 Application(s) Topical <User Schedule>  cilostazol 100 milliGRAM(s) Oral two times a day  clopidogrel Tablet 75 milliGRAM(s) Oral daily  docusate sodium 100 milliGRAM(s) Oral two times a day  furosemide    Tablet 20 milliGRAM(s) Oral <User Schedule>  heparin  Injectable 5000 Unit(s) SubCutaneous every 8 hours  levETIRAcetam 500 milliGRAM(s) Oral two times a day  meropenem  IVPB 500 milliGRAM(s) IV Intermittent every 8 hours  methylPREDNISolone sodium succinate Injectable 60 milliGRAM(s) IV Push two times a day  metoprolol tartrate 25 milliGRAM(s) Oral two times a day  nystatin Powder 1 Application(s) Topical two times a day  pantoprazole    Tablet 40 milliGRAM(s) Oral before breakfast  senna 2 Tablet(s) Oral at bedtime  tiotropium 18 MICROgram(s) Capsule 1 Capsule(s) Inhalation daily      VITAL SIGNS:  Vital Signs Last 24 Hrs  T(C): 36.1 (25 Jan 2019 05:30), Max: 36.1 (24 Jan 2019 08:00)  T(F): 97 (25 Jan 2019 05:30), Max: 97 (24 Jan 2019 08:00)  HR: 97 (25 Jan 2019 05:30) (82 - 119)  BP: 124/65 (25 Jan 2019 05:30) (65/43 - 124/65)  BP(mean): 65 (24 Jan 2019 14:22) (50 - 79)  RR: 18 (25 Jan 2019 05:30) (17 - 25)  SpO2: 98% (24 Jan 2019 23:01) (95% - 98%) on BiPAP        PHYSICAL EXAM:  Awake and alert NAD  Neck supple, no LN  S1 and S2 no murmur  Lungs decreased BS BL  Abdomen is soft and non tender  There is no edema  Neurologically non focal                          LABS:                        9.2    18.27 )-----------( 231      ( 25 Jan 2019 06:13 )             29.8     01-24    145  |  103  |  44<H>  ----------------------------<  138<H>  4.7   |  29  |  1.3    Ca    8.8      24 Jan 2019 04:10  Phos  4.1     01-24  Mg     2.1     01-24    TPro  5.4<L>  /  Alb  3.4<L>  /  TBili  0.4  /  DBili  x   /  AST  45<H>  /  ALT  41  /  AlkPhos  62  01-24    PT/INR - ( 24 Jan 2019 12:05 )   PT: 11.30 sec;   INR: 0.98 ratio     PTT - ( 24 Jan 2019 12:05 )  PTT:27.4 sec    Urinalysis Basic - ( 24 Jan 2019 04:30 )  Color: Red / Appearance: Turbid / SG: >=1.030 / pH: x  Gluc: x / Ketone: 40  / Bili: Large / Urobili: 1.0   Blood: x / Protein: >=300 / Nitrite: Positive   Leuk Esterase: Moderate / RBC: >50 /HPF / WBC x   Sq Epi: x / Non Sq Epi: x / Bacteria: Moderate /HPF              RADIOLOGY & ADDITIONAL TESTS:   < from: Xray Chest 1 View- PORTABLE-Routine (01.24.19 @ 05:08) >  Stable right greater than left bibasilar airspace opacities.

## 2019-01-25 NOTE — DIETITIAN INITIAL EVALUATION ADULT. - DIET TYPE
pt reports good appetite (eating ~75% meals) howevere per RN pt is not eating well, had < 50% breakfast this AM/regular

## 2019-01-25 NOTE — PROGRESS NOTE ADULT - ASSESSMENT
86 yo F PMHx COPD on 2 L NC, PVD, CKD3, HTN, DLD, hx of CVA, ex-smoker, recently discharged from hospital Jan 13 after being admitted for pneumonia, p/w SOB 1 day. She finished her course of Levaquin and prednisone on Friday and was doing well, until Sunday evening when she starting feeling SOB again. In Ed patient received cefepime,  Levaquin, and solumedrol. Patient mentioned history of dysuria of 2 days duration.  Pt was admitted to CCU for acute on chronic hypoxic and chronic hypercapnic respiratory failure 2/2 COPD exacerbation 2/2 Possible RLL GNR pneumonia exacerbated by coronavirus coinfection.   She was treated with BiPaP prn and qhs, alternating with venti mask and oxygen via NC. Pt was treated with meropenem as per ID, solumedrol, bronchodilators. Pt also had right tibial vein DVT- was on IV heparin which was stopped as pt had hematuria x2. Per Dr Dunham and PMD, d/c heparin as DVT is distal and low risk to cause PE. Pt also had moderate ascites seen on 2D echo- RUQ USG pending results.   Pt had +UA but UCx neg, BCx neg. On meropenem for PNA. MRSA negative, Flu negative.     # Acute on chronic hypoxic and chronic hypercapnic respiratory failure 2/2 COPD exacerbation 2/2 Possible RLL GNR pneumonia exacerbated by coronavirus coinfection- improving  Continue BiPAP and supplemental O2 as necessary to maintain sat > 88%  Follow O2 sats  Duonebs q6h  Symbicort bid  Switch Solumedrol 60 q12 to po prednisone  ID- meropenem 500 q8h   f/u urine legionella Ag  +Coronavirus  FLU, RSV, MRSA nares, BCx neg    #HFpEF  ECHO 1/22- EF 55-60%, severe pulm HTN, dec RV systolic fx, RV vol and pressure overload, RV enlarged, severe mitral annular calcification    c/w home dose lasix 20 per week    #Right Tibial vein DVT  Duplex LE- Right post tibial DVT  Was on heparin gtt-on hold 2/2 hematuria. Keep off heparin gtt, as dvt is distal and PE is unlikely.   Repeat Duplex in 1 week from the last duplex (around 1/27-ordered)    #Hematuria improved after heparin drip stopped  UA 1/24 + UTI, + blood, but UCx neg. Pt already on meropenem    #Mod ascites noted on echo  F/u RUQ USG for ascites.     #Constipation- Last BM sunday  senna, colace  lactulose prn    DVT prophylaxis- heparin 5000 q8  GI ppx- protonix  Diet- DASH/TLC   #Activity Ambulate as tolerated  #Code status FULL  #Dispo: home

## 2019-01-25 NOTE — PROGRESS NOTE ADULT - SUBJECTIVE AND OBJECTIVE BOX
SUBJECTIVE:    Patient is a 85y old Female who presents with a chief complaint of sob (25 Jan 2019 09:45)    Currently admitted to medicine with the primary diagnosis of Sepsis     Today is hospital day 4d. Overnight no event. Report feeling better since admission. SOB improving, on venti mask. Coughing improved. Denied pain, dysuria, hematuria. Last BM Sunday.    PAST MEDICAL & SURGICAL HISTORY  PVD (peripheral vascular disease)  COPD (chronic obstructive pulmonary disease)  Kidney disease, chronic, stage I (GFR over 89 ml/min)  Peripheral vascular disease  HTN (hypertension)  High cholesterol  CVA (cerebral vascular accident)  H/O aorto-femoral bypass  History of right common carotid artery stent placement        ALLERGIES:  No Known Allergies    MEDICATIONS:  STANDING MEDICATIONS  ALBUTerol    90 MICROgram(s) HFA Inhaler 1 Puff(s) Inhalation every 4 hours  ALBUTerol/ipratropium for Nebulization 3 milliLiter(s) Nebulizer every 6 hours  aspirin enteric coated 81 milliGRAM(s) Oral <User Schedule>  atorvastatin 10 milliGRAM(s) Oral at bedtime  buDESOnide 160 MICROgram(s)/formoterol 4.5 MICROgram(s) Inhaler 2 Puff(s) Inhalation two times a day  chlorhexidine 4% Liquid 1 Application(s) Topical <User Schedule>  cilostazol 100 milliGRAM(s) Oral two times a day  clopidogrel Tablet 75 milliGRAM(s) Oral daily  docusate sodium 100 milliGRAM(s) Oral two times a day  furosemide    Tablet 20 milliGRAM(s) Oral <User Schedule>  heparin  Injectable 5000 Unit(s) SubCutaneous every 8 hours  levETIRAcetam 500 milliGRAM(s) Oral two times a day  meropenem  IVPB 500 milliGRAM(s) IV Intermittent every 8 hours  metoprolol tartrate 25 milliGRAM(s) Oral two times a day  nystatin Powder 1 Application(s) Topical two times a day  pantoprazole    Tablet 40 milliGRAM(s) Oral before breakfast  predniSONE   Tablet 60 milliGRAM(s) Oral two times a day  senna 2 Tablet(s) Oral at bedtime  tiotropium 18 MICROgram(s) Capsule 1 Capsule(s) Inhalation daily    PRN MEDICATIONS  lactulose Syrup 10 Gram(s) Oral every 6 hours PRN    VITALS:   T(F): 96  HR: 86  BP: 107/51  RR: 18  SpO2: 90%    LABS:                        9.2    18.27 )-----------( 231      ( 25 Jan 2019 06:13 )             29.8     01-25    148<H>  |  106  |  50<H>  ----------------------------<  97  4.2   |  31  |  1.4    Ca    9.3      25 Jan 2019 06:13  Phos  4.3     01-25  Mg     2.2     01-25    TPro  5.4<L>  /  Alb  3.6  /  TBili  0.4  /  DBili  x   /  AST  43<H>  /  ALT  53<H>  /  AlkPhos  60  01-25    PT/INR - ( 25 Jan 2019 06:13 )   PT: 11.30 sec;   INR: 0.98 ratio         PTT - ( 25 Jan 2019 06:13 )  PTT:35.6 sec  Urinalysis Basic - ( 24 Jan 2019 04:30 )    Color: Red / Appearance: Turbid / SG: >=1.030 / pH: x  Gluc: x / Ketone: 40  / Bili: Large / Urobili: 1.0   Blood: x / Protein: >=300 / Nitrite: Positive   Leuk Esterase: Moderate / RBC: >50 /HPF / WBC x   Sq Epi: x / Non Sq Epi: x / Bacteria: Moderate /HPF            Culture - Urine (collected 23 Jan 2019 03:20)  Source: .Urine Clean Catch (Midstream)  Final Report (24 Jan 2019 11:51):    No growth          RADIOLOGY:  < from: Transthoracic Echocardiogram (01.22.19 @ 13:29) >   1. Left ventricular ejection fraction, by visual estimation, is 55 to   60%.   2. Normal left ventricular size and wall thicknesses, with normal   systolic function.   3. Right ventricular volume and pressure overload.   4. Severely enlarged right ventricle.   5. Severely reduced RV systolic function.   6. Moderate ascites.   7. Severe mitral annular calcification.   8. Mild tricuspid regurgitation.   9. Estimated pulmonary artery systolic pressure is 64.6 mmHg assuming a   right atrial pressure of 3 mmHg, which is consistent with severe   pulmonary hypertension.  10. Pulmonary hypertension is present.    < end of copied text >    PHYSICAL EXAM:  GENERAL: NAD, on venti mask, AAOx3  HEENT:  Atraumatic, Normocephalic, PERRLA, No JVD  PULMONARY: Decreased BS  CARDIOVASCULAR: Regular rate and rhythm. S1 S2+; 2/6 systolic murmur  GASTROINTESTINAL: Soft, Nontender, Nondistended; Bowel sounds present  MUSCULOSKELETAL:  2+ Peripheral Pulses, No clubbing, cyanosis, or edema  NEUROLOGY: non-focal  SKIN: No rashes or lesions

## 2019-01-25 NOTE — PROGRESS NOTE ADULT - SUBJECTIVE AND OBJECTIVE BOX
LILA VELA  85y  Female      SUBJECTIVE:  out of bed in chair on NC tolerating diet     PAST MEDICAL & SURGICAL HISTORY:  PVD (peripheral vascular disease)  COPD (chronic obstructive pulmonary disease)  Kidney disease, chronic, stage I (GFR over 89 ml/min)  Peripheral vascular disease  HTN (hypertension)  High cholesterol  CVA (cerebral vascular accident)  H/O aorto-femoral bypass  History of right common carotid artery stent placement    85y    REVIEW OF SYSTEMS:    T(C): 35.6 (01-25-19 @ 14:00), Max: 36.1 (01-25-19 @ 05:30)  HR: 86 (01-25-19 @ 14:00) (86 - 110)  BP: 107/51 (01-25-19 @ 14:00) (107/51 - 124/65)  RR: 18 (01-25-19 @ 14:00) (18 - 20)  SpO2: 90% (01-25-19 @ 12:22) (87% - 98%)  Wt(kg): --Vital Signs Last 24 Hrs  T(C): 35.6 (25 Jan 2019 14:00), Max: 36.1 (25 Jan 2019 05:30)  T(F): 96 (25 Jan 2019 14:00), Max: 97 (25 Jan 2019 05:30)  HR: 86 (25 Jan 2019 14:00) (86 - 110)  BP: 107/51 (25 Jan 2019 14:00) (107/51 - 124/65)  BP(mean): --  RR: 18 (25 Jan 2019 14:00) (18 - 20)  SpO2: 90% (25 Jan 2019 12:22) (87% - 98%)    MEDICATION:  ALBUTerol    90 MICROgram(s) HFA Inhaler 1 Puff(s) Inhalation every 4 hours  ALBUTerol/ipratropium for Nebulization 3 milliLiter(s) Nebulizer every 6 hours  aspirin enteric coated 81 milliGRAM(s) Oral <User Schedule>  atorvastatin 10 milliGRAM(s) Oral at bedtime  buDESOnide 160 MICROgram(s)/formoterol 4.5 MICROgram(s) Inhaler 2 Puff(s) Inhalation two times a day  chlorhexidine 4% Liquid 1 Application(s) Topical <User Schedule>  cilostazol 100 milliGRAM(s) Oral two times a day  clopidogrel Tablet 75 milliGRAM(s) Oral daily  docusate sodium 100 milliGRAM(s) Oral two times a day  furosemide    Tablet 20 milliGRAM(s) Oral <User Schedule>  heparin  Injectable 5000 Unit(s) SubCutaneous every 8 hours  lactulose Syrup 10 Gram(s) Oral every 6 hours PRN  levETIRAcetam 500 milliGRAM(s) Oral two times a day  meropenem  IVPB 500 milliGRAM(s) IV Intermittent every 8 hours  metoprolol tartrate 25 milliGRAM(s) Oral two times a day  nystatin Powder 1 Application(s) Topical two times a day  pantoprazole    Tablet 40 milliGRAM(s) Oral before breakfast  predniSONE   Tablet 60 milliGRAM(s) Oral two times a day  senna 2 Tablet(s) Oral at bedtime  tiotropium 18 MICROgram(s) Capsule 1 Capsule(s) Inhalation daily      LABS:                      9.2    18.27 )-----------( 231      ( 25 Jan 2019 06:13 )             29.8     148<H>  |  106  |  50<H>  ----------------------------<  97  4.2   |  31  |  1.4    Ca    9.3      25 Jan 2019 06:13  Phos  4.3     01-25  Mg     2.2     01-25    TPro  5.4<L>  /  Alb  3.6  /  TBili  0.4  /  DBili  x   /  AST  43<H>  /  ALT  53<H>  /  AlkPhos  60  01-25  Culture - Urine (01.23.19 @ 03:20)    Specimen Source: .Urine Clean Catch (Midstream)    Culture Results:   No growth    Culture - Blood (01.22.19 @ 04:11)    Specimen Source: .Blood None    Culture Results:   No growth to date.    Culture - Blood (01.21.19 @ 11:19)    Specimen Source: .Blood Blood    Culture Results:   No growth to date.    Rapid Respiratory Viral Panel (01.21.19 @ 15:52)    Coronavirus (229E,HKU1,NL63,OC43): Detected     Urinalysis in AM (01.24.19 @ 04:30)    Glucose Qualitative, Urine: 100 mg/dL    Blood, Urine: Large    pH Urine: 5.0    Color: Red    Urine Appearance: Turbid    Bilirubin: Large    Ketone - Urine: 40    Specific Gravity: >=1.030    Protein, Urine: >=300    Urobilinogen: 1.0    Nitrite: Positive    Leukocyte Esterase Concentration: Moderate    RADIOLOGY:    < from: Xray Chest 1 View- PORTABLE-Routine (01.25.19 @ 06:29) >  Impression:      COPDwith right lower lobe pneumonia.    Follow-up as needed.  < end of copied text >     < from: [facility] Duplex Lower Ext Vein Scan, Bilat (01.21.19 @ 17:19) >  Impression:    Thrombosis present in the right posterior tibial (calf) vein. Superficial   venous thrombophlebitis of the distal greater saphenous vein.    No evidence of deep venous thrombosis or superficial venous   thrombophlebitis of the left leg.    < end of copied text >     < from: Transthoracic Echocardiogram (01.22.19 @ 13:29) >  Summary:   1. Left ventricular ejection fraction, by visual estimation, is 55 to   60%.   2. Normal left ventricular size and wall thicknesses, with normal   systolic function.   3. Right ventricular volume and pressure overload.   4. Severely enlarged right ventricle.   5. Severely reduced RV systolic function.   6. Moderate ascites.   7. Severe mitral annular calcification.   8. Mild tricuspid regurgitation.   9. Estimated pulmonary artery systolic pressure is 64.6 mmHg assuming a   right atrial pressure of 3 mmHg, which is consistent with severe   pulmonary hypertension.  10. Pulmonary hypertension is present.    < end of copied text >    PHYSICAL EXAM:  out of bed in chair in NAD  heart rsr s1 s2+  lungs clear  abdomen soft non tender  extremities - no edema or tenderness    IMPRESSION:  Rt LLL pneumonia GNR - coinfection with CORONA VIRUS 	  s/p Acute hypoxic respiratory failure  COPD - oxygen dependent  PULMONARY HYPERTENSION - SEVERE  OLD CVA  CAROTID STENOSIS - S/P Rt common CAROTID STENT  PAD - S/P AORTO FEMORAL BYPASS  CKD STAGE3  CHRONIC ANEMIA H/H STABLE  HTN  DLD  LEUKOCYTOSIS SEC.TO INFECTION AND STEROIDS  PYURIA-HEMATURIA - NEG CULTURE - REPEAT U/A- GET U/S  RT POSTERIOR TIBIAL VEIN THROMBOSIS  ASCITES    PLAN:  ID AND PULMONARY F/U NOTED  F/U LEGIONELLA TITRES (PENDING)  CONTINUE RESPIRATORY SUPPORT - OXYGEN SUPPLEMENT - NEBULIZER - BIPAP AS NEEDED  CONTINUE IV ANTIBIOTICS AND IV STEROIDS  CONTINUE LASIX PO  F/U ABDOMINAL SONOGRAM TO EVALUATE ASCITES  CONTINUE DVT PROPHYLAXIS AND REPEAT F/U VENOUS DUPLEX LES

## 2019-01-25 NOTE — DIETITIAN INITIAL EVALUATION ADULT. - ENERGY NEEDS
Estimated energy needs: ~1120kcal/d (MSJ x 1.3)   Estimated protein needs: ~ 40gm/d (0.8gm/kg act wt) - impaired renal function noted (current labs + h/o CKD 1)    Estimated fluid needs: 1ml/kcal or per LIP

## 2019-01-25 NOTE — PROGRESS NOTE ADULT - ASSESSMENT
IMPRESSION:  Possibly RLL GNR PNA exacerbated by a Coronavirus coinfection.  WBC 18.2  BCx NTD    RECOMMENDATIONS:  Nares for ORSA  Urine for legionella antigen  Meropenem 500 mg iv q8h

## 2019-01-25 NOTE — DIETITIAN INITIAL EVALUATION ADULT. - PHYSICAL APPEARANCE
alert, oriented, OOB to chair. BMI-19.6, IBW- 105lb/47.7kg. UBW-102lb/46.4kg. Skin: rash. GI- constipation, last BM 4 days ago (RN and MD aware). Pt has dentures, no chewing/swallowing difficulties reported.

## 2019-01-25 NOTE — DIETITIAN INITIAL EVALUATION ADULT. - OTHER INFO
Pt admitted with CP and SOB. Pt currently being treated for PNA exacerbated by a Coronavirus coinfection. SLP eval (1/23), rec'd: continue regular consistency w/ thin liquids. Reason for assessment: referral for unspecified reason.

## 2019-01-25 NOTE — PROGRESS NOTE ADULT - ASSESSMENT
IMPRESSION:  84 yo female with a history of O2 dependent COPD recently hospitalized for pneumonia  Readmitted for acute on chronic hypoxemic respiratory failure with worsening basilar opacity on CXR c/w HCAP  Elevated BNP suggestive of some degree of heart failure, probably HFPEF  Influenza testing NEGATIVE  Calf vein thrombosis - doubt PE  Clinically improving    PLAN:  Continue BiPAP and supplemental O2 as necessary to maintain sat > 90%  Follow O2 sats  Continue antibiotics as per ID, steroids and bronchodilators  Can change from IV to po steroids  Continue GI and DVT prophylaxis  Repeat LE doppler next week

## 2019-01-26 NOTE — PROGRESS NOTE ADULT - ASSESSMENT
86 yo F PMHx COPD on 2 L NC, PVD, CKD3, HTN, DLD, hx of CVA, ex-smoker, recently discharged from hospital Jan 13 after being admitted for pneumonia, p/w SOB 1 day. She finished her course of Levaquin and prednisone on Friday and was doing well, until Sunday evening when she starting feeling SOB again. In Ed patient received cefepime,  Levaquin, and solumedrol. Patient mentioned history of dysuria of 2 days duration.  Pt was admitted to CCU for acute on chronic hypoxic and chronic hypercapnic respiratory failure 2/2 COPD exacerbation 2/2 Possible RLL GNR pneumonia exacerbated by coronavirus coinfection.   She was treated with BiPaP prn and qhs, alternating with venti mask and oxygen via NC. Pt was treated with meropenem as per ID, solumedrol, bronchodilators. Pt also had right tibial vein DVT- was on IV heparin which was stopped as pt had hematuria. Per Dr Dunham and PMD, d/c heparin as DVT is distal and low risk to cause PE. Pt also had moderate ascites seen on 2D echo- RUQ USG pending results.   Pt had +UA but UCx neg, BCx neg. On meropenem for PNA. MRSA negative, Flu negative.     # Acute on chronic hypoxic and chronic hypercapnic respiratory failure 2/2 COPD exacerbation 2/2 Possible RLL GNR pneumonia exacerbated by coronavirus coinfection- improving  Continue BiPAP and supplemental O2 as necessary to maintain sat > 88%  Follow O2 sats  Duonebs q6h  Symbicort bid  Dec po prednisone to 40 bid. Taper  ID- meropenem 500 q8h  +Coronavirus  FLU, RSV, MRSA nares, BCx, legionella urine neg    #HFpEF  ECHO 1/22- EF 55-60%, severe pulm HTN, dec RV systolic fx, RV vol and pressure overload, RV enlarged, severe mitral annular calcification    c/w home dose lasix 20 per week  Cardiology consult    #Right Tibial vein DVT  Duplex LE- Right post tibial DVT  Was on heparin gtt-on hold 2/2 hematuria. Keep off heparin gtt, as dvt is distal and PE is unlikely.   Repeat Duplex in 1 week from the last duplex (around 1/27-ordered)    #Hematuria, heparin drip stopped. On heparin 5000 q8 for ppx  UA 1/24 + UTI, + blood, but UCx neg. Pt already on meropenem  Urology consult  Kidney bladder US    #Mod ascites noted on echo  neg ascites    #Up trending LFT  RUQ US    #Constipation- BM yesterday  senna, colace  lactulose prn    DVT prophylaxis- heparin 5000 q8  GI ppx- protonix  Diet- DASH/TLC   #Activity Ambulate as tolerated  #Code status FULL  #Dispo: home    Spoke with Dr. Ley 86 yo F PMHx COPD on 2 L NC, PVD, CKD3, HTN, DLD, hx of CVA, ex-smoker, recently discharged from hospital Jan 13 after being admitted for pneumonia, p/w SOB 1 day. She finished her course of Levaquin and prednisone on Friday and was doing well, until Sunday evening when she starting feeling SOB again. In Ed patient received cefepime,  Levaquin, and solumedrol. Patient mentioned history of dysuria of 2 days duration.  Pt was admitted to CCU for acute on chronic hypoxic and chronic hypercapnic respiratory failure 2/2 COPD exacerbation 2/2 Possible RLL GNR pneumonia exacerbated by coronavirus coinfection.   She was treated with BiPaP prn and qhs, alternating with venti mask and oxygen via NC. Pt was treated with meropenem as per ID, solumedrol, bronchodilators. Pt also had right tibial vein DVT- was on IV heparin which was stopped as pt had hematuria. Per Dr Dunham and PMD, d/c heparin as DVT is distal and low risk to cause PE. Pt also had moderate ascites seen on 2D echo- RUQ USG pending results.   Pt had +UA but UCx neg, BCx neg. On meropenem for PNA. MRSA negative, Flu negative.     # Acute on chronic hypoxic and chronic hypercapnic respiratory failure 2/2 COPD exacerbation 2/2 Possible RLL GNR pneumonia exacerbated by coronavirus coinfection- improving  Continue BiPAP and supplemental O2 as necessary to maintain sat > 88%  Follow O2 sats  Duonebs q6h  Symbicort bid  Dec po prednisone to 40 bid. Taper  ID- meropenem 500 q8h  +Coronavirus  FLU, RSV, MRSA nares, BCx, legionella urine neg    #HFpEF  ECHO 1/22- EF 55-60%, severe pulm HTN, dec RV systolic fx, RV vol and pressure overload, RV enlarged, severe mitral annular calcification    c/w home dose lasix 20 per week  Cardiology consult    #Right Tibial vein DVT  Duplex LE- Right post tibial DVT  Was on heparin gtt-on hold 2/2 hematuria. Keep off heparin gtt, as dvt is distal and PE is unlikely.   Repeat Duplex in 1 week from the last duplex (around 1/27-ordered)    #Hematuria after heparin drip (Jan 21-23), heparin drip stopped  Pt still has hematuria today on monique  On heparin 5000 q8 for ppx  UA 1/24 + UTI, + blood, but UCx neg. Pt already on meropenem  Hgb stable 9.1, 12.3 on admission. Active T&S  Urology consult  Kidney bladder US    #Mod ascites noted on echo  neg ascites    #Up trending LFT  RUQ US    #Constipation- BM yesterday  senna, colace  lactulose prn    DVT prophylaxis- heparin 5000 q8  GI ppx- protonix  Diet- DASH/TLC   #Activity Ambulate as tolerated  #Code status FULL  #Dispo: home    Spoke with Dr. Ley 84 yo F PMHx COPD on 2 L NC, PVD, CKD3, HTN, DLD, hx of CVA, ex-smoker, recently discharged from hospital Jan 13 after being admitted for pneumonia, p/w SOB 1 day. She finished her course of Levaquin and prednisone on Friday and was doing well, until Sunday evening when she starting feeling SOB again. In Ed patient received cefepime,  Levaquin, and solumedrol. Patient mentioned history of dysuria of 2 days duration.  Pt was admitted to CCU for acute on chronic hypoxic and chronic hypercapnic respiratory failure 2/2 COPD exacerbation 2/2 Possible RLL GNR pneumonia exacerbated by coronavirus coinfection.   She was treated with BiPaP prn and qhs, alternating with venti mask and oxygen via NC. Pt was treated with meropenem as per ID, solumedrol, bronchodilators. Pt also had right tibial vein DVT- was on IV heparin which was stopped as pt had hematuria. Per Dr Dunham and PMD, d/c heparin as DVT is distal and low risk to cause PE. Pt also had moderate ascites seen on 2D echo- RUQ USG pending results.   Pt had +UA but UCx neg, BCx neg. On meropenem for PNA. MRSA negative, Flu negative.     # Acute on chronic hypoxic and chronic hypercapnic respiratory failure 2/2 COPD exacerbation 2/2 Possible RLL GNR pneumonia exacerbated by coronavirus coinfection- improving  Continue BiPAP and supplemental O2 as necessary to maintain sat > 88%  Follow O2 sats  Duonebs q6h  Symbicort bid  Dec po prednisone to 40 bid. Taper  ID- meropenem 500 q8h  +Coronavirus  FLU, RSV, MRSA nares, BCx, legionella urine neg    #HFpEF  ECHO 1/22- EF 55-60%, severe pulm HTN, dec RV systolic fx, RV vol and pressure overload, RV enlarged, severe mitral annular calcification    c/w home dose lasix 20 per week  Cardiology consult    #Right Tibial vein DVT  Duplex LE- Right post tibial DVT  Was on heparin gtt-on hold 2/2 hematuria. Keep off heparin gtt, as dvt is distal and PE is unlikely.   Repeat Duplex in 1 week from the last duplex (around 1/27-ordered)    #Hematuria after heparin drip (Jan 21-23)- active  On heparin 5000 q8 for ppx  UA 1/24 + UTI, + blood, but UCx neg. Pt already on meropenem  Hgb stable 9.1, 12.3 on admission. Active T&S  Urology consult  Kidney bladder US    #Mod ascites noted on echo  neg ascites    #Up trending LFT  RUQ US    #Constipation- BM yesterday  senna, colace  lactulose prn    DVT prophylaxis- heparin 5000 q8  GI ppx- protonix  Diet- DASH/TLC   #Activity Ambulate as tolerated  #Code status FULL  #Dispo: home    Spoke with Dr. Ley

## 2019-01-26 NOTE — CONSULT NOTE ADULT - ASSESSMENT
UROLOGY: Pt is an 86y/o F PMH of COPD, CKD3, and pneumonia admitted for SOB,  called due to pt having hematuria while on Heparin drip that was stopped. Pt does not have a urologist, admits to being a prior smoker. She denies having any LUTS now or prior to hospital visit. Pt's nurse states that her last voids have been dark yellow, no visible blood; UA results show turbid +nitrites and +leuks,, negative UCx. US shows a R 1.6cm renal cyst enlarged from 1cm and L 6.9cm renal cyst enlarged from 5.8cm, no hydronephrosis.     A) Gross hematuria   - L 1.6cm renal cyst, R 6.9 renal cyst  - UTI    P) Recommend non-contrast CT Scan  - F/u as outpatient with Dr. Coleman to further work up hematuria.  - Recommend GYN consult.

## 2019-01-26 NOTE — PROGRESS NOTE ADULT - SUBJECTIVE AND OBJECTIVE BOX
DANELILA  85y  Female      SUBJECTIVE:  no new complaints on O2 supplement via  NC    PAST MEDICAL & SURGICAL HISTORY:  PVD (peripheral vascular disease)  COPD (chronic obstructive pulmonary disease)  Kidney disease, chronic, stage I (GFR over 89 ml/min)  Peripheral vascular disease  HTN (hypertension)  High cholesterol  CVA (cerebral vascular accident)  H/O aorto-femoral bypass  History of right common carotid artery stent placement    85y    REVIEW OF SYSTEMS:    T(C): 35.6 (01-26-19 @ 12:55), Max: 36.3 (01-26-19 @ 06:00)  HR: 99 (01-26-19 @ 12:55) (84 - 112)  BP: 111/53 (01-26-19 @ 12:55) (111/53 - 152/67)  RR: 18 (01-26-19 @ 12:55) (18 - 18)  SpO2: 94% (01-26-19 @ 01:45) (92% - 94%)  Wt(kg): --Vital Signs Last 24 Hrs  T(C): 35.6 (26 Jan 2019 12:55), Max: 36.3 (26 Jan 2019 06:00)  T(F): 96 (26 Jan 2019 12:55), Max: 97.4 (26 Jan 2019 06:00)  HR: 99 (26 Jan 2019 12:55) (84 - 112)  BP: 111/53 (26 Jan 2019 12:55) (111/53 - 152/67)  BP(mean): --  RR: 18 (26 Jan 2019 12:55) (18 - 18)  SpO2: 94% (26 Jan 2019 01:45) (92% - 94%)    MEDICATION:  ALBUTerol    90 MICROgram(s) HFA Inhaler 1 Puff(s) Inhalation every 4 hours  ALBUTerol/ipratropium for Nebulization 3 milliLiter(s) Nebulizer every 6 hours  aspirin enteric coated 81 milliGRAM(s) Oral <User Schedule>  atorvastatin 10 milliGRAM(s) Oral at bedtime  buDESOnide 160 MICROgram(s)/formoterol 4.5 MICROgram(s) Inhaler 2 Puff(s) Inhalation two times a day  chlorhexidine 4% Liquid 1 Application(s) Topical <User Schedule>  cilostazol 100 milliGRAM(s) Oral two times a day  clopidogrel Tablet 75 milliGRAM(s) Oral daily  docusate sodium 100 milliGRAM(s) Oral two times a day  furosemide    Tablet 20 milliGRAM(s) Oral <User Schedule>  guaiFENesin    Syrup 100 milliGRAM(s) Oral every 6 hours PRN  heparin  Injectable 5000 Unit(s) SubCutaneous every 8 hours  lactulose Syrup 10 Gram(s) Oral every 6 hours PRN  levETIRAcetam 500 milliGRAM(s) Oral two times a day  meropenem  IVPB 500 milliGRAM(s) IV Intermittent every 8 hours  metoprolol tartrate 25 milliGRAM(s) Oral two times a day  nystatin Powder 1 Application(s) Topical two times a day  pantoprazole    Tablet 40 milliGRAM(s) Oral before breakfast  predniSONE   Tablet 40 milliGRAM(s) Oral two times a day  senna 2 Tablet(s) Oral at bedtime  tiotropium 18 MICROgram(s) Capsule 1 Capsule(s) Inhalation daily      LABS:                         9.1    17.25 )-----------( 228      ( 26 Jan 2019 06:50 )             29.4     01-26    147<H>  |  105  |  49<H>  ----------------------------<  110<H>  4.7   |  29  |  1.2    Ca    9.3      26 Jan 2019 06:50  Phos  4.3     01-25  Mg     2.1     01-26    TPro  5.3<L>  /  Alb  3.4<L>  /  TBili  0.4  /  DBili  x   /  AST  90<H>  /  ALT  99<H>  /  AlkPhos  61  01-26    Legionella pneumophila Antigen, Urine (01.24.19 @ 20:00)    Legionella Antigen, Urine: Negative      RADIOLOGY:  < from: Xray Chest 1 View- PORTABLE-Routine (01.25.19 @ 06:29) >  Impression:      COPDwith right lower lobe pneumonia.    Follow-up as needed.    < end of copied text >      < from: US Kidney and Bladder (01.26.19 @ 11:07) >  Impression:    1.  Right renal 1.6 cm cyst enlarged from 1 cm. Left renal 6.9 cm cyst   enlarged from 5.8 cm.. No hydronephrosis.    2.  Nondiagnostic examination of an empty urinary bladder.    < end of copied text >    < from: US Abdomen Limited (01.26.19 @ 11:12) >  IMPRESSION:    1.  Negative examination of the liver.    2.  Nonvisualization of the gallbladder. Common bile duct within normal   limits for the patient's age.    3.  Right pleural effusion.      < end of copied text >    PHYSICAL EXAM:  alert orientedx3  heart rsr s1s2 +  lungs clear  extremities no edema or tenderness  skin echymosis both forearms     IMPRESSION:  Rt LLL pneumonia GNR - coinfection with CORONA VIRUS 	  Acute hypoxic respiratory failure  COPD - oxygen dependent  PULMONARY HYPERTENSION - SEVERE -  Rt posterior tibial vein thrombosis - on sc heparin for dvt prophylaxis  OLD CVA  CAROTID STENOSIS - S/P Rt common CAROTID STENT  PAD - S/P AORTO FEMORAL BYPASS  CKD STAGE3  CHRONIC ANEMIA H/H STABLE  HTN  DLD  LEUKOCYTOSIS SEC.TO INFECTION AND STEROIDS - improving  PYURIA-HEMATURIA - NEG CULTURE - u/s negative except renal cysts bilateral - [place]  ABNORMAL LFTS - U/S NL LIVER AND BILIARY TREE    PLAN:  continue iv antibiotics   taper iv steroid dose  continue respiratory support bipap/nebulizer/O2 supplement  urology consult  cARDIOLOGY CONSULT  repeat venous duplex LES  MONITOR LFTS

## 2019-01-26 NOTE — PROGRESS NOTE ADULT - ASSESSMENT
IMPRESSION:  Possibly RLL GNR PNA exacerbated by a Coronavirus coinfection.  WBC 18.2  BCx NTD  Legionella negative    RECOMMENDATIONS:  Meropenem 500 mg iv q8h

## 2019-01-26 NOTE — PROGRESS NOTE ADULT - ASSESSMENT
IMPRESSION:  86 yo female with a history of O2 dependent COPD recently hospitalized for pneumonia  Readmitted for acute on chronic hypoxemic respiratory failure with worsening basilar opacity on CXR c/w HCAP  HFPEF  Calf vein thrombosis - doubt PE  Clinically improving    PLAN:  Continue BiPAP and supplemental O2 as necessary to maintain sat > 90%  Follow O2 sats  Continue antibiotics as per ID, steroids and bronchodilators  PO steroids  Continue GI and DVT prophylaxis  Repeat LE doppler next week  OOB to chair

## 2019-01-26 NOTE — PROGRESS NOTE ADULT - SUBJECTIVE AND OBJECTIVE BOX
LILA VELA  85y, Female      OVERNIGHT EVENTS:    none    VITALS:  T(F): 97.4, Max: 97.4 (01-26-19 @ 06:00)  HR: 112  BP: 152/67  RR: 18Vital Signs Last 24 Hrs  T(C): 36.3 (26 Jan 2019 06:00), Max: 36.3 (26 Jan 2019 06:00)  T(F): 97.4 (26 Jan 2019 06:00), Max: 97.4 (26 Jan 2019 06:00)  HR: 112 (26 Jan 2019 06:00) (84 - 112)  BP: 152/67 (26 Jan 2019 06:00) (107/51 - 152/67)  BP(mean): --  RR: 18 (26 Jan 2019 06:00) (18 - 18)  SpO2: 94% (26 Jan 2019 01:45) (90% - 94%)    TESTS & MEASUREMENTS:                        9.1    17.25 )-----------( 228      ( 26 Jan 2019 06:50 )             29.4     01-26    147<H>  |  105  |  49<H>  ----------------------------<  110<H>  4.7   |  29  |  1.2    Ca    9.3      26 Jan 2019 06:50  Phos  4.3     01-25  Mg     2.1     01-26    TPro  5.3<L>  /  Alb  3.4<L>  /  TBili  0.4  /  DBili  x   /  AST  90<H>  /  ALT  99<H>  /  AlkPhos  61  01-26    LIVER FUNCTIONS - ( 26 Jan 2019 06:50 )  Alb: 3.4 g/dL / Pro: 5.3 g/dL / ALK PHOS: 61 U/L / ALT: 99 U/L / AST: 90 U/L / GGT: x             Culture - Urine (collected 01-23-19 @ 03:20)  Source: .Urine Clean Catch (Midstream)  Final Report (01-24-19 @ 11:51):    No growth    Culture - Blood (collected 01-22-19 @ 04:11)  Source: .Blood None  Preliminary Report (01-23-19 @ 13:01):    No growth to date.    Culture - Blood (collected 01-21-19 @ 11:19)  Source: .Blood Blood  Preliminary Report (01-22-19 @ 16:01):    No growth to date.    Culture - Blood (collected 01-21-19 @ 11:19)  Source: .Blood Blood  Preliminary Report (01-22-19 @ 17:01):    No growth to date.            RADIOLOGY & ADDITIONAL TESTS:    ANTIBIOTICS:  meropenem  IVPB 500 milliGRAM(s) IV Intermittent every 8 hours

## 2019-01-26 NOTE — CONSULT NOTE ADULT - SUBJECTIVE AND OBJECTIVE BOX
Patient is a 85y old  Female who presents with a chief complaint of sob (26 Jan 2019 14:34)      HPI:  86 yo F with PMHx of COPD on 2 L NC, PVD, CKD3, essential HTN, DLD, hx of CVA, ex-smoker, recently discharged from hospital Jan 13 after being admitted for pneumonia, presented for SOB of one day duration, patient has finished her course of Levaquin and prednisone on Friday and was doing well , until Sunday evening when she starting feeling SOB again that worsening today, patient denies any chest pain, cough, lower extremity swelling.  EMS found her to be hypoxic to the 70s on room air.  On arrival patient, hypoxic to 67%, + tachypnea, speaking few words at a time. + wheezing, bilaterally, clavicular retractions.  Patient was placed on BiPAP and SOB improved. In Ed patient received cefepime,  Levaquin , and solumedrol. Patient mentioned history of dysuria of 2 days duration. (21 Jan 2019 14:45)    UROLOGY: Pt is an 86y/o F PMH of COPD, CKD3, and pneumonia admitted for SOB,  called due to pt having hematuria while on Heparin drip that was stopped. Pt does not have a urologist, admits to being a prior smoker. She denies having any LUTS now or prior to hospital visit. Pt's nurse states that her last voids have been dark yellow, no visible blood; UA results show turbid +nitrites and +leuks,, negative UCx. US shows a R 1.6cm renal cyst enlarged from 1cm and L 6.9cm renal cyst enlarged from 5.8cm, no hydronephrosis.       PAST MEDICAL & SURGICAL HISTORY:  PVD (peripheral vascular disease)  COPD (chronic obstructive pulmonary disease)  Kidney disease, chronic, stage I (GFR over 89 ml/min)  Peripheral vascular disease  HTN (hypertension)  High cholesterol  CVA (cerebral vascular accident)  H/O aorto-femoral bypass  History of right common carotid artery stent placement    MEDICATIONS  (STANDING):  ALBUTerol    90 MICROgram(s) HFA Inhaler 1 Puff(s) Inhalation every 4 hours  ALBUTerol/ipratropium for Nebulization 3 milliLiter(s) Nebulizer every 6 hours  aspirin enteric coated 81 milliGRAM(s) Oral <User Schedule>  atorvastatin 10 milliGRAM(s) Oral at bedtime  buDESOnide 160 MICROgram(s)/formoterol 4.5 MICROgram(s) Inhaler 2 Puff(s) Inhalation two times a day  chlorhexidine 4% Liquid 1 Application(s) Topical <User Schedule>  cilostazol 100 milliGRAM(s) Oral two times a day  clopidogrel Tablet 75 milliGRAM(s) Oral daily  docusate sodium 100 milliGRAM(s) Oral two times a day  furosemide    Tablet 20 milliGRAM(s) Oral <User Schedule>  heparin  Injectable 5000 Unit(s) SubCutaneous every 8 hours  levETIRAcetam 500 milliGRAM(s) Oral two times a day  meropenem  IVPB 500 milliGRAM(s) IV Intermittent every 8 hours  metoprolol tartrate 25 milliGRAM(s) Oral two times a day  nystatin Powder 1 Application(s) Topical two times a day  pantoprazole    Tablet 40 milliGRAM(s) Oral before breakfast  predniSONE   Tablet 40 milliGRAM(s) Oral two times a day  senna 2 Tablet(s) Oral at bedtime  tiotropium 18 MICROgram(s) Capsule 1 Capsule(s) Inhalation daily    MEDICATIONS  (PRN):  ALBUTerol/ipratropium for Nebulization 3 milliLiter(s) Nebulizer every 6 hours PRN Shortness of Breath and/or Wheezing  guaiFENesin    Syrup 100 milliGRAM(s) Oral every 6 hours PRN Cough  lactulose Syrup 10 Gram(s) Oral every 6 hours PRN constipation      Allergies: No Known Allergies    SOCIAL HISTORY: No illicit drug use    FAMILY HISTORY:  Family history of atherosclerosis (Mother)      Vital Signs Last 24 Hrs  T(C): 35.6 (26 Jan 2019 12:55), Max: 36.3 (26 Jan 2019 06:00)  T(F): 96 (26 Jan 2019 12:55), Max: 97.4 (26 Jan 2019 06:00)  HR: 101 (26 Jan 2019 18:31) (84 - 112)  BP: 102/51 (26 Jan 2019 18:31) (102/51 - 152/67)  BP(mean): --  RR: 18 (26 Jan 2019 12:55) (18 - 18)  SpO2: 94% (26 Jan 2019 01:45) (94% - 94%)    PHYSICAL EXAM:    Constitutional: NAD, awake, alert, lying comfortably in bed.  Abd: soft, nontender, nondistended  : bladder non-palpable, pt voiding comfortably     I&O's Summary    25 Jan 2019 07:01  -  26 Jan 2019 07:00  --------------------------------------------------------  IN: 240 mL / OUT: 1 mL / NET: 239 mL    26 Jan 2019 07:01  -  26 Jan 2019 19:33  --------------------------------------------------------  IN: 320 mL / OUT: 200 mL / NET: 120 mL      LABS:                        9.1    17.25 )-----------( 228      ( 26 Jan 2019 06:50 )             29.4     01-26    147<H>  |  105  |  49<H>  ----------------------------<  110<H>  4.7   |  29  |  1.2    Ca    9.3      26 Jan 2019 06:50  Phos  4.3     01-25  Mg     2.1     01-26    TPro  5.3<L>  /  Alb  3.4<L>  /  TBili  0.4  /  DBili  x   /  AST  90<H>  /  ALT  99<H>  /  AlkPhos  61  01-26    PT/INR - ( 25 Jan 2019 06:13 )   PT: 11.30 sec;   INR: 0.98 ratio    PTT - ( 25 Jan 2019 06:13 )  PTT:35.6 sec    RADIOLOGY & ADDITIONAL STUDIES:  < from: US Kidney and Bladder (01.26.19 @ 11:07) >    EXAM:  US KIDNEYS AND BLADDER            PROCEDURE DATE:  01/26/2019        INTERPRETATION:  Clinical History / Reason for exam: Hematuria.    Retroperitoneal sonogram.    Correlation: CT abdomen and pelvis August 9, 2005 ..    The right kidneymeasures 8.8 cm in length x 4.3 cm in width by 4.2 cm AP   containing a 1.6 cm cyst.. The left kidney measures 8 cm x 4.2 cm x 4.3   cm with an upper pole exophytic 6.9 cm cyst. No hydronephrosis, solid   renal mass, stone or perinephric fluid collection.    Empty urinary bladder..    Impression:    1.  Right renal 1.6 cm cyst enlarged from 1 cm. Left renal 6.9 cm cyst   enlarged from 5.8 cm.. No hydronephrosis.    2.  Nondiagnostic examination of an empty urinary bladder.      YOU HAQUE M.D., ATTENDING RADIOLOGIST  This document has been electronically signed. Jan 26 2019 12:24PM

## 2019-01-26 NOTE — PROGRESS NOTE ADULT - SUBJECTIVE AND OBJECTIVE BOX
Interval history and ROS:    Feeling better   Tolerating NC O2  Mild coughing    MEDICATIONS:  ALBUTerol    90 MICROgram(s) HFA Inhaler 1 Puff(s) Inhalation every 4 hours  ALBUTerol/ipratropium for Nebulization 3 milliLiter(s) Nebulizer every 6 hours  aspirin enteric coated 81 milliGRAM(s) Oral <User Schedule>  atorvastatin 10 milliGRAM(s) Oral at bedtime  buDESOnide 160 MICROgram(s)/formoterol 4.5 MICROgram(s) Inhaler 2 Puff(s) Inhalation two times a day  chlorhexidine 4% Liquid 1 Application(s) Topical <User Schedule>  cilostazol 100 milliGRAM(s) Oral two times a day  clopidogrel Tablet 75 milliGRAM(s) Oral daily  docusate sodium 100 milliGRAM(s) Oral two times a day  furosemide    Tablet 20 milliGRAM(s) Oral <User Schedule>  guaiFENesin    Syrup 100 milliGRAM(s) Oral every 6 hours PRN  heparin  Injectable 5000 Unit(s) SubCutaneous every 8 hours  lactulose Syrup 10 Gram(s) Oral every 6 hours PRN  levETIRAcetam 500 milliGRAM(s) Oral two times a day  meropenem  IVPB 500 milliGRAM(s) IV Intermittent every 8 hours  metoprolol tartrate 25 milliGRAM(s) Oral two times a day  nystatin Powder 1 Application(s) Topical two times a day  pantoprazole    Tablet 40 milliGRAM(s) Oral before breakfast  predniSONE   Tablet 60 milliGRAM(s) Oral two times a day  senna 2 Tablet(s) Oral at bedtime  tiotropium 18 MICROgram(s) Capsule 1 Capsule(s) Inhalation daily      VITAL SIGNS:  Vital Signs Last 24 Hrs  T(C): 36.3 (26 Jan 2019 06:00), Max: 36.3 (26 Jan 2019 06:00)  T(F): 97.4 (26 Jan 2019 06:00), Max: 97.4 (26 Jan 2019 06:00)  HR: 112 (26 Jan 2019 06:00) (84 - 112)  BP: 152/67 (26 Jan 2019 06:00) (107/51 - 152/67)  BP(mean): --  RR: 18 (26 Jan 2019 06:00) (18 - 18)  SpO2: 94% (26 Jan 2019 01:45) (87% - 94%) 4L NC        PHYSICAL EXAM:  Awake and alert NAD  Neck supple, no LN  S1 and S2 1/6 murmur  Lungs decreased bs  Abdomen is soft and non tender  There is no edema  Neurologically non focal        LABS:                        9.2    18.27 )-----------( 231      ( 25 Jan 2019 06:13 )             29.8     01-25    148<H>  |  106  |  50<H>  ----------------------------<  97  4.2   |  31  |  1.4    Ca    9.3      25 Jan 2019 06:13  Phos  4.3     01-25  Mg     2.2     01-25    TPro  5.4<L>  /  Alb  3.6  /  TBili  0.4  /  DBili  x   /  AST  43<H>  /  ALT  53<H>  /  AlkPhos  60  01-25      PT/INR - ( 25 Jan 2019 06:13 )   PT: 11.30 sec;   INR: 0.98 ratio     PTT - ( 25 Jan 2019 06:13 )  PTT:35.6 sec

## 2019-01-26 NOTE — PROGRESS NOTE ADULT - SUBJECTIVE AND OBJECTIVE BOX
SUBJECTIVE:    Patient is a 85y old Female who presents with a chief complaint of sob (26 Jan 2019 09:06)    Currently admitted to medicine with the primary diagnosis of Sepsis     Today is hospital day 5d. Overnight no event. Per PCA, small amt blood in urine. Denied SOB on NC, CP, abd pain, pain/swelling in LE, dysuria. BM yesterday after lactulose.    PAST MEDICAL & SURGICAL HISTORY  PVD (peripheral vascular disease)  COPD (chronic obstructive pulmonary disease)  Kidney disease, chronic, stage I (GFR over 89 ml/min)  Peripheral vascular disease  HTN (hypertension)  High cholesterol  CVA (cerebral vascular accident)  H/O aorto-femoral bypass  History of right common carotid artery stent placement        ALLERGIES:  No Known Allergies    MEDICATIONS:  STANDING MEDICATIONS  ALBUTerol    90 MICROgram(s) HFA Inhaler 1 Puff(s) Inhalation every 4 hours  ALBUTerol/ipratropium for Nebulization 3 milliLiter(s) Nebulizer every 6 hours  aspirin enteric coated 81 milliGRAM(s) Oral <User Schedule>  atorvastatin 10 milliGRAM(s) Oral at bedtime  buDESOnide 160 MICROgram(s)/formoterol 4.5 MICROgram(s) Inhaler 2 Puff(s) Inhalation two times a day  chlorhexidine 4% Liquid 1 Application(s) Topical <User Schedule>  cilostazol 100 milliGRAM(s) Oral two times a day  clopidogrel Tablet 75 milliGRAM(s) Oral daily  docusate sodium 100 milliGRAM(s) Oral two times a day  furosemide    Tablet 20 milliGRAM(s) Oral <User Schedule>  heparin  Injectable 5000 Unit(s) SubCutaneous every 8 hours  levETIRAcetam 500 milliGRAM(s) Oral two times a day  meropenem  IVPB 500 milliGRAM(s) IV Intermittent every 8 hours  metoprolol tartrate 25 milliGRAM(s) Oral two times a day  nystatin Powder 1 Application(s) Topical two times a day  pantoprazole    Tablet 40 milliGRAM(s) Oral before breakfast  predniSONE   Tablet 60 milliGRAM(s) Oral two times a day  senna 2 Tablet(s) Oral at bedtime  tiotropium 18 MICROgram(s) Capsule 1 Capsule(s) Inhalation daily    PRN MEDICATIONS  guaiFENesin    Syrup 100 milliGRAM(s) Oral every 6 hours PRN  lactulose Syrup 10 Gram(s) Oral every 6 hours PRN    VITALS:   T(F): 97.4  HR: 112  BP: 152/67  RR: 18  SpO2: 94%    LABS:                        9.1    17.25 )-----------( 228      ( 26 Jan 2019 06:50 )             29.4     01-26    147<H>  |  105  |  49<H>  ----------------------------<  110<H>  4.7   |  29  |  1.2    Ca    9.3      26 Jan 2019 06:50  Phos  4.3     01-25  Mg     2.1     01-26    TPro  5.3<L>  /  Alb  3.4<L>  /  TBili  0.4  /  DBili  x   /  AST  90<H>  /  ALT  99<H>  /  AlkPhos  61  01-26    PT/INR - ( 25 Jan 2019 06:13 )   PT: 11.30 sec;   INR: 0.98 ratio         PTT - ( 25 Jan 2019 06:13 )  PTT:35.6 sec              RADIOLOGY:    PHYSICAL EXAM:  GENERAL: NAD, on NC, AAOx3  HEENT:  Atraumatic, Normocephalic, PERRLA, No JVD  PULMONARY: Decreased BS  CARDIOVASCULAR: Regular rate and rhythm. S1 S2+; 2/6 systolic murmur  GASTROINTESTINAL: Soft, Nontender, Nondistended; Bowel sounds present  MUSCULOSKELETAL:  2+ Peripheral Pulses, No clubbing, cyanosis, or edema  NEUROLOGY: non-focal  SKIN: No rashes or lesions

## 2019-01-27 NOTE — PROGRESS NOTE ADULT - SUBJECTIVE AND OBJECTIVE BOX
LILA VELA  85y, Female      OVERNIGHT EVENTS:    no fevers, feels well, no cough decreased SOB    VITALS:  T(F): 97.4, Max: 97.4 (01-26-19 @ 20:45)  HR: 96  BP: 128/64  RR: 18Vital Signs Last 24 Hrs  T(C): 36.3 (26 Jan 2019 20:45), Max: 36.3 (26 Jan 2019 20:45)  T(F): 97.4 (26 Jan 2019 20:45), Max: 97.4 (26 Jan 2019 20:45)  HR: 96 (26 Jan 2019 20:45) (96 - 101)  BP: 128/64 (26 Jan 2019 20:45) (102/51 - 128/64)  BP(mean): --  RR: 18 (26 Jan 2019 20:45) (18 - 18)  SpO2: --    TESTS & MEASUREMENTS:                        9.1    17.25 )-----------( 228      ( 26 Jan 2019 06:50 )             29.4     01-26    147<H>  |  105  |  49<H>  ----------------------------<  110<H>  4.7   |  29  |  1.2    Ca    9.3      26 Jan 2019 06:50  Mg     2.1     01-26    TPro  5.3<L>  /  Alb  3.4<L>  /  TBili  0.4  /  DBili  x   /  AST  90<H>  /  ALT  99<H>  /  AlkPhos  61  01-26    LIVER FUNCTIONS - ( 26 Jan 2019 06:50 )  Alb: 3.4 g/dL / Pro: 5.3 g/dL / ALK PHOS: 61 U/L / ALT: 99 U/L / AST: 90 U/L / GGT: x             Culture - Urine (collected 01-23-19 @ 03:20)  Source: .Urine Clean Catch (Midstream)  Final Report (01-24-19 @ 11:51):    No growth    Culture - Blood (collected 01-22-19 @ 04:11)  Source: .Blood None  Preliminary Report (01-23-19 @ 13:01):    No growth to date.    Culture - Blood (collected 01-21-19 @ 11:19)  Source: .Blood Blood  Final Report (01-26-19 @ 16:00):    No growth at 5 days.    Culture - Blood (collected 01-21-19 @ 11:19)  Source: .Blood Blood  Final Report (01-26-19 @ 17:00):    No growth at 5 days.            RADIOLOGY & ADDITIONAL TESTS:    ANTIBIOTICS:  meropenem  IVPB 500 milliGRAM(s) IV Intermittent every 8 hours

## 2019-01-27 NOTE — PROGRESS NOTE ADULT - ASSESSMENT
IMPRESSION:   RLL GNR PNA exacerbated by a Coronavirus coinfection.  WBC 17.8  BCx NTD  Legionella negative  Clinically/ radiographically imroved    RECOMMENDATIONS:  Meropenem 500 mg iv q8h  Will change to po on 1/28 with po Levoquin 250 mg q24h and po augmentin 500 mg q12h for 7 more days

## 2019-01-27 NOTE — PROGRESS NOTE ADULT - SUBJECTIVE AND OBJECTIVE BOX
DANELILA  85y  Female      SUBJECTIVE:  feels better  no new complaints    PAST MEDICAL & SURGICAL HISTORY:  PVD (peripheral vascular disease)  COPD (chronic obstructive pulmonary disease)  Kidney disease, chronic, stage I (GFR over 89 ml/min)  Peripheral vascular disease  HTN (hypertension)  High cholesterol  CVA (cerebral vascular accident)  H/O aorto-femoral bypass  History of right common carotid artery stent placement    85y    REVIEW OF SYSTEMS:    T(C): 36.4 (01-27-19 @ 12:48), Max: 36.4 (01-27-19 @ 12:48)  HR: 92 (01-27-19 @ 12:48) (80 - 101)  BP: 164/73 (01-27-19 @ 12:48) (102/51 - 164/73)  RR: 18 (01-27-19 @ 12:48) (18 - 18)  SpO2: --  Wt(kg): --Vital Signs Last 24 Hrs  T(C): 36.4 (27 Jan 2019 12:48), Max: 36.4 (27 Jan 2019 12:48)  T(F): 97.6 (27 Jan 2019 12:48), Max: 97.6 (27 Jan 2019 12:48)  HR: 92 (27 Jan 2019 12:48) (80 - 101)  BP: 164/73 (27 Jan 2019 12:48) (102/51 - 164/73)  BP(mean): --  RR: 18 (27 Jan 2019 12:48) (18 - 18)  SpO2: --    MEDICATION:  ALBUTerol    90 MICROgram(s) HFA Inhaler 1 Puff(s) Inhalation every 4 hours  ALBUTerol/ipratropium for Nebulization 3 milliLiter(s) Nebulizer every 6 hours  ALBUTerol/ipratropium for Nebulization 3 milliLiter(s) Nebulizer every 6 hours PRN  aspirin enteric coated 81 milliGRAM(s) Oral <User Schedule>  atorvastatin 10 milliGRAM(s) Oral at bedtime  buDESOnide 160 MICROgram(s)/formoterol 4.5 MICROgram(s) Inhaler 2 Puff(s) Inhalation two times a day  chlorhexidine 4% Liquid 1 Application(s) Topical <User Schedule>  cilostazol 100 milliGRAM(s) Oral two times a day  clopidogrel Tablet 75 milliGRAM(s) Oral daily  docusate sodium 100 milliGRAM(s) Oral two times a day  furosemide    Tablet 20 milliGRAM(s) Oral <User Schedule>  guaiFENesin    Syrup 100 milliGRAM(s) Oral every 6 hours PRN  heparin  Injectable 5000 Unit(s) SubCutaneous every 8 hours  lactulose Syrup 10 Gram(s) Oral every 6 hours PRN  levETIRAcetam 500 milliGRAM(s) Oral two times a day  meropenem  IVPB 500 milliGRAM(s) IV Intermittent every 8 hours  metoprolol tartrate 25 milliGRAM(s) Oral two times a day  nystatin Powder 1 Application(s) Topical two times a day  pantoprazole    Tablet 40 milliGRAM(s) Oral before breakfast  predniSONE   Tablet 40 milliGRAM(s) Oral two times a day  senna 2 Tablet(s) Oral at bedtime  tiotropium 18 MICROgram(s) Capsule 1 Capsule(s) Inhalation daily      LABS:                       9.9    21.89 )-----------( 279      ( 27 Jan 2019 07:33 )             32.8     01-27    147<H>  |  105  |  43<H>  ----------------------------<  91  5.3<H>   |  27  |  1.3    Ca    9.4      27 Jan 2019 07:33  Mg     2.1     01-27    TPro  5.7<L>  /  Alb  3.6  /  TBili  0.6  /  DBili  x   /  AST  57<H>  /  ALT  94<H>  /  AlkPhos  70  01-27    RADIOLOGY:    PHYSICAL EXAM:  alert orientedx3  heart s1s2+  lungs clear  abdomen soft nontender bs+    IMPRESSION:  IMPRESSION:  Rt LLL pneumonia GNR - coinfection with CORONA VIRUS - LEGIONELLA NEGATIVE	  Acute hypoxic respiratory failure  COPD - oxygen dependent - off iv steroids changed to po steroids  PULMONARY HYPERTENSION - SEVERE - rt sided heart failure  Rt posterior tibial vein thrombosis - on sc heparin for dvt prophylaxis  OLD CVA  CAROTID STENOSIS - S/P Rt common CAROTID STENT  PAD - S/P AORTO FEMORAL BYPASS  CKD STAGE3  CHRONIC ANEMIA H/H STABLE  HTN  DLD  LEUKOCYTOSIS SEC.TO INFECTION AND STEROIDS   PYURIA-HEMATURIA - NEG CULTURE - u/s negative except renal cysts bilateral - [facility]  ABNORMAL LFTS - stable - U/S NL LIVER AND BILIARY TREE    PLAN:  ID F/U NOTED CHANGE TO PO ANTIBIOTICS IN AM  UROLOGY CONSULT AND F/U NOTED - CTABD/PELVIS NON CONTRAST PENDING  repeat venous duplex pending  continue respiratory support - bipap- o2- nebulizer - po prednisone  monitor cbc  monitor lfts  cardiology consult pending  rehab consult

## 2019-01-27 NOTE — PROGRESS NOTE ADULT - ASSESSMENT
85 y.o F with UTI, hematuria, L 1.6cm renal cyst, R 6.9 renal cyst.      Plan:  Treat UTI   Please obtain CT A/P   Urine FISH/ Cytology  F/U as outpatient for cystoscopy    service will F/U

## 2019-01-27 NOTE — PROGRESS NOTE ADULT - SUBJECTIVE AND OBJECTIVE BOX
85 y.o F with UTI, hematuria, L 1.6cm renal cyst, R 6.9 renal cyst. Pt. seen and examined at bedside in NAD sitting at chair comfortable, denies problems with urination or abdominal pain, Pt.  not sure if urine still bloody. Afebrile.         Vital Signs Last 24 Hrs  T(C): 36.2 (27 Jan 2019 06:00), Max: 36.3 (26 Jan 2019 20:45)  T(F): 97.1 (27 Jan 2019 06:00), Max: 97.4 (26 Jan 2019 20:45)  HR: 80 (27 Jan 2019 06:00) (80 - 101)  BP: 132/64 (27 Jan 2019 06:00) (102/51 - 132/64)  RR: 18 (27 Jan 2019 06:00) (18 - 18)       PE:  General: WN/WD in NAD  HEENT: NC/AT, EOMI  Abd: soft, mildly distended, no suprapubic ttp.   Extremities: LANDRY x 4      LABS:                        9.1    17.25 )-----------( 228      ( 26 Jan 2019 06:50 )             29.4     01-26    147<H>  |  105  |  49<H>  ----------------------------<  110<H>  4.7   |  29  |  1.2    Ca    9.3      26 Jan 2019 06:50  Mg     2.1     01-26    TPro  5.3<L>  /  Alb  3.4<L>  /  TBili  0.4  /  DBili  x   /  AST  90<H>  /  ALT  99<H>  /  AlkPhos  61  01-26    Legionella pneumophila Antigen, Urine (01.24.19 @ 20:00)    Legionella Antigen, Urine: Negative    Urinalysis in AM (01.24.19 @ 04:30)    Glucose Qualitative, Urine: 100 mg/dL    Blood, Urine: Large    pH Urine: 5.0    Color: Red    Urine Appearance: Turbid    Bilirubin: Large    Ketone - Urine: 40    Specific Gravity: >=1.030    Protein, Urine: >=300  < from: US Kidney and Bladder (01.26.19 @ 11:07) >    1.  Right renal 1.6 cm cyst enlarged from 1 cm. Left renal 6.9 cm cyst   enlarged from 5.8 cm.. No hydronephrosis.    2.  Nondiagnostic examination of an empty urinary bladder.    < end of copied text >    Urobilinogen: 1.0    Nitrite: Positive    Leukocyte Esterase Concentration: Moderate    Culture - Urine (01.23.19 @ 03:20)    Specimen Source: .Urine Clean Catch (Midstream)    Culture Results:   No growth        RADIOLOGY & ADDITIONAL STUDIES:   < from:  Kidney and Bladder (01.26.19 @ 11:07) >    Impression:    1.  Right renal 1.6 cm cyst enlarged from 1 cm. Left renal 6.9 cm cyst   enlarged from 5.8 cm.. No hydronephrosis.    2.  Nondiagnostic examination of an empty urinary bladder.    < end of copied text >

## 2019-01-28 NOTE — PROGRESS NOTE ADULT - SUBJECTIVE AND OBJECTIVE BOX
DAILY PROGRESS NOTE  ===========================================================    Patient Information:  LILA VELA  /  85y  /  Female  /  MRN#: [id number]    Hospital Day: 7d     |:::::::::::::::::::::::::::| SUBJECTIVE |:::::::::::::::::::::::::::|    OVERNIGHT EVENTS:   TODAY: Patient was seen today at bedside. Review of systems is otherwise negative.    |:::::::::::::::::::::::::::| OBJECTIVE |:::::::::::::::::::::::::::|    VITAL SIGNS: Last 24 Hours  T(C): 36.5 (28 Jan 2019 04:45), Max: 36.6 (27 Jan 2019 20:30)  T(F): 97.7 (28 Jan 2019 04:45), Max: 97.8 (27 Jan 2019 20:30)  HR: 92 (28 Jan 2019 04:45) (92 - 97)  BP: 139/68 (28 Jan 2019 04:45) (106/57 - 164/73)  BP(mean): --  RR: 18 (28 Jan 2019 04:45) (18 - 18)  SpO2: --    PHYSICAL EXAM:  GENERAL:   Awake, alert; NAD.  HEENT:  Head NC/AT; Conjunctivae pink, Sclera anicteric; Oral mucosa moist.  CARDIO:   Regular rate; Regular rhythm; S1 & S2.  RESP:   No rales or rhonchi appreciated.  GI:   Soft; NT/ND; BS; No guarding; No rebound tenderness.  EXT:   No edema in UE and LE.  NEURO:   PERRL.  SKIN:   Intact.    LAB RESULTS:                        9.9    21.89 )-----------( 279      ( 27 Jan 2019 07:33 )             32.8     01-27    147<H>  |  105  |  43<H>  ----------------------------<  91  5.3<H>   |  27  |  1.3    Ca    9.4      27 Jan 2019 07:33  Mg     2.1     01-27    TPro  5.7<L>  /  Alb  3.6  /  TBili  0.6  /  DBili  x   /  AST  57<H>  /  ALT  94<H>  /  AlkPhos  70  01-27                MICROBIOLOGY:    RADIOLOGY:    ALLERGIES:  No Known Allergies    HOME MEDICATIONS:  albuterol 2.5 mg/3 mL (0.083%) inhalation solution: 3 milliliter(s) inhaled every 6 hours (06 Sep 2018 13:40)  Centrum oral tablet: 1 tab(s) orally once a day (06 Sep 2018 13:40)  furosemide 20 mg oral tablet: 1 tab(s) orally every 7 days (06 Sep 2018 13:40)  Pletal 100 mg oral tablet: 1 tab(s) orally 2 times a day (06 Sep 2018 13:40)    =========================================================== DAILY PROGRESS NOTE  ===========================================================    Patient Information:  LILA VELA  /  85y  /  Female  /  MRN#: [id number]    Hospital Day: 7d     |:::::::::::::::::::::::::::| SUBJECTIVE |:::::::::::::::::::::::::::|    OVERNIGHT EVENTS:   TODAY: Patient was seen today at bedside. She voiced no complaints. Review of systems is otherwise negative.    |:::::::::::::::::::::::::::| OBJECTIVE |:::::::::::::::::::::::::::|    VITAL SIGNS: Last 24 Hours  T(C): 36.5 (28 Jan 2019 04:45), Max: 36.6 (27 Jan 2019 20:30)  T(F): 97.7 (28 Jan 2019 04:45), Max: 97.8 (27 Jan 2019 20:30)  HR: 92 (28 Jan 2019 04:45) (92 - 97)  BP: 139/68 (28 Jan 2019 04:45) (106/57 - 164/73)  BP(mean): --  RR: 18 (28 Jan 2019 04:45) (18 - 18)  SpO2: --    PHYSICAL EXAM:  GENERAL: Awake, alert; NAD.  HEENT: Head NC/AT; Conjunctivae pink, Sclera anicteric; Oral mucosa moist.  CARDIO: Regular rate; Regular rhythm; S1 & S2.  RESP: CTA B/L. No wheezes or rhonchi appreciated.  GI: Soft; NT/ND; BS; No guarding; No rebound tenderness.  EXT: No edema in UE and LE.  NEURO: A&Ox3  SKIN: Intact.    LAB RESULTS:                        9.9    21.89 )-----------( 279      ( 27 Jan 2019 07:33 )             32.8     01-27    147<H>  |  105  |  43<H>  ----------------------------<  91  5.3<H>   |  27  |  1.3    Ca    9.4      27 Jan 2019 07:33  Mg     2.1     01-27    TPro  5.7<L>  /  Alb  3.6  /  TBili  0.6  /  DBili  x   /  AST  57<H>  /  ALT  94<H>  /  AlkPhos  70  01-27                MICROBIOLOGY:    RADIOLOGY:    ALLERGIES:  No Known Allergies    HOME MEDICATIONS:  albuterol 2.5 mg/3 mL (0.083%) inhalation solution: 3 milliliter(s) inhaled every 6 hours (06 Sep 2018 13:40)  Centrum oral tablet: 1 tab(s) orally once a day (06 Sep 2018 13:40)  furosemide 20 mg oral tablet: 1 tab(s) orally every 7 days (06 Sep 2018 13:40)  Pletal 100 mg oral tablet: 1 tab(s) orally 2 times a day (06 Sep 2018 13:40)    ===========================================================

## 2019-01-28 NOTE — PROGRESS NOTE ADULT - SUBJECTIVE AND OBJECTIVE BOX
Interval history and ROS:    Feeling better on NC O2  Using BiPAP prn q hs    MEDICATIONS:  ALBUTerol    90 MICROgram(s) HFA Inhaler 1 Puff(s) Inhalation every 4 hours  ALBUTerol/ipratropium for Nebulization 3 milliLiter(s) Nebulizer every 6 hours  ALBUTerol/ipratropium for Nebulization 3 milliLiter(s) Nebulizer every 6 hours PRN  aspirin enteric coated 81 milliGRAM(s) Oral <User Schedule>  atorvastatin 10 milliGRAM(s) Oral at bedtime  buDESOnide 160 MICROgram(s)/formoterol 4.5 MICROgram(s) Inhaler 2 Puff(s) Inhalation two times a day  chlorhexidine 4% Liquid 1 Application(s) Topical <User Schedule>  cilostazol 100 milliGRAM(s) Oral two times a day  clopidogrel Tablet 75 milliGRAM(s) Oral daily  docusate sodium 100 milliGRAM(s) Oral two times a day  furosemide    Tablet 20 milliGRAM(s) Oral <User Schedule>  guaiFENesin    Syrup 100 milliGRAM(s) Oral every 6 hours PRN  heparin  Injectable 5000 Unit(s) SubCutaneous every 8 hours  lactulose Syrup 10 Gram(s) Oral every 6 hours PRN  levETIRAcetam 500 milliGRAM(s) Oral two times a day  meropenem  IVPB 500 milliGRAM(s) IV Intermittent every 8 hours  metoprolol tartrate 25 milliGRAM(s) Oral two times a day  nystatin Powder 1 Application(s) Topical two times a day  pantoprazole    Tablet 40 milliGRAM(s) Oral before breakfast  predniSONE   Tablet 40 milliGRAM(s) Oral two times a day  senna 2 Tablet(s) Oral at bedtime  tiotropium 18 MICROgram(s) Capsule 1 Capsule(s) Inhalation daily      VITAL SIGNS:  Vital Signs Last 24 Hrs  T(C): 36.5 (28 Jan 2019 04:45), Max: 36.6 (27 Jan 2019 20:30)  T(F): 97.7 (28 Jan 2019 04:45), Max: 97.8 (27 Jan 2019 20:30)  HR: 92 (28 Jan 2019 04:45) (92 - 97)  BP: 139/68 (28 Jan 2019 04:45) (106/57 - 164/73)  BP(mean): --  RR: 18 (28 Jan 2019 04:45) (18 - 18)  SpO2: -- on NC      PHYSICAL EXAM:  Awake and alert NAD  Neck supple, no LN  S1 and S2 no murmur  Lungs are clear without wheeze, rhonchi or rales  Abdomen is soft and non tender  There is no edema  Neurologically non focal                          LABS:                        9.9    21.89 )-----------( 279      ( 27 Jan 2019 07:33 )             32.8     01-27    147<H>  |  105  |  43<H>  ----------------------------<  91  5.3<H>   |  27  |  1.3    Ca    9.4      27 Jan 2019 07:33  Mg     2.1     01-27    TPro  5.7<L>  /  Alb  3.6  /  TBili  0.6  /  DBili  x   /  AST  57<H>  /  ALT  94<H>  /  AlkPhos  70  01-27

## 2019-01-28 NOTE — PROGRESS NOTE ADULT - ASSESSMENT
===========================================================  Today/Updates:  ===========================================================        Electrolyte Imbalances: Correct as needed  []  Hyponatremia   /   Hypernatremia  []   []  Hypokalemia   /   Hyperkalemia  []   []  Hypochloremia   /    Hyperchloremia  []   []  Hypocapnia   /   Hypercapnia  []   []  Hypomagnesemia   /   Hypermagnesemia  []   []  Hypophosphatemia   /   Hyperphosphatemia  []       GI ppx:                                   [] Not indicated   /   [] Pantoprazole 40mg PO Daily    DVT ppx:  [] Not indicated / [] Heparin 5000mg SubQ / [] Lovenox 40mg SubQ / [] SCDs    Fluids:   [] PO  |  [] IVF    Activity:  [] Ad Annabelle  /  [X] Increase as Tolerated  /  [] OOB w/ assist  /  [] Bed Rest    BMI:  Height (cm): 154.94 (01-21)  Weight (kg): 47 (01-21)  BMI (kg/m2): 19.6 (01-21)      DISPO:  Patient to be discharged when condition(s) optimized.  [] Home  /  [] SNF  /  [] Long Term       [X] Discussion with patient and/or proxy regarding goals of care.  [X] Discussed Case and Plan with the Medical Attending.    CODE STATUS  [X] FULL   /    [] DNR    Please call Dr. Hill [[email or web address]] with any questions/consult recs: Spectra # [id number] 84 yo F PMHx COPD on 2 L NC, PVD, CKD3, HTN, DLD, hx of CVA, ex-smoker, recently discharged from hospital Jan 13 after being admitted for pneumonia, p/w SOB 1 day. She finished her course of Levaquin and prednisone on Friday and was doing well, until Sunday evening when she starting feeling SOB again. In Ed patient received cefepime,  Levaquin, and solumedrol. Patient mentioned history of dysuria of 2 days duration.  Pt was admitted to CCU for acute on chronic hypoxic and chronic hypercapnic respiratory failure 2/2 COPD exacerbation 2/2 Possible RLL GNR pneumonia exacerbated by coronavirus coinfection.   She was treated with BiPaP prn and qhs, alternating with venti mask and oxygen via NC. Pt was treated with meropenem as per ID, solumedrol, bronchodilators. Pt also had right tibial vein DVT- was on IV heparin which was stopped as pt had hematuria. Per Dr Dunham and PMD, d/c heparin as DVT is distal and low risk to cause PE. Pt also had moderate ascites seen on 2D echo- RUQ USG pending results.   Pt had +UA but UCx neg, BCx neg. On meropenem for PNA. MRSA negative, Flu negative.       ===========================================================  Today/Updates:  ===========================================================    # Acute on chronic hypoxic and chronic hypercapnic respiratory failure 2/2 COPD exacerbation 2/2 Possible RLL GNR pneumonia exacerbated by coronavirus coinfection- improving  Continue BiPAP and supplemental O2 as necessary to maintain sat > 88%  Follow O2 sats  Duonebs q6h  Symbicort bid  Dec po prednisone to 40 bid. Taper  ID- meropenem 500 q8h  +Coronavirus  FLU, RSV, MRSA nares, BCx, legionella urine neg    #HFpEF  ECHO 1/22- EF 55-60%, severe pulm HTN, dec RV systolic fx, RV vol and pressure overload, RV enlarged, severe mitral annular calcification    c/w home dose lasix 20 per week  Cardiology consult    #Right Tibial vein DVT  Duplex LE- Right post tibial DVT  Was on heparin gtt-on hold 2/2 hematuria. Keep off heparin gtt, as dvt is distal and PE is unlikely.   Repeat Duplex in 1 week from the last duplex (around 1/27-ordered)    #Hematuria after heparin drip (Jan 21-23)- active  On heparin 5000 q8 for ppx  UA 1/24 + UTI, + blood, but UCx neg. Pt already on meropenem  Hgb stable 9.1, 12.3 on admission. Active T&S  Urology consult  Kidney bladder US done:     1. Right renal 1.6 cm cyst enlarged from 1 cm. Left renal 6.9 cm cyst   enlarged from 5.8 cm.. No hydronephrosis.   2. Nondiagnostic examination of an empty urinary bladder.     #Mod ascites noted on echo  neg ascites    #Up trending LFT  RUQ US done:   1.  Negative examination of the liver.  2.  Nonvisualization of the gallbladder. Common bile duct within normal   limits for the patient's age.  3.  Right pleural effusion.    #Constipation- BM yesterday  senna, colace  lactulose prn      Electrolyte Imbalances: Correct as needed  []  Hyponatremia   /   Hypernatremia  []   []  Hypokalemia   /   Hyperkalemia  []   []  Hypochloremia   /    Hyperchloremia  []   []  Hypocapnia   /   Hypercapnia  []   []  Hypomagnesemia   /   Hypermagnesemia  []   []  Hypophosphatemia   /   Hyperphosphatemia  []       GI ppx:                                   [] Not indicated   /   [] Pantoprazole 40mg PO Daily    DVT ppx:  [] Not indicated / [] Heparin 5000mg SubQ / [] Lovenox 40mg SubQ / [] SCDs    Fluids:   [] PO  |  [] IVF    Activity:  [] Ad Annabelle  /  [X] Increase as Tolerated  /  [] OOB w/ assist  /  [] Bed Rest    BMI:  Height (cm): 154.94 (01-21)  Weight (kg): 47 (01-21)  BMI (kg/m2): 19.6 (01-21)      DISPO:  Patient to be discharged when condition(s) optimized.  [] Home  /  [] SNF  /  [] Long Term       [X] Discussion with patient and/or proxy regarding goals of care.  [X] Discussed Case and Plan with the Medical Attending.    CODE STATUS  [X] FULL   /    [] DNR    Please call Dr. Hill [[email or web address]] with any questions/consult recs: Spectra # [phone] 84 yo F, CCU [facility], PMHx COPD on 2 L NC, PVD, CKD3, HTN, DLD, hx of CVA, ex-smoker, recently discharged from hospital Jan 13 after being admitted for pneumonia, p/w SOB 1 day. She finished her course of Levaquin and prednisone on 01/18 and was doing well, until 01/20 evening when she starting feeling SOB again. In Ed patient received cefepime,  Levaquin, and solumedrol. Patient mentioned history of dysuria of 2 days duration. Pt was admitted to CCU for acute on chronic hypoxic and chronic hypercapnic respiratory failure 2/2 COPD exacerbation 2/2 Possible RLL GNR pneumonia exacerbated by coronavirus coinfection. She was on BiPaP prn and qhs, alternating with venti mask and oxygen via NC, solumedrol, bronchodilators and Meropenem as per ID. She was treated for distal DVT. which was subsequently stopped for hematuria. Pt also had moderate ascites seen on 2D echo- RUQ USG pending results.   Pt had +UA w/negative urine and blood cultures. She is on Meropenem for PNA. MRSA negative, Flu negative.     ===========================================================  Today/Updates: Anticipate pt for d/c tomorrow/cont BIPAP QHS and O2 by NC/No labs: Phlebotomist unable to draw blood/Reordered  ===========================================================    # Acute on chronic hypoxic and chronic hypercapnic respiratory failure 2/2 COPD exacerbation 2/2 Possible RLL GNR pneumonia exacerbated by coronavirus coinfection- improving    Continue BiPAP and supplemental O2 as necessary to maintain sat > 88%  Duonebs q6h  Symbicort bid  Dec po prednisone to 30 bid. Continue tapering  ID- meropenem 500 q8h, day 6  +Coronavirus  FLU, RSV, MRSA nares, BCx, legionella urine neg    # HFpEF: Stable    ECHO 1/22- EF 55-60%, severe pulm HTN, decreased RV systolic fx, RV vol and pressure overload,   RV enlarged, severe mitral annular calcification    C/w home dose lasix 20 per week  Cardiology consult: Pending    # Right Tibial vein DVT: Stable    Duplex LE- Right post tibial DVT  No need for therapeutic AC  Repeat Duplex: Chronic R posterior tibial vein DVT and superficial thrombophlebitis of distal R Greater Saphenous Vein    # Hematuria after heparin drip: Resolved    Cont heparin 5000 q8 for ppx  UA 1/24 + UTI, + blood, but UCx neg.   Pt already on meropenem, day 6  Hgb stable 9.1, 12.3 on admission. Active T&S  Urology consult appreciated: Treat UTI, please obtain CT A/P, OP f/u for Cystoscopy  Kidney/bladder US done: Right renal 1.6 cm cyst enlarged from 1 cm. Left renal 6.9 cm cyst   enlarged from 5.8 cm.. No hydronephrosis. Nondiagnostic examination of an empty urinary bladder.     # Mod ascites noted on echo    Continue Lasix for now    # Transaminitis: Resolving    LFTs: trending down  RUQ US done: Negative examination of the liver.Nonvisualization of the gallbladder. Common bile duct within normal   limits for the patient's age. Right pleural effusion.    # Constipation: Resolved    Cont. Senna, Colace  Lactulose prn    # Mild Hyperkalemia:    Kayexalate 30 mg Stat  Monitor BMP    Electrolyte Imbalances: Correct as needed  []  Hyponatremia   /   Hypernatremia  []   []  Hypokalemia   /   Hyperkalemia  [x]   []  Hypochloremia   /    Hyperchloremia  []   []  Hypocapnia   /   Hypercapnia  []   []  Hypomagnesemia   /   Hypermagnesemia  []   []  Hypophosphatemia   /   Hyperphosphatemia  []       GI ppx:                                   [] Not indicated   /   [x] Pantoprazole 40mg PO Daily    DVT ppx:  [] Not indicated / [x] Heparin 5000mg SubQ / [] Lovenox 40mg SubQ / [] SCDs    Fluids:   [x] PO  |  [] IVF    Activity:  [] Ad Annabelle  /  [X] Increase as Tolerated  /  [] OOB w/ assist  /  [] Bed Rest    BMI:  Height (cm): 154.94 (01-21)  Weight (kg): 47 (01-21)  BMI (kg/m2): 19.6 (01-21)      DISPO:  Patient to be discharged when condition(s) optimized. Anticipated for d/c tomorrow	  [x] Home  vs  [x] SNF  /  [] Long Term       [X] Discussion with patient and/or proxy regarding goals of care.  [X] Discussed Case and Plan with the Medical Attending.    CODE STATUS  [X] FULL   /    [] DNR    Please call Dr. Hill [[email or web address]] with any questions/consult recs: Spectra # [id number]

## 2019-01-28 NOTE — CONSULT NOTE ADULT - SUBJECTIVE AND OBJECTIVE BOX
Date of Admission:    CHIEF COMPLAINT:    HISTORY OF PRESENT ILLNESS: 84 yo F with PMHx of COPD on 2 L NC, PVD, CKD3, essential HTN, DLD, hx of CVA, ex-smoker, recently discharged from hospital Jan 13 after being admitted for pneumonia, presented for SOB of one day duration, patient has finished her course of Levaquin and prednisone on Friday and was doing well , until Sunday evening when she starting feeling SOB again that worsening today, patient denies any chest pain, cough, lower extremity swelling.  EMS found her to be hypoxic to the 70s on room air.  On arrival patient, hypoxic to 67%, + tachypnea, speaking few words at a time. + wheezing, bilaterally, clavicular retractions.  Patient was placed on BiPAP and SOB improved. In Ed patient received cefepime,  Levaquin , and solumedrol. Patient mentioned history of dysuria of 2 days duration.      PAST MEDICAL & SURGICAL HISTORY:  PVD (peripheral vascular disease)  COPD (chronic obstructive pulmonary disease)  Kidney disease, chronic, stage I (GFR over 89 ml/min)  Peripheral vascular disease  HTN (hypertension)  High cholesterol  CVA (cerebral vascular accident)  H/O aorto-femoral bypass  History of right common carotid artery stent placement    HEALTH ISSUES - PROBLEM Dx:        FAMILY HISTORY:  Family history of atherosclerosis (Mother)    Allergies    No Known Allergies    Intolerances    	  Home Medications:  albuterol 2.5 mg/3 mL (0.083%) inhalation solution: 3 milliliter(s) inhaled every 6 hours (06 Sep 2018 13:40)  Centrum oral tablet: 1 tab(s) orally once a day (06 Sep 2018 13:40)  furosemide 20 mg oral tablet: 1 tab(s) orally every 7 days (06 Sep 2018 13:40)  Pletal 100 mg oral tablet: 1 tab(s) orally 2 times a day (06 Sep 2018 13:40)    MEDICATIONS  (STANDING):  ALBUTerol    90 MICROgram(s) HFA Inhaler 1 Puff(s) Inhalation every 4 hours  ALBUTerol/ipratropium for Nebulization 3 milliLiter(s) Nebulizer every 6 hours  aspirin enteric coated 81 milliGRAM(s) Oral <User Schedule>  atorvastatin 10 milliGRAM(s) Oral at bedtime  buDESOnide 160 MICROgram(s)/formoterol 4.5 MICROgram(s) Inhaler 2 Puff(s) Inhalation two times a day  chlorhexidine 4% Liquid 1 Application(s) Topical <User Schedule>  cilostazol 100 milliGRAM(s) Oral two times a day  clopidogrel Tablet 75 milliGRAM(s) Oral daily  docusate sodium 100 milliGRAM(s) Oral two times a day  furosemide    Tablet 20 milliGRAM(s) Oral <User Schedule>  heparin  Injectable 5000 Unit(s) SubCutaneous every 8 hours  levETIRAcetam 500 milliGRAM(s) Oral two times a day  meropenem  IVPB 500 milliGRAM(s) IV Intermittent every 8 hours  metoprolol tartrate 25 milliGRAM(s) Oral two times a day  nystatin Powder 1 Application(s) Topical two times a day  pantoprazole    Tablet 40 milliGRAM(s) Oral before breakfast  predniSONE   Tablet 30 milliGRAM(s) Oral two times a day  senna 2 Tablet(s) Oral at bedtime  tiotropium 18 MICROgram(s) Capsule 1 Capsule(s) Inhalation daily    MEDICATIONS  (PRN):  ALBUTerol/ipratropium for Nebulization 3 milliLiter(s) Nebulizer every 6 hours PRN Shortness of Breath and/or Wheezing  guaiFENesin    Syrup 100 milliGRAM(s) Oral every 6 hours PRN Cough  lactulose Syrup 10 Gram(s) Oral every 6 hours PRN constipation          REVIEW OF SYSTEMS:  CONSTITUTIONAL: No fever, weight loss, or fatigue  CARDIOLOGY: Patient denies chest pain, has shortness of breath with minimal  activities.   RESPIRATORY: has  shortness of breath, wheezing.   NEUROLOGICAL: NO weakness, no focal deficits to report.   GI: no BRBPR, no Vomiting, no diarrhea.      PHYSICAL EXAM:  T(C): 36.6 (01-28-19 @ 12:37), Max: 36.6 (01-27-19 @ 20:30)  HR: 116 (01-28-19 @ 12:37) (92 - 116)  BP: 110/57 (01-28-19 @ 12:37) (110/48 - 139/68)  RR: 18 (01-28-19 @ 12:37) (18 - 18)  SpO2: --  Wt(kg): --  I&O's Summary    Daily     Daily     General Appearance: Normal	  Cardiovascular: Normal S1 S2, 3/6 PSM in the lower right sternal border  Respiratory: wheezing present.  Psychiatry: A & O x 3, Mood & affect appropriate  Gastrointestinal:  Soft, Non-tender  Skin: No rashes, No ecchymoses, No cyanosis	  Neurologic: Non-focal  Extremities: Normal range of motion, No clubbing, cyanosis or edema  Vascular: Peripheral pulses palpable 2+ bilaterally        LABS:	 	                          9.9    21.89 )-----------( 279      ( 27 Jan 2019 07:33 )             32.8     01-27    147<H>  |  105  |  43<H>  ----------------------------<  91  5.3<H>   |  27  |  1.3    Ca    9.4      27 Jan 2019 07:33  Mg     2.1     01-27    TPro  5.7<L>  /  Alb  3.6  /  TBili  0.6  /  DBili  x   /  AST  57<H>  /  ALT  94<H>  /  AlkPhos  70  01-27        [ ] Echocardiogram:  [ ]  Catheterization:  [ ] Stress Test:  	  	  ASSESSMENT/PLAN: 	    1) Shortness of breath is due to COPD exacerbation      Echo findings are consistent with pulmonary HTN secondary to COPD      Continue present treatment       No further cardiac evaluation needed at this time.

## 2019-01-28 NOTE — PROGRESS NOTE ADULT - ASSESSMENT
IMPRESSION:  84 yo female with a history of O2 dependent COPD recently hospitalized for pneumonia  Readmitted for acute on chronic hypoxemic respiratory failure with worsening basilar opacity on CXR c/w HCAP  HFPEF  Calf vein thrombosis - doubt PE  Clinically improving    PLAN:  Continue NC O2  Monitor O2 sat  BiPAP qhs  Complete steroids taper  Continue bronchodilators  Complete antibiotic course  Continue GI and DVT prophylaxis  Repeat LE doppler this week  OOB to chair, PT, rehab

## 2019-01-28 NOTE — PROGRESS NOTE ADULT - SUBJECTIVE AND OBJECTIVE BOX
01-28-19 @ 10:17    LILA VELA  85y  Female  In Bed, comfortable. No c/o now.     INTERVAL EVENTS:    MEDICATIONS  (STANDING):  ALBUTerol    90 MICROgram(s) HFA Inhaler 1 Puff(s) Inhalation every 4 hours  ALBUTerol/ipratropium for Nebulization 3 milliLiter(s) Nebulizer every 6 hours  aspirin enteric coated 81 milliGRAM(s) Oral <User Schedule>  atorvastatin 10 milliGRAM(s) Oral at bedtime  buDESOnide 160 MICROgram(s)/formoterol 4.5 MICROgram(s) Inhaler 2 Puff(s) Inhalation two times a day  chlorhexidine 4% Liquid 1 Application(s) Topical <User Schedule>  cilostazol 100 milliGRAM(s) Oral two times a day  clopidogrel Tablet 75 milliGRAM(s) Oral daily  docusate sodium 100 milliGRAM(s) Oral two times a day  furosemide    Tablet 20 milliGRAM(s) Oral <User Schedule>  heparin  Injectable 5000 Unit(s) SubCutaneous every 8 hours  levETIRAcetam 500 milliGRAM(s) Oral two times a day  meropenem  IVPB 500 milliGRAM(s) IV Intermittent every 8 hours  metoprolol tartrate 25 milliGRAM(s) Oral two times a day  nystatin Powder 1 Application(s) Topical two times a day  pantoprazole    Tablet 40 milliGRAM(s) Oral before breakfast  predniSONE   Tablet 40 milliGRAM(s) Oral two times a day  senna 2 Tablet(s) Oral at bedtime  tiotropium 18 MICROgram(s) Capsule 1 Capsule(s) Inhalation daily    MEDICATIONS  (PRN):  ALBUTerol/ipratropium for Nebulization 3 milliLiter(s) Nebulizer every 6 hours PRN Shortness of Breath and/or Wheezing  guaiFENesin    Syrup 100 milliGRAM(s) Oral every 6 hours PRN Cough  lactulose Syrup 10 Gram(s) Oral every 6 hours PRN constipation      T(C): 36.5 (01-28-19 @ 04:45), Max: 36.6 (01-27-19 @ 20:30)  HR: 92 (01-28-19 @ 04:45) (92 - 97)  BP: 139/68 (01-28-19 @ 04:45) (106/57 - 164/73)  RR: 18 (01-28-19 @ 04:45) (18 - 18)  SpO2: --  Wt(kg): --Vital Signs Last 24 Hrs  T(C): 36.5 (28 Jan 2019 04:45), Max: 36.6 (27 Jan 2019 20:30)  T(F): 97.7 (28 Jan 2019 04:45), Max: 97.8 (27 Jan 2019 20:30)  HR: 92 (28 Jan 2019 04:45) (92 - 97)  BP: 139/68 (28 Jan 2019 04:45) (106/57 - 164/73)  BP(mean): --  RR: 18 (28 Jan 2019 04:45) (18 - 18)  SpO2: --    PHYSICAL EXAM:  GENERAL:   NECK:   CHEST/LUNG:  HEART: S1  ABDOMEN:   EXTREMITIES:                           9.9    21.89 )-----------( 279      ( 27 Jan 2019 07:33 )             32.8     01-27    147<H>  |  105  |  43<H>  ----------------------------<  91  5.3<H>   |  27  |  1.3    Ca    9.4      27 Jan 2019 07:33  Mg     2.1     01-27    TPro  5.7<L>  /  Alb  3.6  /  TBili  0.6  /  DBili  x   /  AST  57<H>  /  ALT  94<H>  /  AlkPhos  70  01-27            RADIOLOGY & ADDITIONAL TESTS:    Abd Sono : -ve for ascietes    CXR R pl effusion w/ Pna     No ac DVT     ASSESSMENT / PLAN  :    ACUTE HYPOXIC  RESPIRATORY FAILURE : IMPROVED. STABLE now.Being f/u by pulm.   PNEUMONIA ; Abx as adv by ID, ID f/u appreciated.   COPD exacerbation ; has +viral. cChr. O2 dependent.   BL PLEURAL EFFUSION : See above.   RIGHT TIBIAL VEIN THROMBOSIS ; Conservative. Maintain ambulation. No Ac DVT.  ANEMIA ; chronic stable.   ? ASCIETES : incidental finding in Echo. Negative ABd sono.   LEUKOCYTOSIS : Likely 2' Steroid.   CKD III : Stable. Avoid dehydration   OLD CVA ; stable.   Hx. AORTO-FEM BYPASS  RIGHT CAROTID STENT  PVD ; Cont current Tx, risk fx control. 01-28-19 @ 10:17    LILA VELA  85y  Female  In Bed, comfortable. No c/o now.     INTERVAL EVENTS:    MEDICATIONS  (STANDING):  ALBUTerol    90 MICROgram(s) HFA Inhaler 1 Puff(s) Inhalation every 4 hours  ALBUTerol/ipratropium for Nebulization 3 milliLiter(s) Nebulizer every 6 hours  aspirin enteric coated 81 milliGRAM(s) Oral <User Schedule>  atorvastatin 10 milliGRAM(s) Oral at bedtime  buDESOnide 160 MICROgram(s)/formoterol 4.5 MICROgram(s) Inhaler 2 Puff(s) Inhalation two times a day  chlorhexidine 4% Liquid 1 Application(s) Topical <User Schedule>  cilostazol 100 milliGRAM(s) Oral two times a day  clopidogrel Tablet 75 milliGRAM(s) Oral daily  docusate sodium 100 milliGRAM(s) Oral two times a day  furosemide    Tablet 20 milliGRAM(s) Oral <User Schedule>  heparin  Injectable 5000 Unit(s) SubCutaneous every 8 hours  levETIRAcetam 500 milliGRAM(s) Oral two times a day  meropenem  IVPB 500 milliGRAM(s) IV Intermittent every 8 hours  metoprolol tartrate 25 milliGRAM(s) Oral two times a day  nystatin Powder 1 Application(s) Topical two times a day  pantoprazole    Tablet 40 milliGRAM(s) Oral before breakfast  predniSONE   Tablet 40 milliGRAM(s) Oral two times a day  senna 2 Tablet(s) Oral at bedtime  tiotropium 18 MICROgram(s) Capsule 1 Capsule(s) Inhalation daily    MEDICATIONS  (PRN):  ALBUTerol/ipratropium for Nebulization 3 milliLiter(s) Nebulizer every 6 hours PRN Shortness of Breath and/or Wheezing  guaiFENesin    Syrup 100 milliGRAM(s) Oral every 6 hours PRN Cough  lactulose Syrup 10 Gram(s) Oral every 6 hours PRN constipation      T(C): 36.5 (01-28-19 @ 04:45), Max: 36.6 (01-27-19 @ 20:30)  HR: 92 (01-28-19 @ 04:45) (92 - 97)  BP: 139/68 (01-28-19 @ 04:45) (106/57 - 164/73)  RR: 18 (01-28-19 @ 04:45) (18 - 18)  SpO2: --  Wt(kg): --Vital Signs Last 24 Hrs  T(C): 36.5 (28 Jan 2019 04:45), Max: 36.6 (27 Jan 2019 20:30)  T(F): 97.7 (28 Jan 2019 04:45), Max: 97.8 (27 Jan 2019 20:30)  HR: 92 (28 Jan 2019 04:45) (92 - 97)  BP: 139/68 (28 Jan 2019 04:45) (106/57 - 164/73)  BP(mean): --  RR: 18 (28 Jan 2019 04:45) (18 - 18)  SpO2: --    PHYSICAL EXAM:  GENERAL:   NECK:   CHEST/LUNG:  HEART: S1  ABDOMEN:   EXTREMITIES:                           9.9    21.89 )-----------( 279      ( 27 Jan 2019 07:33 )             32.8     01-27    147<H>  |  105  |  43<H>  ----------------------------<  91  5.3<H>   |  27  |  1.3    Ca    9.4      27 Jan 2019 07:33  Mg     2.1     01-27    TPro  5.7<L>  /  Alb  3.6  /  TBili  0.6  /  DBili  x   /  AST  57<H>  /  ALT  94<H>  /  AlkPhos  70  01-27            RADIOLOGY & ADDITIONAL TESTS:    Abd Sono : -ve for ascietes    CXR R pl effusion w/ Pna     No ac DVT     ASSESSMENT / PLAN  :    ACUTE HYPOXIC  RESPIRATORY FAILURE : IMPROVED. STABLE now. Appreciated f/u by pulm.   PNEUMONIA ; Abx as adv by ID, ID f/u appreciated. To change to PO as guided.    COPD exacerbation ; has +viral. Chr. O2 dependent. BiPap at night, as advised by pulm.   BL PLEURAL EFFUSION : See above.   RIGHT TIBIAL VEIN THROMBOSIS ; Conservative. Maintain ambulation. No Ac DVT.  ANEMIA ; chronic stable.   ? ASCIETES : False -ve finding in Echo. Negative ABd sono.   LEUKOCYTOSIS : Likely 2' Steroid.   CKD III : Stable. Avoid dehydration   OLD CVA ; stable.   Hx. AORTO-FEM BYPASS  RIGHT CAROTID STENT  PVD ; Cont current Tx, risk fx control.     Cont routine preventives. OOB to chair, Discharge planning, if contd. stable.

## 2019-01-29 NOTE — PROGRESS NOTE ADULT - ASSESSMENT
86 yo F, CCU Downgrade, PMHx COPD on 2 L NC, PVD, CKD3, HTN, DLD, hx of CVA, ex-smoker, recently discharged from [facility] Jan 13 after PNA tx w/Levaquin and Prednisone, presented/w SOB and dysuria. She was doing well until 01/20 when she starting feeling SOB again.     In Ed, she received cefepime, Levaquin, and solumedrol. Pt was admitted to CCU for acute on chronic hypoxic and chronic hypercapnic respiratory failure 2/2 COPD exacerbation likely due to RLL GNR PNA exacerbated by coronavirus coinfection. She was on BiPaP prn and qhs, alternating with venti mask and oxygen via NC, solumedrol, bronchodilators and Meropenem as per ID. She was treated for distal DVT and subsequently developed hematuria. Further w/u was positive for moderate ascites (2D echo), +UA (negative urine and blood cultures). She is on Meropenem for PNA. MRSA and Flu were negative.     ===========================================================  Today/Updates: Anticipated pt for d/c tomorrow/cont BIPAP QHS & NC/PT Eval/Nephrology eval before CT Urogram  ===========================================================    # Acute on chronic hypoxic and chronic hypercapnic respiratory failure 2/2 COPD exacerbation 2/2 Possible RLL GNR pneumonia exacerbated by coronavirus coinfection- improved    Continue BiPAP and supplemental O2 as necessary to maintain sat > 88%  Meropenem, day 7  Duonebs q6h  Symbicort bid  Prednisone to 30 bid. Continue tapering  ID f/u pending  FLU, RSV, MRSA nares, BCx, legionella urine neg  Pulm on board  PT Eval today  Anticipated for d/c tomorrow    # HFpEF: Stable    ECHO 1/22- EF 55-60%, severe pulm HTN, decreased RV systolic fx, RV vol and pressure overload,   RV enlarged, severe mitral annular calcification    C/w home dose lasix 20 per week  Cardiology consult: No further evaluation at this time. Cont present mgt    # Right Tibial vein DVT: Stable    Duplex LE- Right post tibial DVT  No need for therapeutic AC    # Hematuria after heparin drip: Resolved    Cont heparin 5000 q8 for ppx  No UTI  Pt already on meropenem, day 7  HGB stable  Urology consult appreciated: Treat UTI, please obtain CT A/P, OP f/u for Cystoscopy  Kidney/bladder US done: Right renal 1.6 cm cyst enlarged from 1 cm. Left renal 6.9 cm cyst   enlarged from 5.8 cm.. No hydronephrosis. Nondiagnostic examination of an empty urinary bladder.   Nephrology consult: pending    # Mod ascites noted on echo    Continue Lasix for now    # Transaminitis: Resolving    LFTs: trending down  RUQ US: negative    # Constipation: Resolved    Cont. Senna, Colace  Lactulose prn    # Mild Hyperkalemia: resolved    Kayexalate 30 mg Stat    # Hypernatremia:     Encouraged hydration  Monitor BMP      Electrolyte Imbalances: WNL  []  Hyponatremia   /   Hypernatremia  [X]   []  Hypokalemia   /   Hyperkalemia  []   []  Hypochloremia   /    Hyperchloremia  []   []  Hypocapnia   /   Hypercapnia  []   []  Hypomagnesemia   /   Hypermagnesemia  []   []  Hypophosphatemia   /   Hyperphosphatemia  []       GI ppx:                                   [] Not indicated   /   [x] Pantoprazole 40mg PO Daily    DVT ppx:  [] Not indicated / [x] Heparin 5000mg SubQ / [] Lovenox 40mg SubQ / [] SCDs    Fluids:   [x] PO  |  [] IVF    Activity:  [] Ad Annabelle  /  [X] Increase as Tolerated  /  [] OOB w/ assist  /  [] Bed Rest    BMI:  Height (cm): 154.94 (01-21)  Weight (kg): 47 (01-21)  BMI (kg/m2): 19.6 (01-21)      DISPO:  Patient to be discharged when condition(s) optimized. Anticipated for d/c tomorrow	  [x] Home  vs  [x] SNF  /  [] Long Term       [X] Discussion with patient and/or proxy regarding goals of care.  [X] Discussed Case and Plan with the Medical Attending.    CODE STATUS  [X] FULL   /    [] DNR    Please call Dr. Hill [[email or web address]] with any questions/consult recs: Spectra # [phone] 84 yo F, CCU Downgrade, PMHx COPD on 2 L NC, PVD, CKD3, HTN, DLD, hx of CVA, ex-smoker, recently discharged from [facility] Jan 13 after PNA tx w/Levaquin and Prednisone, presented/w SOB and dysuria. She was doing well until 01/20 when she starting feeling SOB again.     In Ed, she received cefepime, Levaquin, and solumedrol. Pt was admitted to CCU for acute on chronic hypoxic and chronic hypercapnic respiratory failure 2/2 COPD exacerbation likely due to RLL GNR PNA exacerbated by coronavirus coinfection. She was on BiPaP prn and qhs, alternating with venti mask and oxygen via NC, solumedrol, bronchodilators and Meropenem as per ID. She was treated for distal DVT and subsequently developed hematuria. Further w/u was positive for moderate ascites (2D echo), +UA (negative urine and blood cultures). She is on Meropenem for PNA. MRSA and Flu were negative.     ===========================================================  Today/Updates: Anticipated pt for d/c tomorrow/cont BIPAP QHS & NC/PT Eval/Nephrology eval before CT Urogram  ===========================================================    # Acute on chronic hypoxic and chronic hypercapnic respiratory failure 2/2 COPD exacerbation 2/2 Possible RLL GNR pneumonia exacerbated by coronavirus coinfection- improved    Continue BiPAP and supplemental O2 as necessary to maintain sat > 88%  Meropenem, day 7  Duonebs q6h  Symbicort bid  Prednisone to 30 bid. Continue tapering  ID f/u pending  FLU, RSV, MRSA nares, BCx, legionella urine neg  Pulm on board  PT Eval today  Anticipated for d/c tomorrow    # Hypertension: Poorly controlled    Pt on Furosemide and Metoprolol  Was given Labetolol 100 mg for elevated BP at nght  Montor BP    # HFpEF: Stable    ECHO 1/22- EF 55-60%, severe pulm HTN, decreased RV systolic fx, RV vol and pressure overload,   RV enlarged, severe mitral annular calcification    C/w home dose lasix 20 per week  Cardiology consult: No further evaluation at this time. Cont present mgt    # Right Tibial vein DVT: Stable    Duplex LE- Right post tibial DVT  No need for therapeutic AC    # Hematuria after heparin drip: Resolved    Cont heparin 5000 q8 for ppx  No UTI  Pt already on meropenem, day 7  HGB stable  Urology consult appreciated: Treat UTI, please obtain CT A/P, OP f/u for Cystoscopy  Kidney/bladder US done: Right renal 1.6 cm cyst enlarged from 1 cm. Left renal 6.9 cm cyst   enlarged from 5.8 cm.. No hydronephrosis. Nondiagnostic examination of an empty urinary bladder.   Nephrology consult: I spoke w/Dr Mccall and he approved the CT Urogram after IVF today. Pt given  ml    # Mod ascites noted on echo    Continue Lasix for now    # Transaminitis: Resolving    LFTs: trending down  RUQ US: negative    # Constipation: Resolved    Cont. Senna, Colace  Lactulose prn    # Mild Hyperkalemia: resolved    Kayexalate 30 mg Stat    # Hypernatremia:     Encouraged hydration  Monitor BMP      Electrolyte Imbalances: WNL  []  Hyponatremia   /   Hypernatremia  [X]   []  Hypokalemia   /   Hyperkalemia  []   []  Hypochloremia   /    Hyperchloremia  []   []  Hypocapnia   /   Hypercapnia  []   []  Hypomagnesemia   /   Hypermagnesemia  []   []  Hypophosphatemia   /   Hyperphosphatemia  []       GI ppx:                                   [] Not indicated   /   [x] Pantoprazole 40mg PO Daily    DVT ppx:  [] Not indicated / [x] Heparin 5000mg SubQ / [] Lovenox 40mg SubQ / [] SCDs    Fluids:   [x] PO  |  [] IVF    Activity:  [] Ad Annabelle  /  [X] Increase as Tolerated  /  [] OOB w/ assist  /  [] Bed Rest    BMI:  Height (cm): 154.94 (01-21)  Weight (kg): 47 (01-21)  BMI (kg/m2): 19.6 (01-21)      DISPO:  Patient to be discharged when condition(s) optimized. Anticipated for d/c tomorrow	  [x] Home  vs  [x] SNF  /  [] Long Term       [X] Discussion with patient and/or proxy regarding goals of care.  [X] Discussed Case and Plan with the Medical Attending.    CODE STATUS  [X] FULL   /    [] DNR    Please call Dr. Hill [[email or web address]] with any questions/consult recs: Spectra # [phone]

## 2019-01-29 NOTE — PROGRESS NOTE ADULT - SUBJECTIVE AND OBJECTIVE BOX
Interval history and ROS:    No complaints    MEDICATIONS:  ALBUTerol    90 MICROgram(s) HFA Inhaler 1 Puff(s) Inhalation every 4 hours  ALBUTerol/ipratropium for Nebulization 3 milliLiter(s) Nebulizer every 6 hours  ALBUTerol/ipratropium for Nebulization 3 milliLiter(s) Nebulizer every 6 hours PRN  aspirin enteric coated 81 milliGRAM(s) Oral <User Schedule>  atorvastatin 10 milliGRAM(s) Oral at bedtime  buDESOnide 160 MICROgram(s)/formoterol 4.5 MICROgram(s) Inhaler 2 Puff(s) Inhalation two times a day  chlorhexidine 4% Liquid 1 Application(s) Topical <User Schedule>  cilostazol 100 milliGRAM(s) Oral two times a day  clopidogrel Tablet 75 milliGRAM(s) Oral daily  docusate sodium 100 milliGRAM(s) Oral two times a day  furosemide    Tablet 20 milliGRAM(s) Oral <User Schedule>  guaiFENesin    Syrup 100 milliGRAM(s) Oral every 6 hours PRN  heparin  Injectable 5000 Unit(s) SubCutaneous every 8 hours  lactulose Syrup 10 Gram(s) Oral every 6 hours PRN  levETIRAcetam 500 milliGRAM(s) Oral two times a day  meropenem  IVPB 500 milliGRAM(s) IV Intermittent every 8 hours  metoprolol tartrate 25 milliGRAM(s) Oral two times a day  nystatin Powder 1 Application(s) Topical two times a day  pantoprazole    Tablet 40 milliGRAM(s) Oral before breakfast  predniSONE   Tablet 30 milliGRAM(s) Oral two times a day  senna 2 Tablet(s) Oral at bedtime  tiotropium 18 MICROgram(s) Capsule 1 Capsule(s) Inhalation daily      VITAL SIGNS:  Vital Signs Last 24 Hrs  T(C): 36.1 (29 Jan 2019 05:18), Max: 36.9 (28 Jan 2019 20:55)  T(F): 97 (29 Jan 2019 05:18), Max: 98.4 (28 Jan 2019 20:55)  HR: 108 (29 Jan 2019 06:00) (91 - 116)  BP: 188/84 (29 Jan 2019 06:00) (110/57 - 188/84)  BP(mean): 97 (28 Jan 2019 18:36) (97 - 97)  RR: 18 (28 Jan 2019 20:55) (18 - 18)  SpO2: -- on NC        PHYSICAL EXAM:  Awake and alert NAD  Neck supple, no LN  S1 and S2 no murmur  Lungs are clear without wheeze, rhonchi or rales  Abdomen is soft and non tender  There is no edema  Neurologically non focal                          LABS:                        9.8    20.22 )-----------( 289      ( 28 Jan 2019 17:59 )             31.4     01-28    145  |  104  |  38<H>  ----------------------------<  98  3.7   |  27  |  1.1    Ca    9.3      28 Jan 2019 17:59    TPro  5.5<L>  /  Alb  3.5  /  TBili  0.4  /  DBili  x   /  AST  41  /  ALT  86<H>  /  AlkPhos  71  01-28                RADIOLOGY & ADDITIONAL TESTS:   < from: [facility] Duplex Lower Ext Vein Scan, Bilat (01.27.19 @ 13:32) >  Chronic DVT in the right posterior tibial vein and superficial   thrombophlebitis of the distal right greater saphenous vein.  No evidence of deep venous thrombosis or superficial thrombophlebitis in   lower extremity.

## 2019-01-29 NOTE — PROGRESS NOTE ADULT - SUBJECTIVE AND OBJECTIVE BOX
DANE LILA  85y  Female      SUBJECTIVE:  c/o feels better,wants to go home    Progress Note:      REVIEW OF SYSTEMS:    01-28      TPro  5.5<L>  /  Alb  3.5  /  TBili  0.4  /  DBili  x   /  AST  41  /  ALT  86<H>  /  AlkPhos  71  01-28  T(C): 36.1 (01-29-19 @ 05:18), Max: 36.9 (01-28-19 @ 20:55)  HR: 108 (01-29-19 @ 06:00) (91 - 116)  BP: 188/84 (01-29-19 @ 06:00) (110/57 - 188/84)  RR: 18 (01-28-19 @ 20:55) (18 - 18)  SpO2: --  Wt(kg): --Vital Signs Last 24 Hrs  T(C): 36.1 (29 Jan 2019 05:18), Max: 36.9 (28 Jan 2019 20:55)  T(F): 97 (29 Jan 2019 05:18), Max: 98.4 (28 Jan 2019 20:55)  HR: 108 (29 Jan 2019 06:00) (91 - 116)  BP: 188/84 (29 Jan 2019 06:00) (110/57 - 188/84)  BP(mean): 97 (28 Jan 2019 18:36) (97 - 97)  RR: 18 (28 Jan 2019 20:55) (18 - 18)  SpO2: --    PHYSICAL EXAM:  lungs-clear  cor reg nl s! & s2  ext-no calf tenderness    LABS:                          9.8    20.22 )-----------( 289      ( 28 Jan 2019 17:59 )             31.4   01-28    145  |  104  |  38<H>  ----------------------------<  98  3.7   |  27  |  1.1    Ca    9.3      28 Jan 2019 17:59    TPro  5.5<L>  /  Alb  3.5  /  TBili  0.4  /  DBili  x   /  AST  41  /  ALT  86<H>  /  AlkPhos  71  01-28    RADIOLOGY:    < from: [facility] Duplex Lower Ext Vein Scan, Bilat (01.27.19 @ 13:32) >  XAM:  DUPLEX SCAN EXT VEINS LOWER BI            PROCEDURE DATE:  01/27/2019            INTERPRETATION:  Clinical History / Reason for exam: The patient is a   85-year-old female the history of right deep venous thrombosis. A venous   duplex examination was performed to evaluate the patient for deep venous   thrombosis of the lower extremities.    The common femoral, superficial femoral and popliteal veins were   visualized bilaterally with no evidence of deep venous thrombosis.    All these veins were fully compressible.  There was presence of   spontaneous flow, augmentation with distal compression and phasicity.    The left anterior tibial veins were  patent  left posterior tibial veins   were  patent  and  left peroneal veins were patent.    Thrombosis is noted in the distal right great saphenous vein branch of   right posterior tibial vein, with loss of compressibility.  The right anterior tibial and peroneal veins were patent.  Which is essentially unchanged from the previous scandone on 1/21/2019    Impression:    Chronic DVT in the right posterior tibial vein and superficial   thrombophlebitis of the distal right greater saphenous vein.  No evidence of deep venous thrombosis or superficial thrombophlebitis in   lower extremity.    ICD-10: Z86.718              HARRIETT HERRING   This document has been electronically signed.  MINDI BURGER M.D., ATTENDING VASCULAR  This document has been electronically signed. Jan 28 2019  8:29AM        < end of copied text >    IMPRESSION:  ACUTE HYPOXIC  RESPIRATORY FAILURE-IMPROVING  PNEUMONIA-CLINICALLY BETTER  RIGHT PLEURAL EFFUSION  PULMONARY HYPERTENSION  RIGHT TIBIAL VEIN THROMBOSIS-chronic  ANEMIA-stable  LEUKOCYTOSIS-due to steroids  HEMATURIA  CKD-STAGE 3  OLD CVA  AORTO-FEM BYPASS  RIGHT CAROTID STENT  PVD            PLAN:  IV ANTIBIOTICS AS PER ID  BIPAP AND NASAL O2   DR. HEARN RENAL CONSULT -[facility] CT UROGRAM  REHAB/PT FOR AMBULATION      I SPENT 30 MINS. EXAMINING,EVALUATING AND COUNSELING  PATIENT AND COORDINATING CARE WITH RESIDENT  BY ATTENDING PHYSICIAN

## 2019-01-29 NOTE — PROGRESS NOTE ADULT - SUBJECTIVE AND OBJECTIVE BOX
LILA VELA  85y, Female      OVERNIGHT EVENTS:    no fevers, feels well, minimal SOB, no cough    VITALS:  T(F): 97, Max: 98.4 (01-28-19 @ 20:55)  HR: 108  BP: 118/56  RR: 18Vital Signs Last 24 Hrs  T(C): 36.1 (29 Jan 2019 05:18), Max: 36.9 (28 Jan 2019 20:55)  T(F): 97 (29 Jan 2019 05:18), Max: 98.4 (28 Jan 2019 20:55)  HR: 108 (29 Jan 2019 06:00) (91 - 116)  BP: 118/56 (29 Jan 2019 09:15) (110/57 - 188/84)  BP(mean): 97 (28 Jan 2019 18:36) (97 - 97)  RR: 18 (28 Jan 2019 20:55) (18 - 18)  SpO2: --    TESTS & MEASUREMENTS:                        9.8    16.41 )-----------( 291      ( 29 Jan 2019 06:22 )             31.6     01-29    147<H>  |  104  |  37<H>  ----------------------------<  105<H>  4.1   |  30  |  1.0    Ca    9.2      29 Jan 2019 06:22  Mg     2.1     01-29    TPro  5.8<L>  /  Alb  3.6  /  TBili  0.5  /  DBili  x   /  AST  32  /  ALT  82<H>  /  AlkPhos  69  01-29    LIVER FUNCTIONS - ( 29 Jan 2019 06:22 )  Alb: 3.6 g/dL / Pro: 5.8 g/dL / ALK PHOS: 69 U/L / ALT: 82 U/L / AST: 32 U/L / GGT: x             Culture - Urine (collected 01-23-19 @ 03:20)  Source: .Urine Clean Catch (Midstream)  Final Report (01-24-19 @ 11:51):    No growth            RADIOLOGY & ADDITIONAL TESTS:    ANTIBIOTICS:  meropenem  IVPB 500 milliGRAM(s) IV Intermittent every 8 hours

## 2019-01-29 NOTE — CHART NOTE - NSCHARTNOTEFT_GEN_A_CORE
Registered Dietitian Follow-Up     Patient Profile Reviewed                           Yes [x]   No []     Nutrition History Previously Obtained        Yes [x]  No []       Pertinent Subjective Information: Pt reports consuming most of her meal trays without any issues, and is drinking Ensure (8oz) brought from home. Pt reports that she hasn't been receiving Ensure Compact and RN has confirmed this. Will communicate with [facility] to bring it as it is ordered and active in EMR.      Pertinent Medical Interventions:     Diet order: DASH/TLC + 1.5L fluid restriction + Ensure Compact BID      Anthropometrics:  - Ht.  - Wt.  - %wt change  - BMI  - IBW     Pertinent Lab Data:     Pertinent Meds: heparin, aspirin, pletal, plavix, abx, prednisone, albuterol, lactulose, colace, senna, lipitor, symbicort, lasix, keppra, lopressor, protonix, spiriva,      Physical Findings:  - Appearance:  - GI function:  - Tubes:  - Oral/Mouth cavity:  - Skin:     Nutrition Requirements  Weight Used:     Estimated Energy Needs    Continue []  Adjust []  Adjusted Energy Recommendations:   kcal/day        Estimated Protein Needs    Continue []  Adjust []  Adjusted Protein Recommendations:   gm/day        Estimated Fluid Needs        Continue []  Adjust []  Adjusted Fluid Recommendations:   mL/day     Nutrient Intake:        [] Previous Nutrition Diagnosis:            [] Ongoing          [] Resolved    [] No active nutrition diagnosis identified at this time     Nutrition Diagnostic #1  Problem:  Etiology:  Statement:     Nutrition Diagnostic #2  Problem:  Etiology:  Statement:     Nutrition Intervention      Goal/Expected Outcome:     Indicator/Monitoring: Registered Dietitian Follow-Up     Patient Profile Reviewed                           Yes [x]   No []     Nutrition History Previously Obtained        Yes [x]  No []       Pertinent Subjective Information: Pt reports consuming most of her meal trays without any issues, and is drinking Ensure (8oz) brought from home. Pt reports that she hasn't been receiving Ensure Compact and RN has confirmed this. Will communicate with [facility] to bring it as it is ordered and active in EMR.      Pertinent Medical Interventions: IV abx per ID. BiPAP and nasal O2. Renal consult--wants CT urogram      Diet order: DASH/TLC + 1.5L fluid restriction + Ensure Compact BID      Anthropometrics:  - Ht. 61"  - Wt. 49.8kg (1/29)--wt trending upwards likely 2/2 bed-scale inaccuracy   - %wt change  - BMI: 19.6   - IBW: 105#     Pertinent Lab Data: Reviewed (1/29): H/H 9.8/31.6, Na 147, BUN 37, Glu 105, GFR 51      Pertinent Meds: heparin, aspirin, pletal, plavix, abx, prednisone, albuterol, lactulose, colace, senna, lipitor, symbicort, lasix, keppra, lopressor, protonix, spiriva,      Physical Findings:  - Appearance: alert and oriented   - GI function: no GI distress noted, LBM 1/29   - Oral/Mouth cavity: no chewing/swallowing difficulties  - Skin: intact      Nutrition Requirements  Weight Used: admission  47kg; needs continued from RD note on 1/25      Estimated energy needs: ~1120kcal/d (MSJ x 1.3)   Estimated protein needs: ~ 40gm/d (0.8gm/kg act wt) - impaired renal function noted (current labs + h/o CKD 1)    Estimated fluid needs: 1.5L fluid restriction      Nutrient Intake: meeting kcal/pro needs at this time      Previous Nutrition Diagnosis: Predicted suboptimal energy intake (resolved)      Nutrition Intervention: meals and snacks, medical food supplements    Recommendations:  1. Continue current diet order      Goal/Expected Outcome: Pt to maintain % po intake of meals and snacks over the next 7 days      Indicator/Monitoring: RD to monitor renal/electrolyte profiles, body composition, nutrition focused physical findings

## 2019-01-29 NOTE — PROGRESS NOTE ADULT - SUBJECTIVE AND OBJECTIVE BOX
DAILY PROGRESS NOTE  ===========================================================    Patient Information:  LILA VELA  /  85y  /  Female  /  MRN#: [id number]    Hospital Day: 8d     |:::::::::::::::::::::::::::| SUBJECTIVE |:::::::::::::::::::::::::::|    OVERNIGHT EVENTS:   TODAY: Patient was seen today at bedside. She voiced no complaints and wants to go home. Review of systems is otherwise negative.    |:::::::::::::::::::::::::::| OBJECTIVE |:::::::::::::::::::::::::::|    VITAL SIGNS: Last 24 Hours  ICU Vital Signs Last 24 Hrs  T(C): 36.1 (29 Jan 2019 05:18), Max: 36.9 (28 Jan 2019 20:55)  T(F): 97 (29 Jan 2019 05:18), Max: 98.4 (28 Jan 2019 20:55)  HR: 108 (29 Jan 2019 06:00) (91 - 116)  BP: 118/56 (29 Jan 2019 09:15) (110/57 - 188/84)  BP(mean): 97 (28 Jan 2019 18:36) (97 - 97)  ABP: --  ABP(mean): --  RR: 18 (28 Jan 2019 20:55) (18 - 18)  SpO2: --      PHYSICAL EXAM:  GENERAL: Awake, alert; NAD.  HEENT: Head NC/AT; Conjunctivae pink, Sclera anicteric; Oral mucosa moist.  CARDIO: Regular rate; Regular rhythm; S1 & S2.  RESP: CTA B/L. No wheezes or rhonchi appreciated.  GI: Soft; NT/ND; BS; No guarding; No rebound tenderness.  EXT: No edema in UE and LE.  NEURO: A&Ox3  SKIN: Intact.    LAB RESULTS:                                   9.8    16.41 )-----------( 291      ( 29 Jan 2019 06:22 )             31.6       01-29    147<H>  |  104  |  37<H>  ----------------------------<  105<H>  4.1   |  30  |  1.0    Ca    9.2      29 Jan 2019 06:22  Mg     2.1     01-29    TPro  5.8<L>  /  Alb  3.6  /  TBili  0.5  /  DBili  x   /  AST  32  /  ALT  82<H>  /  AlkPhos  69  01-29            MICROBIOLOGY:    RADIOLOGY:    ALLERGIES:  No Known Allergies    HOME MEDICATIONS:  albuterol 2.5 mg/3 mL (0.083%) inhalation solution: 3 milliliter(s) inhaled every 6 hours (06 Sep 2018 13:40)  Centrum oral tablet: 1 tab(s) orally once a day (06 Sep 2018 13:40)  furosemide 20 mg oral tablet: 1 tab(s) orally every 7 days (06 Sep 2018 13:40)  Pletal 100 mg oral tablet: 1 tab(s) orally 2 times a day (06 Sep 2018 13:40)    ===========================================================

## 2019-01-29 NOTE — PROGRESS NOTE ADULT - ASSESSMENT
IMPRESSION:  86 yo female with a history of O2 dependent COPD recently hospitalized for pneumonia  Readmitted for acute on chronic hypoxemic respiratory failure with worsening basilar opacity on CXR c/w HCAP  HFPEF  Calf vein thrombosis - no progression on follow up doppler examination  Clinically improving    PLAN:  Continue NC O2  Monitor O2 sat  BiPAP qhs  Complete steroid taper  Continue bronchodilators  Complete antibiotic course  Continue GI and DVT prophylaxis  OOB to chair, PT, rehab

## 2019-01-29 NOTE — PROGRESS NOTE ADULT - ASSESSMENT
IMPRESSION:   RLL GNR PNA exacerbated by a Coronavirus coinfection.  WBC 17.8  BCx NTD  Legionella negative  Clinically/ radiographically improved    RECOMMENDATIONS:  Po Levoquin 250 mg q24h for 3 more days  D/c other ABx  Recall prn please

## 2019-01-30 NOTE — CONSULT NOTE ADULT - SUBJECTIVE AND OBJECTIVE BOX
[facility] INITIAL CONSULT NOTE  --------------------------------------------------------------------------------  HPI:  86 yo F with PMHx of COPD on 2 L NC, PVD, CKD III baseline creatinine 1-1.4, essential HTN, DLD, hx of CVA, ex-smoker, recently discharged from hospital Jan 13 after being admitted for pneumonia, presented for SOB of one day duration, patient has finished her course of Levaquin and prednisone. EMS found her to be hypoxic in to the 70s on room air.  On arrival patient, hypoxic to 67%, + tachypnea, speaking few words at a time. + wheezing, bilaterally, clavicular retractions.  Patient was placed on BiPAP and SOB improved. During hospital course patient noted to have a DVT. Started on anticoagulation and developed hematuria. Urology requested CT urogram, concern for contrast induced nephropathy given history of CKD.     PAST HISTORY  --------------------------------------------------------------------------------  PAST MEDICAL & SURGICAL HISTORY:  PVD (peripheral vascular disease)  COPD (chronic obstructive pulmonary disease)  Kidney disease, chronic, stage III  Peripheral vascular disease  HTN (hypertension)  High cholesterol  CVA (cerebral vascular accident)  H/O aorto-femoral bypass  History of right common carotid artery stent placement    FAMILY HISTORY:  Family history of atherosclerosis (Mother)    SOCIAL HISTORY:  Former smoker. No ETOH or illicit drug use.    ALLERGIES & MEDICATIONS  --------------------------------------------------------------------------------  Allergies    No Known Allergies    Standing Inpatient Medications  ALBUTerol    90 MICROgram(s) HFA Inhaler 1 Puff(s) Inhalation every 4 hours  ALBUTerol/ipratropium for Nebulization 3 milliLiter(s) Nebulizer every 6 hours  aspirin enteric coated 81 milliGRAM(s) Oral <User Schedule>  atorvastatin 10 milliGRAM(s) Oral at bedtime  buDESOnide 160 MICROgram(s)/formoterol 4.5 MICROgram(s) Inhaler 2 Puff(s) Inhalation two times a day  chlorhexidine 4% Liquid 1 Application(s) Topical <User Schedule>  cilostazol 100 milliGRAM(s) Oral two times a day  clopidogrel Tablet 75 milliGRAM(s) Oral daily  docusate sodium 100 milliGRAM(s) Oral two times a day  furosemide    Tablet 20 milliGRAM(s) Oral <User Schedule>  heparin  Injectable 5000 Unit(s) SubCutaneous every 8 hours  levETIRAcetam 500 milliGRAM(s) Oral two times a day  levoFLOXacin  Tablet 250 milliGRAM(s) Oral every 24 hours  lisinopril 5 milliGRAM(s) Oral daily  metoprolol tartrate 25 milliGRAM(s) Oral two times a day  nystatin Powder 1 Application(s) Topical two times a day  pantoprazole    Tablet 40 milliGRAM(s) Oral before breakfast  predniSONE   Tablet 20 milliGRAM(s) Oral two times a day  senna 2 Tablet(s) Oral at bedtime  tiotropium 18 MICROgram(s) Capsule 1 Capsule(s) Inhalation daily    PRN Inpatient Medications  ALBUTerol/ipratropium for Nebulization 3 milliLiter(s) Nebulizer every 6 hours PRN  guaiFENesin    Syrup 100 milliGRAM(s) Oral every 6 hours PRN  lactulose Syrup 10 Gram(s) Oral every 6 hours PRN    REVIEW OF SYSTEMS  --------------------------------------------------------------------------------  Gen: No weight changes, fatigue, fevers/chills, weakness  Skin: No rashes  Head/Eyes/Ears/Mouth: No headache; No sinus pain/discomfort, sore throat  Respiratory: No dyspnea, cough, wheezing, hemoptysis  CV: No chest pain, PND, orthopnea  GI: No abdominal pain, diarrhea, constipation, nausea, vomiting, melena, hematochezia  : No increased frequency, nocturia  All other systems were reviewed and are negative, except as noted.    VITALS/PHYSICAL EXAM  --------------------------------------------------------------------------------  T(C): 36.2 (01-30-19 @ 05:03), Max: 36.8 (01-29-19 @ 20:22)  HR: 91 (01-30-19 @ 05:03) (74 - 127)  BP: 184/88 (01-30-19 @ 05:03) (99/52 - 184/88)  RR: 20 (01-30-19 @ 05:03) (20 - 22)  SpO2: 91% (01-30-19 @ 05:03) (91% - 91%)    Physical Exam:  	Gen: NAD  	HEENT: Supple neck, clear oropharynx  	Pulm:  B/L scant rales  	CV: RRR, S1S2  	Back: No CVA tenderness; no sacral edema  	Abd: +BS, soft, nontender/nondistended  	: No suprapubic tenderness  	LE: Warm, no edema  	Neuro: AAO x2    LABS/STUDIES  --------------------------------------------------------------------------------              9.0    15.39 >-----------<  277      [01-30-19 @ 06:58]              28.7     148  |  105  |  31  ----------------------------<  135      [01-30-19 @ 06:58]  4.5   |  29  |  0.9        Ca     8.7     [01-30-19 @ 06:58]      Mg     2.0     [01-30-19 @ 06:58]    TPro  5.1  /  Alb  3.3  /  TBili  0.6  /  DBili  x   /  AST  50  /  ALT  84  /  AlkPhos  67  [01-30-19 @ 06:58]    Creatinine Trend:  SCr 0.9 [01-30 @ 06:58]  SCr 1.0 [01-29 @ 06:22]  SCr 1.1 [01-28 @ 17:59]  SCr 1.3 [01-27 @ 07:33]  SCr 1.2 [01-26 @ 06:50]    Urinalysis - [01-24-19 @ 04:30]      Color Red / Appearance Turbid / SG >=1.030 / pH 5.0      Gluc 100 / Ketone 40  / Bili Large / Urobili 1.0       Blood Large / Protein >=300 / Leuk Est Moderate / Nitrite Positive      RBC >50 / WBC  / Hyaline  / Gran 3-5 / Sq Epi  / Non Sq Epi  / Bacteria Moderate    HbA1c 5.7      [01-11-19 @ 07:14]  Lipid: chol 171, TG 43, HDL 90, LDL 78      [01-11-19 @ 07:14]

## 2019-01-30 NOTE — PROGRESS NOTE ADULT - SUBJECTIVE AND OBJECTIVE BOX
DANELILA  85y  Female      SUBJECTIVE:  c/o  wants to go home    Progress Note:      REVIEW OF SYSTEm    T(C): 36.2 (01-30-19 @ 05:03), Max: 36.8 (01-29-19 @ 20:22)  HR: 91 (01-30-19 @ 05:03) (74 - 127)  BP: 184/88 (01-30-19 @ 05:03) (99/52 - 184/88)  RR: 20 (01-30-19 @ 05:03) (20 - 22)  SpO2: 91% (01-30-19 @ 05:03) (91% - 100%)  Wt(kg): --Vital Signs Last 24 Hrs  T(C): 36.2 (30 Jan 2019 05:03), Max: 36.8 (29 Jan 2019 20:22)  T(F): 97.2 (30 Jan 2019 05:03), Max: 98.2 (29 Jan 2019 20:22)  HR: 91 (30 Jan 2019 05:03) (74 - 127)  BP: 184/88 (30 Jan 2019 05:03) (99/52 - 184/88)  BP(mean): --  RR: 20 (30 Jan 2019 05:03) (20 - 22)  SpO2: 91% (30 Jan 2019 05:03) (91% - 100%)    PHYSICAL EXAM:  lungs-clear  cor reg nl s! & s2  ext-no calf tenderness  LABS:                        9.0    15.39 )-----------( 277      ( 30 Jan 2019 06:58 )             28.7     01-30    148<H>  |  105  |  31<H>  ----------------------------<  135<H>  4.5   |  29  |  0.9    Ca    8.7      30 Jan 2019 06:58  Mg     2.0     01-30    TPro  5.1<L>  /  Alb  3.3<L>  /  TBili  0.6  /  DBili  x   /  AST  50<H>  /  ALT  84<H>  /  AlkPhos  67  01-30    RADIOLOGY:          IMPRESSION:  ACUTE HYPOXIC  RESPIRATORY FAILURE-IMPROVING  PNEUMONIA-CLINICALLY BETTER  RIGHT PLEURAL EFFUSION  PULMONARY HYPERTENSION  RIGHT TIBIAL VEIN THROMBOSIS-CHRONIC  ANEMIA-STABLE  LEUKOCYTOSIS-due to steroids improving  HEMATURIA  CKD-STAGE 3  OLD CVA  AORTO-FEM BYPASS  RIGHT CAROTID STENT  PVD  ABNORMAL LFT'S DUE TO MEDS AND INFECTION            PLAN:   ANTIBIOTICS AS PER ID  BIPAP AND NASAL O2   CT UROGRAM TODAY-IF OK THAN D/C PLANNING FOR HOME CARE  REHAB/PT FOR AMBULATION      I SPENT 30 MINS. EXAMINING,EVALUATING AND COUNSELING  PATIENT AND COORDINATING CARE WITH RESIDENT  BY ATTENDING PHYSICIAN

## 2019-01-30 NOTE — CONSULT NOTE ADULT - ASSESSMENT
1. CKD III-stable  2. Hematuria  3. DVT  4. HTN    Plan:  Received IVF overnight  Stop IVF  Continue lisinopril  CT today  Check BMP am

## 2019-01-30 NOTE — PROGRESS NOTE ADULT - ASSESSMENT
84 yo F, CCU Downgrade, PMHx COPD on 2 L NC, PVD, CKD3, HTN, DLD, hx of CVA, ex-smoker, recently discharged from [facility] on 01/13 after PNA tx w/Levaquin and Prednisone, presented/w SOB and dysuria. She was doing well until 01/20 when she starting feeling SOB again.     In the ED, she received cefepime, Levaquin, and solumedrol. Pt was admitted to CCU for acute on chronic hypoxic and chronic hypercapnic respiratory failure 2/2 COPD exacerbation likely due to RLL GNR PNA exacerbated by coronavirus coinfection. She was on BiPaP prn and qhs, alternating with venti mask and oxygen via NC, solumedrol, bronchodilators and Meropenem as per ID. She was treated for distal DVT and subsequently developed hematuria. Further w/u was positive for moderate ascites (2D echo), +UA (negative urine and blood cultures). She was on Meropenem for PNA. MRSA and Flu were negative.     ===========================================================  Today/Updates: CT Urogram/ D/C Planning  ===========================================================    # Acute on chronic hypoxic and chronic hypercapnic respiratory failure 2/2 COPD exacerbation 2/2 Possible RLL GNR pneumonia exacerbated by coronavirus coinfection: resolved    Continue BiPAP and O2 NC  Levaquin, day 2  Duonebs q6h  Symbicort bid  Prednisone to 20 bid, tapering  ID f/u appreciated: Levaquin x 3 days  FLU, RSV, MRSA nares, BCx, legionella urine neg  Pulm on board  PT Eval: Pending  Anticipated for d/c tomorrow    # Hypertension: Poorly controlled    Pt on Furosemide and Metoprolol  Started Lisinopril 5 mg today  Montor BP    # HFpEF: Stable    ECHO 1/22- EF 55-60%, severe pulm HTN, decreased RV systolic fx, RV vol and pressure overload,   RV enlarged, severe mitral annular calcification    C/w home dose lasix 20 per week  Cardiology consult: No further evaluation at this time. Cont same management    # Right Tibial vein DVT: Stable    Duplex LE- Right post tibial DVT  No need for therapeutic AC  Pt on prophylactic Heparin    # Hematuria: Resolved    Cont prophylactic heparin   No UTI  HGB stable  Nephrology consult appreciated: Recs noted   CT Urogram today after IVF    # Mod ascites noted on echo    Lasix for now    # Transaminitis: waxing and waning    RUQ US: negative    # Constipation: Resolved    Cont. Senna, Colace  Lactulose prn    # Mild Hyperkalemia: resolved    # Mild Hypernatremia:     Hydration      Electrolyte Imbalances: WNL  []  Hyponatremia   /   Hypernatremia  [X]   []  Hypokalemia   /   Hyperkalemia  []   []  Hypochloremia   /    Hyperchloremia  []   []  Hypocapnia   /   Hypercapnia  []   []  Hypomagnesemia   /   Hypermagnesemia  []   []  Hypophosphatemia   /   Hyperphosphatemia  []       GI ppx:                                   [] Not indicated   /   [x] Pantoprazole 40mg PO Daily    DVT ppx:  [] Not indicated / [x] Heparin 5000mg SubQ / [] Lovenox 40mg SubQ / [] SCDs    Fluids:   [x] PO  |  [] IVF    Activity:  [] Ad Annabelle  /  [X] Increase as Tolerated  /  [] OOB w/ assist  /  [] Bed Rest    BMI:  Height (cm): 154.94 (01-21)  Weight (kg): 47 (01-21)  BMI (kg/m2): 19.6 (01-21)      DISPO:  Anticipated for d/c tomorrow. Pt refused STR	  [x] Home  vs  [] SNF  /  [] Long Term       [X] Discussion with patient and/or proxy regarding goals of care.  [X] Discussed Case and Plan with the Medical Attending.    CODE STATUS  [X] FULL   /    [] DNR    Please call Dr. Hill [[email or web address]] with any questions/consult recs: Spectra # [phone]

## 2019-01-30 NOTE — PROGRESS NOTE ADULT - SUBJECTIVE AND OBJECTIVE BOX
DAILY PROGRESS NOTE  ===========================================================    Patient Information:  LILA VELA  /  85y  /  Female  /  MRN#: [id number]    Hospital Day: 8d     |:::::::::::::::::::::::::::| SUBJECTIVE |:::::::::::::::::::::::::::|    OVERNIGHT EVENTS:   TODAY: Patient was seen today at bedside. She voiced no complaints and wants to go home. Review of systems is otherwise negative.    |:::::::::::::::::::::::::::| OBJECTIVE |:::::::::::::::::::::::::::|    VITAL SIGNS: Last 24 Hours  Vital Signs Last 24 Hrs  T(C): 36.2 (30 Jan 2019 05:03), Max: 36.8 (29 Jan 2019 20:22)  T(F): 97.2 (30 Jan 2019 05:03), Max: 98.2 (29 Jan 2019 20:22)  HR: 91 (30 Jan 2019 05:03) (74 - 127)  BP: 184/88 (30 Jan 2019 05:03) (99/52 - 184/88)  BP(mean): --  ABP: --  ABP(mean): --  RR: 20 (30 Jan 2019 05:03) (20 - 22)  SpO2: 91% (30 Jan 2019 05:03) (91% - 91%)      PHYSICAL EXAM:  GENERAL: Awake, alert; NAD, A&Ox3  HEENT: Head NC/AT; Conjunctivae pink, Sclera anicteric; Oral mucosa moist.  CARDIO: Regular rate; Regular rhythm; S1 & S2.  RESP: CTA B/L. No wheezes or rhonchi appreciated.  GI: Soft; NT/ND; BS; No guarding; No rebound tenderness.  EXT: No edema in UE and LE.  NEURO: A&Ox3  SKIN: Intact except b/l UE bruises    LAB RESULTS:                                            9.0    15.39 )-----------( 277      ( 30 Jan 2019 06:58 )             28.7     01-30    148<H>  |  105  |  31<H>  ----------------------------<  135<H>  4.5   |  29  |  0.9    Ca    8.7      30 Jan 2019 06:58  Mg     2.0     01-30    TPro  5.1<L>  /  Alb  3.3<L>  /  TBili  0.6  /  DBili  x   /  AST  50<H>  /  ALT  84<H>  /  AlkPhos  67  01-30      MICROBIOLOGY:    RADIOLOGY:    ALLERGIES:  No Known Allergies    HOME MEDICATIONS:  albuterol 2.5 mg/3 mL (0.083%) inhalation solution: 3 milliliter(s) inhaled every 6 hours (06 Sep 2018 13:40)  Centrum oral tablet: 1 tab(s) orally once a day (06 Sep 2018 13:40)  furosemide 20 mg oral tablet: 1 tab(s) orally every 7 days (06 Sep 2018 13:40)  Pletal 100 mg oral tablet: 1 tab(s) orally 2 times a day (06 Sep 2018 13:40)    ===========================================================

## 2019-01-31 NOTE — PROGRESS NOTE ADULT - ASSESSMENT
1. CKD III-stable  2. Bladder mass/hematuria  3. DVT  4. HTN    Plan:  Urology f/u  Continue lisinopril  Change Lasix to 20mg PO every day

## 2019-01-31 NOTE — PROGRESS NOTE ADULT - SUBJECTIVE AND OBJECTIVE BOX
DAILY PROGRESS NOTE  ===========================================================    Patient Information:  LILA VELA  /  85y  /  Female  /  MRN#: [id number]    Hospital Day: 9d     |:::::::::::::::::::::::::::| SUBJECTIVE |:::::::::::::::::::::::::::|    OVERNIGHT EVENTS:   TODAY: Patient was seen today at bedside. She voiced no complaints and wants to go home. Review of systems is otherwise negative.    |:::::::::::::::::::::::::::| OBJECTIVE |:::::::::::::::::::::::::::|    VITAL SIGNS: Last 24 Hours  Vital Signs Last 24 Hrs  T(C): 36.4 (31 Jan 2019 05:00), Max: 36.4 (31 Jan 2019 05:00)  T(F): 97.5 (31 Jan 2019 05:00), Max: 97.5 (31 Jan 2019 05:00)  HR: 87 (31 Jan 2019 05:00) (87 - 118)  BP: 136/63 (31 Jan 2019 05:00) (89/54 - 136/63)  BP(mean): --  ABP: --  ABP(mean): --  RR: 18 (31 Jan 2019 05:00) (18 - 20)  SpO2: 94% (31 Jan 2019 00:53) (93% - 96%)    PHYSICAL EXAM:  GENERAL: Awake, alert; NAD, A&Ox3  HEENT: Head NC/AT; Conjunctivae pink, Sclera anicteric; Oral mucosa moist.  CARDIO: Regular rate; Regular rhythm; S1 & S2.  RESP: CTA B/L. No wheezes or rhonchi appreciated.  GI: Soft; NT/ND; BS; No guarding; No rebound tenderness.  EXT: No edema in UE and LE.  NEURO: A&Ox3  SKIN: Intact except b/l UE bruises    LAB RESULTS:                                                                 9.2    18.42 )-----------( 301      ( 31 Jan 2019 07:44 )             30.2     01-31-19 @ 07:44    146  |  107  |  38<H>             --------------------------< 114<H>     4.3  |  28  | 1.1    eGFR AA: 53<L>  eGFR N-AA: 46<L>    Calcium: 9.0  Phosphorus: --  Magnesium: 2.0    AST: 58<H>    ALT: 109<H>  AlkPhos: 77  Protein: 5.4<L>  Albumin: 3.5  TBili: 0.5  D-Bili: --      MICROBIOLOGY:    RADIOLOGY:    ALLERGIES:  No Known Allergies    HOME MEDICATIONS:  albuterol 2.5 mg/3 mL (0.083%) inhalation solution: 3 milliliter(s) inhaled every 6 hours (06 Sep 2018 13:40)  Centrum oral tablet: 1 tab(s) orally once a day (06 Sep 2018 13:40)  furosemide 20 mg oral tablet: 1 tab(s) orally every 7 days (06 Sep 2018 13:40)  Pletal 100 mg oral tablet: 1 tab(s) orally 2 times a day (06 Sep 2018 13:40)    =========================================================== DAILY PROGRESS NOTE  ===========================================================    Patient Information:  LILA VELA  /  85y  /  Female  /  MRN#: [id number]    Hospital Day: 10d     |:::::::::::::::::::::::::::| SUBJECTIVE |:::::::::::::::::::::::::::|    OVERNIGHT EVENTS:   TODAY: Patient was seen today at bedside. She voiced no complaints and wants to go home. Review of systems is otherwise negative.    |:::::::::::::::::::::::::::| OBJECTIVE |:::::::::::::::::::::::::::|    VITAL SIGNS: Last 24 Hours  Vital Signs Last 24 Hrs  T(C): 36.7 (31 Jan 2019 15:03), Max: 36.7 (31 Jan 2019 15:03)  T(F): 98.1 (31 Jan 2019 15:03), Max: 98.1 (31 Jan 2019 15:03)  HR: 116 (31 Jan 2019 18:23) (87 - 116)  BP: 144/72 (31 Jan 2019 18:23) (89/54 - 144/72)  BP(mean): --  ABP: --  ABP(mean): --  RR: 20 (31 Jan 2019 18:23) (18 - 22)  SpO2: 94% (31 Jan 2019 00:53) (93% - 94%)    PHYSICAL EXAM:  GENERAL: Awake, alert; NAD, A&Ox3  HEENT: Head NC/AT; Conjunctivae pink, Sclera anicteric; Oral mucosa moist.  CARDIO: Regular rate; Regular rhythm; S1 & S2.  RESP: CTA B/L. No wheezes or rhonchi appreciated.  GI: Soft; NT/ND; BS; No guarding; No rebound tenderness.  EXT: No edema in UE and LE.  NEURO: A&Ox3  SKIN: Intact except b/l UE bruises    LAB RESULTS:                                                                 9.2    18.42 )-----------( 301      ( 31 Jan 2019 07:44 )             30.2     01-31-19 @ 07:44    146  |  107  |  38<H>             --------------------------< 114<H>     4.3  |  28  | 1.1    eGFR AA: 53<L>  eGFR N-AA: 46<L>    Calcium: 9.0  Phosphorus: --  Magnesium: 2.0    AST: 58<H>    ALT: 109<H>  AlkPhos: 77  Protein: 5.4<L>  Albumin: 3.5  TBili: 0.5  D-Bili: --      MICROBIOLOGY:    RADIOLOGY:    ALLERGIES:  No Known Allergies    HOME MEDICATIONS:  albuterol 2.5 mg/3 mL (0.083%) inhalation solution: 3 milliliter(s) inhaled every 6 hours (06 Sep 2018 13:40)  Centrum oral tablet: 1 tab(s) orally once a day (06 Sep 2018 13:40)  furosemide 20 mg oral tablet: 1 tab(s) orally every 7 days (06 Sep 2018 13:40)  Pletal 100 mg oral tablet: 1 tab(s) orally 2 times a day (06 Sep 2018 13:40)    ===========================================================

## 2019-01-31 NOTE — CHART NOTE - NSCHARTNOTEFT_GEN_A_CORE
I was called by the RN and told that this pt is desaturating down to the 80's. I asked for him to put the pt on face mask and get a complete set of vitals. I went to the room and examined the pt. She voiced no complaints, denied SOB, CP, cough, palpitations, n/v, headache. She was A&Ox3, speaking in full sentences. I increased the O2 by NC and the saturation was at 97-97%. Vital signs were stable except tachycardia at 112-100. I ordered EKG, cardiac enzyme, ABG, and CXR stat. I will monitor further I was called by the RN and told that this pt is desaturating down to the 80's. I asked for him to put the pt on face mask and get a complete set of vitals. I went to the room and examined the pt. She voiced no complaints, denied SOB, CP, cough, palpitations, n/v, headache. She was A&Ox3, speaking in full sentences. I increased the O2 by NC and the saturation was at 97-97%. Vital signs were stable except tachycardia at 112-100. I ordered EKG, cardiac enzyme, ABG, and CXR stat. I will monitor further    Addendum: EKG showed sinus tachycardia, ABG was normal, CXR: worse RLL infiltrate and Troponin elevated at 0.38. I ordered troponin at 23:00 and 04:30  and will call the attention of oncall intern to monitor this pt carefully and consider repeat EKG and cardiac consult.  Will need to r/o Aspiration and ACS

## 2019-01-31 NOTE — CHART NOTE - NSCHARTNOTEFT_GEN_A_CORE
Patient's cardiac enzyme result was notable for troponin of 0.38. She is currently asymptomatic, no chest pain, no increased O2 demand, normotensive with HR in 80s, saturation 97%. EKG from time of desaturation earlier today shows sinus tachycardia with no ischemic changes. Spoke with cardiac fellow about case, he thinks this is unlikely cardiac but rather demand ischemia from hypoxic episode. Advised to trend cardiac enzymes. Will trend and continue to monitor.

## 2019-01-31 NOTE — CONSULT NOTE ADULT - SUBJECTIVE AND OBJECTIVE BOX
HPI:  86 yo F with PMHx of COPD on 2 L NC, PVD, CKD3, essential HTN, DLD, hx of CVA, ex-smoker, recently discharged from hospital Jan 13 after being admitted for pneumonia, presented for SOB of one day duration, patient has finished her course of Levaquin and prednisone on Friday and was doing well , until Sunday evening when she starting feeling SOB again that worsening today, patient denies any chest pain, cough, lower extremity swelling.  EMS found her to be hypoxic to the 70s on room air.  On arrival patient, hypoxic to 67%, + tachypnea, speaking few words at a time. + wheezing, bilaterally, clavicular retractions.  Patient was placed on BiPAP and SOB improved. In Ed patient received cefepime,  Levaquin , and solumedrol. Patient mentioned history of dysuria of 2 days duration. (21 Jan 2019 14:45)      PAST MEDICAL & SURGICAL HISTORY:  PVD (peripheral vascular disease)  COPD (chronic obstructive pulmonary disease)  Kidney disease, chronic, stage I (GFR over 89 ml/min)  Peripheral vascular disease  HTN (hypertension)  High cholesterol  CVA (cerebral vascular accident)  H/O aorto-femoral bypass  History of right common carotid artery stent placement      Hospital Course:    TODAY'S SUBJECTIVE & REVIEW OF SYMPTOMS:     Constitutional WNL   Cardio WNL   Resp WNL   GI WNL  Heme WNL  Endo WNL  Skin WNL  MSK Weakness  Neuro WNL  Cognitive WNL  Psych WNL      MEDICATIONS  (STANDING):  ALBUTerol    90 MICROgram(s) HFA Inhaler 1 Puff(s) Inhalation every 4 hours  ALBUTerol/ipratropium for Nebulization 3 milliLiter(s) Nebulizer every 6 hours  aspirin enteric coated 81 milliGRAM(s) Oral <User Schedule>  atorvastatin 10 milliGRAM(s) Oral at bedtime  buDESOnide 160 MICROgram(s)/formoterol 4.5 MICROgram(s) Inhaler 2 Puff(s) Inhalation two times a day  chlorhexidine 4% Liquid 1 Application(s) Topical <User Schedule>  cilostazol 100 milliGRAM(s) Oral two times a day  clopidogrel Tablet 75 milliGRAM(s) Oral daily  docusate sodium 100 milliGRAM(s) Oral two times a day  furosemide    Tablet 20 milliGRAM(s) Oral <User Schedule>  heparin  Injectable 5000 Unit(s) SubCutaneous every 8 hours  levETIRAcetam 500 milliGRAM(s) Oral two times a day  levoFLOXacin  Tablet 250 milliGRAM(s) Oral every 24 hours  metoprolol tartrate 25 milliGRAM(s) Oral two times a day  nystatin Powder 1 Application(s) Topical two times a day  pantoprazole    Tablet 40 milliGRAM(s) Oral before breakfast  predniSONE   Tablet 20 milliGRAM(s) Oral two times a day  senna 2 Tablet(s) Oral at bedtime  sodium chloride 0.9%. 500 milliLiter(s) (50 mL/Hr) IV Continuous <Continuous>  tiotropium 18 MICROgram(s) Capsule 1 Capsule(s) Inhalation daily    MEDICATIONS  (PRN):  ALBUTerol/ipratropium for Nebulization 3 milliLiter(s) Nebulizer every 6 hours PRN Shortness of Breath and/or Wheezing  guaiFENesin    Syrup 100 milliGRAM(s) Oral every 6 hours PRN Cough  lactulose Syrup 10 Gram(s) Oral every 6 hours PRN constipation      FAMILY HISTORY:  Family history of atherosclerosis (Mother)      Allergies    No Known Allergies    Intolerances        SOCIAL HISTORY:    [  ] Etoh  [  ] Smoking  [  ] Substance abuse     Home Environment:  [  ] Home Alone  [x  ] Lives with Family  [  ] Home Health Aid    Dwelling:  [  ] Apartment  [x  ] Private House  [  ] Adult Home  [  ] Skilled Nursing Facility      [  ] Short Term  [  ] Long Term  [x  ] Stairs       Elevator [  ]    FUNCTIONAL STATUS PTA: (Check all that apply)  Ambulation: [ x  ]Independent    [  ] Dependent     [  ] Non-Ambulatory  Assistive Device: [  ] SA Cane  [  ]  Q Cane  [  ] Walker  [  ]  Wheelchair  ADL : [x  ] Independent  [  ]  Dependent       Vital Signs Last 24 Hrs  T(C): 36.7 (31 Jan 2019 15:03), Max: 36.7 (31 Jan 2019 15:03)  T(F): 98.1 (31 Jan 2019 15:03), Max: 98.1 (31 Jan 2019 15:03)  HR: 116 (31 Jan 2019 15:03) (87 - 116)  BP: 139/65 (31 Jan 2019 15:03) (89/54 - 139/65)  BP(mean): --  RR: 20 (31 Jan 2019 15:03) (18 - 22)  SpO2: 94% (31 Jan 2019 00:53) (93% - 94%)      PHYSICAL EXAM: Alert & Oriented X3  GENERAL: NAD, well-groomed, well-developed  HEAD:  Atraumatic, Normocephalic  CHEST/LUNG: decreased bs lung bases  HEART: S1S2+  ABDOMEN: Soft, Nontender  EXTREMITIES:  no calf tenderness    NERVOUS SYSTEM:  Cranial Nerves 2-12 intact [  ] Abnormal  [  ]  ROM: WFL all extremities [x  ]  Abnormal [  ]  Motor Strength: WFL all extremities  [x  ]  Abnormal [  ]  Sensation: intact to light touch [ x ] Abnormal [  ]  Reflexes: Symmetric [  ]  Abnormal [  ]    FUNCTIONAL STATUS:  Bed Mobility: Independent [  ]  Supervision [  ]  Needs Assistance [ x ]  N/A [  ]  Transfers: Independent [  ]  Supervision [  ]  Needs Assistance [x  ]  N/A [  ]   Ambulation: Independent [  ]  Supervision [  ]  Needs Assistance [  ]  N/A [  ]  ADL: Independent [  ] Requires Assistance [  ] N/A [  ]      LABS:                        9.2    18.42 )-----------( 301      ( 31 Jan 2019 07:44 )             30.2     01-31    146  |  107  |  38<H>  ----------------------------<  114<H>  4.3   |  28  |  1.1    Ca    9.0      31 Jan 2019 07:44  Mg     2.0     01-31    TPro  5.4<L>  /  Alb  3.5  /  TBili  0.5  /  DBili  x   /  AST  58<H>  /  ALT  109<H>  /  AlkPhos  77  01-31          RADIOLOGY & ADDITIONAL STUDIES:    Assesment:

## 2019-01-31 NOTE — PROGRESS NOTE ADULT - SUBJECTIVE AND OBJECTIVE BOX
84 yo female admitted for COPD exacerbation, pt was seen by  for gross hematuria 1/27, which has since resolved, CT urogram was suggested and performed 1/30, results are below -     < from: CT Abdomen and Pelvis w/ IV Cont (01.30.19 @ 18:58) >  PELVIC ORGANS/BLADDER: There is a heterogeneously enhancing right   anterior urinary bladder  polyploid mass measuring approximately 3.3 x   1.5 x 2.2 cm. Questionable invasion of the perivesicular fat anteriorly   at the bladder base on series 7 image 251                          9.2    18.42 )-----------( 301      ( 31 Jan 2019 07:44 )             30.2     results discussed with pt's son and daughter, they do not want their mother knowing the results of the CT at this time    I explained that pt needs a cystoscopy and bladder biopsy to r/o bladder cancer, but not in the setting of recent hospital admission for pneumonia and current admission for hypoxia due to COPD exacerbation    as long as pt doesn't have persistent gross hematuria and h/h stable, pt may follow as op with urology (daughter knows Dr. Michael Stevens and will take her mother to see him) to discuss possible future surgical management of the bladder mass and the r/b/a     pt's family was advised to bring back pt if she has persistent blood in the urine or the inability to void or if she is passing clots     recall prn

## 2019-01-31 NOTE — PROGRESS NOTE ADULT - SUBJECTIVE AND OBJECTIVE BOX
01-31-19 @ 12:32    LILA VELA  85y  Female  OOB to chair. Comfortable. No c/o now.     INTERVAL EVENTS:    MEDICATIONS  (STANDING):  ALBUTerol    90 MICROgram(s) HFA Inhaler 1 Puff(s) Inhalation every 4 hours  ALBUTerol/ipratropium for Nebulization 3 milliLiter(s) Nebulizer every 6 hours  aspirin enteric coated 81 milliGRAM(s) Oral <User Schedule>  atorvastatin 10 milliGRAM(s) Oral at bedtime  buDESOnide 160 MICROgram(s)/formoterol 4.5 MICROgram(s) Inhaler 2 Puff(s) Inhalation two times a day  chlorhexidine 4% Liquid 1 Application(s) Topical <User Schedule>  cilostazol 100 milliGRAM(s) Oral two times a day  clopidogrel Tablet 75 milliGRAM(s) Oral daily  docusate sodium 100 milliGRAM(s) Oral two times a day  furosemide    Tablet 20 milliGRAM(s) Oral <User Schedule>  heparin  Injectable 5000 Unit(s) SubCutaneous every 8 hours  levETIRAcetam 500 milliGRAM(s) Oral two times a day  levoFLOXacin  Tablet 250 milliGRAM(s) Oral every 24 hours  metoprolol tartrate 25 milliGRAM(s) Oral two times a day  nystatin Powder 1 Application(s) Topical two times a day  pantoprazole    Tablet 40 milliGRAM(s) Oral before breakfast  predniSONE   Tablet 20 milliGRAM(s) Oral two times a day  senna 2 Tablet(s) Oral at bedtime  sodium chloride 0.9%. 500 milliLiter(s) (50 mL/Hr) IV Continuous <Continuous>  tiotropium 18 MICROgram(s) Capsule 1 Capsule(s) Inhalation daily    MEDICATIONS  (PRN):  ALBUTerol/ipratropium for Nebulization 3 milliLiter(s) Nebulizer every 6 hours PRN Shortness of Breath and/or Wheezing  guaiFENesin    Syrup 100 milliGRAM(s) Oral every 6 hours PRN Cough  lactulose Syrup 10 Gram(s) Oral every 6 hours PRN constipation      T(C): 36.4 (01-31-19 @ 05:00), Max: 36.4 (01-31-19 @ 05:00)  HR: 87 (01-31-19 @ 05:00) (87 - 118)  BP: 136/63 (01-31-19 @ 05:00) (89/54 - 136/63)  RR: 18 (01-31-19 @ 05:00) (18 - 20)  SpO2: 94% (01-31-19 @ 00:53) (93% - 96%)  Wt(kg): --Vital Signs Last 24 Hrs  T(C): 36.4 (31 Jan 2019 05:00), Max: 36.4 (31 Jan 2019 05:00)  T(F): 97.5 (31 Jan 2019 05:00), Max: 97.5 (31 Jan 2019 05:00)  HR: 87 (31 Jan 2019 05:00) (87 - 118)  BP: 136/63 (31 Jan 2019 05:00) (89/54 - 136/63)  BP(mean): --  RR: 18 (31 Jan 2019 05:00) (18 - 20)  SpO2: 94% (31 Jan 2019 00:53) (93% - 96%)    PHYSICAL EXAM:  GENERAL:   NECK:   CHEST/LUNG:  HEART: S1  ABDOMEN:   EXTREMITIES:                           9.2    18.42 )-----------( 301      ( 31 Jan 2019 07:44 )             30.2     01-31    146  |  107  |  38<H>  ----------------------------<  114<H>  4.3   |  28  |  1.1    Ca    9.0      31 Jan 2019 07:44  Mg     2.0     01-31    TPro  5.4<L>  /  Alb  3.5  /  TBili  0.5  /  DBili  x   /  AST  58<H>  /  ALT  109<H>  /  AlkPhos  77  01-31            RADIOLOGY & ADDITIONAL TESTS:      ASSESSMENT / PLAN  :    ACUTE HYPOXIC  RESPIRATORY FAILURE : IMPROVED. STABLE now. Appreciated f/u by pulm.   PNEUMONIA ; Abx as adv by ID, ID f/u appreciated. To change to PO as guided.    COPD exacerbation ; has +viral. Chr. O2 dependent. BiPap at night, as advised by pulm.   BL PLEURAL EFFUSION : See above.   RIGHT TIBIAL VEIN THROMBOSIS ; Conservative. Maintain ambulation. No Ac DVT.  ANEMIA ; chronic stable.   ? ASCIETES : False -ve finding in Echo. Negative ABd sono.   LEUKOCYTOSIS : Likely 2' Steroid.   CKD III : Stable. Avoid dehydration   OLD CVA ; stable.   Hx. AORTO-FEM BYPASS  RIGHT CAROTID STENT  PVD ; Cont current Tx, risk fx control.     Cont routine preventives. OOB to chair, Discharge planning, if contd. stable.     Discussed w/ Dr. Randle in details. 01-31-19 @ 12:32    LILA VELA  85y  Female  OOB to chair. Comfortable. No c/o now.     INTERVAL EVENTS: none    MEDICATIONS  (STANDING):  ALBUTerol    90 MICROgram(s) HFA Inhaler 1 Puff(s) Inhalation every 4 hours  ALBUTerol/ipratropium for Nebulization 3 milliLiter(s) Nebulizer every 6 hours  aspirin enteric coated 81 milliGRAM(s) Oral <User Schedule>  atorvastatin 10 milliGRAM(s) Oral at bedtime  buDESOnide 160 MICROgram(s)/formoterol 4.5 MICROgram(s) Inhaler 2 Puff(s) Inhalation two times a day  chlorhexidine 4% Liquid 1 Application(s) Topical <User Schedule>  cilostazol 100 milliGRAM(s) Oral two times a day  clopidogrel Tablet 75 milliGRAM(s) Oral daily  docusate sodium 100 milliGRAM(s) Oral two times a day  furosemide    Tablet 20 milliGRAM(s) Oral <User Schedule>  heparin  Injectable 5000 Unit(s) SubCutaneous every 8 hours  levETIRAcetam 500 milliGRAM(s) Oral two times a day  levoFLOXacin  Tablet 250 milliGRAM(s) Oral every 24 hours  metoprolol tartrate 25 milliGRAM(s) Oral two times a day  nystatin Powder 1 Application(s) Topical two times a day  pantoprazole    Tablet 40 milliGRAM(s) Oral before breakfast  predniSONE   Tablet 20 milliGRAM(s) Oral two times a day  senna 2 Tablet(s) Oral at bedtime  sodium chloride 0.9%. 500 milliLiter(s) (50 mL/Hr) IV Continuous <Continuous>  tiotropium 18 MICROgram(s) Capsule 1 Capsule(s) Inhalation daily    MEDICATIONS  (PRN):  ALBUTerol/ipratropium for Nebulization 3 milliLiter(s) Nebulizer every 6 hours PRN Shortness of Breath and/or Wheezing  guaiFENesin    Syrup 100 milliGRAM(s) Oral every 6 hours PRN Cough  lactulose Syrup 10 Gram(s) Oral every 6 hours PRN constipation      T(C): 36.4 (01-31-19 @ 05:00), Max: 36.4 (01-31-19 @ 05:00)  HR: 87 (01-31-19 @ 05:00) (87 - 118)  BP: 136/63 (01-31-19 @ 05:00) (89/54 - 136/63)  RR: 18 (01-31-19 @ 05:00) (18 - 20)  SpO2: 94% (01-31-19 @ 00:53) (93% - 96%)  Wt(kg): --Vital Signs Last 24 Hrs  T(C): 36.4 (31 Jan 2019 05:00), Max: 36.4 (31 Jan 2019 05:00)  T(F): 97.5 (31 Jan 2019 05:00), Max: 97.5 (31 Jan 2019 05:00)  HR: 87 (31 Jan 2019 05:00) (87 - 118)  BP: 136/63 (31 Jan 2019 05:00) (89/54 - 136/63)  BP(mean): --  RR: 18 (31 Jan 2019 05:00) (18 - 20)  SpO2: 94% (31 Jan 2019 00:53) (93% - 96%)    PHYSICAL EXAM:  GENERAL:   NECK:   CHEST/LUNG:  HEART: S1  ABDOMEN:   EXTREMITIES:                           9.2    18.42 )-----------( 301      ( 31 Jan 2019 07:44 )             30.2     01-31    146  |  107  |  38<H>  ----------------------------<  114<H>  4.3   |  28  |  1.1    Ca    9.0      31 Jan 2019 07:44  Mg     2.0     01-31    TPro  5.4<L>  /  Alb  3.5  /  TBili  0.5  /  DBili  x   /  AST  58<H>  /  ALT  109<H>  /  AlkPhos  77  01-31    Echo :   Summary:   1. Left ventricular ejection fraction, by visual estimation, is 55 to   60%.   2. Normal left ventricular size and wall thicknesses, with normal   systolic function.   3. Right ventricular volume and pressure overload.   4. Severely enlarged right ventricle.   5. Severely reduced RV systolic function.   6. Moderate ascites.   7. Severe mitral annular calcification.   8. Mild tricuspid regurgitation.   9. Estimated pulmonary artery systolic pressure is 64.6 mmHg assuming a   right atrial pressure of 3 mmHg, which is consistent with severe   pulmonary hypertension.  10. Pulmonary hypertension is present.          RADIOLOGY & ADDITIONAL TESTS:    CT AP w/C :  FINDINGS:    LOWER CHEST: Partially imaged right pleural effusion with adjacent   groundglass opacities. Bilateral interlobular septal thickening.   Bibasilar atelectasis. 5 mm left lower lobe solitary pulmonary nodule   (9/21). Coronary calcifications.    HEPATOBILIARY: 1.2 cm right hepatic lobe lesion with arterial   enhancement, incompletely characterized.    SPLEEN: Unremarkable.    PANCREAS: Unremarkable.    ADRENAL GLANDS: Nonspecific thickening of the left adrenal gland, without   a discrete mass. Right adrenal gland is unremarkable.    KIDNEYS/COLLECTING SYSTEMS: Symmetric renal enhancement. No   hydronephrosis. Bilateral renal cysts, measuring up to 6.9 cm on the left   and 1.6 cm on the right. No filling defect seen in the opacified portion   of the ureters bilaterally.    ABDOMINOPELVIC NODES: No lymphadenopathy.    PELVIC ORGANS/BLADDER: There is a heterogeneously enhancing right   anterior urinary bladder  polyploid mass measuring approximately 3.3 x   1.5 x 2.2 cm. Questionable invasion of the perivesicular fat anteriorly   at the bladder base on series 7 image 251.    PERITONEUM/MESENTERY/BOWEL: No bowel obstruction or pneumoperitoneum.   Normal caliber appendix.    BONES/SOFT TISSUES: Diffuse osteopenia. No acute osseous abnormality.   Degenerative changes of the spine. Age indeterminate mild compression   fracture at L3    OTHER: Bilateral common iliac/internal iliac artery stents.   Atherosclerosis of the aorta and its branches.    IMPRESSION:     3.3 cm right anterior urinary bladder mass. Questionable invasion of the   perivesicular fat anteriorly at the bladder base on series 7 image 251.     No convincing evidence of upper tract disease (note that much of the left   ureter is not well opacified, but there is no evidence of hydronephrosis   to suggest an intraluminal lesion).    No lymphadenopathy.    5 mm left lower lobe solitary pulmonary nodule.    1.2 cm right hepatic lobe lesion with arterial enhancement, incompletely   characterized but could be a benign lesion. Consider nonemergent MRI of   the abdomen with contrast if clinically indicated.      DAKOTAH SAHU M.D., RESIDENT RADIOLOGIST  This document has been electronically signed.  JAMEL LAROSE M.D., ATTENDING RADIOLOGIST  This document has been electronically signed. Jan 31 2019 11:44AM        ASSESSMENT / PLAN  :    ACUTE HYPOXIC  RESPIRATORY FAILURE : IMPROVED. STABLE now. Appreciated f/u by pulm.   PNEUMONIA ; Abx as adv by ID, ID f/u appreciated. To change to PO as guided.    COPD exacerbation ; has +viral. Chr. O2 dependent. BiPap at night, as advised by pulm.   BL PLEURAL EFFUSION : See above.   RIGHT TIBIAL VEIN THROMBOSIS ; Conservative. Maintain ambulation. No Ac DVT.  ANEMIA ; chronic stable.   RECENT HEMATURIA : Seen by Uro. as advised, CT done, showed ant bladder mass. Uro reevaluation.   ? ASCIETES : False -ve finding in Echo. Negative ABd sono. CT -ve for ascites   LIVER Lesion : 1.2 cm, with art enhancement in CT. Can be f/u as non emergent f/u.   LEUKOCYTOSIS : Likely 2' Steroid.   CKD III : Stable. Avoid dehydration   OLD CVA ; stable.   Hx. AORTO-FEM BYPASS  RIGHT CAROTID STENT  PVD ; Cont current Tx, risk fx control.     Cont routine preventives. OOB to chair, Discharge planning, after uro reevaluation.      Discussed w/ Dr. Randle in details.     Spent 30 min in care & coordination.

## 2019-01-31 NOTE — CONSULT NOTE ADULT - ASSESSMENT
IMPRESSION: Rehab of gait dysfunction      PRECAUTIONS: [  ] Cardiac  [  ] Respiratory  [  ] Seizures [  ] Contact Isolation  [  ] Droplet Isolation  [  ] Other    Weight Bearing Status:     RECOMMENDATION:    Out of Bed to Chair     DVT/Decubiti Prophylaxis    REHAB PLAN:     [ x  ] Bedside P/T 3-5 times a week   [   ]   Bedside O/T  2-3 times a week             [   ] No Rehab Therapy Indicated                   [   ]  Speech Therapy   Conditioning/ROM                                    ADL  Bed Mobility                                               Conditioning/ROM  Transfers                                                     Bed Mobility  Sitting /Standing Balance                         Transfers                                        Gait Training                                               Sitting/Standing Balance  Stair Training [   ]Applicable                    Home equipment Eval                                                                        Splinting  [   ] Only      GOALS:   ADL   [   ]   Independent                    Transfers  [ x  ] Independent                          Ambulation  [ x  ] Independent     [  x  ] With device                            [   ]  CG                                                         [   ]  CG                                                                  [   ] CG                            [    ] Min A                                                   [   ] Min A                                                              [   ] Min  A          DISCHARGE PLAN:   [   ]  Good candidate for Intensive Rehabilitation/Hospital based                                             Will tolerate 3hrs Intensive Rehab Daily                                       [ x   ]  Short Term Rehab in Skilled Nursing Facility                          vs             [   x ]  Home with Outpatient or VN services                                         [    ]  Possible Candidate for Intensive Hospital based Rehab IMPRESSION: Rehab of gait dysfunction      PRECAUTIONS: [  ] Cardiac  [  ] Respiratory  [  ] Seizures [  ] Contact Isolation  [  ] Droplet Isolation  [  ] Other    Weight Bearing Status:     RECOMMENDATION:    Out of Bed to Chair     DVT/Decubiti Prophylaxis    REHAB PLAN:     [ x  ] Bedside P/T 3-5 times a week   [ x  ]   Bedside O/T  2-3 times a week             [   ] No Rehab Therapy Indicated                   [   ]  Speech Therapy   Conditioning/ROM                                    ADL  Bed Mobility                                               Conditioning/ROM  Transfers                                                     Bed Mobility  Sitting /Standing Balance                         Transfers                                        Gait Training                                               Sitting/Standing Balance  Stair Training [   ]Applicable                    Home equipment Eval                                                                        Splinting  [   ] Only      GOALS:   ADL   [ x  ]   Independent                    Transfers  [ x  ] Independent                          Ambulation  [ x  ] Independent     [  x  ] With device                            [   ]  CG                                                         [   ]  CG                                                                  [   ] CG                            [    ] Min A                                                   [   ] Min A                                                              [   ] Min  A          DISCHARGE PLAN:   [x   ]  Good candidate for Intensive Rehabilitation/Hospital based                                             Will tolerate 3hrs Intensive Rehab Daily                                       [    ]  Short Term Rehab in Skilled Nursing Facility                                       [    ]  Home with Outpatient or VN services                                         [    ]  Possible Candidate for Intensive Hospital based Rehab

## 2019-01-31 NOTE — PROGRESS NOTE ADULT - SUBJECTIVE AND OBJECTIVE BOX
[facility]PHR[facility] FOLLOW UP NOTE  --------------------------------------------------------------------------------  24 hour events/subjective: Patient examined. Appears comfortable.    PAST HISTORY  --------------------------------------------------------------------------------  No significant changes to PMH, PSH, FHx, SHx, unless otherwise noted    ALLERGIES & MEDICATIONS  --------------------------------------------------------------------------------  Allergies    No Known Allergies    Standing Inpatient Medications  ALBUTerol    90 MICROgram(s) HFA Inhaler 1 Puff(s) Inhalation every 4 hours  ALBUTerol/ipratropium for Nebulization 3 milliLiter(s) Nebulizer every 6 hours  aspirin enteric coated 81 milliGRAM(s) Oral <User Schedule>  atorvastatin 10 milliGRAM(s) Oral at bedtime  buDESOnide 160 MICROgram(s)/formoterol 4.5 MICROgram(s) Inhaler 2 Puff(s) Inhalation two times a day  chlorhexidine 4% Liquid 1 Application(s) Topical <User Schedule>  cilostazol 100 milliGRAM(s) Oral two times a day  clopidogrel Tablet 75 milliGRAM(s) Oral daily  docusate sodium 100 milliGRAM(s) Oral two times a day  furosemide    Tablet 20 milliGRAM(s) Oral <User Schedule>  heparin  Injectable 5000 Unit(s) SubCutaneous every 8 hours  levETIRAcetam 500 milliGRAM(s) Oral two times a day  levoFLOXacin  Tablet 250 milliGRAM(s) Oral every 24 hours  metoprolol tartrate 25 milliGRAM(s) Oral two times a day  nystatin Powder 1 Application(s) Topical two times a day  pantoprazole    Tablet 40 milliGRAM(s) Oral before breakfast  predniSONE   Tablet 20 milliGRAM(s) Oral two times a day  senna 2 Tablet(s) Oral at bedtime  sodium chloride 0.9%. 500 milliLiter(s) IV Continuous <Continuous>  tiotropium 18 MICROgram(s) Capsule 1 Capsule(s) Inhalation daily    PRN Inpatient Medications  ALBUTerol/ipratropium for Nebulization 3 milliLiter(s) Nebulizer every 6 hours PRN  guaiFENesin    Syrup 100 milliGRAM(s) Oral every 6 hours PRN  lactulose Syrup 10 Gram(s) Oral every 6 hours PRN    VITALS/PHYSICAL EXAM  --------------------------------------------------------------------------------  T(C): 36.7 (01-31-19 @ 15:03), Max: 36.7 (01-31-19 @ 15:03)  HR: 116 (01-31-19 @ 15:03) (87 - 116)  BP: 139/65 (01-31-19 @ 15:03) (89/54 - 139/65)  RR: 20 (01-31-19 @ 15:03) (18 - 22)  SpO2: 94% (01-31-19 @ 00:53) (93% - 94%)    Physical Exam:  	Gen: NAD  	Pulm: CTA B/L  	CV:  S1S2  	Abd: +BS, soft, nontender/nondistended  	: No suprapubic tenderness  	LE: Warm, ; no edema    LABS/STUDIES  --------------------------------------------------------------------------------              9.2    18.42 >-----------<  301      [01-31-19 @ 07:44]              30.2     146  |  107  |  38  ----------------------------<  114      [01-31-19 @ 07:44]  4.3   |  28  |  1.1        Ca     9.0     [01-31-19 @ 07:44]      Mg     2.0     [01-31-19 @ 07:44]    TPro  5.4  /  Alb  3.5  /  TBili  0.5  /  DBili  x   /  AST  58  /  ALT  109  /  AlkPhos  77  [01-31-19 @ 07:44]    Creatinine Trend:  SCr 1.1 [01-31 @ 07:44]  SCr 0.9 [01-30 @ 06:58]  SCr 1.0 [01-29 @ 06:22]  SCr 1.1 [01-28 @ 17:59]  SCr 1.3 [01-27 @ 07:33]    Urinalysis - [01-24-19 @ 04:30]      Color Red / Appearance Turbid / SG >=1.030 / pH 5.0      Gluc 100 / Ketone 40  / Bili Large / Urobili 1.0       Blood Large / Protein >=300 / Leuk Est Moderate / Nitrite Positive      RBC >50 / WBC  / Hyaline  / Gran 3-5 / Sq Epi  / Non Sq Epi  / Bacteria Moderate      HbA1c 5.7      [01-11-19 @ 07:14]  Lipid: chol 171, TG 43, HDL 90, LDL 78      [01-11-19 @ 07:14]

## 2019-01-31 NOTE — PROGRESS NOTE ADULT - ASSESSMENT
84 yo F, CCU Downgrade, PMHx COPD on 2 L NC, PVD, CKD3, HTN, DLD, hx of CVA, ex-smoker, recently discharged from [facility] on 01/13 after PNA tx w/Levaquin and Prednisone, presented/w SOB and dysuria. She was doing well until 01/20 when she starting feeling SOB again.     In the ED, she received cefepime, Levaquin, and solumedrol. Pt was admitted to CCU for acute on chronic hypoxic and chronic hypercapnic respiratory failure 2/2 COPD exacerbation likely due to RLL GNR PNA exacerbated by coronavirus coinfection. She was on BiPaP prn and qhs, alternating with venti mask and oxygen via NC, solumedrol, bronchodilators and Meropenem as per ID. She was treated for distal DVT and subsequently developed hematuria. Further w/u was positive for moderate ascites (2D echo), +UA (negative urine and blood cultures). She was on Meropenem for PNA. MRSA and Flu were negative.     ===========================================================  Today/Updates: CT Urogram read pending/ D/C Planning  ===========================================================    # Acute on chronic hypoxic and chronic hypercapnic respiratory failure 2/2 COPD exacerbation 2/2 Possible RLL GNR pneumonia exacerbated by coronavirus coinfection: resolved    Continue BiPAP and O2 NC  Levaquin, day 3  Prednisone to 20 bid, tapering  All cxs: Negative  Pulm on board  PT Eval: Pending  Anticipated for d/c tomorrow    # Hypertension: Uncontrolled    Cont. Furosemide and Metoprolol  Lisinopril d/c'd for hypotension  Montor BP    # HFpEF: Stable    ECHO 1/22- EF 55-60%, severe pulm HTN, decreased RV systolic fx, RV vol and pressure overload,   RV enlarged, severe mitral annular calcification    C/w home dose lasix 20 per week    # Right Tibial vein DVT: Stable    Duplex LE- Right post tibial DVT  No need for therapeutic AC  Pt on prophylactic Heparin    # Hematuria: Resolved    Cont prophylactic heparin   No UTI  HGB stable  Nephrology consult appreciated: Recs noted   CT Urogram read pending  IV NS for worsening kidney function after urogram    # Mod ascites noted on echo    Lasix for now    # Transaminitis: waxing and waning    RUQ US: negative    # Constipation: Resolved    Cont. Senna, Colace  Lactulose prn    # Mild Hyperkalemia: resolved    # Mild Hypernatremia:     Hydration      Electrolyte Imbalances: WNL  []  Hyponatremia   /   Hypernatremia  [X]   []  Hypokalemia   /   Hyperkalemia  []   []  Hypochloremia   /    Hyperchloremia  []   []  Hypocapnia   /   Hypercapnia  []   []  Hypomagnesemia   /   Hypermagnesemia  []   []  Hypophosphatemia   /   Hyperphosphatemia  []       GI ppx:                                   [] Not indicated   /   [x] Pantoprazole 40mg PO Daily    DVT ppx:  [] Not indicated / [x] Heparin 5000mg SubQ / [] Lovenox 40mg SubQ / [] SCDs    Fluids:   [x] PO  |  [] IVF    Activity:  [] Ad Annabelle  /  [X] Increase as Tolerated  /  [] OOB w/ assist  /  [] Bed Rest    BMI:  Height (cm): 154.94 (01-21)  Weight (kg): 47 (01-21)  BMI (kg/m2): 19.6 (01-21)      DISPO:  Anticipated for d/c tomorrow. Pt refused STR	  [x] Home  vs  [] SNF  /  [] Long Term       [X] Discussion with patient and/or proxy regarding goals of care.  [X] Discussed Case and Plan with the Medical Attending.    CODE STATUS  [X] FULL   /    [] DNR    Please call Dr. Hill [[email or web address]-1] with any questions/consult recs: Spectra # [id number] 86 yo F, CCU Downgrade, PMHx COPD on 2 L NC, PVD, CKD3, HTN, DLD, hx of CVA, ex-smoker, recently discharged from [facility] on 01/13 after PNA tx w/Levaquin and Prednisone, presented/w SOB and dysuria. She was doing well until 01/20 when she starting feeling SOB again.     Pt was admitted to CCU for acute on chronic hypoxic and chronic hypercapnic respiratory failure 2/2 COPD exacerbation likely due to RLL GNR PNA exacerbated by coronavirus coinfection. She was on BiPaP prn, solumedrol, bronchodilators and Meropenem. She was treated for distal DVT and subsequently developed hematuria. Further w/u was positive for moderate ascites (2D echo), +UA (negative urine and blood cultures). MRSA and Flu were negative.     ===========================================================  Today/Updates: Urology f/u/ D/C Planning  ===========================================================    # Acute on chronic hypoxic and chronic hypercapnic respiratory failure 2/2 COPD exacerbation 2/2 Possible RLL GNR pneumonia exacerbated by coronavirus coinfection: resolved    Continue BiPAP and O2 NC  Levaquin, day 3  Prednisone to 20 bid, tapering  All cxs: Negative  Pulm on board  Physiatry consult appreciated: Short Term Rehab in Skilled Nursing Facility  Anticipated for d/c tomorrow    # Hypertension: Uncontrolled    Cont. Furosemide and Metoprolol  Lisinopril d/c'd for hypotension  Montor BP  Nephrology consult appreciated: Continue lisinopril. Change Lasix to 20mg PO every day    # HFpEF: Stable    ECHO 1/22- EF 55-60%, severe pulm HTN, decreased RV systolic fx, RV vol and pressure overload,   RV enlarged, severe mitral annular calcification    Will consider lasix 20 mg every other day    # Right Tibial vein DVT: Stable    Duplex LE- Right post tibial DVT  No need for therapeutic AC  Pt on prophylactic Heparin    # Hematuria: Resolved    Cont prophylactic heparin   HGB stable  CT Urogram: 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  1.2 cm right hepatic lobe lesion with arterial enhancement, incompletely characterized but could be a benign lesion. Consider nonemergent MRI of the abdomen with contrast if clinically indicated.    # Mod ascites noted on echo    Lasix for now    # Transaminitis: waxing and waning    RUQ US: negative  Will review medications    # Constipation: Resolved    Cont. Senna, Colace  Lactulose prn    # Mild Hyperkalemia: resolved    # Mild Hypernatremia: resolved      Electrolyte Imbalances: WNL  []  Hyponatremia   /   Hypernatremia  []   []  Hypokalemia   /   Hyperkalemia  []   []  Hypochloremia   /    Hyperchloremia  []   []  Hypocapnia   /   Hypercapnia  []   []  Hypomagnesemia   /   Hypermagnesemia  []   []  Hypophosphatemia   /   Hyperphosphatemia  []       GI ppx:                                   [] Not indicated   /   [x] Pantoprazole 40mg PO Daily    DVT ppx:  [] Not indicated / [x] Heparin 5000mg SubQ / [] Lovenox 40mg SubQ / [] SCDs    Fluids:   [x] PO  |  [] IVF    Activity:  [] Ad Annabelle  /  [X] Increase as Tolerated  /  [] OOB w/ assist  /  [] Bed Rest    BMI:  Height (cm): 154.94 (01-21)  Weight (kg): 47 (01-21)  BMI (kg/m2): 19.6 (01-21)      DISPO:  Anticipated for d/c tomorrow. Pt refused STR	  [x] Home  vs  [] SNF  /  [] Long Term       [X] Discussion with patient and/or proxy regarding goals of care.  [X] Discussed Case and Plan with the Medical Attending.    CODE STATUS  [X] FULL   /    [] DNR    Please call Dr. Hill [PGY-1] with any questions/consult recs: Spectra # [id number]

## 2019-02-01 NOTE — PROGRESS NOTE ADULT - SUBJECTIVE AND OBJECTIVE BOX
DAILY PROGRESS NOTE  ===========================================================    Patient Information:  LILA VELA  /  85y  /  Female  /  MRN#: [id number]    Hospital Day: 11d     |:::::::::::::::::::::::::::| SUBJECTIVE |:::::::::::::::::::::::::::|    OVERNIGHT EVENTS:   TODAY: Patient was seen today at bedside. She voiced no complaints and wants to go home. Review of systems is otherwise negative.    |:::::::::::::::::::::::::::| OBJECTIVE |:::::::::::::::::::::::::::|    VITAL SIGNS: Last 24 Hours  ICU Vital Signs Last 24 Hrs  T(C): 36.8 (01 Feb 2019 05:46), Max: 36.8 (01 Feb 2019 05:46)  T(F): 98.2 (01 Feb 2019 05:46), Max: 98.2 (01 Feb 2019 05:46)  HR: 91 (01 Feb 2019 05:46) (88 - 116)  BP: 113/71 (01 Feb 2019 05:46) (113/56 - 144/72)  BP(mean): --  ABP: --  ABP(mean): --  RR: 18 (01 Feb 2019 05:46) (18 - 20)  SpO2: --    PHYSICAL EXAM:  GENERAL: Awake, alert; NAD, A&Ox3  HEENT: Head NC/AT; Conjunctivae pink, Sclera anicteric; Oral mucosa moist.  CARDIO: Regular rate; Regular rhythm; S1 & S2.  RESP: CTA B/L. No wheezes or rhonchi appreciated.  GI: Soft; NT/ND; BS; No guarding; No rebound tenderness.  EXT: No edema in UE and LE.  NEURO: A&Ox3  SKIN: Intact except b/l UE bruises    LAB RESULTS:                                   9.3    14.03 )-----------( 295      ( 01 Feb 2019 06:24 )             29.9   02-01    149<H>  |  107  |  33<H>  ----------------------------<  89  4.0   |  29  |  1.1    Ca    8.9      01 Feb 2019 06:24  Mg     1.9     02-01    TPro  5.3<L>  /  Alb  3.4<L>  /  TBili  0.5  /  DBili  x   /  AST  43<H>  /  ALT  100<H>  /  AlkPhos  71  02-01                              MICROBIOLOGY:    RADIOLOGY:    ALLERGIES:  No Known Allergies    HOME MEDICATIONS:  albuterol 2.5 mg/3 mL (0.083%) inhalation solution: 3 milliliter(s) inhaled every 6 hours (06 Sep 2018 13:40)  Centrum oral tablet: 1 tab(s) orally once a day (06 Sep 2018 13:40)  furosemide 20 mg oral tablet: 1 tab(s) orally every 7 days (06 Sep 2018 13:40)  Pletal 100 mg oral tablet: 1 tab(s) orally 2 times a day (06 Sep 2018 13:40)    ===========================================================

## 2019-02-01 NOTE — PROGRESS NOTE ADULT - ASSESSMENT
84 yo F, CCU Downgrade, PMHx COPD on 2 L NC, PVD, CKD3, HTN, DLD, hx of CVA, ex-smoker, recently discharged from [facility] on 01/13 after PNA tx w/Levaquin and Prednisone, presented/w SOB and dysuria. She was doing well until 01/20 when she starting feeling SOB again.     Pt was admitted to CCU for acute on chronic hypoxic and chronic hypercapnic respiratory failure 2/2 COPD exacerbation likely due to RLL GNR PNA exacerbated by coronavirus coinfection. She was on BiPaP prn, solumedrol, bronchodilators and Meropenem. She was treated for distal DVT and subsequently developed hematuria. Further w/u was positive for moderate ascites (2D echo), +UA (negative urine and blood cultures). MRSA and Flu were negative.     ===========================================================  Today/Updates: Nephro f/u/ R/O ACS by cardio  ===========================================================    # Acute on chronic hypoxic and chronic hypercapnic respiratory failure 2/2 COPD exacerbation 2/2 Possible RLL GNR pneumonia exacerbated by coronavirus coinfection: resolved    Continue BiPAP and O2 NC  Levaquin, day 4  Prednisone 10 bid, tapering  All cxs: Negative  Pulm on board    # Hypoxic episode and troponinemia:    Cardio reconsult  Monitor vitals    # Hypertension: better    Cont. 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  1.2 cm right hepatic lobe lesion with arterial enhancement, incompletely characterized but could be a benign lesion. Consider nonemergent MRI of the abdomen with contrast if clinically indicated.    # Mod ascites noted on echo    Lasix for now    # Transaminitis: waxing and waning    RUQ US: negative    # Constipation: Resolved    Cont. Senna, Colace  Lactulose prn    # Mild Hyperkalemia: resolved    # Mild Hypernatremia: resolved      Electrolyte Imbalances: WNL  []  Hyponatremia   /   Hypernatremia  [X]   []  Hypokalemia   /   Hyperkalemia  []   []  Hypochloremia   /    Hyperchloremia  []   []  Hypocapnia   /   Hypercapnia  []   []  Hypomagnesemia   /   Hypermagnesemia  []   []  Hypophosphatemia   /   Hyperphosphatemia  []       GI ppx:                                   [] Not indicated   /   [x] Pantoprazole 40mg PO Daily    DVT ppx:  [] Not indicated / [x] Heparin 5000mg SubQ / [] Lovenox 40mg SubQ / [] SCDs    Fluids:   [x] PO  |  [] IVF    Activity:  [] Ad Annabelle  /  [X] Increase as Tolerated  /  [] OOB w/ assist  /  [] Bed Rest    BMI:  Height (cm): 154.94 (01-21)  Weight (kg): 47 (01-21)  BMI (kg/m2): 19.6 (01-21)      DISPO:  Anticipated for d/c tomorrow. Pt refused STR	  [x] Home  vs  [X] SNF  /  [] Long Term       [X] Discussion with patient and/or proxy regarding goals of care.  [X] Discussed Case and Plan with the Medical Attending.    CODE STATUS  [X] FULL   /    [] DNR    Please call Dr. Hill [PGY-1] with any questions/consult recs: Spectra # [id number] 86 yo F, CCU Downgrade, PMHx COPD on 2 L NC, PVD, CKD3, HTN, DLD, hx of CVA, ex-smoker, recently discharged from [facility] on 01/13 after PNA tx w/Levaquin and Prednisone, presented/w SOB and dysuria. She was doing well until 01/20 when she starting feeling SOB again.     Pt was admitted to CCU for acute on chronic hypoxic and chronic hypercapnic respiratory failure 2/2 COPD exacerbation likely due to RLL GNR PNA exacerbated by coronavirus coinfection. She was on BiPaP prn, solumedrol, bronchodilators and Meropenem. She was treated for distal DVT and subsequently developed hematuria. Further w/u was positive for moderate ascites (2D echo), +UA (negative urine and blood cultures). MRSA and Flu were negative.     ===========================================================  Today/Updates: Nephro f/u/ R/O ACS by cardio  ===========================================================    # Acute on chronic hypoxic and chronic hypercapnic respiratory failure 2/2 COPD exacerbation 2/2 Possible RLL GNR pneumonia exacerbated by coronavirus coinfection: resolved    Continue BiPAP and O2 NC  Levaquin, day 4  Prednisone 10 bid, tapering  All cxs: Negative  Pulm on board    # Hypoxic episode and troponinemia:    Cardio reconsult: I spoke to Dr Ceja on the phone about the events yesterday and he said the patient does not need any further w/u and is cleared from cardiac stand point and pt should continue w/ASA 81 mg QD.  Monitor vitals    # Hypertension: better    Cont. 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  1.2 cm right hepatic lobe lesion with arterial enhancement, incompletely characterized but could be a benign lesion. Consider nonemergent MRI of the abdomen with contrast if clinically indicated.    # Mod ascites noted on echo    Lasix for now    # Transaminitis: waxing and waning    RUQ US: negative    # Constipation: Resolved    Cont. Senna, Colace  Lactulose prn    # Mild Hyperkalemia: resolved    # Mild Hypernatremia: resolved      Electrolyte Imbalances: WNL  []  Hyponatremia   /   Hypernatremia  [X]   []  Hypokalemia   /   Hyperkalemia  []   []  Hypochloremia   /    Hyperchloremia  []   []  Hypocapnia   /   Hypercapnia  []   []  Hypomagnesemia   /   Hypermagnesemia  []   []  Hypophosphatemia   /   Hyperphosphatemia  []       GI ppx:                                   [] Not indicated   /   [x] Pantoprazole 40mg PO Daily    DVT ppx:  [] Not indicated / [x] Heparin 5000mg SubQ / [] Lovenox 40mg SubQ / [] SCDs    Fluids:   [x] PO  |  [] IVF    Activity:  [] Ad Annabelle  /  [X] Increase as Tolerated  /  [] OOB w/ assist  /  [] Bed Rest    BMI:  Height (cm): 154.94 (01-21)  Weight (kg): 47 (01-21)  BMI (kg/m2): 19.6 (01-21)      DISPO:  Anticipated for d/c tomorrow. Pt refused STR	  [x] Home  vs  [X] SNF  /  [] Long Term       [X] Discussion with patient and/or proxy regarding goals of care.  [X] Discussed Case and Plan with the Medical Attending.    CODE STATUS  [X] FULL   /    [] DNR    Please call Dr. Hill [[email or web address]-1] with any questions/consult recs: Spectra # [phone]

## 2019-02-01 NOTE — PROGRESS NOTE ADULT - ASSESSMENT
1. CKD III-stable  2. Bladder mass/hematuria  3. DVT  4. HTN  5. PNA    Plan:  Urology f/u-outpatient cyto  Continue lisinopril  Change Lasix to 20mg PO every other day  Taper prednisone

## 2019-02-01 NOTE — PROGRESS NOTE ADULT - SUBJECTIVE AND OBJECTIVE BOX
LILA VELA  85y  Female      SUBJECTIVE:  out of bed in chair no new complaints appears comfortable no hematuria    PAST MEDICAL & SURGICAL HISTORY:  PVD (peripheral vascular disease)  COPD (chronic obstructive pulmonary disease)  Kidney disease, chronic, stage I (GFR over 89 ml/min)  Peripheral vascular disease  HTN (hypertension)  High cholesterol  CVA (cerebral vascular accident)  H/O aorto-femoral bypass  History of right common carotid artery stent placement    85y    REVIEW OF SYSTEMS:    T(C): 36.8 (02-01-19 @ 05:46), Max: 36.8 (02-01-19 @ 05:46)  HR: 91 (02-01-19 @ 05:46) (88 - 116)  BP: 113/71 (02-01-19 @ 05:46) (113/71 - 144/72)  RR: 18 (02-01-19 @ 05:46) (18 - 20)  SpO2: --  Wt(kg): --Vital Signs Last 24 Hrs  T(C): 36.8 (01 Feb 2019 05:46), Max: 36.8 (01 Feb 2019 05:46)  T(F): 98.2 (01 Feb 2019 05:46), Max: 98.2 (01 Feb 2019 05:46)  HR: 91 (01 Feb 2019 05:46) (88 - 116)  BP: 113/71 (01 Feb 2019 05:46) (113/71 - 144/72)  BP(mean): --  RR: 18 (01 Feb 2019 05:46) (18 - 20)  SpO2: --    MEDICATION:  ALBUTerol    90 MICROgram(s) HFA Inhaler 1 Puff(s) Inhalation every 4 hours  ALBUTerol/ipratropium for Nebulization 3 milliLiter(s) Nebulizer every 6 hours  ALBUTerol/ipratropium for Nebulization 3 milliLiter(s) Nebulizer every 6 hours PRN  aspirin enteric coated 81 milliGRAM(s) Oral <User Schedule>  atorvastatin 10 milliGRAM(s) Oral at bedtime  buDESOnide 160 MICROgram(s)/formoterol 4.5 MICROgram(s) Inhaler 2 Puff(s) Inhalation two times a day  chlorhexidine 4% Liquid 1 Application(s) Topical <User Schedule>  cilostazol 100 milliGRAM(s) Oral two times a day  clopidogrel Tablet 75 milliGRAM(s) Oral daily  docusate sodium 100 milliGRAM(s) Oral two times a day  furosemide    Tablet 20 milliGRAM(s) Oral <User Schedule>  guaiFENesin    Syrup 100 milliGRAM(s) Oral every 6 hours PRN  heparin  Injectable 5000 Unit(s) SubCutaneous every 8 hours  lactulose Syrup 10 Gram(s) Oral every 6 hours PRN  levETIRAcetam 500 milliGRAM(s) Oral two times a day  levoFLOXacin  Tablet 250 milliGRAM(s) Oral every 24 hours  metoprolol tartrate 25 milliGRAM(s) Oral two times a day  nystatin Powder 1 Application(s) Topical two times a day  pantoprazole    Tablet 40 milliGRAM(s) Oral before breakfast  predniSONE   Tablet 10 milliGRAM(s) Oral two times a day  senna 2 Tablet(s) Oral at bedtime  tiotropium 18 MICROgram(s) Capsule 1 Capsule(s) Inhalation daily      LABS:                              9.3    14.03 )-----------( 295      ( 01 Feb 2019 06:24 )             29.9   02-01    149<H>  |  107  |  33<H>  ----------------------------<  89  4.0   |  29  |  1.1      Ca    8.9      01 Feb 2019 06:24  Mg     1.9     02-01    TPro  5.3<L>  /  Alb  3.4<L>  /  TBili  0.5  /  DBili  x   /  AST  43<H>  /  ALT  100<H>  /  AlkPhos  71  02-01    RADIOLOGY:  < from: Xray Chest 1 View-PORTABLE IMMEDIATE (01.31.19 @ 16:49) >  Impression:      Increased right lower lung airspace opacity. Unchanged left basilar   opacity/effusion.    < end of copied text >     < from: CT Abdomen and Pelvis w/ IV Cont (01.30.19 @ 18:58) >  IMPRESSION:     3.3 cm right anterior urinary bladder mass. Questionable invasion of the   perivesicular fat anteriorly at the bladder base on series 7 image 251.     No convincing evidence of upper tract disease (note that much of the left   ureter is not well opacified, but there is no evidence of hydronephrosis   to suggest an intraluminal lesion).    No lymphadenopathy.    5 mm left lower lobe solitary pulmonary nodule.    1.2 cm right hepatic lobe lesion with arterial enhancement, incompletely   characterized but could be a benign lesion. Consider nonemergent MRI of   the abdomen with contrast if clinically indicated.    < end of copied text >    < from: [facility] Duplex Lower Ext Vein Scan, Bilat (01.27.19 @ 13:32) >  Impression:    Chronic DVT in the right posterior tibial vein and superficial   thrombophlebitis of the distal right greater saphenous vein.  No evidence of deep venous thrombosis or superficial thrombophlebitis in   lower extremity.    < end of copied text >    PHYSICAL EXAM:  alert in NAD  Heart rsr s1s2+  lungs decreased bs bases  abdomen soft nontender bs+  extremities no edema or tenderness    IMPRESSION:    IMPRESSION:  Rt LLL pneumonia GNR - coinfection with CORONA VIRUS - clinically better 	  s/p Acute hypoxic respiratory failure  COPD - oxygen dependent  PULMONARY HYPERTENSION - SEVERE  HEMATURIA BLADDER MASS  OLD CVA  CAROTID STENOSIS - S/P Rt common CAROTID STENT  PAD - S/P AORTO FEMORAL BYPASS  CKD STAGE3  CHRONIC ANEMIA H/H STABLE  HTN  DLD  LEUKOCYTOSIS SEC.TO STEROIDS  RT POSTERIOR TIBIAL VEIN THROMBOSIS - F/U VENOUS DUPLEX NO PROGRESSION    PLAN:  UROLOGY F/U NOTED OUTPATIENT W/U CYSTOSCOPY AND SURGICAL OPTION FAMILY AWARE  CONTINUE RESPIRATORY SUPPORT - OXYGEN SUPPLEMENT - NEBULIZER - BIPAP AS NEEDED  TAPER PO STEROIDS  CONTINUE LASIX PO  DISPOSITION HOME VS SNF LILA VELA  85y  Female      SUBJECTIVE:  out of bed in chair no new complaints appears comfortable no hematuria    PAST MEDICAL & SURGICAL HISTORY:  PVD (peripheral vascular disease)  COPD (chronic obstructive pulmonary disease)  Kidney disease, chronic, stage I (GFR over 89 ml/min)  Peripheral vascular disease  HTN (hypertension)  High cholesterol  CVA (cerebral vascular accident)  H/O aorto-femoral bypass  History of right common carotid artery stent placement    85y    REVIEW OF SYSTEMS:    T(C): 36.8 (02-01-19 @ 05:46), Max: 36.8 (02-01-19 @ 05:46)  HR: 91 (02-01-19 @ 05:46) (88 - 116)  BP: 113/71 (02-01-19 @ 05:46) (113/71 - 144/72)  RR: 18 (02-01-19 @ 05:46) (18 - 20)  SpO2: --  Wt(kg): --Vital Signs Last 24 Hrs  T(C): 36.8 (01 Feb 2019 05:46), Max: 36.8 (01 Feb 2019 05:46)  T(F): 98.2 (01 Feb 2019 05:46), Max: 98.2 (01 Feb 2019 05:46)  HR: 91 (01 Feb 2019 05:46) (88 - 116)  BP: 113/71 (01 Feb 2019 05:46) (113/71 - 144/72)  BP(mean): --  RR: 18 (01 Feb 2019 05:46) (18 - 20)  SpO2: --    MEDICATION:  ALBUTerol    90 MICROgram(s) HFA Inhaler 1 Puff(s) Inhalation every 4 hours  ALBUTerol/ipratropium for Nebulization 3 milliLiter(s) Nebulizer every 6 hours  ALBUTerol/ipratropium for Nebulization 3 milliLiter(s) Nebulizer every 6 hours PRN  aspirin enteric coated 81 milliGRAM(s) Oral <User Schedule>  atorvastatin 10 milliGRAM(s) Oral at bedtime  buDESOnide 160 MICROgram(s)/formoterol 4.5 MICROgram(s) Inhaler 2 Puff(s) Inhalation two times a day  chlorhexidine 4% Liquid 1 Application(s) Topical <User Schedule>  cilostazol 100 milliGRAM(s) Oral two times a day  clopidogrel Tablet 75 milliGRAM(s) Oral daily  docusate sodium 100 milliGRAM(s) Oral two times a day  furosemide    Tablet 20 milliGRAM(s) Oral <User Schedule>  guaiFENesin    Syrup 100 milliGRAM(s) Oral every 6 hours PRN  heparin  Injectable 5000 Unit(s) SubCutaneous every 8 hours  lactulose Syrup 10 Gram(s) Oral every 6 hours PRN  levETIRAcetam 500 milliGRAM(s) Oral two times a day  levoFLOXacin  Tablet 250 milliGRAM(s) Oral every 24 hours  metoprolol tartrate 25 milliGRAM(s) Oral two times a day  nystatin Powder 1 Application(s) Topical two times a day  pantoprazole    Tablet 40 milliGRAM(s) Oral before breakfast  predniSONE   Tablet 10 milliGRAM(s) Oral two times a day  senna 2 Tablet(s) Oral at bedtime  tiotropium 18 MICROgram(s) Capsule 1 Capsule(s) Inhalation daily      LABS:                              9.3    14.03 )-----------( 295      ( 01 Feb 2019 06:24 )             29.9   02-01    149<H>  |  107  |  33<H>  ----------------------------<  89  4.0   |  29  |  1.1      Ca    8.9      01 Feb 2019 06:24  Mg     1.9     02-01    TPro  5.3<L>  /  Alb  3.4<L>  /  TBili  0.5  /  DBili  x   /  AST  43<H>  /  ALT  100<H>  /  AlkPhos  71  02-01    RADIOLOGY:  < from: Xray Chest 1 View-PORTABLE IMMEDIATE (01.31.19 @ 16:49) >  Impression:      Increased right lower lung airspace opacity. Unchanged left basilar   opacity/effusion.    < end of copied text >     < from: CT Abdomen and Pelvis w/ IV Cont (01.30.19 @ 18:58) >  IMPRESSION:     3.3 cm right anterior urinary bladder mass. Questionable invasion of the   perivesicular fat anteriorly at the bladder base on series 7 image 251.     No convincing evidence of upper tract disease (note that much of the left   ureter is not well opacified, but there is no evidence of hydronephrosis   to suggest an intraluminal lesion).    No lymphadenopathy.    5 mm left lower lobe solitary pulmonary nodule.    1.2 cm right hepatic lobe lesion with arterial enhancement, incompletely   characterized but could be a benign lesion. Consider nonemergent MRI of   the abdomen with contrast if clinically indicated.    < end of copied text >    < from: [facility] Duplex Lower Ext Vein Scan, Bilat (01.27.19 @ 13:32) >  Impression:    Chronic DVT in the right posterior tibial vein and superficial   thrombophlebitis of the distal right greater saphenous vein.  No evidence of deep venous thrombosis or superficial thrombophlebitis in   lower extremity.    < end of copied text >    PHYSICAL EXAM:  alert in NAD  Heart rsr s1s2+  lungs decreased bs bases  abdomen soft nontender bs+  extremities no edema or tenderness    IMPRESSION:    IMPRESSION:  Rt LLL pneumonia GNR - coinfection with CORONA VIRUS - clinically better 	  s/p Acute hypoxic respiratory failure  COPD - oxygen dependent  PULMONARY HYPERTENSION - SEVERE  HEMATURIA BLADDER MASS  OLD CVA  CAROTID STENOSIS - S/P Rt common CAROTID STENT  PAD - S/P AORTO FEMORAL BYPASS  CKD STAGE3  CHRONIC ANEMIA H/H STABLE  HTN  DLD  LEUKOCYTOSIS SEC.TO STEROIDS  RT POSTERIOR TIBIAL VEIN THROMBOSIS - F/U VENOUS DUPLEX NO PROGRESSION  addendum - noted to have positive troponins episode of chest pain last nite cardiology evaluated the patient dx TYPE2 MI NO CARDIAC INTERVENTION AS PER CARDIOLOGY     PLAN:  UROLOGY F/U NOTED OUTPATIENT W/U CYSTOSCOPY AND SURGICAL OPTION FAMILY AWARE  CONTINUE RESPIRATORY SUPPORT - OXYGEN SUPPLEMENT - NEBULIZER - BIPAP AS NEEDED  TAPER PO STEROIDS  CONTINUE LASIX PO  CONTINUE CARDIOLOGY F/U  DISPOSITION HOME VS SNF

## 2019-02-01 NOTE — PROGRESS NOTE ADULT - SUBJECTIVE AND OBJECTIVE BOX
SUBJ:  Patient with severe COPD on home o2  Spoke to house staff regarding troponin of 0.3    MEDICATIONS  (STANDING):  ALBUTerol    90 MICROgram(s) HFA Inhaler 1 Puff(s) Inhalation every 4 hours  ALBUTerol/ipratropium for Nebulization 3 milliLiter(s) Nebulizer every 6 hours  aspirin enteric coated 81 milliGRAM(s) Oral <User Schedule>  atorvastatin 10 milliGRAM(s) Oral at bedtime  buDESOnide 160 MICROgram(s)/formoterol 4.5 MICROgram(s) Inhaler 2 Puff(s) Inhalation two times a day  chlorhexidine 4% Liquid 1 Application(s) Topical <User Schedule>  cilostazol 100 milliGRAM(s) Oral two times a day  clopidogrel Tablet 75 milliGRAM(s) Oral daily  docusate sodium 100 milliGRAM(s) Oral two times a day  furosemide    Tablet 20 milliGRAM(s) Oral <User Schedule>  heparin  Injectable 5000 Unit(s) SubCutaneous every 8 hours  levETIRAcetam 500 milliGRAM(s) Oral two times a day  levoFLOXacin  Tablet 250 milliGRAM(s) Oral every 24 hours  metoprolol tartrate 25 milliGRAM(s) Oral two times a day  nystatin Powder 1 Application(s) Topical two times a day  pantoprazole    Tablet 40 milliGRAM(s) Oral before breakfast  predniSONE   Tablet 10 milliGRAM(s) Oral two times a day  senna 2 Tablet(s) Oral at bedtime  tiotropium 18 MICROgram(s) Capsule 1 Capsule(s) Inhalation daily    MEDICATIONS  (PRN):  ALBUTerol/ipratropium for Nebulization 3 milliLiter(s) Nebulizer every 6 hours PRN Shortness of Breath and/or Wheezing  guaiFENesin    Syrup 100 milliGRAM(s) Oral every 6 hours PRN Cough  lactulose Syrup 10 Gram(s) Oral every 6 hours PRN constipation            Vital Signs Last 24 Hrs  T(C): 36.2 (01 Feb 2019 17:00), Max: 36.8 (01 Feb 2019 05:46)  T(F): 97.1 (01 Feb 2019 17:00), Max: 98.2 (01 Feb 2019 05:46)  HR: 91 (01 Feb 2019 05:46) (88 - 116)  BP: 113/71 (01 Feb 2019 05:46) (113/71 - 144/72)  BP(mean): --  RR: 20 (01 Feb 2019 17:00) (18 - 20)  SpO2: --      PHYSICAL EXAM:  · CONSTITUTIONAL:	Well-developed, well nourished    BMI-  ·RESPIRATORY:  diffuse wheeze   · CARDIOVASCULAR	regular rate and rhythm  no rub  no murmur  normal PMI  · EXTREMITIES: No cyanosis, clubbing or edema  · VASCULAR: 	Equal and normal pulses (carotid, femoral, dorsalis pedis)  	  TELEMETRY:    ECG:    TTE:    LABS:                        9.3    14.03 )-----------( 295      ( 01 Feb 2019 06:24 )             29.9     02-01    149<H>  |  107  |  33<H>  ----------------------------<  89  4.0   |  29  |  1.1    Ca    8.9      01 Feb 2019 06:24  Mg     1.9     02-01    TPro  5.3<L>  /  Alb  3.4<L>  /  TBili  0.5  /  DBili  x   /  AST  43<H>  /  ALT  100<H>  /  AlkPhos  71  02-01    CARDIAC MARKERS ( 01 Feb 2019 06:24 )  x     / 0.33 ng/mL / 58 U/L / x     / 3.4 ng/mL  CARDIAC MARKERS ( 01 Feb 2019 00:54 )  x     / 0.32 ng/mL / 58 U/L / x     / 3.2 ng/mL  CARDIAC MARKERS ( 31 Jan 2019 19:51 )  x     / 0.38 ng/mL / 65 U/L / x     / 3.0 ng/mL          I&O's Summary    BNP  RADIOLOGY & ADDITIONAL STUDIES:    IMPRESSION AND PLAN:    1) Shortness of breath is due to COPD exacerbation      Echo findings are consistent with pulmonary HTN secondary to COPD      Continue present treatment     2)Troponin elevation could be due to type 2 MI     Continue ASA , plavix ,metoprolol and statin.     No cardiac intervention needed at this time.

## 2019-02-01 NOTE — PROGRESS NOTE ADULT - SUBJECTIVE AND OBJECTIVE BOX
[facility] FOLLOW UP NOTE  --------------------------------------------------------------------------------  24 hour events/subjective: Patient examined. Appears comfortable.    PAST HISTORY  --------------------------------------------------------------------------------  No significant changes to PMH, PSH, FHx, SHx, unless otherwise noted    ALLERGIES & MEDICATIONS  --------------------------------------------------------------------------------  Allergies    No Known Allergies    Standing Inpatient Medications  ALBUTerol    90 MICROgram(s) HFA Inhaler 1 Puff(s) Inhalation every 4 hours  ALBUTerol/ipratropium for Nebulization 3 milliLiter(s) Nebulizer every 6 hours  aspirin enteric coated 81 milliGRAM(s) Oral <User Schedule>  atorvastatin 10 milliGRAM(s) Oral at bedtime  buDESOnide 160 MICROgram(s)/formoterol 4.5 MICROgram(s) Inhaler 2 Puff(s) Inhalation two times a day  chlorhexidine 4% Liquid 1 Application(s) Topical <User Schedule>  cilostazol 100 milliGRAM(s) Oral two times a day  clopidogrel Tablet 75 milliGRAM(s) Oral daily  docusate sodium 100 milliGRAM(s) Oral two times a day  furosemide    Tablet 20 milliGRAM(s) Oral daily  heparin  Injectable 5000 Unit(s) SubCutaneous every 8 hours  levETIRAcetam 500 milliGRAM(s) Oral two times a day  levoFLOXacin  Tablet 250 milliGRAM(s) Oral every 24 hours  metoprolol tartrate 25 milliGRAM(s) Oral two times a day  nystatin Powder 1 Application(s) Topical two times a day  pantoprazole    Tablet 40 milliGRAM(s) Oral before breakfast  predniSONE   Tablet 20 milliGRAM(s) Oral two times a day  senna 2 Tablet(s) Oral at bedtime  tiotropium 18 MICROgram(s) Capsule 1 Capsule(s) Inhalation daily    PRN Inpatient Medications  ALBUTerol/ipratropium for Nebulization 3 milliLiter(s) Nebulizer every 6 hours PRN  guaiFENesin    Syrup 100 milliGRAM(s) Oral every 6 hours PRN  lactulose Syrup 10 Gram(s) Oral every 6 hours PRN    VITALS/PHYSICAL EXAM  --------------------------------------------------------------------------------  T(C): 36.8 (02-01-19 @ 05:46), Max: 36.8 (02-01-19 @ 05:46)  HR: 91 (02-01-19 @ 05:46) (88 - 116)  BP: 113/71 (02-01-19 @ 05:46) (113/56 - 144/72)  RR: 18 (02-01-19 @ 05:46) (18 - 22)    Physical Exam:  	Gen: NAD  	Pulm: CTA B/L  	CV: RRR, S1S2  	Abd: +BS, soft, nontender/nondistended  	: No suprapubic tenderness  	LE: Warm, no edema    LABS/STUDIES  --------------------------------------------------------------------------------              9.3    14.03 >-----------<  295      [02-01-19 @ 06:24]              29.9     149  |  107  |  33  ----------------------------<  89      [02-01-19 @ 06:24]  4.0   |  29  |  1.1        Ca     8.9     [02-01-19 @ 06:24]      Mg     1.9     [02-01-19 @ 06:24]    TPro  5.3  /  Alb  3.4  /  TBili  0.5  /  DBili  x   /  AST  43  /  ALT  100  /  AlkPhos  71  [02-01-19 @ 06:24]    Troponin 0.33      [02-01-19 @ 06:24]  CK 58      [02-01-19 @ 06:24]    Creatinine Trend:  SCr 1.1 [02-01 @ 06:24]  SCr 1.1 [01-31 @ 07:44]  SCr 0.9 [01-30 @ 06:58]  SCr 1.0 [01-29 @ 06:22]  SCr 1.1 [01-28 @ 17:59]    Urinalysis - [01-24-19 @ 04:30]      Color Red / Appearance Turbid / SG >=1.030 / pH 5.0      Gluc 100 / Ketone 40  / Bili Large / Urobili 1.0       Blood Large / Protein >=300 / Leuk Est Moderate / Nitrite Positive      RBC >50 / WBC  / Hyaline  / Gran 3-5 / Sq Epi  / Non Sq Epi  / Bacteria Moderate      HbA1c 5.7      [01-11-19 @ 07:14]  Lipid: chol 171, TG 43, HDL 90, LDL 78      [01-11-19 @ 07:14]

## 2019-02-01 NOTE — PHYSICAL THERAPY INITIAL EVALUATION ADULT - GENERAL OBSERVATIONS, REHAB EVAL
Pt encountered OOB sitting in chair in NAD, no c/o pain, + O2 4L NC and pt is agreeable to participate with PT. Pt requires CGA with transfer and ambulation 20 ft using RW and noticeable inc SOB.

## 2019-02-02 NOTE — PROGRESS NOTE ADULT - ASSESSMENT
acute/chronic hypoxic respiratory failure  copd exacerbation, improved  s/p HCAP  chronic diastolic chf      low flow oxygen continuous during the day, using bipap at night  bronchodilators  finish course of levofloxacin  continue prednisone 10mg daily  continue cardiac medications  continue lasix  gi/dvt prophylaxis  physical therapy

## 2019-02-02 NOTE — PROGRESS NOTE ADULT - SUBJECTIVE AND OBJECTIVE BOX
DAILY PROGRESS NOTE  ===========================================================    Patient Information:  LILA VELA  /  85y  /  Female  /  MRN#: [id number]    Hospital Day: 12d     |:::::::::::::::::::::::::::| SUBJECTIVE |:::::::::::::::::::::::::::|    OVERNIGHT EVENTS:   TODAY: Patient was seen today at bedside. Review of systems is otherwise negative.    |:::::::::::::::::::::::::::| OBJECTIVE |:::::::::::::::::::::::::::|    VITAL SIGNS: Last 24 Hours  Vital Signs Last 24 Hrs  T(C): 36.5 (02 Feb 2019 12:17), Max: 37 (02 Feb 2019 05:15)  T(F): 97.7 (02 Feb 2019 12:17), Max: 98.6 (02 Feb 2019 05:15)  HR: 99 (02 Feb 2019 12:17) (87 - 99)  BP: 131/63 (02 Feb 2019 12:17) (101/50 - 131/63)  BP(mean): --  ABP: --  ABP(mean): --  RR: 18 (02 Feb 2019 12:17) (18 - 20)  SpO2: 95% (01 Feb 2019 21:00) (95% - 95%)    PHYSICAL EXAM:  GENERAL: Awake, alert; NAD, A&Ox3  HEENT: Head NC/AT; Conjunctivae pink, Sclera anicteric; Oral mucosa moist.  CARDIO: Regular rate; Regular rhythm; S1 & S2.  RESP: CTA B/L. No wheezes or rhonchi appreciated.  GI: Soft; NT/ND; BS; No guarding; No rebound tenderness.  EXT: No edema in UE and LE.  NEURO: A&Ox3  SKIN: Intact except b/l UE bruises    LAB RESULTS:                          9.8    14.20 )-----------( 303      ( 02 Feb 2019 06:23 )             31.7     02-02    144  |  101  |  35<H>  ----------------------------<  104<H>  3.9   |  25  |  1.2    Ca    8.8      02 Feb 2019 06:23  Mg     1.8     02-02    TPro  5.3<L>  /  Alb  3.4<L>  /  TBili  0.6  /  DBili  x   /  AST  42<H>  /  ALT  91<H>  /  AlkPhos  68  02-02                                      MICROBIOLOGY:    RADIOLOGY:    ALLERGIES:  No Known Allergies    HOME MEDICATIONS:  albuterol 2.5 mg/3 mL (0.083%) inhalation solution: 3 milliliter(s) inhaled every 6 hours (06 Sep 2018 13:40)  Centrum oral tablet: 1 tab(s) orally once a day (06 Sep 2018 13:40)  furosemide 20 mg oral tablet: 1 tab(s) orally every 7 days (06 Sep 2018 13:40)  Pletal 100 mg oral tablet: 1 tab(s) orally 2 times a day (06 Sep 2018 13:40)    =========================================================== DAILY PROGRESS NOTE  ===========================================================    Patient Information:  LILA VELA  /  85y  /  Female  /  MRN#: [id number]    Hospital Day: 12d     |:::::::::::::::::::::::::::| SUBJECTIVE |:::::::::::::::::::::::::::|    OVERNIGHT EVENTS:   TODAY: Patient was seen today at bedside. Pt voiced no complaints. Review of systems is otherwise negative.    |:::::::::::::::::::::::::::| OBJECTIVE |:::::::::::::::::::::::::::|    VITAL SIGNS: Last 24 Hours  Vital Signs Last 24 Hrs  T(C): 36.5 (02 Feb 2019 12:17), Max: 37 (02 Feb 2019 05:15)  T(F): 97.7 (02 Feb 2019 12:17), Max: 98.6 (02 Feb 2019 05:15)  HR: 99 (02 Feb 2019 12:17) (87 - 99)  BP: 131/63 (02 Feb 2019 12:17) (101/50 - 131/63)  BP(mean): --  ABP: --  ABP(mean): --  RR: 18 (02 Feb 2019 12:17) (18 - 20)  SpO2: 95% (01 Feb 2019 21:00) (95% - 95%)    PHYSICAL EXAM:  GENERAL: Awake, alert; NAD, A&Ox3  HEENT: Head NC/AT; Conjunctivae pink, Sclera anicteric; Oral mucosa moist.  CARDIO: Regular rate; Regular rhythm; S1 & S2.  RESP: Decreased breathe sounds in B/L lower lung fields. No wheezes or rhonchi appreciated.  GI: Soft; NT/ND; BS; No guarding; No rebound tenderness.  EXT: No edema in UE and LE.  NEURO: A&Ox3  SKIN: Intact except b/l UE bruises    LAB RESULTS:                          9.8    14.20 )-----------( 303      ( 02 Feb 2019 06:23 )             31.7     02-02    144  |  101  |  35<H>  ----------------------------<  104<H>  3.9   |  25  |  1.2    Ca    8.8      02 Feb 2019 06:23  Mg     1.8     02-02    TPro  5.3<L>  /  Alb  3.4<L>  /  TBili  0.6  /  DBili  x   /  AST  42<H>  /  ALT  91<H>  /  AlkPhos  68  02-02                                      MICROBIOLOGY:    RADIOLOGY:    ALLERGIES:  No Known Allergies    HOME MEDICATIONS:  albuterol 2.5 mg/3 mL (0.083%) inhalation solution: 3 milliliter(s) inhaled every 6 hours (06 Sep 2018 13:40)  Centrum oral tablet: 1 tab(s) orally once a day (06 Sep 2018 13:40)  furosemide 20 mg oral tablet: 1 tab(s) orally every 7 days (06 Sep 2018 13:40)  Pletal 100 mg oral tablet: 1 tab(s) orally 2 times a day (06 Sep 2018 13:40)    ===========================================================

## 2019-02-02 NOTE — PROGRESS NOTE ADULT - ASSESSMENT
1. CKD III-stable  2. Bladder mass/hematuria  3. DVT  4. HTN  5. PNA    Plan:  Urology f/u-outpatient cyto  Continue lisinopril  Lasix 20mg PO every other day  Taper prednisone

## 2019-02-02 NOTE — PROGRESS NOTE ADULT - SUBJECTIVE AND OBJECTIVE BOX
02-02-19 @ 10:13    LILA VELA  85y  Female      INTERVAL EVENTS:    MEDICATIONS  (STANDING):  ALBUTerol    90 MICROgram(s) HFA Inhaler 1 Puff(s) Inhalation every 4 hours  ALBUTerol/ipratropium for Nebulization 3 milliLiter(s) Nebulizer every 6 hours  aspirin enteric coated 81 milliGRAM(s) Oral <User Schedule>  atorvastatin 10 milliGRAM(s) Oral at bedtime  buDESOnide 160 MICROgram(s)/formoterol 4.5 MICROgram(s) Inhaler 2 Puff(s) Inhalation two times a day  chlorhexidine 4% Liquid 1 Application(s) Topical <User Schedule>  cilostazol 100 milliGRAM(s) Oral two times a day  clopidogrel Tablet 75 milliGRAM(s) Oral daily  docusate sodium 100 milliGRAM(s) Oral two times a day  furosemide    Tablet 20 milliGRAM(s) Oral <User Schedule>  heparin  Injectable 5000 Unit(s) SubCutaneous every 8 hours  levETIRAcetam 500 milliGRAM(s) Oral two times a day  levoFLOXacin  Tablet 250 milliGRAM(s) Oral every 24 hours  metoprolol tartrate 25 milliGRAM(s) Oral two times a day  nystatin Powder 1 Application(s) Topical two times a day  pantoprazole    Tablet 40 milliGRAM(s) Oral before breakfast  predniSONE   Tablet 10 milliGRAM(s) Oral two times a day  senna 2 Tablet(s) Oral at bedtime  tiotropium 18 MICROgram(s) Capsule 1 Capsule(s) Inhalation daily    MEDICATIONS  (PRN):  ALBUTerol/ipratropium for Nebulization 3 milliLiter(s) Nebulizer every 6 hours PRN Shortness of Breath and/or Wheezing  guaiFENesin    Syrup 100 milliGRAM(s) Oral every 6 hours PRN Cough  lactulose Syrup 10 Gram(s) Oral every 6 hours PRN constipation      T(C): 37 (02-02-19 @ 05:15), Max: 37 (02-02-19 @ 05:15)  HR: 87 (02-01-19 @ 21:00) (87 - 87)  BP: 101/50 (02-01-19 @ 21:00) (101/50 - 101/50)  RR: 18 (02-01-19 @ 21:00) (18 - 20)  SpO2: 95% (02-01-19 @ 21:00) (95% - 95%)  Wt(kg): --Vital Signs Last 24 Hrs  T(C): 37 (02 Feb 2019 05:15), Max: 37 (02 Feb 2019 05:15)  T(F): 98.6 (02 Feb 2019 05:15), Max: 98.6 (02 Feb 2019 05:15)  HR: 87 (01 Feb 2019 21:00) (87 - 87)  BP: 101/50 (01 Feb 2019 21:00) (101/50 - 101/50)  BP(mean): --  RR: 18 (01 Feb 2019 21:00) (18 - 20)  SpO2: 95% (01 Feb 2019 21:00) (95% - 95%)    PHYSICAL EXAM:  GENERAL:   NECK:   CHEST/LUNG:  HEART: S1  ABDOMEN:   EXTREMITIES:                           9.8    14.20 )-----------( 303      ( 02 Feb 2019 06:23 )             31.7     02-02    144  |  101  |  35<H>  ----------------------------<  104<H>  3.9   |  25  |  1.2    Ca    8.8      02 Feb 2019 06:23  Mg     1.8     02-02    TPro  5.3<L>  /  Alb  3.4<L>  /  TBili  0.6  /  DBili  x   /  AST  42<H>  /  ALT  91<H>  /  AlkPhos  68  02-02            RADIOLOGY & ADDITIONAL TESTS:      ASSESSMENT / PLAN  :    HEALTH ISSUES - PROBLEM Dx: 02-02-19 @ 10:13    LILA VELA  85y  Female  Seen sitting OOB, as recent days, denies any sx. Feels well, expresses desire to go home. No c/o offered.     INTERVAL EVENTS: None reported.     MEDICATIONS  (STANDING):  ALBUTerol    90 MICROgram(s) HFA Inhaler 1 Puff(s) Inhalation every 4 hours  ALBUTerol/ipratropium for Nebulization 3 milliLiter(s) Nebulizer every 6 hours  aspirin enteric coated 81 milliGRAM(s) Oral <User Schedule>  atorvastatin 10 milliGRAM(s) Oral at bedtime  buDESOnide 160 MICROgram(s)/formoterol 4.5 MICROgram(s) Inhaler 2 Puff(s) Inhalation two times a day  chlorhexidine 4% Liquid 1 Application(s) Topical <User Schedule>  cilostazol 100 milliGRAM(s) Oral two times a day  clopidogrel Tablet 75 milliGRAM(s) Oral daily  docusate sodium 100 milliGRAM(s) Oral two times a day  furosemide    Tablet 20 milliGRAM(s) Oral <User Schedule>  heparin  Injectable 5000 Unit(s) SubCutaneous every 8 hours  levETIRAcetam 500 milliGRAM(s) Oral two times a day  levoFLOXacin  Tablet 250 milliGRAM(s) Oral every 24 hours  metoprolol tartrate 25 milliGRAM(s) Oral two times a day  nystatin Powder 1 Application(s) Topical two times a day  pantoprazole    Tablet 40 milliGRAM(s) Oral before breakfast  predniSONE   Tablet 10 milliGRAM(s) Oral two times a day  senna 2 Tablet(s) Oral at bedtime  tiotropium 18 MICROgram(s) Capsule 1 Capsule(s) Inhalation daily    MEDICATIONS  (PRN):  ALBUTerol/ipratropium for Nebulization 3 milliLiter(s) Nebulizer every 6 hours PRN Shortness of Breath and/or Wheezing  guaiFENesin    Syrup 100 milliGRAM(s) Oral every 6 hours PRN Cough  lactulose Syrup 10 Gram(s) Oral every 6 hours PRN constipation      T(C): 37 (02-02-19 @ 05:15), Max: 37 (02-02-19 @ 05:15)  HR: 87 (02-01-19 @ 21:00) (87 - 87)  BP: 101/50 (02-01-19 @ 21:00) (101/50 - 101/50)  RR: 18 (02-01-19 @ 21:00) (18 - 20)  SpO2: 95% (02-01-19 @ 21:00) (95% - 95%)  Wt(kg): --Vital Signs Last 24 Hrs  T(C): 37 (02 Feb 2019 05:15), Max: 37 (02 Feb 2019 05:15)  T(F): 98.6 (02 Feb 2019 05:15), Max: 98.6 (02 Feb 2019 05:15)  HR: 87 (01 Feb 2019 21:00) (87 - 87)  BP: 101/50 (01 Feb 2019 21:00) (101/50 - 101/50)  BP(mean): --  RR: 18 (01 Feb 2019 21:00) (18 - 20)  SpO2: 95% (01 Feb 2019 21:00) (95% - 95%)    PHYSICAL EXAM:  GENERAL: NAD, alert, talks freely. Does say walk around w/o difficulty.   NECK: supple. No JVD  CHEST/LUNG: Good BL AE. No adv sounds.   HEART: S1S2, regular.   ABDOMEN: soft, benign  EXTREMITIES: No active edema                          9.8    14.20 )-----------( 303      ( 02 Feb 2019 06:23 )             31.7     02-02    144  |  101  |  35<H>  ----------------------------<  104<H>  3.9   |  25  |  1.2    Ca    8.8      02 Feb 2019 06:23  Mg     1.8     02-02    TPro  5.3<L>  /  Alb  3.4<L>  /  TBili  0.6  /  DBili  x   /  AST  42<H>  /  ALT  91<H>  /  AlkPhos  68  02-02    CARDIAC MARKERS ( 01 Feb 2019 06:24 )  x     / 0.33 ng/mL / 58 U/L / x     / 3.4 ng/mL  CARDIAC MARKERS ( 01 Feb 2019 00:54 )  x     / 0.32 ng/mL / 58 U/L / x     / 3.2 ng/mL  CARDIAC MARKERS ( 31 Jan 2019 19:51 )  x     / 0.38 ng/mL / 65 U/L / x     / 3.0 ng/mL    EKG :    Ventricular Rate 105 BPM    Atrial Rate 105 BPM    P-R Interval 136 ms    QRS Duration 70 ms    Q-T Interval 334 ms    QTC Calculation(Bezet) 441 ms    P Axis 77 degrees    R Axis 69 degrees    T Axis 30 degrees    Diagnosis Line Sinus tachycardia  T wave abnormality, consider inferior ischemia  Abnormal ECG    Confirmed by Lorenzo Maradiaga ([id number]) on 1/31/2019 6:41:53 PM          RADIOLOGY & ADDITIONAL TESTS:      ASSESSMENT / PLAN  :    ACUTE HYPOXIC  RESPIRATORY FAILURE : IMPROVED. STABLE now. Appreciated f/u by pulm.   PNEUMONIA ; Abx as adv by ID, PO as guided.    COPD exacerbation ; has +viral. Chr. O2 dependent. BiPap at night, as advised by pulm.   BL PLEURAL EFFUSION : See above.   RIGHT TIBIAL VEIN THROMBOSIS ; Conservative. Maintain ambulation. No Ac DVT.  ANEMIA ; chronic stable.   RECENT HEMATURIA : Seen by Uro. as advised, CT done, showed ant bladder mass. OP cysto & further management.    Elevated troponin : Cardio eval noted. Could be type II CAD, cont current conservative Tx, no further w/u now.   LIVER Lesion : 1.2 cm, with art enhancement in CT. observe.   LEUKOCYTOSIS : Likely 2' Steroid. Improving.   CKD III : Stable. Avoid dehydration   OLD CVA ; stable.   Hx. AORTO-FEM BYPASS  RIGHT CAROTID STENT  PVD ; Cont current Tx, risk fx control.

## 2019-02-02 NOTE — PROGRESS NOTE ADULT - SUBJECTIVE AND OBJECTIVE BOX
[place] [facility] FOLLOW UP NOTE  --------------------------------------------------------------------------------  24 hour events/subjective: Patient examined. Appears comfortable.    PAST HISTORY  --------------------------------------------------------------------------------  No significant changes to PMH, PSH, FHx, SHx, unless otherwise noted    ALLERGIES & MEDICATIONS  --------------------------------------------------------------------------------  Allergies    No Known Allergies    Standing Inpatient Medications  ALBUTerol    90 MICROgram(s) HFA Inhaler 1 Puff(s) Inhalation every 4 hours  ALBUTerol/ipratropium for Nebulization 3 milliLiter(s) Nebulizer every 6 hours  aspirin enteric coated 81 milliGRAM(s) Oral <User Schedule>  atorvastatin 10 milliGRAM(s) Oral at bedtime  buDESOnide 160 MICROgram(s)/formoterol 4.5 MICROgram(s) Inhaler 2 Puff(s) Inhalation two times a day  chlorhexidine 4% Liquid 1 Application(s) Topical <User Schedule>  cilostazol 100 milliGRAM(s) Oral two times a day  clopidogrel Tablet 75 milliGRAM(s) Oral daily  docusate sodium 100 milliGRAM(s) Oral two times a day  furosemide    Tablet 20 milliGRAM(s) Oral <User Schedule>  heparin  Injectable 5000 Unit(s) SubCutaneous every 8 hours  levETIRAcetam 500 milliGRAM(s) Oral two times a day  levoFLOXacin  Tablet 250 milliGRAM(s) Oral every 24 hours  metoprolol tartrate 25 milliGRAM(s) Oral two times a day  nystatin Powder 1 Application(s) Topical two times a day  pantoprazole    Tablet 40 milliGRAM(s) Oral before breakfast  predniSONE   Tablet 10 milliGRAM(s) Oral two times a day  senna 2 Tablet(s) Oral at bedtime  tiotropium 18 MICROgram(s) Capsule 1 Capsule(s) Inhalation daily    PRN Inpatient Medications  ALBUTerol/ipratropium for Nebulization 3 milliLiter(s) Nebulizer every 6 hours PRN  guaiFENesin    Syrup 100 milliGRAM(s) Oral every 6 hours PRN  lactulose Syrup 10 Gram(s) Oral every 6 hours PRN    VITALS/PHYSICAL EXAM  --------------------------------------------------------------------------------  T(C): 36.5 (02-02-19 @ 12:17), Max: 37 (02-02-19 @ 05:15)  HR: 99 (02-02-19 @ 12:17) (87 - 99)  BP: 131/63 (02-02-19 @ 12:17) (101/50 - 131/63)  RR: 18 (02-02-19 @ 12:17) (18 - 20)  SpO2: 95% (02-01-19 @ 21:00) (95% - 95%)    Physical Exam:  	Gen: NAD  	Pulm: CTA B/L  	CV: RRR, S1S2  	Abd: +BS, soft, nontender/nondistended  	: No suprapubic tenderness  	LE: Warm, no edema    LABS/STUDIES  --------------------------------------------------------------------------------              9.8    14.20 >-----------<  303      [02-02-19 @ 06:23]              31.7     144  |  101  |  35  ----------------------------<  104      [02-02-19 @ 06:23]  3.9   |  25  |  1.2        Ca     8.8     [02-02-19 @ 06:23]      Mg     1.8     [02-02-19 @ 06:23]    TPro  5.3  /  Alb  3.4  /  TBili  0.6  /  DBili  x   /  AST  42  /  ALT  91  /  AlkPhos  68  [02-02-19 @ 06:23]    Troponin 0.33      [02-01-19 @ 06:24]  CK 58      [02-01-19 @ 06:24]    Creatinine Trend:  SCr 1.2 [02-02 @ 06:23]  SCr 1.1 [02-01 @ 06:24]  SCr 1.1 [01-31 @ 07:44]  SCr 0.9 [01-30 @ 06:58]  SCr 1.0 [01-29 @ 06:22]    Urinalysis - [01-24-19 @ 04:30]      Color Red / Appearance Turbid / SG >=1.030 / pH 5.0      Gluc 100 / Ketone 40  / Bili Large / Urobili 1.0       Blood Large / Protein >=300 / Leuk Est Moderate / Nitrite Positive      RBC >50 / WBC  / Hyaline  / Gran 3-5 / Sq Epi  / Non Sq Epi  / Bacteria Moderate      HbA1c 5.7      [01-11-19 @ 07:14]  Lipid: chol 171, TG 43, HDL 90, LDL 78      [01-11-19 @ 07:14]

## 2019-02-02 NOTE — PROGRESS NOTE ADULT - SUBJECTIVE AND OBJECTIVE BOX
LILA VELA  85y, Female  Allergy: No Known Allergies      LAST 24-Hr EVENTS:  no complaints  VITALS:  T(F): 98.6 (02-02-19 @ 05:15), Max: 98.6 (02-02-19 @ 05:15)  HR: 87 (02-01-19 @ 21:00)  BP: 101/50 (02-01-19 @ 21:00) (101/50 - 101/50)  RR: 18 (02-01-19 @ 21:00)  SpO2: 95% (02-01-19 @ 21:00)    PHYSICAL EXAM:    GENERAL: NAD, well-developed  HEAD:  Atraumatic, Normocephalic  NECK: Supple, No JVD  CHEST/LUNG: Clear to auscultation bilaterally; No wheeze  HEART: Regular rate and rhythm; No murmurs, rubs, or gallops  ABDOMEN: Soft, Nontender, Nondistended; Bowel sounds present  EXTREMITIES:  2+ Peripheral Pulses, No clubbing, cyanosis, or edema        TESTS & MEASUREMENTS:                          9.8    14.20 )-----------( 303      ( 02 Feb 2019 06:23 )             31.7       02-02    144  |  101  |  35<H>  ----------------------------<  104<H>  3.9   |  25  |  1.2    Ca    8.8      02 Feb 2019 06:23  Mg     1.8     02-02    TPro  5.3<L>  /  Alb  3.4<L>  /  TBili  0.6  /  DBili  x   /  AST  42<H>  /  ALT  91<H>  /  AlkPhos  68  02-02    LIVER FUNCTIONS - ( 02 Feb 2019 06:23 )  Alb: 3.4 g/dL / Pro: 5.3 g/dL / ALK PHOS: 68 U/L / ALT: 91 U/L / AST: 42 U/L / GGT: x           CARDIAC MARKERS ( 01 Feb 2019 06:24 )  x     / 0.33 ng/mL / 58 U/L / x     / 3.4 ng/mL  CARDIAC MARKERS ( 01 Feb 2019 00:54 )  x     / 0.32 ng/mL / 58 U/L / x     / 3.2 ng/mL  CARDIAC MARKERS ( 31 Jan 2019 19:51 )  x     / 0.38 ng/mL / 65 U/L / x     / 3.0 ng/mL            ABG & VENT SETTINGS: (when applicable)  ABG - ( 31 Jan 2019 15:34 )  pH, Arterial: 7.46  pH, Blood: x     /  pCO2: 40    /  pO2: 121   / HCO3: 28    / Base Excess: 4.0   /  SaO2: 99                    RADIOLOGY & ADDITIONAL TESTS:  < from: Xray Chest 1 View-PORTABLE IMMEDIATE (01.31.19 @ 16:49) >    Impression:      Increased right lower lung airspace opacity. Unchanged left basilar   opacity/effusion.      < end of copied text >    MEDICATIONS:  MEDICATIONS  (STANDING):  ALBUTerol    90 MICROgram(s) HFA Inhaler 1 Puff(s) Inhalation every 4 hours  ALBUTerol/ipratropium for Nebulization 3 milliLiter(s) Nebulizer every 6 hours  aspirin enteric coated 81 milliGRAM(s) Oral <User Schedule>  atorvastatin 10 milliGRAM(s) Oral at bedtime  buDESOnide 160 MICROgram(s)/formoterol 4.5 MICROgram(s) Inhaler 2 Puff(s) Inhalation two times a day  chlorhexidine 4% Liquid 1 Application(s) Topical <User Schedule>  cilostazol 100 milliGRAM(s) Oral two times a day  clopidogrel Tablet 75 milliGRAM(s) Oral daily  docusate sodium 100 milliGRAM(s) Oral two times a day  furosemide    Tablet 20 milliGRAM(s) Oral <User Schedule>  heparin  Injectable 5000 Unit(s) SubCutaneous every 8 hours  levETIRAcetam 500 milliGRAM(s) Oral two times a day  levoFLOXacin  Tablet 250 milliGRAM(s) Oral every 24 hours  metoprolol tartrate 25 milliGRAM(s) Oral two times a day  nystatin Powder 1 Application(s) Topical two times a day  pantoprazole    Tablet 40 milliGRAM(s) Oral before breakfast  predniSONE   Tablet 10 milliGRAM(s) Oral two times a day  senna 2 Tablet(s) Oral at bedtime  tiotropium 18 MICROgram(s) Capsule 1 Capsule(s) Inhalation daily    MEDICATIONS  (PRN):  ALBUTerol/ipratropium for Nebulization 3 milliLiter(s) Nebulizer every 6 hours PRN Shortness of Breath and/or Wheezing  guaiFENesin    Syrup 100 milliGRAM(s) Oral every 6 hours PRN Cough  lactulose Syrup 10 Gram(s) Oral every 6 hours PRN constipation

## 2019-02-02 NOTE — PROGRESS NOTE ADULT - ASSESSMENT
84 yo F, CCU Downgrade, PMHx COPD on 2 L NC, PVD, CKD3, HTN, DLD, hx of CVA, ex-smoker, recently discharged from [facility] on 01/13 after PNA tx w/Levaquin and Prednisone, presented/w SOB and dysuria. She was doing well until 01/20 when she starting feeling SOB again.     Pt was admitted to CCU for acute on chronic hypoxic and chronic hypercapnic respiratory failure 2/2 COPD exacerbation likely due to RLL GNR PNA exacerbated by coronavirus coinfection. She was on BiPaP prn, solumedrol, bronchodilators and Meropenem. She was treated for distal DVT and subsequently developed hematuria. Further w/u was positive for moderate ascites (2D echo), +UA (negative urine and blood cultures). MRSA and Flu were negative.     ===========================================================  Today/Updates: Dispo planning  ===========================================================    # Acute on chronic hypoxic and chronic hypercapnic respiratory failure 2/2 COPD exacerbation 2/2 Possible RLL GNR pneumonia exacerbated by coronavirus coinfection: resolved    Continue BiPAP and O2 NC  Levaquin d/c'd today  Prednisone 10 bid. Please d/c 02/03  All cxs: Negative  Pulm on board    # Hypoxic episode and troponinemia: resolved    Cardio consult appreciated: recs below  Troponin elevation could be due to type 2 MI  Continue ASA , plavix ,metoprolol and statin.  No cardiac intervention needed at this time.    # Hypertension: better    Cont. 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  1.2 cm right hepatic lobe lesion with arterial enhancement, incompletely characterized but could be a benign lesion. Consider nonemergent MRI of the abdomen with contrast if clinically indicated.    # Mod ascites noted on echo    Lasix for now    # Transaminitis: waxing and waning    RUQ US: negative    # Constipation: Resolved ?    Cont. Senna, Colace  Lactulose prn    # Mild Hyperkalemia: resolved    # Mild Hypernatremia: resolved      Electrolyte Imbalances: WNL  []  Hyponatremia   /   Hypernatremia  []   []  Hypokalemia   /   Hyperkalemia  []   []  Hypochloremia   /    Hyperchloremia  []   []  Hypocapnia   /   Hypercapnia  []   []  Hypomagnesemia   /   Hypermagnesemia  []   []  Hypophosphatemia   /   Hyperphosphatemia  []       GI ppx:                                   [] Not indicated   /   [x] Pantoprazole 40mg PO Daily    DVT ppx:  [] Not indicated / [x] Heparin 5000mg SubQ / [] Lovenox 40mg SubQ / [] SCDs    Fluids:   [x] PO  |  [] IVF    Activity:  [] Ad Annabelle  /  [X] Increase as Tolerated  /  [] OOB w/ assist  /  [] Bed Rest    BMI:  Height (cm): 154.94 (01-21)  Weight (kg): 47 (01-21)  BMI (kg/m2): 19.6 (01-21)      DISPO:  Anticipated for d/c tomorrow. Pt refused STR	  [x] Home  vs  [X] SNF  /  [] Long Term       [X] Discussion with patient and/or proxy regarding goals of care.  [X] Discussed Case and Plan with the Medical Attending.    CODE STATUS  [X] FULL   /    [] DNR    Please call Dr. Hill [[email or web address]] with any questions/consult recs: Spectra # [id number] 84 yo F, CCU Downgrade, PMHx COPD on 2 L NC, PVD, CKD3, HTN, DLD, hx of CVA, ex-smoker, recently discharged from [facility] on 01/13 after PNA tx w/Levaquin and Prednisone, presented/w SOB and dysuria. She was doing well until 01/20 when she starting feeling SOB again.     Pt was admitted to CCU for acute on chronic hypoxic and chronic hypercapnic respiratory failure 2/2 COPD exacerbation likely due to RLL GNR PNA exacerbated by coronavirus coinfection. She was on BiPaP prn, solumedrol, bronchodilators and Meropenem. She was treated for distal DVT and subsequently developed hematuria. Further w/u was positive for moderate ascites (2D echo), +UA (negative urine and blood cultures). MRSA and Flu were negative.     ===========================================================  Today/Updates: CXR today is WORSE/ Will Consider Abxs  ===========================================================    # Acute on chronic hypoxic and chronic hypercapnic respiratory failure 2/2 COPD exacerbation 2/2 Possible RLL GNR pneumonia exacerbated by coronavirus coinfection: worse    Continue BiPAP and O2 NC 4L  Levaquin d/c'd today per ID recs  Prednisone 10 bid. Please d/c 02/03  All cxs: Negative  Pulm on board  Chest xray today: Worse  I will consider ID reconsult ASAP  Will recommend CT Chest as CXR is worse and PNA unresolving  Oxygen demand is increasing as well  Please check ABG 02/03    # Hypoxic episode and troponinemia likely 2/2 worsening RLL PNA    Cardio consult appreciated: recs below  Troponin elevation could be due to type 2 MI  Continue ASA , plavix ,metoprolol and statin.  No cardiac intervention needed at this time.  Consider CT Chest, ID and Pulm reconsult    # Hypertension: better    Cont. 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  1.2 cm right hepatic lobe lesion with arterial enhancement, incompletely characterized but could be a benign lesion. Consider nonemergent MRI of the abdomen with contrast if clinically indicated.    # Mod ascites noted on echo    Lasix for now    # Transaminitis: waxing and waning    RUQ US: negative    # Constipation: Resolved     Cont. Senna, Colace  Lactulose prn  Pt had a BM today    # Mild Hyperkalemia: resolved    # Mild Hypernatremia: resolved      Electrolyte Imbalances: WNL  []  Hyponatremia   /   Hypernatremia  []   []  Hypokalemia   /   Hyperkalemia  []   []  Hypochloremia   /    Hyperchloremia  []   []  Hypocapnia   /   Hypercapnia  []   []  Hypomagnesemia   /   Hypermagnesemia  []   []  Hypophosphatemia   /   Hyperphosphatemia  []       GI ppx:                                   [] Not indicated   /   [x] Pantoprazole 40mg PO Daily    DVT ppx:  [] Not indicated / [x] Heparin 5000mg SubQ / [] Lovenox 40mg SubQ / [] SCDs    Fluids:   [x] PO  |  [] IVF    Activity:  [] Ad Annabelle  /  [X] Increase as Tolerated  /  [] OOB w/ assist  /  [] Bed Rest    BMI:  Height (cm): 154.94 (01-21)  Weight (kg): 47 (01-21)  BMI (kg/m2): 19.6 (01-21)      DISPO:  Anticipated for d/c tomorrow. Pt refused STR	  [x] Home  vs  [X] SNF  /  [] Long Term       [X] Discussion with patient and/or proxy regarding goals of care.  [X] Discussed Case and Plan with the Medical Attending.    CODE STATUS  [X] FULL   /    [] DNR    Please call Dr. Hill [[email or web address]] with any questions/consult recs: Spectra # [phone]

## 2019-02-03 NOTE — PROGRESS NOTE ADULT - SUBJECTIVE AND OBJECTIVE BOX
LILA VELA  85y, Female  Allergy: No Known Allergies      LAST 24-Hr EVENTS:  seen in chair, complains of feeling lightheaded, didn't sleep well last night  VITALS:  T(F): 96 (02-03-19 @ 05:24), Max: 98.1 (02-02-19 @ 21:54)  HR: 92 (02-03-19 @ 05:24)  BP: 144/74 (02-03-19 @ 05:24) (131/63 - 144/74)  RR: 20 (02-03-19 @ 05:24)  SpO2: 95% (02-02-19 @ 23:00)on low flow oxygen    PHYSICAL EXAM:    GENERAL: NAD, well-developed  HEAD:  Atraumatic, Normocephalic  NECK: Supple, No JVD  CHEST/LUNG: Clear to auscultation bilaterally; No wheeze  HEART: Regular rate and rhythm; No murmurs, rubs, or gallops  ABDOMEN: Soft, Nontender, Nondistended; Bowel sounds present  EXTREMITIES:  2+ Peripheral Pulses, No clubbing, cyanosis, or edema        TESTS & MEASUREMENTS:                          9.3    14.47 )-----------( 290      ( 03 Feb 2019 06:48 )             29.6       02-03    143  |  102  |  29<H>  ----------------------------<  101<H>  4.0   |  26  |  1.0    Ca    8.8      03 Feb 2019 06:48  Mg     1.7     02-03    TPro  5.0<L>  /  Alb  3.3<L>  /  TBili  0.5  /  DBili  x   /  AST  29  /  ALT  77<H>  /  AlkPhos  63  02-03    LIVER FUNCTIONS - ( 03 Feb 2019 06:48 )  Alb: 3.3 g/dL / Pro: 5.0 g/dL / ALK PHOS: 63 U/L / ALT: 77 U/L / AST: 29 U/L / GGT: x                     ABG & VENT SETTINGS: (when applicable)    n/a    RADIOLOGY & ADDITIONAL TESTS:    MEDICATIONS:  MEDICATIONS  (STANDING):  ALBUTerol    90 MICROgram(s) HFA Inhaler 1 Puff(s) Inhalation every 4 hours  ALBUTerol/ipratropium for Nebulization 3 milliLiter(s) Nebulizer every 6 hours  aspirin enteric coated 81 milliGRAM(s) Oral <User Schedule>  atorvastatin 10 milliGRAM(s) Oral at bedtime  buDESOnide 160 MICROgram(s)/formoterol 4.5 MICROgram(s) Inhaler 2 Puff(s) Inhalation two times a day  chlorhexidine 4% Liquid 1 Application(s) Topical <User Schedule>  cilostazol 100 milliGRAM(s) Oral two times a day  clopidogrel Tablet 75 milliGRAM(s) Oral daily  docusate sodium 100 milliGRAM(s) Oral two times a day  furosemide    Tablet 20 milliGRAM(s) Oral <User Schedule>  heparin  Injectable 5000 Unit(s) SubCutaneous every 8 hours  levETIRAcetam 500 milliGRAM(s) Oral two times a day  metoprolol tartrate 25 milliGRAM(s) Oral two times a day  nystatin Powder 1 Application(s) Topical two times a day  pantoprazole    Tablet 40 milliGRAM(s) Oral before breakfast  predniSONE   Tablet 10 milliGRAM(s) Oral daily  senna 2 Tablet(s) Oral at bedtime  tiotropium 18 MICROgram(s) Capsule 1 Capsule(s) Inhalation daily    MEDICATIONS  (PRN):  ALBUTerol/ipratropium for Nebulization 3 milliLiter(s) Nebulizer every 6 hours PRN Shortness of Breath and/or Wheezing  guaiFENesin    Syrup 100 milliGRAM(s) Oral every 6 hours PRN Cough  lactulose Syrup 10 Gram(s) Oral every 6 hours PRN constipation

## 2019-02-03 NOTE — PROGRESS NOTE ADULT - SUBJECTIVE AND OBJECTIVE BOX
02-03-19 @ 14:27    LILA VELA  85y  Female  Seen OOB to chair. Very comfortable. Denies any sx. Able to get up by herself. Does walk, as she informs.     INTERVAL EVENTS: None.     MEDICATIONS  (STANDING):  ALBUTerol    90 MICROgram(s) HFA Inhaler 1 Puff(s) Inhalation every 4 hours  ALBUTerol/ipratropium for Nebulization 3 milliLiter(s) Nebulizer every 6 hours  aspirin enteric coated 81 milliGRAM(s) Oral <User Schedule>  atorvastatin 10 milliGRAM(s) Oral at bedtime  buDESOnide 160 MICROgram(s)/formoterol 4.5 MICROgram(s) Inhaler 2 Puff(s) Inhalation two times a day  chlorhexidine 4% Liquid 1 Application(s) Topical <User Schedule>  cilostazol 100 milliGRAM(s) Oral two times a day  clopidogrel Tablet 75 milliGRAM(s) Oral daily  docusate sodium 100 milliGRAM(s) Oral two times a day  furosemide    Tablet 20 milliGRAM(s) Oral <User Schedule>  heparin  Injectable 5000 Unit(s) SubCutaneous every 8 hours  levETIRAcetam 500 milliGRAM(s) Oral two times a day  metoprolol tartrate 25 milliGRAM(s) Oral two times a day  nystatin Powder 1 Application(s) Topical two times a day  pantoprazole    Tablet 40 milliGRAM(s) Oral before breakfast  predniSONE   Tablet 10 milliGRAM(s) Oral daily  senna 2 Tablet(s) Oral at bedtime  tiotropium 18 MICROgram(s) Capsule 1 Capsule(s) Inhalation daily    MEDICATIONS  (PRN):  ALBUTerol/ipratropium for Nebulization 3 milliLiter(s) Nebulizer every 6 hours PRN Shortness of Breath and/or Wheezing  guaiFENesin    Syrup 100 milliGRAM(s) Oral every 6 hours PRN Cough  lactulose Syrup 10 Gram(s) Oral every 6 hours PRN constipation      T(C): 37.1 (02-03-19 @ 12:12), Max: 37.1 (02-03-19 @ 12:12)  HR: 119 (02-03-19 @ 12:12) (92 - 119)  BP: 121/61 (02-03-19 @ 12:12) (121/61 - 144/74)  RR: 18 (02-03-19 @ 12:12) (16 - 20)  SpO2: 95% (02-02-19 @ 23:00) (85% - 95%)  Wt(kg): --Vital Signs Last 24 Hrs  T(C): 37.1 (03 Feb 2019 12:12), Max: 37.1 (03 Feb 2019 12:12)  T(F): 98.7 (03 Feb 2019 12:12), Max: 98.7 (03 Feb 2019 12:12)  HR: 119 (03 Feb 2019 12:12) (92 - 119)  BP: 121/61 (03 Feb 2019 12:12) (121/61 - 144/74)  BP(mean): --  RR: 18 (03 Feb 2019 12:12) (16 - 20)  SpO2: 95% (02 Feb 2019 23:00) (85% - 95%)    PHYSICAL EXAM:  GENERAL: Slim built, NAD  NECK: supple. No JVD  CHEST/LUNG: No adv sound. Good AE BL. Using ipap. O2 via NC.  HEART: S1 S2, occ. variable. SM   ABDOMEN: benign  EXTREMITIES: no CCE;   Ecchymoses areas of IV & sc hep.                           9.3    14.47 )-----------( 290      ( 03 Feb 2019 06:48 )             29.6     02-03    143  |  102  |  29<H>  ----------------------------<  101<H>  4.0   |  26  |  1.0    Ca    8.8      03 Feb 2019 06:48  Mg     1.7     02-03    TPro  5.0<L>  /  Alb  3.3<L>  /  TBili  0.5  /  DBili  x   /  AST  29  /  ALT  77<H>  /  AlkPhos  63  02-03            RADIOLOGY & ADDITIONAL TESTS:    ECG :   Ventricular Rate 83 BPM    Atrial Rate 83 BPM    P-R Interval 136 ms    QRS Duration 76 ms    Q-T Interval 384 ms    QTC Calculation(Bezet) 451 ms    P Axis 77 degrees    R Axis 41 degrees    T Axis 7 degrees    Diagnosis Line Normal sinus rhythm  T wave abnormality, consider inferior ischemia  Abnormal ECG    Confirmed by TRINA LARA, CASSANDRA ([id number]) on 2/3/2019 1:18:45 AM    CXR :   Findings:    Support devices: None.    Cardiac/mediastinum/hilum: Stable.    Lung parenchyma/Pleura: Unchanged right mid to lower lung field, and left   basilar opacities. No pneumothorax.     Skeleton/soft tissues: Unchanged.    Impression: Unchanged bilateral opacities consistent with pneumonia in   the proper clinical setting. Recommend follow-up to resolution        ALBIN WESLEY M.D., ATTENDING RADIOLOGIST  This document has been electronically signed. Feb  3 2019  8:42AM            ASSESSMENT / PLAN  :    ACUTE HYPOXIC  RESPIRATORY FAILURE : IMPROVED. STABLE now. Appreciated f/u by pulm.   PNEUMONIA ; Off Abx. No fever No new sx.     COPD exacerbation ; has +viral. Chr. O2 dependent. BiPap at night, as advised by pulm. Stable.   BL PLEURAL EFFUSION : Chronic + possible 2' PNA.    RIGHT TIBIAL VEIN THROMBOSIS ; Conservative. Maintain ambulation. No Ac DVT.  ANEMIA ; chronic stable.   RECENT HEMATURIA : Seen by Uro. as advised, CT done, showed ant bladder mass. OP cysto & further management.    Elevated troponin : Cardio eval noted. Could be type II CAD, cont current conservative Tx, no further w/u now. EKG stable. Sx-atically stable.   LIVER Lesion : 1.2 cm, with art enhancement in CT. observe.   LEUKOCYTOSIS : 2' Steroid. Stable & Improving.   CKD III : Stable. Avoid dehydration   OLD CVA ; stable.   Hx. AORTO-FEM BYPASS  RIGHT CAROTID STENT  PVD ; Cont current Tx, risk fx control.     Pt. been clinically stable now. Discussed w/ resident. Discharge planning if no new issues.     Spent 30 min in care & coordination.

## 2019-02-03 NOTE — PROGRESS NOTE ADULT - SUBJECTIVE AND OBJECTIVE BOX
[facility] NEPHROLOGY FOLLOW UP NOTE  --------------------------------------------------------------------------------  24 hour events/subjective: Patient examined. Appears comfortable.    PAST HISTORY  --------------------------------------------------------------------------------  No significant changes to PMH, PSH, FHx, SHx, unless otherwise noted    ALLERGIES & MEDICATIONS  --------------------------------------------------------------------------------  Allergies    No Known Allergies    Standing Inpatient Medications  ALBUTerol    90 MICROgram(s) HFA Inhaler 1 Puff(s) Inhalation every 4 hours  ALBUTerol/ipratropium for Nebulization 3 milliLiter(s) Nebulizer every 6 hours  aspirin enteric coated 81 milliGRAM(s) Oral <User Schedule>  atorvastatin 10 milliGRAM(s) Oral at bedtime  buDESOnide 160 MICROgram(s)/formoterol 4.5 MICROgram(s) Inhaler 2 Puff(s) Inhalation two times a day  chlorhexidine 4% Liquid 1 Application(s) Topical <User Schedule>  cilostazol 100 milliGRAM(s) Oral two times a day  clopidogrel Tablet 75 milliGRAM(s) Oral daily  docusate sodium 100 milliGRAM(s) Oral two times a day  furosemide    Tablet 20 milliGRAM(s) Oral <User Schedule>  heparin  Injectable 5000 Unit(s) SubCutaneous every 8 hours  levETIRAcetam 500 milliGRAM(s) Oral two times a day  metoprolol tartrate 25 milliGRAM(s) Oral two times a day  nystatin Powder 1 Application(s) Topical two times a day  pantoprazole    Tablet 40 milliGRAM(s) Oral before breakfast  predniSONE   Tablet 10 milliGRAM(s) Oral daily  senna 2 Tablet(s) Oral at bedtime  tiotropium 18 MICROgram(s) Capsule 1 Capsule(s) Inhalation daily    PRN Inpatient Medications  ALBUTerol/ipratropium for Nebulization 3 milliLiter(s) Nebulizer every 6 hours PRN  guaiFENesin    Syrup 100 milliGRAM(s) Oral every 6 hours PRN  lactulose Syrup 10 Gram(s) Oral every 6 hours PRN    VITALS/PHYSICAL EXAM  --------------------------------------------------------------------------------  T(C): 35.6 (02-03-19 @ 05:24), Max: 36.7 (02-02-19 @ 21:54)  HR: 92 (02-03-19 @ 05:24) (92 - 99)  BP: 144/74 (02-03-19 @ 05:24) (131/63 - 144/74)  RR: 20 (02-03-19 @ 05:24) (16 - 20)  SpO2: 95% (02-02-19 @ 23:00) (85% - 95%)    Physical Exam:  	Gen: NAD  	Pulm: CTA B/L  	CV: RRR, S1S2  	Abd: +BS, soft, nontender/nondistended  	: No suprapubic tenderness  	LE: Warm, no edema    LABS/STUDIES  --------------------------------------------------------------------------------              9.3    14.47 >-----------<  290      [02-03-19 @ 06:48]              29.6     143  |  102  |  29  ----------------------------<  101      [02-03-19 @ 06:48]  4.0   |  26  |  1.0        Ca     8.8     [02-03-19 @ 06:48]      Mg     1.7     [02-03-19 @ 06:48]    TPro  5.0  /  Alb  3.3  /  TBili  0.5  /  DBili  x   /  AST  29  /  ALT  77  /  AlkPhos  63  [02-03-19 @ 06:48]    Creatinine Trend:  SCr 1.0 [02-03 @ 06:48]  SCr 1.2 [02-02 @ 06:23]  SCr 1.1 [02-01 @ 06:24]  SCr 1.1 [01-31 @ 07:44]  SCr 0.9 [01-30 @ 06:58]    Urinalysis - [01-24-19 @ 04:30]      Color Red / Appearance Turbid / SG >=1.030 / pH 5.0      Gluc 100 / Ketone 40  / Bili Large / Urobili 1.0       Blood Large / Protein >=300 / Leuk Est Moderate / Nitrite Positive      RBC >50 / WBC  / Hyaline  / Gran 3-5 / Sq Epi  / Non Sq Epi  / Bacteria Moderate    HbA1c 5.7      [01-11-19 @ 07:14]  Lipid: chol 171, TG 43, HDL 90, LDL 78      [01-11-19 @ 07:14]

## 2019-02-03 NOTE — PROGRESS NOTE ADULT - ASSESSMENT
acute on chronic hypoxic respiratory failure  copd exacerbation, improved  s/p HCAP  chronic diastolic chf      oxygen per pulse oximetry  bipap hs and prn  hold lasix  repeat chest x-ray in am  monitor off antibiotic  continue prednisone as ordered  continue symbicort and spiriva  out of bed/physical therapy acute on chronic hypoxic respiratory failure  copd exacerbation, improved  s/p HCAP  chronic diastolic chf      oxygen per pulse oximetry  bipap hs and prn  hold lasix  repeat chest x-ray in am  monitor off antibiotic, monitor wbc  continue prednisone as ordered  continue symbicort and spiriva  out of bed/physical therapy

## 2019-02-04 NOTE — PROGRESS NOTE ADULT - SUBJECTIVE AND OBJECTIVE BOX
02-04-19 @ 10:29    LILA VELA  85y  Female  Comfortable. No c/o now.     INTERVAL EVENTS:    MEDICATIONS  (STANDING):  ALBUTerol    90 MICROgram(s) HFA Inhaler 1 Puff(s) Inhalation every 4 hours  ALBUTerol/ipratropium for Nebulization 3 milliLiter(s) Nebulizer every 6 hours  aspirin enteric coated 81 milliGRAM(s) Oral <User Schedule>  atorvastatin 10 milliGRAM(s) Oral at bedtime  buDESOnide 160 MICROgram(s)/formoterol 4.5 MICROgram(s) Inhaler 2 Puff(s) Inhalation two times a day  chlorhexidine 4% Liquid 1 Application(s) Topical <User Schedule>  cilostazol 100 milliGRAM(s) Oral two times a day  clopidogrel Tablet 75 milliGRAM(s) Oral daily  docusate sodium 100 milliGRAM(s) Oral two times a day  furosemide    Tablet 20 milliGRAM(s) Oral <User Schedule>  heparin  Injectable 5000 Unit(s) SubCutaneous every 8 hours  levETIRAcetam 500 milliGRAM(s) Oral two times a day  metoprolol tartrate 25 milliGRAM(s) Oral two times a day  nystatin Powder 1 Application(s) Topical two times a day  pantoprazole    Tablet 40 milliGRAM(s) Oral before breakfast  predniSONE   Tablet 10 milliGRAM(s) Oral daily  senna 2 Tablet(s) Oral at bedtime  tiotropium 18 MICROgram(s) Capsule 1 Capsule(s) Inhalation daily    MEDICATIONS  (PRN):  ALBUTerol/ipratropium for Nebulization 3 milliLiter(s) Nebulizer every 6 hours PRN Shortness of Breath and/or Wheezing  guaiFENesin    Syrup 100 milliGRAM(s) Oral every 6 hours PRN Cough  lactulose Syrup 10 Gram(s) Oral every 6 hours PRN constipation      T(C): 36.4 (02-04-19 @ 05:18), Max: 37.1 (02-03-19 @ 12:12)  HR: 94 (02-04-19 @ 05:18) (89 - 119)  BP: 137/68 (02-04-19 @ 05:18) (120/50 - 142/64)  RR: 18 (02-04-19 @ 05:18) (18 - 20)  SpO2: 96% (02-03-19 @ 20:59) (96% - 96%)  Wt(kg): --Vital Signs Last 24 Hrs  T(C): 36.4 (04 Feb 2019 05:18), Max: 37.1 (03 Feb 2019 12:12)  T(F): 97.6 (04 Feb 2019 05:18), Max: 98.7 (03 Feb 2019 12:12)  HR: 94 (04 Feb 2019 05:18) (89 - 119)  BP: 137/68 (04 Feb 2019 05:18) (120/50 - 142/64)  BP(mean): --  RR: 18 (04 Feb 2019 05:18) (18 - 20)  SpO2: 96% (03 Feb 2019 20:59) (96% - 96%)    PHYSICAL EXAM:  GENERAL: NAD, comfortable. Able to get up from sitting w/o help, takes few steps w/ me guarding her.   NECK: supple. No JVD  CHEST/LUNG: Good AE, no adv sounds  HEART: S1S2, reg.   ABDOMEN: benign.   EXTREMITIES: stasis change. Ecchymoses UE at IV areas.                           9.5    15.46 )-----------( 307      ( 04 Feb 2019 07:57 )             31.2     02-04    149<H>  |  107  |  32<H>  ----------------------------<  170<H>  4.3   |  29  |  1.1    Ca    8.9      04 Feb 2019 07:57  Mg     2.0     02-04    TPro  5.3<L>  /  Alb  3.4<L>  /  TBili  0.4  /  DBili  x   /  AST  55<H>  /  ALT  102<H>  /  AlkPhos  73  02-04            RADIOLOGY & ADDITIONAL TESTS:      ASSESSMENT / PLAN  :    ACUTE HYPOXIC  RESPIRATORY FAILURE : IMPROVED. STABLE now. Appreciated f/u by pulm.   PNEUMONIA ; Off Abx. No fever No new sx.     COPD exacerbation ; has +viral. Chr. O2 dependent. BiPap at night, as advised by pulm. Stable.   BL PLEURAL EFFUSION : Chronic + possible 2' PNA.    RIGHT TIBIAL VEIN THROMBOSIS ; Conservative. Maintain ambulation. No Ac DVT.  ANEMIA ; chronic stable.   RECENT HEMATURIA : Seen by Uro. as advised, CT done, showed ant bladder mass. OP cysto & further management.    Elevated troponin : Cardio eval noted. Could be type II CAD, cont current conservative Tx, no further w/u now. EKG stable. Sx-atically stable.   LIVER Lesion : 1.2 cm, with art enhancement in CT. observe.   LEUKOCYTOSIS : 2' Steroid. Stable & Improving.   CKD III : Stable. Avoid dehydration   OLD CVA ; stable.   Hx. AORTO-FEM BYPASS  RIGHT CAROTID STENT  PVD ; Cont current Tx, risk fx control.       Discussed w/ residents Dr. Reagna & Aquilino. Medically stable for discharge with ph. Th. for ambulation, strengthening. Home O2, & f/u. 02-04-19 @ 10:29    LILA VELA  85y  Female  Comfortable. No c/o now.     INTERVAL EVENTS:    MEDICATIONS  (STANDING):  ALBUTerol    90 MICROgram(s) HFA Inhaler 1 Puff(s) Inhalation every 4 hours  ALBUTerol/ipratropium for Nebulization 3 milliLiter(s) Nebulizer every 6 hours  aspirin enteric coated 81 milliGRAM(s) Oral <User Schedule>  atorvastatin 10 milliGRAM(s) Oral at bedtime  buDESOnide 160 MICROgram(s)/formoterol 4.5 MICROgram(s) Inhaler 2 Puff(s) Inhalation two times a day  chlorhexidine 4% Liquid 1 Application(s) Topical <User Schedule>  cilostazol 100 milliGRAM(s) Oral two times a day  clopidogrel Tablet 75 milliGRAM(s) Oral daily  docusate sodium 100 milliGRAM(s) Oral two times a day  furosemide    Tablet 20 milliGRAM(s) Oral <User Schedule>  heparin  Injectable 5000 Unit(s) SubCutaneous every 8 hours  levETIRAcetam 500 milliGRAM(s) Oral two times a day  metoprolol tartrate 25 milliGRAM(s) Oral two times a day  nystatin Powder 1 Application(s) Topical two times a day  pantoprazole    Tablet 40 milliGRAM(s) Oral before breakfast  predniSONE   Tablet 10 milliGRAM(s) Oral daily  senna 2 Tablet(s) Oral at bedtime  tiotropium 18 MICROgram(s) Capsule 1 Capsule(s) Inhalation daily    MEDICATIONS  (PRN):  ALBUTerol/ipratropium for Nebulization 3 milliLiter(s) Nebulizer every 6 hours PRN Shortness of Breath and/or Wheezing  guaiFENesin    Syrup 100 milliGRAM(s) Oral every 6 hours PRN Cough  lactulose Syrup 10 Gram(s) Oral every 6 hours PRN constipation      T(C): 36.4 (02-04-19 @ 05:18), Max: 37.1 (02-03-19 @ 12:12)  HR: 94 (02-04-19 @ 05:18) (89 - 119)  BP: 137/68 (02-04-19 @ 05:18) (120/50 - 142/64)  RR: 18 (02-04-19 @ 05:18) (18 - 20)  SpO2: 96% (02-03-19 @ 20:59) (96% - 96%)  Wt(kg): --Vital Signs Last 24 Hrs  T(C): 36.4 (04 Feb 2019 05:18), Max: 37.1 (03 Feb 2019 12:12)  T(F): 97.6 (04 Feb 2019 05:18), Max: 98.7 (03 Feb 2019 12:12)  HR: 94 (04 Feb 2019 05:18) (89 - 119)  BP: 137/68 (04 Feb 2019 05:18) (120/50 - 142/64)  BP(mean): --  RR: 18 (04 Feb 2019 05:18) (18 - 20)  SpO2: 96% (03 Feb 2019 20:59) (96% - 96%)    PHYSICAL EXAM:  GENERAL: NAD, comfortable. Able to get up from sitting w/o help, takes few steps w/ me guarding her.   NECK: supple. No JVD  CHEST/LUNG: Good AE, no adv sounds  HEART: S1S2, reg.   ABDOMEN: benign.   EXTREMITIES: stasis change. Ecchymoses UE at IV areas.                           9.5    15.46 )-----------( 307      ( 04 Feb 2019 07:57 )             31.2     02-04    149<H>  |  107  |  32<H>  ----------------------------<  170<H>  4.3   |  29  |  1.1    Ca    8.9      04 Feb 2019 07:57  Mg     2.0     02-04    TPro  5.3<L>  /  Alb  3.4<L>  /  TBili  0.4  /  DBili  x   /  AST  55<H>  /  ALT  102<H>  /  AlkPhos  73  02-04      RADIOLOGY & ADDITIONAL TESTS:      ASSESSMENT / PLAN  :    ACUTE HYPOXIC  RESPIRATORY FAILURE : IMPROVED. STABLE now. Appreciated f/u by pulm.   PNEUMONIA ; Off Abx. No fever No new sx.     COPD exacerbation ; has +viral. Chr. O2 dependent. BiPap at night, as advised by pulm. Stable.   BL PLEURAL EFFUSION : Chronic + possible 2' PNA.    RIGHT TIBIAL VEIN THROMBOSIS ; Conservative. Maintain ambulation. No Ac DVT.  ANEMIA ; chronic stable.   RECENT HEMATURIA : Seen by Uro. as advised, CT done, showed ant bladder mass. OP cysto & further management.    Elevated troponin : Cardio eval noted. Could be type II CAD, cont current conservative Tx, no further w/u now. EKG stable. Sx-atically stable.   LIVER Lesion : 1.2 cm, with art enhancement in CT. observe.   LEUKOCYTOSIS : 2' Steroid. Stable & Improving.   CKD III : Stable. Avoid dehydration   OLD CVA ; stable.   Hx. AORTO-FEM BYPASS  RIGHT CAROTID STENT  PVD ; Cont current Tx, risk fx control.       Discussed w/ residents Dr. Reagan & Aquilino. Medically stable for discharge with ph. Th. for ambulation, strengthening. Home O2, & f/u.

## 2019-02-04 NOTE — PROGRESS NOTE ADULT - SUBJECTIVE AND OBJECTIVE BOX
LILA VELA  85y, Female  Allergy: No Known Allergies      LAST 24-Hr EVENTS:  pt seen examined  sitting in chair  feels overall better    VITALS:  T(F): 97 (02-04-19 @ 13:14), Max: 97.6 (02-04-19 @ 05:18)  HR: 129 (02-04-19 @ 13:14)  BP: 106/51 (02-04-19 @ 13:14) (106/51 - 142/64)  RR: 18 (02-04-19 @ 13:14)  SpO2: 96% (02-03-19 @ 20:59)    PHYSICAL EXAM:    GENERAL: NAD  NECK: Supple, No JVD  CHEST/LUNG:  distant breath sounds  HEART: Regular rate and rhythm  ABDOMEN: Soft, Nontender, Nondistended  EXTREMITIES:  No clubbing, edema absent        TESTS & MEASUREMENTS:                          9.5    15.46 )-----------( 307      ( 04 Feb 2019 07:57 )             31.2       02-04    149<H>  |  107  |  32<H>  ----------------------------<  170<H>  4.3   |  29  |  1.1    Ca    8.9      04 Feb 2019 07:57  Mg     2.0     02-04    TPro  5.3<L>  /  Alb  3.4<L>  /  TBili  0.4  /  DBili  x   /  AST  55<H>  /  ALT  102<H>  /  AlkPhos  73  02-04    LIVER FUNCTIONS - ( 04 Feb 2019 07:57 )  Alb: 3.4 g/dL / Pro: 5.3 g/dL / ALK PHOS: 73 U/L / ALT: 102 U/L / AST: 55 U/L / GGT: x                     ABG & VENT SETTINGS: (when applicable)      MEDICATIONS:  MEDICATIONS  (STANDING):  ALBUTerol    90 MICROgram(s) HFA Inhaler 1 Puff(s) Inhalation every 4 hours  ALBUTerol/ipratropium for Nebulization 3 milliLiter(s) Nebulizer every 6 hours  aspirin enteric coated 81 milliGRAM(s) Oral <User Schedule>  atorvastatin 10 milliGRAM(s) Oral at bedtime  buDESOnide 160 MICROgram(s)/formoterol 4.5 MICROgram(s) Inhaler 2 Puff(s) Inhalation two times a day  chlorhexidine 4% Liquid 1 Application(s) Topical <User Schedule>  cilostazol 100 milliGRAM(s) Oral two times a day  clopidogrel Tablet 75 milliGRAM(s) Oral daily  docusate sodium 100 milliGRAM(s) Oral two times a day  furosemide    Tablet 20 milliGRAM(s) Oral <User Schedule>  heparin  Injectable 5000 Unit(s) SubCutaneous every 8 hours  levETIRAcetam 500 milliGRAM(s) Oral two times a day  metoprolol tartrate 25 milliGRAM(s) Oral two times a day  nystatin Powder 1 Application(s) Topical two times a day  pantoprazole    Tablet 40 milliGRAM(s) Oral before breakfast  senna 2 Tablet(s) Oral at bedtime  tiotropium 18 MICROgram(s) Capsule 1 Capsule(s) Inhalation daily    MEDICATIONS  (PRN):  ALBUTerol/ipratropium for Nebulization 3 milliLiter(s) Nebulizer every 6 hours PRN Shortness of Breath and/or Wheezing  guaiFENesin    Syrup 100 milliGRAM(s) Oral every 6 hours PRN Cough  lactulose Syrup 10 Gram(s) Oral every 6 hours PRN constipation      < from: Xray Chest 1 View-PORTABLE IMMEDIATE (02.04.19 @ 10:00) >    Impression:      Unchanged right lower lobe opacity. Small left basilar opacity.    < end of copied text >

## 2019-02-04 NOTE — PROGRESS NOTE ADULT - SUBJECTIVE AND OBJECTIVE BOX
LENGTH OF HOSPITAL STAY: 14d      CHIEF COMPLAINT:   Patient is a 85y old  Female who presents with a chief complaint of sob (04 Feb 2019 11:35)      OVER Past 24hrs:  The patient was seen and examined at bedside there were no acute events during the night. Patient still complain of SOB at times, but denies fever and chills      PAST MEDICAL & SURGICAL HISTORY  PAST MEDICAL & SURGICAL HISTORY:  PVD (peripheral vascular disease)  COPD (chronic obstructive pulmonary disease)  Kidney disease, chronic, stage I (GFR over 89 ml/min)  Peripheral vascular disease  HTN (hypertension)  High cholesterol  CVA (cerebral vascular accident)  H/O aorto-femoral bypass  History of right common carotid artery stent placement        REVIEW OF SYSTEMS  CONSTITUTIONAL: No fever, weight loss, or fatigue  EYES: No eye pain, visual disturbances, or discharge  ENMT:  No difficulty hearing, tinnitus, vertigo; No sinus or throat pain  NECK: No pain or stiffness  BREASTS: No pain, masses, or nipple discharge  RESPIRATORY: No cough, wheezing, chills or hemoptysis; No shortness of breath  CARDIOVASCULAR: No chest pain, palpitations, dizziness, or leg swelling  GASTROINTESTINAL: No abdominal or epigastric pain. No nausea, vomiting, or hematemesis; No diarrhea or constipation. No melena or hematochezia.  GENITOURINARY: No dysuria, frequency, hematuria, or incontinence  NEUROLOGICAL: No headaches, memory loss, loss of strength, numbness, or tremors  SKIN: No itching, burning, rashes, or lesions   LYMPH NODES: No enlarged glands  ENDOCRINE: No heat or cold intolerance; No hair loss  MUSCULOSKELETAL: No joint pain or swelling; No muscle, back, or extremity pain  PSYCHIATRIC: No depression, anxiety, mood swings, or difficulty sleeping  HEME/LYMPH: No easy bruising, or bleeding gums  ALLERY AND IMMUNOLOGIC: No hives or eczema    ALLERGIES:  No Known Allergies    MEDICATIONS:  STANDING MEDICATIONS  ALBUTerol    90 MICROgram(s) HFA Inhaler 1 Puff(s) Inhalation every 4 hours  ALBUTerol/ipratropium for Nebulization 3 milliLiter(s) Nebulizer every 6 hours  aspirin enteric coated 81 milliGRAM(s) Oral <User Schedule>  atorvastatin 10 milliGRAM(s) Oral at bedtime  buDESOnide 160 MICROgram(s)/formoterol 4.5 MICROgram(s) Inhaler 2 Puff(s) Inhalation two times a day  chlorhexidine 4% Liquid 1 Application(s) Topical <User Schedule>  cilostazol 100 milliGRAM(s) Oral two times a day  clopidogrel Tablet 75 milliGRAM(s) Oral daily  docusate sodium 100 milliGRAM(s) Oral two times a day  furosemide    Tablet 20 milliGRAM(s) Oral <User Schedule>  heparin  Injectable 5000 Unit(s) SubCutaneous every 8 hours  levETIRAcetam 500 milliGRAM(s) Oral two times a day  metoprolol tartrate 25 milliGRAM(s) Oral two times a day  nystatin Powder 1 Application(s) Topical two times a day  pantoprazole    Tablet 40 milliGRAM(s) Oral before breakfast  predniSONE   Tablet 10 milliGRAM(s) Oral daily  senna 2 Tablet(s) Oral at bedtime  tiotropium 18 MICROgram(s) Capsule 1 Capsule(s) Inhalation daily    PRN MEDICATIONS  ALBUTerol/ipratropium for Nebulization 3 milliLiter(s) Nebulizer every 6 hours PRN  guaiFENesin    Syrup 100 milliGRAM(s) Oral every 6 hours PRN  lactulose Syrup 10 Gram(s) Oral every 6 hours PRN    VITALS:   T(F): 97.6  HR: 94  BP: 137/68  RR: 18  SpO2: 96%    PHYSICAL EXAM:  General: No acute distress.  Alert, oriented, interactive, nonfocal.    HEENT: Pupils equal, reactive to light symmetrically.    PULM: Clear to auscultation bilaterally, no significant sputum production.    CVS: Regular rate and rhythm, no murmurs, rubs, or gallops.    ABD: Soft, nondistended, nontender, no masses.    EXT: No edema, nontender.    SKIN: Warm and well perfused, no rashes noted.    LABS:                        9.5    15.46 )-----------( 307      ( 04 Feb 2019 07:57 )             31.2     02-04    149<H>  |  107  |  32<H>  ----------------------------<  170<H>  4.3   |  29  |  1.1    Ca    8.9      04 Feb 2019 07:57  Mg     2.0     02-04    TPro  5.3<L>  /  Alb  3.4<L>  /  TBili  0.4  /  DBili  x   /  AST  55<H>  /  ALT  102<H>  /  AlkPhos  73  02-04                  RADIOLOGY: LENGTH OF HOSPITAL STAY: 14d      CHIEF COMPLAINT:   Patient is a 85y old  Female who presents with a chief complaint of sob (04 Feb 2019 11:35)      OVER Past 24hrs:  The patient was seen and examined at bedside there were no acute events during the night. Patient still complain of SOB at times and anxiety, but denies fever and chills      PAST MEDICAL & SURGICAL HISTORY  PAST MEDICAL & SURGICAL HISTORY:  PVD (peripheral vascular disease)  COPD (chronic obstructive pulmonary disease)  Kidney disease, chronic, stage I (GFR over 89 ml/min)  Peripheral vascular disease  HTN (hypertension)  High cholesterol  CVA (cerebral vascular accident)  H/O aorto-femoral bypass  History of right common carotid artery stent placement        REVIEW OF SYSTEMS  CONSTITUTIONAL: No fever  RESPIRATORY: No cough, wheezing, chills or hemoptysis; + shortness of breath  CARDIOVASCULAR: No chest pain, palpitations, dizziness, or leg swelling    No Known Allergies    MEDICATIONS:  STANDING MEDICATIONS  ALBUTerol    90 MICROgram(s) HFA Inhaler 1 Puff(s) Inhalation every 4 hours  ALBUTerol/ipratropium for Nebulization 3 milliLiter(s) Nebulizer every 6 hours  aspirin enteric coated 81 milliGRAM(s) Oral <User Schedule>  atorvastatin 10 milliGRAM(s) Oral at bedtime  buDESOnide 160 MICROgram(s)/formoterol 4.5 MICROgram(s) Inhaler 2 Puff(s) Inhalation two times a day  chlorhexidine 4% Liquid 1 Application(s) Topical <User Schedule>  cilostazol 100 milliGRAM(s) Oral two times a day  clopidogrel Tablet 75 milliGRAM(s) Oral daily  docusate sodium 100 milliGRAM(s) Oral two times a day  furosemide    Tablet 20 milliGRAM(s) Oral <User Schedule>  heparin  Injectable 5000 Unit(s) SubCutaneous every 8 hours  levETIRAcetam 500 milliGRAM(s) Oral two times a day  metoprolol tartrate 25 milliGRAM(s) Oral two times a day  nystatin Powder 1 Application(s) Topical two times a day  pantoprazole    Tablet 40 milliGRAM(s) Oral before breakfast  predniSONE   Tablet 10 milliGRAM(s) Oral daily  senna 2 Tablet(s) Oral at bedtime  tiotropium 18 MICROgram(s) Capsule 1 Capsule(s) Inhalation daily    PRN MEDICATIONS  ALBUTerol/ipratropium for Nebulization 3 milliLiter(s) Nebulizer every 6 hours PRN  guaiFENesin    Syrup 100 milliGRAM(s) Oral every 6 hours PRN  lactulose Syrup 10 Gram(s) Oral every 6 hours PRN    VITALS:   T(F): 97.6  HR: 94  BP: 137/68  RR: 18  SpO2: 96%    PHYSICAL EXAM:  General: No acute distress.  Alert, oriented, interactive, nonfocal.    HEENT: Pupils equal, reactive to light symmetrically.    PULM: Clear to auscultation bilaterally, no significant sputum production. on NC     CVS: Regular rate and rhythm, no murmurs, rubs, or gallops.    ABD: Soft, nondistended, nontender, no masses.    EXT: No edema, nontender.    SKIN: Warm and well perfused, no rashes noted.    LABS:                        9.5    15.46 )-----------( 307      ( 04 Feb 2019 07:57 )             31.2     02-04    149<H>  |  107  |  32<H>  ----------------------------<  170<H>  4.3   |  29  |  1.1    Ca    8.9      04 Feb 2019 07:57  Mg     2.0     02-04    TPro  5.3<L>  /  Alb  3.4<L>  /  TBili  0.4  /  DBili  x   /  AST  55<H>  /  ALT  102<H>  /  AlkPhos  73  02-04                  RADIOLOGY:      < from: Xray Chest 1 View-PORTABLE IMMEDIATE (02.04.19 @ 10:00) >  Findings:    Support devices: None.    Cardiac/mediastinum/hilum: Unchanged.    Lung parenchyma/Pleura: Unchanged right lower lobe opacity. Small left   basilar opacity. No pneumothorax.    Skeleton/soft tissues: Unchanged.    Impression:      Unchanged right lower lobe opacity. Small left basilar opacity.    < end of copied text >

## 2019-02-04 NOTE — PROGRESS NOTE ADULT - SUBJECTIVE AND OBJECTIVE BOX
[facility] NEPHROLOGY FOLLOW UP NOTE  --------------------------------------------------------------------------------  24 hour events/subjective: Patient examined. Appears comfortable.    PAST HISTORY  --------------------------------------------------------------------------------  No significant changes to PMH, PSH, FHx, SHx, unless otherwise noted    ALLERGIES & MEDICATIONS  --------------------------------------------------------------------------------  Allergies    No Known Allergies    Standing Inpatient Medications  ALBUTerol    90 MICROgram(s) HFA Inhaler 1 Puff(s) Inhalation every 4 hours  ALBUTerol/ipratropium for Nebulization 3 milliLiter(s) Nebulizer every 6 hours  aspirin enteric coated 81 milliGRAM(s) Oral <User Schedule>  atorvastatin 10 milliGRAM(s) Oral at bedtime  buDESOnide 160 MICROgram(s)/formoterol 4.5 MICROgram(s) Inhaler 2 Puff(s) Inhalation two times a day  chlorhexidine 4% Liquid 1 Application(s) Topical <User Schedule>  cilostazol 100 milliGRAM(s) Oral two times a day  clopidogrel Tablet 75 milliGRAM(s) Oral daily  docusate sodium 100 milliGRAM(s) Oral two times a day  furosemide    Tablet 20 milliGRAM(s) Oral <User Schedule>  heparin  Injectable 5000 Unit(s) SubCutaneous every 8 hours  levETIRAcetam 500 milliGRAM(s) Oral two times a day  metoprolol tartrate 25 milliGRAM(s) Oral two times a day  nystatin Powder 1 Application(s) Topical two times a day  pantoprazole    Tablet 40 milliGRAM(s) Oral before breakfast  senna 2 Tablet(s) Oral at bedtime  tiotropium 18 MICROgram(s) Capsule 1 Capsule(s) Inhalation daily    PRN Inpatient Medications  ALBUTerol/ipratropium for Nebulization 3 milliLiter(s) Nebulizer every 6 hours PRN  guaiFENesin    Syrup 100 milliGRAM(s) Oral every 6 hours PRN  lactulose Syrup 10 Gram(s) Oral every 6 hours PRN    VITALS/PHYSICAL EXAM  --------------------------------------------------------------------------------  T(C): 36.1 (02-04-19 @ 13:14), Max: 36.4 (02-04-19 @ 05:18)  HR: 129 (02-04-19 @ 13:14) (89 - 129)  BP: 106/51 (02-04-19 @ 13:14) (106/51 - 142/64)  RR: 18 (02-04-19 @ 13:14) (18 - 20)  SpO2: 96% (02-03-19 @ 20:59) (96% - 96%)    Physical Exam:  	Gen: NAD  	Pulm: CTA B/L  	CV: RRR, S1S2  	Abd: +BS, soft, nontender/nondistended  	: No suprapubic tenderness  	LE: Warm, no edema    LABS/STUDIES  --------------------------------------------------------------------------------              9.5    15.46 >-----------<  307      [02-04-19 @ 07:57]              31.2     149  |  107  |  32  ----------------------------<  170      [02-04-19 @ 07:57]  4.3   |  29  |  1.1        Ca     8.9     [02-04-19 @ 07:57]      Mg     2.0     [02-04-19 @ 07:57]    TPro  5.3  /  Alb  3.4  /  TBili  0.4  /  DBili  x   /  AST  55  /  ALT  102  /  AlkPhos  73  [02-04-19 @ 07:57]    Creatinine Trend:  SCr 1.1 [02-04 @ 07:57]  SCr 1.0 [02-03 @ 06:48]  SCr 1.2 [02-02 @ 06:23]  SCr 1.1 [02-01 @ 06:24]  SCr 1.1 [01-31 @ 07:44]    Urinalysis - [01-24-19 @ 04:30]      Color Red / Appearance Turbid / SG >=1.030 / pH 5.0      Gluc 100 / Ketone 40  / Bili Large / Urobili 1.0       Blood Large / Protein >=300 / Leuk Est Moderate / Nitrite Positive      RBC >50 / WBC  / Hyaline  / Gran 3-5 / Sq Epi  / Non Sq Epi  / Bacteria Moderate    HbA1c 5.7      [01-11-19 @ 07:14]  Lipid: chol 171, TG 43, HDL 90, LDL 78      [01-11-19 @ 07:14]

## 2019-02-04 NOTE — DISCHARGE NOTE ADULT - CARE PLAN
Principal Discharge DX:	Chronic obstructive pulmonary disease with acute exacerbation Principal Discharge DX:	Chronic obstructive pulmonary disease with acute exacerbation  Goal:	Evaluation and therapy  Assessment and plan of treatment:	You had COPD exacerbation please continue Symbicort and Spiriva.  Please follow up with Dr. Milligan as outpatient  Secondary Diagnosis:	Pulmonary arterial hypertension  Goal:	Evaluation and therapy  Assessment and plan of treatment:	You were found to have Pulmonary Arterial Hypertension. You were previously on Lasix please stop taking Lasix until you follow up with you PCP.  Secondary Diagnosis:	Pressure ulcer  Goal:	Evaluation and therapy  Assessment and plan of treatment:	Please provide daily wound dressing with Nystatin powder to Principal Discharge DX:	Chronic obstructive pulmonary disease with acute exacerbation  Goal:	Evaluation and therapy  Assessment and plan of treatment:	You had COPD exacerbation please continue Symbicort and Spiriva.  Please follow up with Dr. Milligan as outpatient  Secondary Diagnosis:	Pulmonary arterial hypertension  Goal:	Evaluation and therapy  Assessment and plan of treatment:	You were found to have Pulmonary Arterial Hypertension. You were previously on Lasix please stop taking Lasix until you follow up with you PCP.  Secondary Diagnosis:	Rash  Goal:	Evaluation and therapy  Assessment and plan of treatment:	Please provide daily wound dressing with Nystatin powder to affected region BID.  Secondary Diagnosis:	Bladder mass  Goal:	Evaluation and therapy  Assessment and plan of treatment:	You were fount to have bladder mas please outpatient with Dr. Coleman to further work up.

## 2019-02-04 NOTE — DISCHARGE NOTE ADULT - CARE PROVIDER_API CALL
Mulugeta Bunch)  Geriatric Medicine; Internal Medicine  [address] [place] [address]  Phone: [phone]  Fax: [phone]  Follow Up Time:     Eliana Milligan)  Internal Medicine  [address] [place] [address]  Phone: [phone]  Fax: [phone]  Follow Up Time:     Misael Coleman)  Urology  [address] [place] [address] [place] [address]  Phone: [phone]  Fax: [phone]  Follow Up Time:     Jorge Ceja)  Cardiovascular Disease; Nuclear Cardiology  [address] [facility], [address]  [place] [address]  Phone: [phone]  Fax: [phone]  Follow Up Time:

## 2019-02-04 NOTE — DISCHARGE NOTE ADULT - ADDITIONAL INSTRUCTIONS
Please, with in two weeks of discharge follow up with Dr. Bunch  Please, with in two weeks of discharge follow up with Dr. Coleman  Please, with in two weeks of discharge follow up with Dr. Milligan  Please, with in two weeks of discharge follow up with Dr. Ceja

## 2019-02-04 NOTE — DISCHARGE NOTE ADULT - MEDICATION SUMMARY - MEDICATIONS TO TAKE
I will START or STAY ON the medications listed below when I get home from the hospital:    aspirin 81 mg oral tablet  -- 1 tab(s) by mouth 2 times a week   -- Indication: For Heart Health     Keppra 500 mg oral tablet  -- 1 tab(s) by mouth 2 times a day  -- Indication: For  Seizure prophylaxsis     Lipitor 10 mg oral tablet  -- 1 tab(s) by mouth once a day  -- Indication: For Hyperlipidemia     clopidogrel 75 mg oral tablet  -- 1 tab(s) by mouth once a day  -- Indication: For Cardiovascular disease.     metoprolol tartrate 25 mg oral tablet  -- 1 tab(s) by mouth 2 times a day  -- Indication: For Heart Rate control     albuterol 2.5 mg/3 mL (0.083%) inhalation solution  -- 3 milliliter(s) inhaled every 6 hours  -- Indication: For COPD    budesonide-formoterol 160 mcg-4.5 mcg/inh inhalation aerosol  -- 2 puff(s) inhaled 2 times a day   -- Indication: For COPD     nystatin 100,000 units/g topical powder  -- 1 application on skin 2 times a day  -- Indication: For woud care     Pletal 100 mg oral tablet  -- 1 tab(s) by mouth 2 times a day  -- Indication: For Aterial Disease     pantoprazole 40 mg oral delayed release tablet  -- 1 tab(s) by mouth once a day (before a meal)  -- Indication: For GERD     Centrum oral tablet  -- 1 tab(s) by mouth once a day  -- Indication: For Vitamin

## 2019-02-04 NOTE — DISCHARGE NOTE ADULT - HOSPITAL COURSE
Ms. Belle is an 84 yo F, CCU Downgrade, PMHx COPD on 2 L NC, PVD, CKD3, HTN, DLD, hx of CVA, ex-smoker, recently discharged from [facility] on 01/13 after PNA tx w/Levaquin and Prednisone, presented/w SOB and dysuria. She was doing well until 01/20 when she starting feeling SOB again.     Pt was admitted to CCU for acute on chronic hypoxic and chronic hypercapnic respiratory failure 2/2 COPD exacerbation likely due to RLL GNR PNA exacerbated by coronavirus coinfection. She was on BiPaP prn, solumedrol, bronchodilators and Meropenem. She was treated for distal DVT and subsequently developed hematuria. Found to have a bladder mass on imaging.  Further w/u was positive for moderate ascites (2D echo), +UA (negative urine and blood cultures). MRSA and Flu were negative  IMPRESSION   ACUTE HYPOXIC  RESPIRATORY FAILURE-IMPROVING  PNEUMONIA- BETTER  RIGHT PLEURAL EFFUSION  PULMONARY HYPERTENSION  RIGHT TIBIAL VEIN THROMBOSIS-CHRONIC  BLADDER TUMOR  ANEMIA-STABLE  LEUKOCYTOSIS-DUE TO STEROIDS  HEMATURIA DUE TO BLADDER TUMOR  CKD-STAGE 3  OLD CVA  AORTO-FEM BYPASS  RIGHT CAROTID STENT  PVD  ABNORMAL LFT'S DUE TO MEDS AND INFECTION    PLAN:   ANTIBIOTICS  CAN BE STOPPED  NASAL O2 -HOME DEPENDENT  REHAB AT SNF  OUTPATIENT FOLLOW UP AND W/U FOR BLADDER TUMOR AS PER UROLOGIST

## 2019-02-04 NOTE — DISCHARGE NOTE ADULT - CARE PROVIDERS DIRECT ADDRESSES
,DirectAddress_Unknown,DirectAddress_Unknown,DirectAddress_Unknown,[email or web address]

## 2019-02-04 NOTE — DISCHARGE NOTE ADULT - PROVIDER TOKENS
PROVIDER:[TOKEN:[[id number]]],PROVIDER:[TOKEN:[[id number]]],PROVIDER:[TOKEN:[[id number]]],PROVIDER:[TOKEN:[[id number]]]

## 2019-02-04 NOTE — DISCHARGE NOTE ADULT - MEDICATION SUMMARY - MEDICATIONS TO STOP TAKING
I will STOP taking the medications listed below when I get home from the hospital:    furosemide 20 mg oral tablet  -- 1 tab(s) by mouth every 7 days    predniSONE 20 mg oral tablet  -- 2 tab(s) by mouth once a day   -- It is very important that you take or use this exactly as directed.  Do not skip doses or discontinue unless directed by your doctor.  Obtain medical advice before taking any non-prescription drugs as some may affect the action of this medication.  Take with food or milk.    Levaquin 500 mg oral tablet  -- 1 tab(s) by mouth once a day   -- Avoid prolonged or excessive exposure to direct and/or artificial sunlight while taking this medication.  Do not take dairy products, antacids, or iron preparations within one hour of this medication.  Finish all this medication unless otherwise directed by prescriber.  May cause drowsiness or dizziness.  Medication should be taken with plenty of water.

## 2019-02-04 NOTE — PROGRESS NOTE ADULT - ASSESSMENT
Acute on chronic hypoxic respiratory failure  Copd exacerbation, improved  s/p HCAP  Chronic diastolic chf        oxygen per pulse oximetry  off antibiotic, monitor wbc  off prednisone  continue symbicort and spiriva  out of bed/physical therapy  follow up as outpt

## 2019-02-04 NOTE — DISCHARGE NOTE ADULT - PLAN OF CARE
Evaluation and therapy You had COPD exacerbation please continue Symbicort and Spiriva.  Please follow up with Dr. Milligan as outpatient You were found to have Pulmonary Arterial Hypertension. You were previously on Lasix please stop taking Lasix until you follow up with you PCP. Please provide daily wound dressing with Nystatin powder to Please provide daily wound dressing with Nystatin powder to affected region BID. You were fount to have bladder mas please outpatient with Dr. Coleman to further work up.

## 2019-02-04 NOTE — DISCHARGE NOTE ADULT - PATIENT PORTAL LINK FT
You can access the [facility] Patient Portal, offered by [facility], by registering with the following website: [email or web address]

## 2019-02-05 NOTE — PROGRESS NOTE ADULT - ASSESSMENT
84 yo F, CCU Downgrade, PMHx COPD on 2 L NC, PVD, CKD3, HTN, DLD, hx of CVA, ex-smoker, recently discharged from [facility] on 01/13 after PNA tx w/Levaquin and Prednisone, presented/w SOB and dysuria. She was doing well until 01/20 when she starting feeling SOB again.     Pt was admitted to CCU for acute on chronic hypoxic and chronic hypercapnic respiratory failure 2/2 COPD exacerbation likely due to RLL GNR PNA exacerbated by coronavirus coinfection. She was on BiPaP prn, solumedrol, bronchodilators and Meropenem. She was treated for distal DVT and subsequently developed hematuria. Further w/u was positive for moderate ascites (2D echo), +UA (negative urine and blood cultures). MRSA and Flu were negative.       Today Events: hypernatremia  giving free water  , DC planing, awaiting 4A placement  on NC O2.    IMPRESSION   Possible PNA unchanged Xray   COPD exacerbation resolved.   Hypernatremia     Plan  # Acute on chronic hypoxic and chronic hypercapnic respiratory failure 2/2 COPD exacerbation 2/2 Possible RLL GNR pneumonia exacerbated by coronavirus coinfection: worse  Continue BiPAP and O2 NC 4L  completed Prednisone taper  All cxs: Negative  Pulm on board  Chest xray today: unchanged   s/p completion od abx course     # Mild Hypernatremia:   -will encourage free water intake   - gave free water      # Hypoxic episode and troponinemia likely 2/2 worsening RLL PNA  Cardio consult appreciated: recs below  Troponin elevation could be due to type 2 MI  Continue ASA , plavix ,metoprolol and statin.  No cardiac intervention needed at this time.  Consider CT Chest, ID and Pulm reconsult    # Hypertension:  Cont. Furosemide and Metoprolol  Montor BP  Nephrology f/u appreciated: recs noted    # HFpEF: Stable  ECHO 1/22- EF 55-60%, severe pulm HTN, decreased RV systolic fx, RV vol and pressure overload,   RV enlarged, severe mitral annular calcification    Lasix 20 mg every other day    # Right Tibial vein DVT: Stable  Duplex LE- Right post tibial DVT  No need for therapeutic AC as per Dr Dunham- will repeat Duplex unremarkable      Cont prophylactic Heparin    # Hematuria: Resolved  Cont prophylactic heparin   HGB stable  CT Urogram: 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  1.2 cm right hepatic lobe lesion with arterial enhancement, incompletely characterized but could be a benign lesion. Consider nonemergent MRI of the abdomen with contrast if clinically indicated.    # Mod ascites noted on echo  Lasix for now    # Transaminitis: waxing and waning  RUQ US: negative    # Constipation: Resolved   Cont. Senna, Colace  lactulose PRN     Electrolyte Imbalances: Correct as needed  []  Hyponatremia   /   Hypernatremia  [x]   []  Hypokalemia   /   Hyperkalemia  []   []  Hypochlorhydria   /    Hypochlorhydria  []   []  Hypomagnesemia   /   Hypermagnesemia  []   []  Hypophosphatemia   /   Hyperphosphatemia  []       GI ppx:                                   [] Not indicated   /   [x] Pantoprazole 40mg PO Daily    DVT ppx:  [] Not indicated / [x] Heparin 5000mg SubQ / [] Lovenox 40mg SubQ / [] SCDs    Fluids:   [x] PO  |  [] IVF    Activity:  [X] Assisatnce needed   [X] Increase as Tolerated  /  [] OOB w/ assist  /  [] Bed Rest      DISPO:  Patient to be discharged when condition(s) optimized. DC planing  [ ] From Home     [ x] NH/SNF   [ ] 4A Rehab  [ ] Detox Clinic  [X] Plan Discussion with patient and/or family.  [X] Discussed Case and Plan with the Medical Attending.    CODE STATUS  [X] FULL   /    [] DNR

## 2019-02-05 NOTE — PROGRESS NOTE ADULT - SUBJECTIVE AND OBJECTIVE BOX
[place] NEPHROLOGY FOLLOW UP NOTE  --------------------------------------------------------------------------------  24 hour events/subjective: Patient examined. Appears comfortable.    PAST HISTORY  --------------------------------------------------------------------------------  No significant changes to PMH, PSH, FHx, SHx, unless otherwise noted    ALLERGIES & MEDICATIONS  --------------------------------------------------------------------------------  Allergies    No Known Allergies    Standing Inpatient Medications  ALBUTerol    90 MICROgram(s) HFA Inhaler 1 Puff(s) Inhalation every 4 hours  ALBUTerol/ipratropium for Nebulization 3 milliLiter(s) Nebulizer every 6 hours  aspirin enteric coated 81 milliGRAM(s) Oral <User Schedule>  atorvastatin 10 milliGRAM(s) Oral at bedtime  buDESOnide 160 MICROgram(s)/formoterol 4.5 MICROgram(s) Inhaler 2 Puff(s) Inhalation two times a day  chlorhexidine 4% Liquid 1 Application(s) Topical <User Schedule>  cilostazol 100 milliGRAM(s) Oral two times a day  clopidogrel Tablet 75 milliGRAM(s) Oral daily  docusate sodium 100 milliGRAM(s) Oral two times a day  heparin  Injectable 5000 Unit(s) SubCutaneous every 8 hours  levETIRAcetam 500 milliGRAM(s) Oral two times a day  metoprolol tartrate 25 milliGRAM(s) Oral two times a day  nystatin Powder 1 Application(s) Topical two times a day  pantoprazole    Tablet 40 milliGRAM(s) Oral before breakfast  senna 2 Tablet(s) Oral at bedtime  tiotropium 18 MICROgram(s) Capsule 1 Capsule(s) Inhalation daily    PRN Inpatient Medications  ALBUTerol/ipratropium for Nebulization 3 milliLiter(s) Nebulizer every 6 hours PRN  guaiFENesin    Syrup 100 milliGRAM(s) Oral every 6 hours PRN  lactulose Syrup 10 Gram(s) Oral every 6 hours PRN    VITALS/PHYSICAL EXAM  --------------------------------------------------------------------------------  T(C): 36.9 (02-05-19 @ 13:20), Max: 36.9 (02-05-19 @ 06:00)  HR: 98 (02-05-19 @ 13:20) (98 - 99)  BP: 97/52 (02-05-19 @ 13:20) (97/52 - 99/54)  RR: 18 (02-05-19 @ 13:20) (16 - 18)    02-04-19 @ 07:01  -  02-05-19 @ 07:00  --------------------------------------------------------  IN: 150 mL / OUT: 20 mL / NET: 130 mL    Physical Exam:  	Gen: NAD  	Pulm: CTA B/L  	CV: RRR, S1S2  	Abd: +BS, soft, nontender/nondistended  	: No suprapubic tenderness  	LE: Warm,  no edema    LABS/STUDIES  --------------------------------------------------------------------------------              9.7    15.05 >-----------<  315      [02-05-19 @ 08:25]              30.9     148  |  105  |  33  ----------------------------<  163      [02-05-19 @ 08:25]  3.8   |  28  |  1.2        Ca     9.0     [02-05-19 @ 08:25]      Mg     2.1     [02-05-19 @ 08:25]      Phos  3.7     [02-05-19 @ 08:25]    TPro  5.3  /  Alb  3.4  /  TBili  0.4  /  DBili  x   /  AST  55  /  ALT  102  /  AlkPhos  73  [02-04-19 @ 07:57]    Creatinine Trend:  SCr 1.2 [02-05 @ 08:25]  SCr 1.1 [02-04 @ 07:57]  SCr 1.0 [02-03 @ 06:48]  SCr 1.2 [02-02 @ 06:23]  SCr 1.1 [02-01 @ 06:24]    Urinalysis - [01-24-19 @ 04:30]      Color Red / Appearance Turbid / SG >=1.030 / pH 5.0      Gluc 100 / Ketone 40  / Bili Large / Urobili 1.0       Blood Large / Protein >=300 / Leuk Est Moderate / Nitrite Positive      RBC >50 / WBC  / Hyaline  / Gran 3-5 / Sq Epi  / Non Sq Epi  / Bacteria Moderate    HbA1c 5.7      [01-11-19 @ 07:14]  Lipid: chol 171, TG 43, HDL 90, LDL 78      [01-11-19 @ 07:14]

## 2019-02-05 NOTE — PROGRESS NOTE ADULT - ASSESSMENT
Acute on chronic hypoxic respiratory failure  Copd exacerbation, improved  s/p HCAP  Chronic diastolic chf        oxygen per pulse oximetry  off antibiotic, monitor wbc  completed prednisone course  continue symbicort and spiriva  out of bed/physical therapy  follow up as outpt

## 2019-02-05 NOTE — PROGRESS NOTE ADULT - ASSESSMENT
1. CKD III-stable  2. Bladder mass/hematuria  3. DVT  4. HTN  5. PNA    Plan:  Urology f/u-outpatient cyto  Continue lisinopril  Stop Lasix   Taper prednisone

## 2019-02-05 NOTE — PROGRESS NOTE ADULT - SUBJECTIVE AND OBJECTIVE BOX
LILA VELA  85y  Female      SUBJECTIVE:    c/o breathing better  Progress Note:      REVIEW OF SYSTEMS:    T(C): 36.9 (02-05-19 @ 06:00), Max: 36.9 (02-05-19 @ 06:00)  HR: 99 (02-04-19 @ 20:26) (99 - 129)  BP: 106/51 (02-04-19 @ 13:14) (106/51 - 106/51)  RR: 18 (02-05-19 @ 06:00) (16 - 18)  SpO2: --  Wt(kg): --Vital Signs Last 24 Hrs  T(C): 36.9 (05 Feb 2019 06:00), Max: 36.9 (05 Feb 2019 06:00)  T(F): 98.4 (05 Feb 2019 06:00), Max: 98.4 (05 Feb 2019 06:00)  HR: 99 (04 Feb 2019 20:26) (99 - 129)  BP: 106/51 (04 Feb 2019 13:14) (106/51 - 106/51)  BP(mean): --  RR: 18 (05 Feb 2019 06:00) (16 - 18)  SpO2: --    PHYSICAL EXAM:  lungs-clear  cor reg nl s! & s2  ext-no calf tenderness  LABS:                        9.5    15.46 )-----------( 307      ( 04 Feb 2019 07:57 )             31.2   02-04    149<H>  |  107  |  32<H>  ----------------------------<  170<H>  4.3   |  29  |  1.1    Ca    8.9      04 Feb 2019 07:57  Mg     2.0     02-04    TPro  5.3<L>  /  Alb  3.4<L>  /  TBili  0.4  /  DBili  x   /  AST  55<H>  /  ALT  102<H>  /  AlkPhos  73  02-04      RADIOLOGY:      IMPRESSION:  ACUTE HYPOXIC  RESPIRATORY FAILURE-IMPROVING  PNEUMONIA- BETTER  RIGHT PLEURAL EFFUSION  PULMONARY HYPERTENSION  RIGHT TIBIAL VEIN THROMBOSIS-CHRONIC  BLADDER TUMOR  ANEMIA-STABLE  LEUKOCYTOSIS-DUE TO STEROIDS  HEMATURIA DUE TO BLADDER TUMOR  CKD-STAGE 3  OLD CVA  AORTO-FEM BYPASS  RIGHT CAROTID STENT  PVD  ABNORMAL LFT'S DUE TO MEDS AND INFECTION            PLAN:   ANTIBIOTICS  CAN BE STOPPED  NASAL O2 -HOME DEPENDENT  REHAB AT SNF  OUTPATIENT FOLLOW UP AND W/U FOR BLADDER TUMOR AS PER UROLOGIST      I SPENT 30 MINS. EXAMINING,EVALUATING AND COUNSELING  PATIENT AND COORDINATING CARE WITH RESIDENT  BY ATTENDING PHYSICIAN

## 2019-02-05 NOTE — PROGRESS NOTE ADULT - SUBJECTIVE AND OBJECTIVE BOX
LENGTH OF HOSPITAL STAY: 15d      CHIEF COMPLAINT:   Patient is a 85y old  Female who presents with a chief complaint of sob (05 Feb 2019 13:36)      OVER Past 24hrs:  The patient was seen and examined at bedside there were no acute during the night.  She feel well today. She denies fever chill chest pain or increased sob.       PAST MEDICAL & SURGICAL HISTORY  PAST MEDICAL & SURGICAL HISTORY:  PVD (peripheral vascular disease)  COPD (chronic obstructive pulmonary disease)  Kidney disease, chronic, stage I (GFR over 89 ml/min)  Peripheral vascular disease  HTN (hypertension)  High cholesterol  CVA (cerebral vascular accident)  H/O aorto-femoral bypass  History of right common carotid artery stent placement        REVIEW OF SYSTEMS  CONSTITUTIONAL: No fever  RESPIRATORY: No cough, wheezing, chills or hemoptysis; + shortness of breath  CARDIOVASCULAR: No chest pain, palpitations, dizziness, or leg swelling    ALLERGIES:  No Known Allergies    MEDICATIONS:  STANDING MEDICATIONS  ALBUTerol    90 MICROgram(s) HFA Inhaler 1 Puff(s) Inhalation every 4 hours  ALBUTerol/ipratropium for Nebulization 3 milliLiter(s) Nebulizer every 6 hours  aspirin enteric coated 81 milliGRAM(s) Oral <User Schedule>  atorvastatin 10 milliGRAM(s) Oral at bedtime  buDESOnide 160 MICROgram(s)/formoterol 4.5 MICROgram(s) Inhaler 2 Puff(s) Inhalation two times a day  chlorhexidine 4% Liquid 1 Application(s) Topical <User Schedule>  cilostazol 100 milliGRAM(s) Oral two times a day  clopidogrel Tablet 75 milliGRAM(s) Oral daily  docusate sodium 100 milliGRAM(s) Oral two times a day  heparin  Injectable 5000 Unit(s) SubCutaneous every 8 hours  levETIRAcetam 500 milliGRAM(s) Oral two times a day  metoprolol tartrate 25 milliGRAM(s) Oral two times a day  nystatin Powder 1 Application(s) Topical two times a day  pantoprazole    Tablet 40 milliGRAM(s) Oral before breakfast  senna 2 Tablet(s) Oral at bedtime  tiotropium 18 MICROgram(s) Capsule 1 Capsule(s) Inhalation daily    PRN MEDICATIONS  ALBUTerol/ipratropium for Nebulization 3 milliLiter(s) Nebulizer every 6 hours PRN  guaiFENesin    Syrup 100 milliGRAM(s) Oral every 6 hours PRN  lactulose Syrup 10 Gram(s) Oral every 6 hours PRN    VITALS:   T(F): 98.5  HR: 98  BP: 97/52  RR: 18  SpO2: 91%    PHYSICAL EXAM:  General: No acute distress.  Alert, oriented, interactive, nonfocal.    HEENT: Pupils equal, reactive to light symmetrically.    PULM: Clear to auscultation bilaterally, no significant sputum production. on NC     CVS: Regular rate and rhythm, no murmurs, rubs, or gallops.    ABD: Soft, nondistended, nontender, no masses.    EXT: No edema, nontender.    SKIN: Warm and well perfused, no rashes noted.      LABS:                        9.7    15.05 )-----------( 315      ( 05 Feb 2019 08:25 )             30.9     02-05    148<H>  |  105  |  33<H>  ----------------------------<  163<H>  3.8   |  28  |  1.2    Ca    9.0      05 Feb 2019 08:25  Phos  3.7     02-05  Mg     2.1     02-05    TPro  5.3<L>  /  Alb  3.4<L>  /  TBili  0.4  /  DBili  x   /  AST  55<H>  /  ALT  102<H>  /  AlkPhos  73  02-04

## 2019-02-05 NOTE — PROGRESS NOTE ADULT - SUBJECTIVE AND OBJECTIVE BOX
LILA VELA  85y, Female  Allergy: No Known Allergies      LAST 24-Hr EVENTS:  pt seen examined  sitting in chair  no complanits    VITALS:  T(F): 98.4 (02-05-19 @ 06:00), Max: 98.4 (02-05-19 @ 06:00)  HR: 99 (02-04-19 @ 20:26)  BP: 106/51 (02-04-19 @ 13:14) (106/51 - 106/51)  RR: 18 (02-05-19 @ 06:00)  SpO2: --    PHYSICAL EXAM:    GENERAL: NAD  NECK: Supple, No JVD  CHEST/LUNG: basilar crackles  HEART: Regular rate and rhythm  ABDOMEN: Soft, Nontender, Nondistended  EXTREMITIES:  No clubbing, edema absent        TESTS & MEASUREMENTS:    IN: 150 mL / OUT: 20 mL / NET: 130 mL                            9.7    15.05 )-----------( 315      ( 05 Feb 2019 08:25 )             30.9       02-04    149<H>  |  107  |  32<H>  ----------------------------<  170<H>  4.3   |  29  |  1.1    Ca    8.9      04 Feb 2019 07:57  Mg     2.0     02-04    TPro  5.3<L>  /  Alb  3.4<L>  /  TBili  0.4  /  DBili  x   /  AST  55<H>  /  ALT  102<H>  /  AlkPhos  73  02-04    LIVER FUNCTIONS - ( 04 Feb 2019 07:57 )  Alb: 3.4 g/dL / Pro: 5.3 g/dL / ALK PHOS: 73 U/L / ALT: 102 U/L / AST: 55 U/L / GGT: x                 MEDICATIONS:  MEDICATIONS  (STANDING):  ALBUTerol    90 MICROgram(s) HFA Inhaler 1 Puff(s) Inhalation every 4 hours  ALBUTerol/ipratropium for Nebulization 3 milliLiter(s) Nebulizer every 6 hours  aspirin enteric coated 81 milliGRAM(s) Oral <User Schedule>  atorvastatin 10 milliGRAM(s) Oral at bedtime  buDESOnide 160 MICROgram(s)/formoterol 4.5 MICROgram(s) Inhaler 2 Puff(s) Inhalation two times a day  chlorhexidine 4% Liquid 1 Application(s) Topical <User Schedule>  cilostazol 100 milliGRAM(s) Oral two times a day  clopidogrel Tablet 75 milliGRAM(s) Oral daily  docusate sodium 100 milliGRAM(s) Oral two times a day  furosemide    Tablet 20 milliGRAM(s) Oral <User Schedule>  heparin  Injectable 5000 Unit(s) SubCutaneous every 8 hours  levETIRAcetam 500 milliGRAM(s) Oral two times a day  metoprolol tartrate 25 milliGRAM(s) Oral two times a day  nystatin Powder 1 Application(s) Topical two times a day  pantoprazole    Tablet 40 milliGRAM(s) Oral before breakfast  senna 2 Tablet(s) Oral at bedtime  tiotropium 18 MICROgram(s) Capsule 1 Capsule(s) Inhalation daily    MEDICATIONS  (PRN):  ALBUTerol/ipratropium for Nebulization 3 milliLiter(s) Nebulizer every 6 hours PRN Shortness of Breath and/or Wheezing  guaiFENesin    Syrup 100 milliGRAM(s) Oral every 6 hours PRN Cough  lactulose Syrup 10 Gram(s) Oral every 6 hours PRN constipation

## 2019-02-05 NOTE — CHART NOTE - NSCHARTNOTEFT_GEN_A_CORE
Registered Dietitian Limited Follow Up     Off antibiotic, monitor wbc. Urology f/u-outpatient cyto. Rehab at SNF. Meds and labs reviewed; LBM 2/4. Skin intact and no edema noted. Pt was OOB to chair upon RD visit and reports that she has a good appetite, consuming half of her meal trays without any issues + Ensure Compact daily. No further nutritional interventions at this time; will reassess pt again in 7 days.

## 2019-02-06 NOTE — PROGRESS NOTE ADULT - ASSESSMENT
chronic hypoxic respiratory failure  s/p HCAP  copd s/p exacerbation  chronic diastolic chf      oxygen per pulse oximetry  bronchodilators  symbicort/spiriva  monitor off prednisone and antibiotic  gi/dvt prophylaxis  cardiac medications  agree with discharge planning to acute rehab

## 2019-02-06 NOTE — PROGRESS NOTE ADULT - SUBJECTIVE AND OBJECTIVE BOX
LILA VELA  85y  Female      SUBJECTIVE:    no c/o  Progress Note:      REVIEW OF SYSTEMS:    T(C): 36.2 (02-06-19 @ 05:38), Max: 36.9 (02-05-19 @ 13:20)  HR: 98 (02-06-19 @ 05:38) (98 - 125)  BP: 134/98 (02-06-19 @ 05:38) (84/48 - 134/98)  RR: 18 (02-06-19 @ 05:38) (17 - 18)  SpO2: 91% (02-05-19 @ 14:00) (91% - 91%)  Wt(kg): --Vital Signs Last 24 Hrs  T(C): 36.2 (06 Feb 2019 05:38), Max: 36.9 (05 Feb 2019 13:20)  T(F): 97.2 (06 Feb 2019 05:38), Max: 98.5 (05 Feb 2019 13:20)  HR: 98 (06 Feb 2019 05:38) (98 - 125)  BP: 134/98 (06 Feb 2019 05:38) (84/48 - 134/98)  BP(mean): --  RR: 18 (06 Feb 2019 05:38) (17 - 18)  SpO2: 91% (05 Feb 2019 14:00) (91% - 91%)    PHYSICAL EXAM:    lungs-clear  cor reg nl s! & s2  ext-no calf tendernessLABS:  LABS:                        9.7    15.05 )-----------( 315      ( 05 Feb 2019 08:25 )             30.9   02-05    148<H>  |  105  |  33<H>  ----------------------------<  163<H>  3.8   |  28  |  1.2    Ca    9.0      05 Feb 2019 08:25  Phos  3.7     02-05  Mg     2.1     02-05    TPro  5.3<L>  /  Alb  3.4<L>  /  TBili  0.4  /  DBili  x   /  AST  55<H>  /  ALT  102<H>  /  AlkPhos  73  02-04      RADIOLOGY:      IMPRESSION:    ACUTE HYPOXIC  RESPIRATORY FAILURE-RESOLVED  PNEUMONIA- BETTER  RIGHT PLEURAL EFFUSION  PULMONARY HYPERTENSION  RIGHT TIBIAL VEIN THROMBOSIS-CHRONIC  BLADDER TUMOR  ANEMIA-STABLE  LEUKOCYTOSIS-DUE TO STEROIDS  HEMATURIA DUE TO BLADDER TUMOR  CKD-STAGE 3  OLD CVA  AORTO-FEM BYPASS  RIGHT CAROTID STENT  PVD  ABNORMAL LFT'S DUE TO MEDS AND INFECTION            PLAN:  NASAL O2 -HOME DEPENDENT  AWAITING 4A TRANSFER FOR REHAB  OUTPATIENT FOLLOW UP AND W/U FOR BLADDER TUMOR AS PER UROLOGIST- DISCUSSED WITH SON AT BEDSIDE      I SPENT 30 MINS. EXAMINING,EVALUATING AND COUNSELING  PATIENT  AND SON AND COORDINATING CARE WITH RESIDENT  BY ATTENDING PHYSICIAN

## 2019-02-06 NOTE — PROGRESS NOTE ADULT - SUBJECTIVE AND OBJECTIVE BOX
[place] NEPHROLOGY FOLLOW UP NOTE  --------------------------------------------------------------------------------  24 hour events/subjective: Patient examined. Appears comfortable.    PAST HISTORY  --------------------------------------------------------------------------------  No significant changes to PMH, PSH, FHx, SHx, unless otherwise noted    ALLERGIES & MEDICATIONS  --------------------------------------------------------------------------------  Allergies    No Known Allergies    Standing Inpatient Medications  ALBUTerol    90 MICROgram(s) HFA Inhaler 1 Puff(s) Inhalation every 4 hours  ALBUTerol/ipratropium for Nebulization 3 milliLiter(s) Nebulizer every 6 hours  aspirin enteric coated 81 milliGRAM(s) Oral <User Schedule>  atorvastatin 10 milliGRAM(s) Oral at bedtime  buDESOnide 160 MICROgram(s)/formoterol 4.5 MICROgram(s) Inhaler 2 Puff(s) Inhalation two times a day  chlorhexidine 4% Liquid 1 Application(s) Topical <User Schedule>  cilostazol 100 milliGRAM(s) Oral two times a day  clopidogrel Tablet 75 milliGRAM(s) Oral daily  docusate sodium 100 milliGRAM(s) Oral two times a day  heparin  Injectable 5000 Unit(s) SubCutaneous every 8 hours  levETIRAcetam 500 milliGRAM(s) Oral two times a day  metoprolol tartrate 25 milliGRAM(s) Oral two times a day  nystatin Powder 1 Application(s) Topical two times a day  pantoprazole    Tablet 40 milliGRAM(s) Oral before breakfast  senna 2 Tablet(s) Oral at bedtime  tiotropium 18 MICROgram(s) Capsule 1 Capsule(s) Inhalation daily    PRN Inpatient Medications  ALBUTerol/ipratropium for Nebulization 3 milliLiter(s) Nebulizer every 6 hours PRN  guaiFENesin    Syrup 100 milliGRAM(s) Oral every 6 hours PRN  lactulose Syrup 10 Gram(s) Oral every 6 hours PRN    VITALS/PHYSICAL EXAM  --------------------------------------------------------------------------------  T(C): 36.2 (02-06-19 @ 05:38), Max: 36.9 (02-05-19 @ 13:20)  HR: 98 (02-06-19 @ 05:38) (98 - 125)  BP: 134/98 (02-06-19 @ 05:38) (84/48 - 134/98)  RR: 18 (02-06-19 @ 05:38) (17 - 18)  SpO2: 91% (02-05-19 @ 14:00) (91% - 91%)    Physical Exam:  	Gen: NAD  	Pulm: CTA B/L  	CV: RRR, S1S2  	Abd: +BS, soft, nontender/nondistended  	: No suprapubic tenderness  	LE: Warm, no edema    LABS/STUDIES  --------------------------------------------------------------------------------              9.7    15.05 >-----------<  315      [02-05-19 @ 08:25]              30.9     148  |  105  |  33  ----------------------------<  163      [02-05-19 @ 08:25]  3.8   |  28  |  1.2        Ca     9.0     [02-05-19 @ 08:25]      Mg     2.1     [02-05-19 @ 08:25]      Phos  3.7     [02-05-19 @ 08:25]    Creatinine Trend:  SCr 1.2 [02-05 @ 08:25]  SCr 1.1 [02-04 @ 07:57]  SCr 1.0 [02-03 @ 06:48]  SCr 1.2 [02-02 @ 06:23]  SCr 1.1 [02-01 @ 06:24]    Urinalysis - [01-24-19 @ 04:30]      Color Red / Appearance Turbid / SG >=1.030 / pH 5.0      Gluc 100 / Ketone 40  / Bili Large / Urobili 1.0       Blood Large / Protein >=300 / Leuk Est Moderate / Nitrite Positive      RBC >50 / WBC  / Hyaline  / Gran 3-5 / Sq Epi  / Non Sq Epi  / Bacteria Moderate    HbA1c 5.7      [01-11-19 @ 07:14]  Lipid: chol 171, TG 43, HDL 90, LDL 78      [01-11-19 @ 07:14]

## 2019-02-06 NOTE — PROGRESS NOTE ADULT - SUBJECTIVE AND OBJECTIVE BOX
LILA VELA  85y, Female  Allergy: No Known Allergies      LAST 24-Hr EVENTS:  complains of increased cough and generalized weakness  VITALS:  T(F): 97.2 (02-06-19 @ 05:38), Max: 98.5 (02-05-19 @ 13:20)  HR: 98 (02-06-19 @ 05:38)  BP: 134/98 (02-06-19 @ 05:38) (84/48 - 134/98)  RR: 18 (02-06-19 @ 05:38)  SpO2: 91% (02-05-19 @ 14:00)on low flow oxygen    PHYSICAL EXAM:    GENERAL: NAD, well-developed  HEAD:  Atraumatic, Normocephalic  NECK: Supple, No JVD  CHEST/LUNG: scattered rhonchi; No wheeze  HEART: Regular rate and rhythm; No murmurs, rubs, or gallops  ABDOMEN: Soft, Nontender, Nondistended; Bowel sounds present  EXTREMITIES:  2+ Peripheral Pulses, No clubbing, cyanosis, or edema        TESTS & MEASUREMENTS:    IN: 150 mL / OUT: 20 mL / NET: 130 mL                            9.7    15.05 )-----------( 315      ( 05 Feb 2019 08:25 )             30.9       02-05    148<H>  |  105  |  33<H>  ----------------------------<  163<H>  3.8   |  28  |  1.2    Ca    9.0      05 Feb 2019 08:25  Phos  3.7     02-05  Mg     2.1     02-05                  ABG & VENT SETTINGS: (when applicable)    n/a    RADIOLOGY & ADDITIONAL TESTS:    MEDICATIONS:  MEDICATIONS  (STANDING):  ALBUTerol    90 MICROgram(s) HFA Inhaler 1 Puff(s) Inhalation every 4 hours  ALBUTerol/ipratropium for Nebulization 3 milliLiter(s) Nebulizer every 6 hours  aspirin enteric coated 81 milliGRAM(s) Oral <User Schedule>  atorvastatin 10 milliGRAM(s) Oral at bedtime  buDESOnide 160 MICROgram(s)/formoterol 4.5 MICROgram(s) Inhaler 2 Puff(s) Inhalation two times a day  chlorhexidine 4% Liquid 1 Application(s) Topical <User Schedule>  cilostazol 100 milliGRAM(s) Oral two times a day  clopidogrel Tablet 75 milliGRAM(s) Oral daily  docusate sodium 100 milliGRAM(s) Oral two times a day  heparin  Injectable 5000 Unit(s) SubCutaneous every 8 hours  levETIRAcetam 500 milliGRAM(s) Oral two times a day  metoprolol tartrate 25 milliGRAM(s) Oral two times a day  nystatin Powder 1 Application(s) Topical two times a day  pantoprazole    Tablet 40 milliGRAM(s) Oral before breakfast  senna 2 Tablet(s) Oral at bedtime  tiotropium 18 MICROgram(s) Capsule 1 Capsule(s) Inhalation daily    MEDICATIONS  (PRN):  ALBUTerol/ipratropium for Nebulization 3 milliLiter(s) Nebulizer every 6 hours PRN Shortness of Breath and/or Wheezing  guaiFENesin    Syrup 100 milliGRAM(s) Oral every 6 hours PRN Cough  lactulose Syrup 10 Gram(s) Oral every 6 hours PRN constipation

## 2019-02-06 NOTE — PROGRESS NOTE ADULT - ASSESSMENT
84 yo F, CCU Downgrade, PMHx COPD on 2 L NC, PVD, CKD3, HTN, DLD, hx of CVA, ex-smoker, recently discharged from [facility] on 01/13 after PNA tx w/Levaquin and Prednisone, presented/w SOB and dysuria. She was doing well until 01/20 when she starting feeling SOB again.     Pt was admitted to CCU for acute on chronic hypoxic and chronic hypercapnic respiratory failure 2/2 COPD exacerbation likely due to RLL GNR PNA exacerbated by coronavirus coinfection. She was on BiPaP prn, solumedrol, bronchodilators and Meropenem. She was treated for distal DVT and subsequently developed hematuria. Further w/u was positive for moderate ascites (2D echo), +UA (negative urine and blood cultures). MRSA and Flu were negative.       Today Events: Dc lasix, no labs today, awaiting 4A placement     IMPRESSION    PNA resolved   COPD exacerbation resolved.        Plan  # Acute on chronic hypoxic and chronic hypercapnic respiratory failure 2/2 COPD exacerbation 2/2 Possible RLL GNR pneumonia exacerbated by coronavirus coinfection: worse  Continue BiPAP and O2 NC 4L  completed Prednisone taper  All cxs: Negative  Pulm on board  s/p completion od abx course     # Mild Hypernatremia:   -will encourage free water intake   - gave free water      # Hypoxic episode and troponinemia likely 2/2 worsening RLL PNA  Cardio consult appreciated: recs below  Troponin elevation could be due to type 2 MI  Continue ASA , plavix ,metoprolol and statin.  No cardiac intervention needed at this time.  Consider CT Chest, ID and Pulm reconsult    # Hypertension:  - Metoprolol  Montor BP  Nephrology f/u appreciated: recs noted    # HFpEF: Stable  ECHO 1/22- EF 55-60%, severe pulm HTN, decreased RV systolic fx, RV vol and pressure overload,   RV enlarged, severe mitral annular calcification      # Right Tibial vein DVT: Stable  Duplex LE- Right post tibial DVT  No need for therapeutic AC as per Dr Dunham- will repeat Duplex unremarkable    - ppx heparin     # Hematuria: Resolved  Cont prophylactic heparin   HGB stable  CT Urogram: 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  1.2 cm right hepatic lobe lesion with arterial enhancement, incompletely characterized but could be a benign lesion. Consider nonemergent MRI of the abdomen with contrast if clinically indicated.  - OP f/u urology      # Mod ascites noted on echo  -dc Lasix     # Transaminitis: waxing and waning  RUQ US: negative    # Constipation: Resolved   Cont. Senna, Colace  lactulose PRN     GI ppx:                                   [] Not indicated   /   [x] Pantoprazole 40mg PO Daily    DVT ppx:  [] Not indicated / [x] Heparin 5000mg SubQ / [] Lovenox 40mg SubQ / [] SCDs    Fluids:   [x] PO  |  [] IVF    Activity:  [X] Assisatnce needed   [X] Increase as Tolerated  /  [] OOB w/ assist  /  [] Bed Rest      DISPO:   DC to 4A  [ ] From Home     [ x] NH/SNF   [ ] 4A Rehab  [ ] Detox Clinic  [X] Plan Discussion with patient and/or family.  [X] Discussed Case and Plan with the Medical Attending.    CODE STATUS  [X] FULL   /    [] DNR 86 yo F, CCU Downgrade, PMHx COPD on 2 L NC, PVD, CKD3, HTN, DLD, hx of CVA, ex-smoker, recently discharged from [facility] on 01/13 after PNA tx w/Levaquin and Prednisone, presented/w SOB and dysuria. She was doing well until 01/20 when she starting feeling SOB again.     Pt was admitted to CCU for acute on chronic hypoxic and chronic hypercapnic respiratory failure 2/2 COPD exacerbation likely due to RLL GNR PNA exacerbated by coronavirus coinfection. She was on BiPaP prn, solumedrol, bronchodilators and Meropenem. She was treated for distal DVT and subsequently developed hematuria. Further w/u was positive for moderate ascites (2D echo), +UA (negative urine and blood cultures). MRSA and Flu were negative.       Today Events: Dc lasix, no labs today, patient denied by 4A now considering NH    IMPRESSION    PNA resolved   COPD exacerbation resolved.        Plan  # Acute on chronic hypoxic and chronic hypercapnic respiratory failure 2/2 COPD exacerbation 2/2 Possible RLL GNR pneumonia exacerbated by coronavirus coinfection: worse  Continue BiPAP and O2 NC 4L  completed Prednisone taper  All cxs: Negative  Pulm on board  s/p completion od abx course     # Mild Hypernatremia:   -will encourage free water intake   - gave free water      # Hypoxic episode and troponinemia likely 2/2 worsening RLL PNA  Cardio consult appreciated: recs below  Troponin elevation could be due to type 2 MI  Continue ASA , plavix ,metoprolol and statin.  No cardiac intervention needed at this time.  Consider CT Chest, ID and Pulm reconsult    # Hypertension:  - Metoprolol  Montor BP  Nephrology f/u appreciated: recs noted    # HFpEF: Stable  ECHO 1/22- EF 55-60%, severe pulm HTN, decreased RV systolic fx, RV vol and pressure overload,   RV enlarged, severe mitral annular calcification      # Right Tibial vein DVT: Stable  Duplex LE- Right post tibial DVT  No need for therapeutic AC as per Dr Dunham- will repeat Duplex unremarkable    - ppx heparin     # Hematuria: Resolved  Cont prophylactic heparin   HGB stable  CT Urogram: 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  1.2 cm right hepatic lobe lesion with arterial enhancement, incompletely characterized but could be a benign lesion. Consider nonemergent MRI of the abdomen with contrast if clinically indicated.  - OP f/u urology      # Mod ascites noted on echo  -dc Lasix     # Transaminitis: waxing and waning  RUQ US: negative    # Constipation: Resolved   Cont. Senna, Colace  lactulose PRN     GI ppx:                                   [] Not indicated   /   [x] Pantoprazole 40mg PO Daily    DVT ppx:  [] Not indicated / [x] Heparin 5000mg SubQ / [] Lovenox 40mg SubQ / [] SCDs    Fluids:   [x] PO  |  [] IVF    Activity:  [X] Assisatnce needed   [X] Increase as Tolerated  /  [] OOB w/ assist  /  [] Bed Rest      DISPO:   attempting NH placement   [ ] From Home     [ x] NH/SNF   [ ] 4A Rehab  [ ] Detox Clinic  [X] Plan Discussion with patient and/or family.  [X] Discussed Case and Plan with the Medical Attending.    CODE STATUS  [X] FULL   /    [] DNR

## 2019-02-06 NOTE — PROGRESS NOTE ADULT - ASSESSMENT
1. CKD III-stable  2. Bladder mass/hematuria  3. DVT  4. HTN  5. PNA    Plan:  Urology f/u-outpatient cyto  Continue lisinopril  Taper prednisone

## 2019-02-06 NOTE — PROGRESS NOTE ADULT - SUBJECTIVE AND OBJECTIVE BOX
LENGTH OF HOSPITAL STAY: 16d      CHIEF COMPLAINT:   Patient is a 85y old  Female who presents with a chief complaint of sob (06 Feb 2019 10:45)      OVER Past 24hrs:  The patient was seen and examined at bedside there were no acute events during the night. Patient denies any complaints.         PAST MEDICAL & SURGICAL HISTORY  PAST MEDICAL & SURGICAL HISTORY:  PVD (peripheral vascular disease)  COPD (chronic obstructive pulmonary disease)  Kidney disease, chronic, stage I (GFR over 89 ml/min)  Peripheral vascular disease  HTN (hypertension)  High cholesterol  CVA (cerebral vascular accident)  H/O aorto-femoral bypass  History of right common carotid artery stent placement        REVIEW OF SYSTEMS  CONSTITUTIONAL: No fever  RESPIRATORY: No cough, wheezing, chills or hemoptysis; + shortness of breath  CARDIOVASCULAR: No chest pain, palpitations, dizziness, or leg swelling    ALLERGIES:  No Known Allergies    MEDICATIONS:  STANDING MEDICATIONS  ALBUTerol    90 MICROgram(s) HFA Inhaler 1 Puff(s) Inhalation every 4 hours  ALBUTerol/ipratropium for Nebulization 3 milliLiter(s) Nebulizer every 6 hours  aspirin enteric coated 81 milliGRAM(s) Oral <User Schedule>  atorvastatin 10 milliGRAM(s) Oral at bedtime  buDESOnide 160 MICROgram(s)/formoterol 4.5 MICROgram(s) Inhaler 2 Puff(s) Inhalation two times a day  chlorhexidine 4% Liquid 1 Application(s) Topical <User Schedule>  cilostazol 100 milliGRAM(s) Oral two times a day  clopidogrel Tablet 75 milliGRAM(s) Oral daily  docusate sodium 100 milliGRAM(s) Oral two times a day  heparin  Injectable 5000 Unit(s) SubCutaneous every 8 hours  levETIRAcetam 500 milliGRAM(s) Oral two times a day  metoprolol tartrate 25 milliGRAM(s) Oral two times a day  nystatin Powder 1 Application(s) Topical two times a day  pantoprazole    Tablet 40 milliGRAM(s) Oral before breakfast  senna 2 Tablet(s) Oral at bedtime  tiotropium 18 MICROgram(s) Capsule 1 Capsule(s) Inhalation daily    PRN MEDICATIONS  ALBUTerol/ipratropium for Nebulization 3 milliLiter(s) Nebulizer every 6 hours PRN  guaiFENesin    Syrup 100 milliGRAM(s) Oral every 6 hours PRN  lactulose Syrup 10 Gram(s) Oral every 6 hours PRN    VITALS:   T(F): 97.2  HR: 98  BP: 134/98  RR: 18  SpO2: 91%    PHYSICAL EXAM:  General: No acute distress.  Alert, oriented, interactive, nonfocal.    HEENT: Pupils equal, reactive to light symmetrically.    PULM: Clear to auscultation bilaterally, no significant sputum production. on NC     CVS: Regular rate and rhythm, no murmurs, rubs, or gallops.    ABD: Soft, nondistended, nontender, no masses.    EXT: No edema, nontender.    SKIN: Warm and well perfused, no rashes noted.    LABS:                        9.7    15.05 )-----------( 315      ( 05 Feb 2019 08:25 )             30.9     02-05    148<H>  |  105  |  33<H>  ----------------------------<  163<H>  3.8   |  28  |  1.2    Ca    9.0      05 Feb 2019 08:25  Phos  3.7     02-05  Mg     2.1     02-05

## 2019-02-07 NOTE — PROGRESS NOTE ADULT - ASSESSMENT
84 yo F, CCU Downgrade, PMHx COPD on 2 L NC, PVD, CKD3, HTN, DLD, hx of CVA, ex-smoker, recently discharged from [facility] on 01/13 after PNA tx w/Levaquin and Prednisone, presented/w SOB and dysuria. She was doing well until 01/20 when she starting feeling SOB again.     Pt was admitted to CCU for acute on chronic hypoxic and chronic hypercapnic respiratory failure 2/2 COPD exacerbation likely due to RLL GNR PNA exacerbated by coronavirus coinfection. She was on BiPaP prn, solumedrol, bronchodilators and Meropenem. She was treated for distal DVT and subsequently developed hematuria. Further w/u was positive for moderate ascites (2D echo), +UA (negative urine and blood cultures). MRSA and Flu were negative.       Today Events: IV lasix for 24 hours , f/u labs including bnp, sob worsened overnight, cxr right LL worsening consolidation possible congestion in lower lobes ,  aspiration precautions, Pulm following, Consulted Cardio.     IMPRESSION    PNA   COPD exacerbation wersoning         Plan  # Acute on chronic hypoxic and chronic hypercapnic respiratory failure 2/2 COPD exacerbation 2/2 Possible RLL GNR pneumonia exacerbated by coronavirus coinfection: worse  Continue BiPAP and O2 NC 4L  completed Prednisone taper  All cxs: Negative  Pulm on board  s/p completion od abx course will reevaluate after lasix       # Hypoxic episode   Cardio reconsulted f/u recs     # Hypertension:  - Metoprolol  Montor BP      # HFpEF: Stable  ECHO 1/22- EF 55-60%, severe pulm HTN, decreased RV systolic fx, RV vol and pressure overload,   RV enlarged, severe mitral annular calcification      # Right Tibial vein DVT: Stable  Duplex LE- Right post tibial DVT  No need for therapeutic AC as per Dr Dunham- will repeat Duplex unremarkable    - ppx heparin     # Hematuria: Resolved  Cont prophylactic heparin   HGB stable  CT Urogram: 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  1.2 cm right hepatic lobe lesion with arterial enhancement, incompletely characterized but could be a benign lesion. Consider nonemergent MRI of the abdomen with contrast if clinically indicated.  - OP f/u urology      # Mod ascites noted on echo  -dc Lasix     # Transaminitis: waxing and waning  RUQ US: negative    # Constipation: Resolved   Cont. Senna, Colace  lactulose PRN     GI ppx:                                   [] Not indicated   /   [x] Pantoprazole 40mg PO Daily    DVT ppx:  [] Not indicated / [x] Heparin 5000mg SubQ / [] Lovenox 40mg SubQ / [] SCDs    Fluids:   [x] PO  |  [] IVF    Activity:  [X] Assisatnce needed   [X] Increase as Tolerated  /  [] OOB w/ assist  /  [] Bed Rest      DISPO:   attempting NH placement after optimization   [ ] From Home     [ x] NH/SNF   [ ] 4A Rehab  [ ] Detox Clinic  [X] Plan Discussion with patient and/or family.  [X] Discussed Case and Plan with the Medical Attending.    CODE STATUS  [X] FULL   /    [] DNR

## 2019-02-07 NOTE — CHART NOTE - NSCHARTNOTEFT_GEN_A_CORE
85 y.o. female who was admitted for acute on chronic hypoxic respiratory failure, HCAP, and COPD exacerbation, s/p CCU from 01/21/19 to 01/24/19 was here in 4C doing well, had completed abx course (ID was following, Dr. Yun), and was on a prednisone taper. It was SNF pending. 3 hours ago, notified by RN and intern that patient was altered and lethargic. Patient seen and examined at bedside. Patient was not responding appropriately. Son said patient is extremely different from baseline. He was at beside stating that patient has been needing more oxygen than baseline, usually on 2-3L at home. In addition, the patient has had a new cough that developed 2 days ago. Upon physical exam, patient's pupils were equal and reactive. Crackles and rales and wheezing were appreciated on lung examination. Also, her pulse ox was around 97% on a venti mask. I got a CXR and ABG, and transitioned her from venti mask to NC 4L for a goal of pulse ox 90-92%. In addition, 40mg IV lasix was given and solumedrol 60mg and duoneb treatment. CXR revealed the RLL PNA from before but appeared worse. Vancomycin and merrem was started. Solumderol 60mg IV q8h and lasix 40mg IV q12h was also started. After giving lasix and decrease pulse ox to 90-92% patient had notable improvement however, patient still appeared tachypneic and uncomfortable. ABG on venti mask was 7.435/ 50.8/ 69.9/ 34.1. Decision was made to place patient on BIPAP with a titration goal of 90-92%. Within next 30minutes, patient appeared more responsive and family said she was back to baseline, "was like a 180." Plan is to keep BIPAP on throughout the night and then 4hrs on and off. Will repeat ABG after being on BIPAP for 4hrs. Critical care (Dr. Eliaan Milligan) was called as she was following patient during the hospital stay. She agreed with plan and said to recall ID.     ABG - ( 07 Feb 2019 16:05 )  pH, Arterial: 7.44  pH, Blood: x     /  pCO2: 51    /  pO2: 70    / HCO3: 34    / Base Excess: 8.2   /  SaO2: 94        #) Assessment  -Acute on Chronic Respiratory Failure  -COPD Exacerbation (on home O2)  -GNR PNA (recently treated for HCAP)  -Pulmonary Edema    #) Things to F/u  -f/u w/ pulm recs  -f/u w/ ABG after being on BIPAP for 4hrs on and 4hrs off  -c/w solumderol 60q8 for now  -c/w lasix 40 IV q12h for now  -c/w duonebs q4h and PRN for now  -c/w BIPAP overnight  -recall ID for abx recs  -keep O2 90-92%

## 2019-02-07 NOTE — PROGRESS NOTE ADULT - SUBJECTIVE AND OBJECTIVE BOX
LILA VELA  85y, Female  Allergy: No Known Allergies      LAST 24-Hr EVENTS:  c/o sob overnight associated with orthopnea    VITALS:  T(F): 97.8 (02-07-19 @ 04:46), Max: 98 (02-06-19 @ 14:50)  HR: 103 (02-07-19 @ 04:46)  BP: 150/89 (02-07-19 @ 04:46) (110/57 - 150/89)  RR: 20 (02-07-19 @ 04:46)  SpO2: 91% (02-07-19 @ 09:55)    PHYSICAL EXAM:    GENERAL: NAD  NECK: Supple, No JVD  CHEST/LUNG: crackles  HEART: Regular rate and rhythm  ABDOMEN: Soft, Nontender, Nondistended  EXTREMITIES:  No clubbing, edema absent        TESTS & MEASUREMENTS:    IN: 0 mL / OUT: 200 mL / NET: -200 mL              MEDICATIONS:  MEDICATIONS  (STANDING):  ALBUTerol    90 MICROgram(s) HFA Inhaler 1 Puff(s) Inhalation every 4 hours  ALBUTerol/ipratropium for Nebulization 3 milliLiter(s) Nebulizer every 6 hours  aspirin enteric coated 81 milliGRAM(s) Oral <User Schedule>  atorvastatin 10 milliGRAM(s) Oral at bedtime  buDESOnide 160 MICROgram(s)/formoterol 4.5 MICROgram(s) Inhaler 2 Puff(s) Inhalation two times a day  chlorhexidine 4% Liquid 1 Application(s) Topical <User Schedule>  cilostazol 100 milliGRAM(s) Oral two times a day  clopidogrel Tablet 75 milliGRAM(s) Oral daily  docusate sodium 100 milliGRAM(s) Oral two times a day  furosemide   Injectable 40 milliGRAM(s) IV Push two times a day  heparin  Injectable 5000 Unit(s) SubCutaneous every 8 hours  levETIRAcetam 500 milliGRAM(s) Oral two times a day  metoprolol tartrate 25 milliGRAM(s) Oral two times a day  nystatin Powder 1 Application(s) Topical two times a day  pantoprazole    Tablet 40 milliGRAM(s) Oral before breakfast  senna 2 Tablet(s) Oral at bedtime  tiotropium 18 MICROgram(s) Capsule 1 Capsule(s) Inhalation daily    MEDICATIONS  (PRN):  ALBUTerol/ipratropium for Nebulization 3 milliLiter(s) Nebulizer every 6 hours PRN Shortness of Breath and/or Wheezing  guaiFENesin    Syrup 100 milliGRAM(s) Oral every 6 hours PRN Cough  lactulose Syrup 10 Gram(s) Oral every 6 hours PRN constipation

## 2019-02-07 NOTE — PROGRESS NOTE ADULT - SUBJECTIVE AND OBJECTIVE BOX
LENGTH OF HOSPITAL STAY: 17d      CHIEF COMPLAINT:   Patient is a 85y old  Female who presents with a chief complaint of sob (07 Feb 2019 12:04)      OVER Past 24hrs:  The patient was seen and examined at bedside no acute events over night she desaturated on NC and required BIPAP.  Today she denies fever and chills.        PAST MEDICAL & SURGICAL HISTORY  PAST MEDICAL & SURGICAL HISTORY:  PVD (peripheral vascular disease)  COPD (chronic obstructive pulmonary disease)  Kidney disease, chronic, stage I (GFR over 89 ml/min)  Peripheral vascular disease  HTN (hypertension)  High cholesterol  CVA (cerebral vascular accident)  H/O aorto-femoral bypass  History of right common carotid artery stent placement        REVIEW OF SYSTEMS  CONSTITUTIONAL: No fever  RESPIRATORY: No cough, wheezing, chills or hemoptysis; + shortness of breath  CARDIOVASCULAR: No chest pain, palpitations, dizziness, or leg swelling    ALLERGIES:  No Known Allergies    MEDICATIONS:  STANDING MEDICATIONS  ALBUTerol    90 MICROgram(s) HFA Inhaler 1 Puff(s) Inhalation every 4 hours  ALBUTerol/ipratropium for Nebulization 3 milliLiter(s) Nebulizer every 6 hours  aspirin enteric coated 81 milliGRAM(s) Oral <User Schedule>  atorvastatin 10 milliGRAM(s) Oral at bedtime  buDESOnide 160 MICROgram(s)/formoterol 4.5 MICROgram(s) Inhaler 2 Puff(s) Inhalation two times a day  chlorhexidine 4% Liquid 1 Application(s) Topical <User Schedule>  cilostazol 100 milliGRAM(s) Oral two times a day  clopidogrel Tablet 75 milliGRAM(s) Oral daily  docusate sodium 100 milliGRAM(s) Oral two times a day  furosemide   Injectable 40 milliGRAM(s) IV Push two times a day  heparin  Injectable 5000 Unit(s) SubCutaneous every 8 hours  levETIRAcetam 500 milliGRAM(s) Oral two times a day  metoprolol tartrate 25 milliGRAM(s) Oral two times a day  nystatin Powder 1 Application(s) Topical two times a day  pantoprazole    Tablet 40 milliGRAM(s) Oral before breakfast  senna 2 Tablet(s) Oral at bedtime  tiotropium 18 MICROgram(s) Capsule 1 Capsule(s) Inhalation daily    PRN MEDICATIONS  ALBUTerol/ipratropium for Nebulization 3 milliLiter(s) Nebulizer every 6 hours PRN  guaiFENesin    Syrup 100 milliGRAM(s) Oral every 6 hours PRN  lactulose Syrup 10 Gram(s) Oral every 6 hours PRN    VITALS:   T(F): 97.8  HR: 103  BP: 150/89  RR: 20  SpO2: 91%    PHYSICAL EXAM:  General: No acute distress.  Alert, oriented, interactive, nonfocal.    HEENT: Pupils equal, reactive to light symmetrically.    PULM: basilar crackles auscultation bilaterally, no significant sputum production. On NC now stable s/p Lasix     CVS: Regular rate and rhythm, no murmurs, rubs, or gallops.    ABD: Soft, nondistended, nontender, no masses.    EXT: No edema, nontender.    SKIN: Warm and well perfused, no rashes noted.

## 2019-02-07 NOTE — PROGRESS NOTE ADULT - ASSESSMENT
1. CKD III-stable  2. Bladder mass/hematuria  3. DVT  4. HTN  5. PNA    Plan:  Started on high-dose Lasix  Check BMP tomorrow  Urology f/u-outpatient cyto  Continue lisinopril  Taper prednisone

## 2019-02-07 NOTE — CHART NOTE - NSCHARTNOTEFT_GEN_A_CORE
Patient is using accessory muscles during breaths and is requiring More O2 to maintain saturation. She is also becoming more Lethargic. Vitals are stable.   Case discussed senior. CXR shows worsoning LL consolidation vs. congestion with worsening RLL,   Starting IV Solumedrol 60mg q8, Titrating O2 sat 88-90%, ABG, [place] and Vanc Started stat, EKG to follow. Patient is using accessory muscles during breaths and is requiring More O2 to maintain saturation. She is also becoming more Lethargic. Vitals are stable.   Case discussed senior. CXR shows worsoning LL consolidation vs. congestion with worsening RLL,   Starting IV Solumedrol 60mg q8, Titrating O2 sat 88-90%, ABG, [place] and Vanc Started stat, EKG to follow. Family notified

## 2019-02-07 NOTE — PROGRESS NOTE ADULT - SUBJECTIVE AND OBJECTIVE BOX
[facility] FOLLOW UP NOTE  --------------------------------------------------------------------------------  24 hour events/subjective: Patient examined. Appears comfortable. Required prolonged Bipap last night.    PAST HISTORY  --------------------------------------------------------------------------------  No significant changes to PMH, PSH, FHx, SHx, unless otherwise noted    ALLERGIES & MEDICATIONS  --------------------------------------------------------------------------------  Allergies    No Known Allergies    Intolerances      Standing Inpatient Medications  ALBUTerol    90 MICROgram(s) HFA Inhaler 1 Puff(s) Inhalation every 4 hours  ALBUTerol/ipratropium for Nebulization 3 milliLiter(s) Nebulizer every 6 hours  aspirin enteric coated 81 milliGRAM(s) Oral <User Schedule>  atorvastatin 10 milliGRAM(s) Oral at bedtime  buDESOnide 160 MICROgram(s)/formoterol 4.5 MICROgram(s) Inhaler 2 Puff(s) Inhalation two times a day  chlorhexidine 4% Liquid 1 Application(s) Topical <User Schedule>  cilostazol 100 milliGRAM(s) Oral two times a day  clopidogrel Tablet 75 milliGRAM(s) Oral daily  docusate sodium 100 milliGRAM(s) Oral two times a day  furosemide   Injectable 40 milliGRAM(s) IV Push two times a day  heparin  Injectable 5000 Unit(s) SubCutaneous every 8 hours  levETIRAcetam 500 milliGRAM(s) Oral two times a day  metoprolol tartrate 25 milliGRAM(s) Oral two times a day  nystatin Powder 1 Application(s) Topical two times a day  pantoprazole    Tablet 40 milliGRAM(s) Oral before breakfast  senna 2 Tablet(s) Oral at bedtime  tiotropium 18 MICROgram(s) Capsule 1 Capsule(s) Inhalation daily    PRN Inpatient Medications  ALBUTerol/ipratropium for Nebulization 3 milliLiter(s) Nebulizer every 6 hours PRN  guaiFENesin    Syrup 100 milliGRAM(s) Oral every 6 hours PRN  lactulose Syrup 10 Gram(s) Oral every 6 hours PRN    VITALS/PHYSICAL EXAM  --------------------------------------------------------------------------------  T(C): 36.6 (02-07-19 @ 04:46), Max: 36.7 (02-06-19 @ 14:50)  HR: 103 (02-07-19 @ 04:46) (103 - 112)  BP: 150/89 (02-07-19 @ 04:46) (110/57 - 150/89)  RR: 20 (02-07-19 @ 04:46) (18 - 20)  SpO2: 91% (02-07-19 @ 09:55) (79% - 94%)    02-06-19 @ 07:01  -  02-07-19 @ 07:00  --------------------------------------------------------  IN: 0 mL / OUT: 200 mL / NET: -200 mL    Physical Exam:  	Gen: NAD  	Pulm: CTA B/L  	CV: RRR, S1S2  	Abd: +BS, soft, nontender/nondistended  	: No suprapubic tenderness  	LE: Warm,  no edema    LABS/STUDIES  --------------------------------------------------------------------------------    Creatinine Trend:  SCr 1.2 [02-05 @ 08:25]  SCr 1.1 [02-04 @ 07:57]  SCr 1.0 [02-03 @ 06:48]  SCr 1.2 [02-02 @ 06:23]  SCr 1.1 [02-01 @ 06:24]    Urinalysis - [01-24-19 @ 04:30]      Color Red / Appearance Turbid / SG >=1.030 / pH 5.0      Gluc 100 / Ketone 40  / Bili Large / Urobili 1.0       Blood Large / Protein >=300 / Leuk Est Moderate / Nitrite Positive      RBC >50 / WBC  / Hyaline  / Gran 3-5 / Sq Epi  / Non Sq Epi  / Bacteria Moderate    HbA1c 5.7      [01-11-19 @ 07:14]  Lipid: chol 171, TG 43, HDL 90, LDL 78      [01-11-19 @ 07:14]

## 2019-02-07 NOTE — PROGRESS NOTE ADULT - SUBJECTIVE AND OBJECTIVE BOX
02-07-19 @ 12:04    LILA VELA  85y  Female  [place] last night. Was rather hypoxic. Needed prolonged Bipap, f/u by Enhanced O2, now better somewhat. Lasix given    INTERVAL EVENTS: See above    MEDICATIONS  (STANDING):  ALBUTerol    90 MICROgram(s) HFA Inhaler 1 Puff(s) Inhalation every 4 hours  ALBUTerol/ipratropium for Nebulization 3 milliLiter(s) Nebulizer every 6 hours  aspirin enteric coated 81 milliGRAM(s) Oral <User Schedule>  atorvastatin 10 milliGRAM(s) Oral at bedtime  buDESOnide 160 MICROgram(s)/formoterol 4.5 MICROgram(s) Inhaler 2 Puff(s) Inhalation two times a day  chlorhexidine 4% Liquid 1 Application(s) Topical <User Schedule>  cilostazol 100 milliGRAM(s) Oral two times a day  clopidogrel Tablet 75 milliGRAM(s) Oral daily  docusate sodium 100 milliGRAM(s) Oral two times a day  furosemide   Injectable 40 milliGRAM(s) IV Push two times a day  heparin  Injectable 5000 Unit(s) SubCutaneous every 8 hours  levETIRAcetam 500 milliGRAM(s) Oral two times a day  metoprolol tartrate 25 milliGRAM(s) Oral two times a day  nystatin Powder 1 Application(s) Topical two times a day  pantoprazole    Tablet 40 milliGRAM(s) Oral before breakfast  senna 2 Tablet(s) Oral at bedtime  tiotropium 18 MICROgram(s) Capsule 1 Capsule(s) Inhalation daily    MEDICATIONS  (PRN):  ALBUTerol/ipratropium for Nebulization 3 milliLiter(s) Nebulizer every 6 hours PRN Shortness of Breath and/or Wheezing  guaiFENesin    Syrup 100 milliGRAM(s) Oral every 6 hours PRN Cough  lactulose Syrup 10 Gram(s) Oral every 6 hours PRN constipation      T(C): 36.6 (02-07-19 @ 04:46), Max: 36.7 (02-06-19 @ 14:50)  HR: 103 (02-07-19 @ 04:46) (103 - 112)  BP: 150/89 (02-07-19 @ 04:46) (110/57 - 150/89)  RR: 20 (02-07-19 @ 04:46) (18 - 20)  SpO2: 91% (02-07-19 @ 09:55) (79% - 94%)  Wt(kg): --Vital Signs Last 24 Hrs  T(C): 36.6 (07 Feb 2019 04:46), Max: 36.7 (06 Feb 2019 14:50)  T(F): 97.8 (07 Feb 2019 04:46), Max: 98 (06 Feb 2019 14:50)  HR: 103 (07 Feb 2019 04:46) (103 - 112)  BP: 150/89 (07 Feb 2019 04:46) (110/57 - 150/89)  BP(mean): --  RR: 20 (07 Feb 2019 04:46) (18 - 20)  SpO2: 91% (07 Feb 2019 09:55) (79% - 94%)    PHYSICAL EXAM:  GENERAL:   NECK:   CHEST/LUNG:  HEART: S1  ABDOMEN:   EXTREMITIES:                     RADIOLOGY & ADDITIONAL TESTS:    Findings:    Support devices: None.    Cardiac/mediastinum/hilum: Unchanged.    Lung parenchyma/Pleura: Unchanged right lower lobe opacity. Small left   basilar opacity. No pneumothorax.    Skeleton/soft tissues: Unchanged.    Impression:      Unchanged right lower lobe opacity. Small left basilar opacity.      ELLIOT LANDAU M.D., ATTENDING RADIOLOGIST  This document has been electronically signed. Feb 4 2019 11:39AM        ASSESSMENT / PLAN  :    Worsening Hypoxia :   ACUTE HYPOXIC  RESPIRATORY FAILURE : IMPROVED. STABLE now. Appreciated f/u by pulm.   PNEUMONIA ; Off Abx. No fever No new sx.     COPD exacerbation ; has +viral. Chr. O2 dependent. BiPap at night, as advised by pulm. Stable.   BL PLEURAL EFFUSION : Chronic + possible 2' PNA.    RIGHT TIBIAL VEIN THROMBOSIS ; Conservative. Maintain ambulation. No Ac DVT.  ANEMIA ; chronic stable.   RECENT HEMATURIA : Seen by Uro. as advised, CT done, showed ant bladder mass. OP cysto & further management.    Elevated troponin : Cardio eval noted. Could be type II CAD, cont current conservative Tx, no further w/u now. EKG stable. Sx-atically stable.   LIVER Lesion : 1.2 cm, with art enhancement in CT. observe.   LEUKOCYTOSIS : 2' Steroid. Stable & Improving.   CKD III : Stable. Avoid dehydration   OLD CVA ; stable.   Hx. AORTO-FEM BYPASS  RIGHT CAROTID STENT  PVD ; Cont current Tx, risk fx control.         Discussed w/ resident. Will get Cardio f/u in view of worsening. 02-07-19 @ 12:04    LILA VELA  85y  Female  [place] last night. Was rather hypoxic. Needed prolonged Bipap, f/u by Enhanced O2, now better somewhat. Lasix given    INTERVAL EVENTS: See above    MEDICATIONS  (STANDING):  ALBUTerol    90 MICROgram(s) HFA Inhaler 1 Puff(s) Inhalation every 4 hours  ALBUTerol/ipratropium for Nebulization 3 milliLiter(s) Nebulizer every 6 hours  aspirin enteric coated 81 milliGRAM(s) Oral <User Schedule>  atorvastatin 10 milliGRAM(s) Oral at bedtime  buDESOnide 160 MICROgram(s)/formoterol 4.5 MICROgram(s) Inhaler 2 Puff(s) Inhalation two times a day  chlorhexidine 4% Liquid 1 Application(s) Topical <User Schedule>  cilostazol 100 milliGRAM(s) Oral two times a day  clopidogrel Tablet 75 milliGRAM(s) Oral daily  docusate sodium 100 milliGRAM(s) Oral two times a day  furosemide   Injectable 40 milliGRAM(s) IV Push two times a day  heparin  Injectable 5000 Unit(s) SubCutaneous every 8 hours  levETIRAcetam 500 milliGRAM(s) Oral two times a day  metoprolol tartrate 25 milliGRAM(s) Oral two times a day  nystatin Powder 1 Application(s) Topical two times a day  pantoprazole    Tablet 40 milliGRAM(s) Oral before breakfast  senna 2 Tablet(s) Oral at bedtime  tiotropium 18 MICROgram(s) Capsule 1 Capsule(s) Inhalation daily    MEDICATIONS  (PRN):  ALBUTerol/ipratropium for Nebulization 3 milliLiter(s) Nebulizer every 6 hours PRN Shortness of Breath and/or Wheezing  guaiFENesin    Syrup 100 milliGRAM(s) Oral every 6 hours PRN Cough  lactulose Syrup 10 Gram(s) Oral every 6 hours PRN constipation      T(C): 36.6 (02-07-19 @ 04:46), Max: 36.7 (02-06-19 @ 14:50)  HR: 103 (02-07-19 @ 04:46) (103 - 112)  BP: 150/89 (02-07-19 @ 04:46) (110/57 - 150/89)  RR: 20 (02-07-19 @ 04:46) (18 - 20)  SpO2: 91% (02-07-19 @ 09:55) (79% - 94%)  Wt(kg): --Vital Signs Last 24 Hrs  T(C): 36.6 (07 Feb 2019 04:46), Max: 36.7 (06 Feb 2019 14:50)  T(F): 97.8 (07 Feb 2019 04:46), Max: 98 (06 Feb 2019 14:50)  HR: 103 (07 Feb 2019 04:46) (103 - 112)  BP: 150/89 (07 Feb 2019 04:46) (110/57 - 150/89)  BP(mean): --  RR: 20 (07 Feb 2019 04:46) (18 - 20)  SpO2: 91% (07 Feb 2019 09:55) (79% - 94%)    PHYSICAL EXAM:  GENERAL: Supine, inclined, in bed. NAD. Does feel little tired. Looks tired  NECK: No JVD at 60'  CHEST/LUNG: Coarse BS w/o wheezing. Prolonged expn.   HEART: S1s2, reg.   ABDOMEN: soft benign.   EXTREMITIES: Dry, somewhat pale. Areas of ecchymoses.           RADIOLOGY & ADDITIONAL TESTS:      Echo review :    Summary:   1. Left ventricular ejection fraction, by visual estimation, is 55 to   60%.   2. Normal left ventricular size and wall thicknesses, with normal   systolic function.   3. Right ventricular volume and pressure overload.   4. Severely enlarged right ventricle.   5. Severely reduced RV systolic function.   6. Moderate ascites.   7. Severe mitral annular calcification.   8. Mild tricuspid re  gurgitation.   9. Estimated pulmonary artery systolic pressure is 64.6 mmHg assuming a   right atrial pressure of 3 mmHg, which is consistent with severe   pulmonary hypertension.  10. Pulmonary hypertension is present.      [id number] Rood Palacios MD, Electronically signed on 1/22/2019 at 3:24:59   PM            CXR :     Findings:    Support devices: None.    Cardiac/mediastinum/hilum: Unchanged.    Lung parenchyma/Pleura: Unchanged right lower lobe opacity. Small left   basilar opacity. No pneumothorax.    Skeleton/soft tissues: Unchanged.    Impression:      Unchanged right lower lobe opacity. Small left basilar opacity.      ELLIOT LANDAU M.D., ATTENDING RADIOLOGIST  This document has been electronically signed. Feb 4 2019 11:39AM        ASSESSMENT / PLAN  :    WORSENING SOB/HYPOXIA : Known COPD, Had RLL PNA c/w Bacterial + viral. Tx-ed w/ Abx, completed. Was doing better. Has Pulm HTN also. ? of recent type II MI. Will get reeval by Cardio, to help Tx plan.   ACUTE HYPOXIC  RESPIRATORY FAILURE : Somewhat worsened in her improved condition. Pulm f/u appreciated.   PNEUMONIA ; Off Abx. No fever No new sx.   See above also.   COPD exacerbation ; has +viral. Chr. O2 dependent. BiPap at night, as advised by pulm. Fluctuating state. Will repeat CXR   BL PLEURAL EFFUSION : was doing better. ? 2' CHF, did better after diuresis. Though clinically look dehydrated. Need to monitor Is-Os.   RIGHT TIBIAL VEIN THROMBOSIS ; Conservative. Maintain ambulation. No Ac DVT.  ANEMIA ; chronic stable.   RECENT HEMATURIA : Seen by Uro. as advised, CT done, showed ant bladder mass. OP cysto & further management, when stable.     CKD III : Stable. Avoid dehydration   OLD CVA ; stable.   Hx. AORTO-FEM BYPASS  RIGHT CAROTID STENT  PVD ; Cont current Tx, risk fx control.         Discussed w/ resident Dr. Roca. f/u CXR. Will get Cardio f/u in view of worsening.     Spent 30 min in care & coordination.

## 2019-02-08 NOTE — PROGRESS NOTE ADULT - ASSESSMENT
acute on chronic hypoxic respiratory failure  pneumonia  copd exacerbation  acute/chronic diastolic chf      continue bipap as needed, begin to wean off in the next 24 hours  continue bronchodilators  continue iv steroid as ordered  continue empiric antibiotic  can decrease furosemide to 40mg daily  cardiac medications  gi/dvt prophylaxis  hold physical therapy  monitor for further improvement

## 2019-02-08 NOTE — PROGRESS NOTE ADULT - SUBJECTIVE AND OBJECTIVE BOX
LILA VELA  85y, Female  Allergy: No Known Allergies      LAST 24-Hr EVENTS:  developed respiratory distress in past 24 hours, now requiring bipap support, but feeling better this am  VITALS:  T(F): 97.9 (02-08-19 @ 04:10), Max: 99.4 (02-07-19 @ 14:41)  HR: 100 (02-08-19 @ 04:10)  BP: 142/68 (02-08-19 @ 04:10) (97/58 - 142/68)  RR: 95 (02-08-19 @ 04:10)  SpO2: 92% (02-08-19 @ 09:19)    PHYSICAL EXAM:    GENERAL: NAD, well-developed  HEAD:  Atraumatic, Normocephalic  NECK: Supple, No JVD  CHEST/LUNG: Clear to auscultation bilaterally; No wheeze  HEART: Regular rate and rhythm; No murmurs, rubs, or gallops  ABDOMEN: Soft, Nontender, Nondistended; Bowel sounds present  EXTREMITIES:  2+ Peripheral Pulses, No clubbing, cyanosis, or edema        TESTS & MEASUREMENTS:    IN: 0 mL / OUT: 200 mL / NET: -200 mL    IN: 0 mL / OUT: 1225 mL / NET: -1225 mL                            10.3   11.54 )-----------( 248      ( 08 Feb 2019 06:44 )             32.8       02-08    149<H>  |  102  |  34<H>  ----------------------------<  95  3.8   |  31  |  1.3    Ca    8.8      08 Feb 2019 06:44  Phos  4.3     02-08  Mg     1.7     02-08                  ABG & VENT SETTINGS: (when applicable)  ABG - ( 07 Feb 2019 21:00 )  pH, Arterial: 7.56  pH, Blood: x     /  pCO2: 36    /  pO2: 85    / HCO3: 31    / Base Excess: 8.7   /  SaO2: 98                    RADIOLOGY & ADDITIONAL TESTS:  < from: Xray Chest 1 View-PORTABLE IMMEDIATE (02.07.19 @ 10:48) >    Lung parenchyma/Pleura: Persistent right lower lung opacity with blunted   costophrenic angle. No pneumothorax    Skeleton/soft tissues: Stable    Impression:      Persistent right lower lung opacity/effusion.      < end of copied text >    MEDICATIONS:  MEDICATIONS  (STANDING):  ALBUTerol    90 MICROgram(s) HFA Inhaler 1 Puff(s) Inhalation every 4 hours  ALBUTerol/ipratropium for Nebulization 3 milliLiter(s) Nebulizer every 6 hours  aspirin enteric coated 81 milliGRAM(s) Oral <User Schedule>  atorvastatin 10 milliGRAM(s) Oral at bedtime  buDESOnide 160 MICROgram(s)/formoterol 4.5 MICROgram(s) Inhaler 2 Puff(s) Inhalation two times a day  chlorhexidine 4% Liquid 1 Application(s) Topical <User Schedule>  cilostazol 100 milliGRAM(s) Oral two times a day  clopidogrel Tablet 75 milliGRAM(s) Oral daily  docusate sodium 100 milliGRAM(s) Oral two times a day  furosemide   Injectable 40 milliGRAM(s) IV Push two times a day  heparin  Injectable 5000 Unit(s) SubCutaneous every 8 hours  levETIRAcetam  IVPB 500 milliGRAM(s) IV Intermittent every 12 hours  meropenem  IVPB 1000 milliGRAM(s) IV Intermittent every 8 hours  methylPREDNISolone sodium succinate Injectable 60 milliGRAM(s) IV Push every 8 hours  metoprolol tartrate 25 milliGRAM(s) Oral two times a day  nystatin Powder 1 Application(s) Topical two times a day  pantoprazole    Tablet 40 milliGRAM(s) Oral before breakfast  senna 2 Tablet(s) Oral at bedtime  tiotropium 18 MICROgram(s) Capsule 1 Capsule(s) Inhalation daily  vancomycin  IVPB 750 milliGRAM(s) IV Intermittent every 12 hours  vancomycin  IVPB        MEDICATIONS  (PRN):  ALBUTerol/ipratropium for Nebulization 3 milliLiter(s) Nebulizer every 6 hours PRN Shortness of Breath and/or Wheezing  guaiFENesin    Syrup 100 milliGRAM(s) Oral every 6 hours PRN Cough  lactulose Syrup 10 Gram(s) Oral every 6 hours PRN constipation

## 2019-02-08 NOTE — PROGRESS NOTE ADULT - ASSESSMENT
1. CKD III-stable  2. Bladder mass/hematuria  3. DVT  4. HTN  5. PNA  6. R heart failure  7. Pulmonary HTN    Plan:  On IV Lasix  Check BMP tomorrow  Urology f/u-outpatient cyto  Continue lisinopril  Taper prednisone

## 2019-02-08 NOTE — PROGRESS NOTE ADULT - SUBJECTIVE AND OBJECTIVE BOX
DANE LILA  85y  Female      SUBJECTIVE:  appears anxious eating poorly on bipap intermittently and o2 supplement    PAST MEDICAL & SURGICAL HISTORY:  PVD (peripheral vascular disease)  COPD (chronic obstructive pulmonary disease)  Kidney disease, chronic, stage I (GFR over 89 ml/min)  Peripheral vascular disease  HTN (hypertension)  High cholesterol  CVA (cerebral vascular accident)  H/O aorto-femoral bypass  History of right common carotid artery stent placement    85y    REVIEW OF SYSTEMS:    T(C): 36.6 (02-08-19 @ 13:43), Max: 36.6 (02-08-19 @ 04:10)  HR: 100 (02-08-19 @ 13:43) (100 - 125)  BP: 126/64 (02-08-19 @ 13:43) (97/58 - 142/68)  RR: 18 (02-08-19 @ 13:43) (16 - 95)  SpO2: 94% (02-08-19 @ 13:55) (88% - 98%)  Wt(kg): --Vital Signs Last 24 Hrs  T(C): 36.6 (08 Feb 2019 13:43), Max: 36.6 (08 Feb 2019 04:10)  T(F): 97.8 (08 Feb 2019 13:43), Max: 97.9 (08 Feb 2019 04:10)  HR: 100 (08 Feb 2019 13:43) (100 - 125)  BP: 126/64 (08 Feb 2019 13:43) (97/58 - 142/68)  BP(mean): --  RR: 18 (08 Feb 2019 13:43) (16 - 95)  SpO2: 94% (08 Feb 2019 13:55) (88% - 98%)    MEDICATION:  ALBUTerol    90 MICROgram(s) HFA Inhaler 1 Puff(s) Inhalation every 4 hours  ALBUTerol/ipratropium for Nebulization 3 milliLiter(s) Nebulizer every 6 hours  ALBUTerol/ipratropium for Nebulization 3 milliLiter(s) Nebulizer every 6 hours PRN  aspirin enteric coated 81 milliGRAM(s) Oral <User Schedule>  atorvastatin 10 milliGRAM(s) Oral at bedtime  buDESOnide 160 MICROgram(s)/formoterol 4.5 MICROgram(s) Inhaler 2 Puff(s) Inhalation two times a day  chlorhexidine 4% Liquid 1 Application(s) Topical <User Schedule>  cilostazol 100 milliGRAM(s) Oral two times a day  clopidogrel Tablet 75 milliGRAM(s) Oral daily  docusate sodium 100 milliGRAM(s) Oral two times a day  furosemide   Injectable 40 milliGRAM(s) IV Push every 24 hours  guaiFENesin    Syrup 100 milliGRAM(s) Oral every 6 hours PRN  heparin  Injectable 5000 Unit(s) SubCutaneous every 8 hours  lactulose Syrup 10 Gram(s) Oral every 6 hours PRN  levETIRAcetam  IVPB 500 milliGRAM(s) IV Intermittent every 12 hours  magnesium sulfate  IVPB 2 Gram(s) IV Intermittent once  meropenem  IVPB 1000 milliGRAM(s) IV Intermittent every 8 hours  methylPREDNISolone sodium succinate Injectable 60 milliGRAM(s) IV Push every 8 hours  metoprolol tartrate 25 milliGRAM(s) Oral two times a day  nystatin Powder 1 Application(s) Topical two times a day  pantoprazole    Tablet 40 milliGRAM(s) Oral before breakfast  senna 2 Tablet(s) Oral at bedtime  tiotropium 18 MICROgram(s) Capsule 1 Capsule(s) Inhalation daily  vancomycin  IVPB 750 milliGRAM(s) IV Intermittent every 12 hours  vancomycin  IVPB          LABS:                       10.3   11.54 )-----------( 248      ( 08 Feb 2019 06:44 )             32.8     02-08    149<H>  |  102  |  34<H>  ----------------------------<  95  3.8   |  31  |  1.3    Ca    8.8      08 Feb 2019 06:44  Phos  4.3     02-08  Mg     1.7     02-08    RADIOLOGY:  < from: Xray Chest 1 View-PORTABLE IMMEDIATE (02.07.19 @ 10:48) >  Impression:      Persistent right lower lung opacity/effusion.      < end of copied text >    PHYSICAL EXAM:  awake alert appears anxious  heart rsr s1s2+  lungs clear  abdomen soft nontender bs+  extr - no edema    IMPRESSION:  acute/chronic hypoxic respiratory failure  COPD exacerbation  rt LL pneumonia  acute/chronic DIASTOLIC CHF   rt sided heart failure   severe pulmonary hypertension  CKD BUN /CR STABLE 34/1.3  leukocytosis - reactive from steroids /infection  BLADDER MASS - HEMATURIA   DVT - rt posterior tibial vein  PAD - s/p aorto- fem bypass  rt carotid stent    PLAN:  pulmonary f/u appreciated respiratory support  bipap /nebulizers/iv steroids  continue iv antibiotics  continue lasix iv  nephrology f/u noted  ID consult  cardiology f/u  calorie count   may need ngt feeds  continue sc heparin/clopidrogel

## 2019-02-08 NOTE — PROGRESS NOTE ADULT - SUBJECTIVE AND OBJECTIVE BOX
LENGTH OF HOSPITAL STAY: 18d      CHIEF COMPLAINT:   Patient is a 85y old  Female who presents with a chief complaint of sob (08 Feb 2019 11:21)      OVER Past 24hrs:  85 y.o. female who was admitted for acute on chronic hypoxic respiratory failure, HCAP, and COPD exacerbation, s/p CCU from 01/21/19 to 01/24/19 was here in 4C doing well, had completed abx course (ID was following, Dr. Yun), and was on a prednisone taper. It was SNF pending. 3 hours ago, notified by RN and intern that patient was altered and lethargic. Patient seen and examined at bedside. Patient was not responding appropriately. Son said patient is extremely different from baseline. He was at beside stating that patient has been needing more oxygen than baseline, usually on 2-3L at home. In addition, the patient has had a new cough that developed 2 days ago. Upon physical exam, patient's pupils were equal and reactive. Crackles and rales and wheezing were appreciated on lung examination. Also, her pulse ox was around 97% on a venti mask. I got a CXR and ABG, and transitioned her from venti mask to NC 4L for a goal of pulse ox 90-92%. In addition, 40mg IV lasix was given and solumedrol 60mg and duoneb treatment. CXR revealed the RLL PNA from before but appeared worse. Vancomycin and merrem was started. Solumderol 60mg IV q8h and lasix 40mg IV q12h was also started. After giving lasix and decrease pulse ox to 90-92% patient had notable improvement however, patient still appeared tachypneic and uncomfortable. ABG on venti mask was 7.435/ 50.8/ 69.9/ 34.1. Decision was made to place patient on BIPAP with a titration goal of 90-92%. Within next 30minutes, patient appeared more responsive and family said she was back to baseline, "was like a 180." Plan is to keep BIPAP on throughout the night and then 4hrs on and off. Will repeat ABG after being on BIPAP for 4hrs. Critical care (Dr. Eliana Milligan) was called as she was following patient during the hospital stay. She agreed with plan and said to recall ID.       PAST MEDICAL & SURGICAL HISTORY  PAST MEDICAL & SURGICAL HISTORY:  PVD (peripheral vascular disease)  COPD (chronic obstructive pulmonary disease)  Kidney disease, chronic, stage I (GFR over 89 ml/min)  Peripheral vascular disease  HTN (hypertension)  High cholesterol  CVA (cerebral vascular accident)  H/O aorto-femoral bypass  History of right common carotid artery stent placement        REVIEW OF SYSTEMS  RESPIRATORY: No cough,  wheezing, chills or hemoptysis; + shortness of breath  CARDIOVASCULAR: No chest pain, palpitations, dizziness, or leg swelling    ALLERGIES:  No Known Allergies    MEDICATIONS:  STANDING MEDICATIONS  ALBUTerol    90 MICROgram(s) HFA Inhaler 1 Puff(s) Inhalation every 4 hours  ALBUTerol/ipratropium for Nebulization 3 milliLiter(s) Nebulizer every 6 hours  aspirin enteric coated 81 milliGRAM(s) Oral <User Schedule>  atorvastatin 10 milliGRAM(s) Oral at bedtime  buDESOnide 160 MICROgram(s)/formoterol 4.5 MICROgram(s) Inhaler 2 Puff(s) Inhalation two times a day  chlorhexidine 4% Liquid 1 Application(s) Topical <User Schedule>  cilostazol 100 milliGRAM(s) Oral two times a day  clopidogrel Tablet 75 milliGRAM(s) Oral daily  docusate sodium 100 milliGRAM(s) Oral two times a day  furosemide   Injectable 40 milliGRAM(s) IV Push every 24 hours  heparin  Injectable 5000 Unit(s) SubCutaneous every 8 hours  levETIRAcetam  IVPB 500 milliGRAM(s) IV Intermittent every 12 hours  meropenem  IVPB 1000 milliGRAM(s) IV Intermittent every 8 hours  methylPREDNISolone sodium succinate Injectable 60 milliGRAM(s) IV Push every 8 hours  metoprolol tartrate 25 milliGRAM(s) Oral two times a day  nystatin Powder 1 Application(s) Topical two times a day  pantoprazole    Tablet 40 milliGRAM(s) Oral before breakfast  senna 2 Tablet(s) Oral at bedtime  tiotropium 18 MICROgram(s) Capsule 1 Capsule(s) Inhalation daily  vancomycin  IVPB 750 milliGRAM(s) IV Intermittent every 12 hours  vancomycin  IVPB        PRN MEDICATIONS  ALBUTerol/ipratropium for Nebulization 3 milliLiter(s) Nebulizer every 6 hours PRN  guaiFENesin    Syrup 100 milliGRAM(s) Oral every 6 hours PRN  lactulose Syrup 10 Gram(s) Oral every 6 hours PRN    VITALS:   T(F): 97.9  HR: 105  BP: 142/68  RR: 95  SpO2: 95%    PHYSICAL EXAM:  General: No acute distress.  Alert, oriented, interactive, nonfocal.    HEENT: Pupils equal, reactive to light symmetrically.    PULM: wheezing improved today  auscultation bilaterally, no significant sputum production. NC    CVS: Regular rate and rhythm, no murmurs, rubs, or gallops.    ABD: Soft, nondistended, nontender, no masses.    EXT: No edema, nontender.    SKIN: Warm and well perfused, no rashes noted.    LABS:                        10.3   11.54 )-----------( 248      ( 08 Feb 2019 06:44 )             32.8     02-08    149<H>  |  102  |  34<H>  ----------------------------<  95  3.8   |  31  |  1.3    Ca    8.8      08 Feb 2019 06:44  Phos  4.3     02-08  Mg     1.7     02-08          ABG - ( 07 Feb 2019 21:00 )  pH, Arterial: 7.56  pH, Blood: x     /  pCO2: 36    /  pO2: 85    / HCO3: 31    / Base Excess: 8.7   /  SaO2: 98

## 2019-02-08 NOTE — PROGRESS NOTE ADULT - ASSESSMENT
84 yo F, CCU Downgrade, PMHx COPD on 2 L NC, PVD, CKD3, HTN, DLD, hx of CVA, ex-smoker, recently discharged from [facility] on 01/13 after PNA tx w/Levaquin and Prednisone, presented/w SOB and dysuria. She was doing well until 01/20 when she starting feeling SOB again.     Pt was admitted to CCU for acute on chronic hypoxic and chronic hypercapnic respiratory failure 2/2 COPD exacerbation likely due to RLL GNR PNA exacerbated by coronavirus coinfection. She was on BiPaP prn, solumedrol, bronchodilators and Meropenem. She was treated for distal DVT and subsequently developed hematuria. Further w/u was positive for moderate ascites (2D echo), +UA (negative urine and blood cultures). MRSA and Flu were negative.       Today Events: c/w Vanc and  Merop,  decreased IV 40mg lasix Q 24 hours, c/w IV steroids, Maintain O2 sat 88-92,  sob improved  overnight, BIPAP 4/4 on/off ,  aspiration precautions, Pulm following,  Cardio following     IMPRESSION    PNA   COPD exacerbation improved        Plan  # Acute on chronic hypoxic and chronic hypercapnic respiratory failure 2/2 COPD exacerbation 2/2 Possible RLL GNR pneumonia exacerbated by coronavirus coinfection: worse  - EKG wnl  - ABG noticed chronic Hypercapnic   -Continue BiPAP 4/4 off/on  and O2 NC 4L 88-92 o2sat   - solumedrol 60mg q8   - IV lasix 40mg q24   - pulm following   - albuterol Neb patient  Sinus tachy           # Hypoxic episode   Cardio reconsulted f/u recs     # Hypertension:  - Metoprolol please continue in light and albuterol         # HFpEF: Stable  ECHO 1/22- EF 55-60%, severe pulm HTN, decreased RV systolic fx, RV vol and pressure overload,   RV enlarged, severe mitral annular calcification      # Right Tibial vein DVT: Stable  Duplex LE- Right post tibial DVT  No need for therapeutic AC as per Dr Dunham- will repeat Duplex unremarkable    - ppx heparin     # Hematuria: Resolved  Cont prophylactic heparin   HGB stable  CT Urogram: 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  1.2 cm right hepatic lobe lesion with arterial enhancement, incompletely characterized but could be a benign lesion. Consider nonemergent MRI of the abdomen with contrast if clinically indicated.  - OP f/u urology      # Mod ascites noted on echo  -dc Lasix     # Transaminitis: waxing and waning  RUQ US: negative    # Constipation: Resolved   Cont. Senna, Colace  lactulose PRN     Electrolyte Imbalances: repletion   []  Hyponatremia   /   Hypernatremia  []   []  Hypokalemia   /   Hyperkalemia  []   []  Hypochloremia   /    Hyperchloremia  []   []  Hypocapnia   /   Hypercapnia  []   [x]  Hypomagnesemia   /   Hypermagnesemia  []   []  Hypophosphatemia   /   Hyperphosphatemia  []           GI ppx:                                   [] Not indicated   /   [x] Pantoprazole 40mg PO Daily    DVT ppx:  [] Not indicated / [x] Heparin 5000mg SubQ / [] Lovenox 40mg SubQ / [] SCDs    Fluids:   [x] PO  |  [] IVF    Activity:  [X] Assisatnce needed   [X] Increase as Tolerated  /  [] OOB w/ assist  /  [] Bed Rest      DISPO:   attempting NH placement after optimization   [ ] From Home     [ x] NH/SNF   [ ] 4A Rehab  [ ] Detox Clinic  [X] Plan Discussion with patient and/or family.  [X] Discussed Case and Plan with the Medical Attending.    CODE STATUS  [X] FULL   /    [] DNR

## 2019-02-08 NOTE — PROGRESS NOTE ADULT - SUBJECTIVE AND OBJECTIVE BOX
SUBJ:  Patient was seen and discussed with son, daughter and house staff  She developed SOB yesterday and received IV lasix and her symptoms resolved partially. She is still having productive cough    MEDICATIONS  (STANDING):  ALBUTerol    90 MICROgram(s) HFA Inhaler 1 Puff(s) Inhalation every 4 hours  ALBUTerol/ipratropium for Nebulization 3 milliLiter(s) Nebulizer every 6 hours  aspirin enteric coated 81 milliGRAM(s) Oral <User Schedule>  atorvastatin 10 milliGRAM(s) Oral at bedtime  buDESOnide 160 MICROgram(s)/formoterol 4.5 MICROgram(s) Inhaler 2 Puff(s) Inhalation two times a day  chlorhexidine 4% Liquid 1 Application(s) Topical <User Schedule>  cilostazol 100 milliGRAM(s) Oral two times a day  clopidogrel Tablet 75 milliGRAM(s) Oral daily  docusate sodium 100 milliGRAM(s) Oral two times a day  furosemide   Injectable 40 milliGRAM(s) IV Push every 24 hours  heparin  Injectable 5000 Unit(s) SubCutaneous every 8 hours  levETIRAcetam  IVPB 500 milliGRAM(s) IV Intermittent every 12 hours  meropenem  IVPB 1000 milliGRAM(s) IV Intermittent every 8 hours  methylPREDNISolone sodium succinate Injectable 60 milliGRAM(s) IV Push every 8 hours  metoprolol tartrate 25 milliGRAM(s) Oral two times a day  nystatin Powder 1 Application(s) Topical two times a day  pantoprazole    Tablet 40 milliGRAM(s) Oral before breakfast  senna 2 Tablet(s) Oral at bedtime  tiotropium 18 MICROgram(s) Capsule 1 Capsule(s) Inhalation daily  vancomycin  IVPB 750 milliGRAM(s) IV Intermittent every 12 hours  vancomycin  IVPB        MEDICATIONS  (PRN):  ALBUTerol/ipratropium for Nebulization 3 milliLiter(s) Nebulizer every 6 hours PRN Shortness of Breath and/or Wheezing  guaiFENesin    Syrup 100 milliGRAM(s) Oral every 6 hours PRN Cough  lactulose Syrup 10 Gram(s) Oral every 6 hours PRN constipation            Vital Signs Last 24 Hrs  T(C): 36.6 (08 Feb 2019 13:43), Max: 36.6 (08 Feb 2019 04:10)  T(F): 97.8 (08 Feb 2019 13:43), Max: 97.9 (08 Feb 2019 04:10)  HR: 100 (08 Feb 2019 13:43) (100 - 111)  BP: 126/64 (08 Feb 2019 13:43) (97/58 - 142/68)  BP(mean): --  RR: 18 (08 Feb 2019 13:43) (18 - 95)  SpO2: 88% (08 Feb 2019 15:30) (88% - 98%)      PHYSICAL EXAM:  · CONSTITUTIONAL: cachectic 	  ·RESPIRATORY:   Diffuse wheeze present  · CARDIOVASCULAR	regular rate and rhythm  3/6 BALJIDNER in the right lower sternal border  · EXTREMITIES: 2+ edema    LABS:                        10.3   11.54 )-----------( 248      ( 08 Feb 2019 06:44 )             32.8     02-08    149<H>  |  102  |  34<H>  ----------------------------<  95  3.8   |  31  |  1.3    Ca    8.8      08 Feb 2019 06:44  Phos  4.3     02-08  Mg     1.7     02-08              I&O's Summary    07 Feb 2019 07:01  -  08 Feb 2019 07:00  --------------------------------------------------------  IN: 0 mL / OUT: 1225 mL / NET: -1225 mL    08 Feb 2019 07:01  -  08 Feb 2019 18:19  --------------------------------------------------------  IN: 360 mL / OUT: 500 mL / NET: -140 mL      BNP  RADIOLOGY & ADDITIONAL STUDIES:    IMPRESSION AND PLAN:    1) Shortness of breath is due to COPD exacerbation, diastolic CHF      Echo findings are consistent with pulmonary HTN secondary to COPD      Continue Lasix 40 mg IV push for 2 days and then change to lasix 20 mg po daily.If needed add extra dose of lasix as needed.      F/U electrolytes.    2)Troponin elevation could be due to type 2 MI     Continue ASA , plavix ,metoprolol and statin.     No cardiac intervention needed at this time.

## 2019-02-08 NOTE — PROGRESS NOTE ADULT - SUBJECTIVE AND OBJECTIVE BOX
[facility] FOLLOW UP NOTE  --------------------------------------------------------------------------------  24 hour events/subjective: Patient examined. Appears comfortable.    PAST HISTORY  --------------------------------------------------------------------------------  No significant changes to PMH, PSH, FHx, SHx, unless otherwise noted    ALLERGIES & MEDICATIONS  --------------------------------------------------------------------------------  Allergies    No Known Allergies    Standing Inpatient Medications  ALBUTerol    90 MICROgram(s) HFA Inhaler 1 Puff(s) Inhalation every 4 hours  ALBUTerol/ipratropium for Nebulization 3 milliLiter(s) Nebulizer every 6 hours  aspirin enteric coated 81 milliGRAM(s) Oral <User Schedule>  atorvastatin 10 milliGRAM(s) Oral at bedtime  buDESOnide 160 MICROgram(s)/formoterol 4.5 MICROgram(s) Inhaler 2 Puff(s) Inhalation two times a day  chlorhexidine 4% Liquid 1 Application(s) Topical <User Schedule>  cilostazol 100 milliGRAM(s) Oral two times a day  clopidogrel Tablet 75 milliGRAM(s) Oral daily  docusate sodium 100 milliGRAM(s) Oral two times a day  furosemide   Injectable 40 milliGRAM(s) IV Push every 24 hours  heparin  Injectable 5000 Unit(s) SubCutaneous every 8 hours  levETIRAcetam  IVPB 500 milliGRAM(s) IV Intermittent every 12 hours  magnesium sulfate  IVPB 2 Gram(s) IV Intermittent once  meropenem  IVPB 1000 milliGRAM(s) IV Intermittent every 8 hours  methylPREDNISolone sodium succinate Injectable 60 milliGRAM(s) IV Push every 8 hours  metoprolol tartrate 25 milliGRAM(s) Oral two times a day  nystatin Powder 1 Application(s) Topical two times a day  pantoprazole    Tablet 40 milliGRAM(s) Oral before breakfast  senna 2 Tablet(s) Oral at bedtime  tiotropium 18 MICROgram(s) Capsule 1 Capsule(s) Inhalation daily  vancomycin  IVPB 750 milliGRAM(s) IV Intermittent every 12 hours  vancomycin  IVPB        PRN Inpatient Medications  ALBUTerol/ipratropium for Nebulization 3 milliLiter(s) Nebulizer every 6 hours PRN  guaiFENesin    Syrup 100 milliGRAM(s) Oral every 6 hours PRN  lactulose Syrup 10 Gram(s) Oral every 6 hours PRN    VITALS/PHYSICAL EXAM  --------------------------------------------------------------------------------  T(C): 36.6 (02-08-19 @ 13:43), Max: 36.7 (02-07-19 @ 15:00)  HR: 100 (02-08-19 @ 13:43) (100 - 125)  BP: 126/64 (02-08-19 @ 13:43) (97/58 - 142/68)  RR: 18 (02-08-19 @ 13:43) (16 - 95)  SpO2: 94% (02-08-19 @ 13:55) (88% - 98%    Weight (kg): 47.7 (02-08-19 @ 07:02)    02-07-19 @ 07:01  -  02-08-19 @ 07:00  --------------------------------------------------------  IN: 0 mL / OUT: 1225 mL / NET: -1225 mL    02-08-19 @ 07:01  -  02-08-19 @ 14:44  --------------------------------------------------------  IN: 360 mL / OUT: 0 mL / NET: 360 mL    Physical Exam:  	Gen: NAD  	Pulm: CTA B/L  	CV: RRR, S1S2  	Abd: +BS, soft, nontender/nondistended  	: No suprapubic tenderness  	LE: Warm, no edema    LABS/STUDIES  --------------------------------------------------------------------------------              10.3   11.54 >-----------<  248      [02-08-19 @ 06:44]              32.8     149  |  102  |  34  ----------------------------<  95      [02-08-19 @ 06:44]  3.8   |  31  |  1.3        Ca     8.8     [02-08-19 @ 06:44]      Mg     1.7     [02-08-19 @ 06:44]      Phos  4.3     [02-08-19 @ 06:44]    Creatinine Trend:  SCr 1.3 [02-08 @ 06:44]  SCr 1.2 [02-07 @ 18:41]  SCr 1.2 [02-05 @ 08:25]  SCr 1.1 [02-04 @ 07:57]  SCr 1.0 [02-03 @ 06:48]    Urinalysis - [01-24-19 @ 04:30]      Color Red / Appearance Turbid / SG >=1.030 / pH 5.0      Gluc 100 / Ketone 40  / Bili Large / Urobili 1.0       Blood Large / Protein >=300 / Leuk Est Moderate / Nitrite Positive      RBC >50 / WBC  / Hyaline  / Gran 3-5 / Sq Epi  / Non Sq Epi  / Bacteria Moderate      HbA1c 5.7      [01-11-19 @ 07:14]  Lipid: chol 171, TG 43, HDL 90, LDL 78      [01-11-19 @ 07:14]

## 2019-02-09 NOTE — PROGRESS NOTE ADULT - ASSESSMENT
Acute on chronic hypoxic respiratory failure  Copd exacerbation, improved  HCAP  CHF-decompensated        oxygen per pulse oximetry  d/c iv abx, switch to po levofloxacin-renally dosed  d/c iv steroids  po prednisone 40 mg   continue symbicort and spiriva  d/c iv lasix-switch to po 40 mg daily  monitor i/os cr and lytes  out of bed/physical therapy  discussed with family at bed side

## 2019-02-09 NOTE — PROGRESS NOTE ADULT - SUBJECTIVE AND OBJECTIVE BOX
[facility] FOLLOW UP NOTE  --------------------------------------------------------------------------------  Chief Complaint/24 hour events/subjective:    feel better, still cought    PAST HISTORY  --------------------------------------------------------------------------------  No significant changes to PMH, PSH, FHx, SHx, unless otherwise noted    ALLERGIES & MEDICATIONS  --------------------------------------------------------------------------------  Allergies    No Known Allergies    Intolerances      Standing Inpatient Medications  ALBUTerol    90 MICROgram(s) HFA Inhaler 1 Puff(s) Inhalation every 4 hours  ALBUTerol/ipratropium for Nebulization 3 milliLiter(s) Nebulizer every 6 hours  aspirin enteric coated 81 milliGRAM(s) Oral <User Schedule>  atorvastatin 10 milliGRAM(s) Oral at bedtime  buDESOnide 160 MICROgram(s)/formoterol 4.5 MICROgram(s) Inhaler 2 Puff(s) Inhalation two times a day  chlorhexidine 4% Liquid 1 Application(s) Topical <User Schedule>  cilostazol 100 milliGRAM(s) Oral two times a day  clopidogrel Tablet 75 milliGRAM(s) Oral daily  docusate sodium 100 milliGRAM(s) Oral two times a day  furosemide   Injectable 40 milliGRAM(s) IV Push every 24 hours  heparin  Injectable 5000 Unit(s) SubCutaneous every 8 hours  levETIRAcetam  IVPB 500 milliGRAM(s) IV Intermittent every 12 hours  meropenem  IVPB 1000 milliGRAM(s) IV Intermittent every 8 hours  methylPREDNISolone sodium succinate Injectable 60 milliGRAM(s) IV Push every 8 hours  metoprolol tartrate 25 milliGRAM(s) Oral two times a day  nystatin Powder 1 Application(s) Topical two times a day  pantoprazole    Tablet 40 milliGRAM(s) Oral before breakfast  senna 2 Tablet(s) Oral at bedtime  tiotropium 18 MICROgram(s) Capsule 1 Capsule(s) Inhalation daily  vancomycin  IVPB 750 milliGRAM(s) IV Intermittent every 12 hours  vancomycin  IVPB        PRN Inpatient Medications  ALBUTerol/ipratropium for Nebulization 3 milliLiter(s) Nebulizer every 6 hours PRN  guaiFENesin    Syrup 100 milliGRAM(s) Oral every 6 hours PRN  lactulose Syrup 10 Gram(s) Oral every 6 hours PRN      REVIEW OF SYSTEMS  --------------------------------------------------------------------------------    All other systems were reviewed and are negative, except as noted.    VITALS/PHYSICAL EXAM  --------------------------------------------------------------------------------  T(C): 35.7 (02-09-19 @ 12:39), Max: 36.4 (02-08-19 @ 21:07)  HR: 18 (02-09-19 @ 12:39) (18 - 125)  BP: 91/46 (02-09-19 @ 12:39) (91/46 - 138/919)  RR: 18 (02-09-19 @ 12:39) (17 - 20)  SpO2: 94% (02-09-19 @ 07:45) (92% - 95%)  Wt(kg): --    Weight (kg): 47.7 (02-08-19 @ 07:02)      02-08-19 @ 07:01  -  02-09-19 @ 07:00  --------------------------------------------------------  IN: 360 mL / OUT: 500 mL / NET: -140 mL      Physical Exam:    	Gen: no distress   	Pulm:  B/L rhonchi  	CV: S1S2; no rub  	Abd: +BS, soft, nontender/nondistended  	LE:  no  edema      LABS/STUDIES  --------------------------------------------------------------------------------              10.3   13.07 >-----------<  265      [02-09-19 @ 07:02]              32.9     148  |  99  |  51  ----------------------------<  126      [02-09-19 @ 07:02]  4.4   |  29  |  1.7        Ca     9.0     [02-09-19 @ 07:02]      Mg     2.4     [02-09-19 @ 07:02]      Phos  4.2     [02-09-19 @ 07:02]            Creatinine Trend:  SCr 1.7 [02-09 @ 07:02]  SCr 1.3 [02-08 @ 06:44]  SCr 1.2 [02-07 @ 18:41]  SCr 1.2 [02-05 @ 08:25]  SCr 1.1 [02-04 @ 07:57]    Urinalysis - [01-24-19 @ 04:30]      Color Red / Appearance Turbid / SG >=1.030 / pH 5.0      Gluc 100 / Ketone 40  / Bili Large / Urobili 1.0       Blood Large / Protein >=300 / Leuk Est Moderate / Nitrite Positive      RBC >50 / WBC  / Hyaline  / Gran 3-5 / Sq Epi  / Non Sq Epi  / Bacteria Moderate      HbA1c 5.7      [01-11-19 @ 07:14]  Lipid: chol 171, TG 43, HDL 90, LDL 78      [01-11-19 @ 07:14]

## 2019-02-09 NOTE — PROGRESS NOTE ADULT - SUBJECTIVE AND OBJECTIVE BOX
SUBJ:  She is feeling better    MEDICATIONS  (STANDING):  ALBUTerol    90 MICROgram(s) HFA Inhaler 1 Puff(s) Inhalation every 4 hours  ALBUTerol/ipratropium for Nebulization 3 milliLiter(s) Nebulizer every 6 hours  aspirin enteric coated 81 milliGRAM(s) Oral <User Schedule>  atorvastatin 10 milliGRAM(s) Oral at bedtime  buDESOnide 160 MICROgram(s)/formoterol 4.5 MICROgram(s) Inhaler 2 Puff(s) Inhalation two times a day  chlorhexidine 4% Liquid 1 Application(s) Topical <User Schedule>  cilostazol 100 milliGRAM(s) Oral two times a day  clopidogrel Tablet 75 milliGRAM(s) Oral daily  docusate sodium 100 milliGRAM(s) Oral two times a day  furosemide   Injectable 40 milliGRAM(s) IV Push every 24 hours  heparin  Injectable 5000 Unit(s) SubCutaneous every 8 hours  levETIRAcetam  IVPB 500 milliGRAM(s) IV Intermittent every 12 hours  meropenem  IVPB 1000 milliGRAM(s) IV Intermittent every 8 hours  methylPREDNISolone sodium succinate Injectable 60 milliGRAM(s) IV Push every 8 hours  metoprolol tartrate 25 milliGRAM(s) Oral two times a day  nystatin Powder 1 Application(s) Topical two times a day  pantoprazole    Tablet 40 milliGRAM(s) Oral before breakfast  senna 2 Tablet(s) Oral at bedtime  tiotropium 18 MICROgram(s) Capsule 1 Capsule(s) Inhalation daily  vancomycin  IVPB 750 milliGRAM(s) IV Intermittent every 12 hours  vancomycin  IVPB        MEDICATIONS  (PRN):  ALBUTerol/ipratropium for Nebulization 3 milliLiter(s) Nebulizer every 6 hours PRN Shortness of Breath and/or Wheezing  guaiFENesin    Syrup 100 milliGRAM(s) Oral every 6 hours PRN Cough  lactulose Syrup 10 Gram(s) Oral every 6 hours PRN constipation            Vital Signs Last 24 Hrs  T(C): 35.7 (09 Feb 2019 12:39), Max: 36.4 (08 Feb 2019 21:07)  T(F): 96.2 (09 Feb 2019 12:39), Max: 97.5 (08 Feb 2019 21:07)  HR: 18 (09 Feb 2019 12:39) (18 - 125)  BP: 91/46 (09 Feb 2019 12:39) (91/46 - 138/919)  BP(mean): --  RR: 18 (09 Feb 2019 12:39) (17 - 20)  SpO2: 94% (09 Feb 2019 07:45) (88% - 95%)      PHYSICAL EXAM:  · CONSTITUTIONAL:	cachectic  ·RESPIRATORY:   Bilateral basal rales improving  · CARDIOVASCULAR	regular rate and rhythm  3/6 BALJINDER in the right lower sternal border  · EXTREMITIES: No cyanosis, clubbing or edema    LABS:                        10.3   13.07 )-----------( 265      ( 09 Feb 2019 07:02 )             32.9     02-09    148<H>  |  99  |  51<H>  ----------------------------<  126<H>  4.4   |  29  |  1.7<H>    Ca    9.0      09 Feb 2019 07:02  Phos  4.2     02-09  Mg     2.4     02-09              I&O's Summary    08 Feb 2019 07:01  -  09 Feb 2019 07:00  --------------------------------------------------------  IN: 360 mL / OUT: 500 mL / NET: -140 mL      BNP  RADIOLOGY & ADDITIONAL STUDIES:    IMPRESSION AND PLAN:    1) Shortness of breath is due to COPD exacerbation, diastolic CHF      Echo findings are consistent with pulmonary HTN secondary to COPD      Change to lasix 20 mg po daily.      If needed add extra dose of lasix as needed.      F/U electrolytes.    2)Troponin elevation could be due to type 2 MI     Continue ASA , plavix ,metoprolol and statin.     No cardiac intervention needed at this time.

## 2019-02-09 NOTE — PROGRESS NOTE ADULT - SUBJECTIVE AND OBJECTIVE BOX
ALEXANDERLILA CLAIRE  85y  Female      SUBJECTIVE:    c/o breathing better  Progress Note:      REVIEW OF SYSTEMS:    T(C): 36.2 (02-09-19 @ 05:00), Max: 36.6 (02-08-19 @ 13:43)  HR: 102 (02-09-19 @ 05:00) (98 - 125)  BP: 138/919 (02-09-19 @ 05:00) (95/50 - 138/919)  RR: 20 (02-09-19 @ 05:00) (17 - 20)  SpO2: 94% (02-09-19 @ 07:45) (88% - 98%)  Wt(kg): --Vital Signs Last 24 Hrs  T(C): 36.2 (09 Feb 2019 05:00), Max: 36.6 (08 Feb 2019 13:43)  T(F): 97.2 (09 Feb 2019 05:00), Max: 97.8 (08 Feb 2019 13:43)  HR: 102 (09 Feb 2019 05:00) (98 - 125)  BP: 138/919 (09 Feb 2019 05:00) (95/50 - 138/919)  BP(mean): --  RR: 20 (09 Feb 2019 05:00) (17 - 20)  SpO2: 94% (09 Feb 2019 07:45) (88% - 98%)    PHYSICAL EXAM:  lungs-clear  cor reg nl s! & s2  ext-no calf tenderness  LABS:                          10.3   13.07 )-----------( 265      ( 09 Feb 2019 07:02 )             32.9   02-08    149<H>  |  102  |  34<H>  ----------------------------<  95  3.8   |  31  |  1.3    Ca    8.8      08 Feb 2019 06:44  Phos  4.3     02-08  Mg     1.7     02-08      RADIOLOGY:  < from: Xray Chest 1 View-PORTABLE IMMEDIATE (02.08.19 @ 17:00) >    EXAM:  XR CHEST PORTABLE IMMED 1V            PROCEDURE DATE:  02/08/2019            INTERPRETATION:  Clinical History / Reason for exam: Chest pain    Comparison : Chest radiograph 2/8/2019.    Technique/Positioning: Single image.    Findings:    Support devices: None.    Cardiac/mediastinum/hilum: Unremarkable.    Lung parenchyma/Pleura: Right midlung opacity unchanged. No pleural   effusion or air leak    Skeleton/soft tissues: Unremarkable.    Impression:      Right midlung opacity unchanged. No pleural effusion or air leak                      ACE RIVERA M.D., ATTENDING RADIOLOGIST  This document has been electronically signed. Feb 8 2019  5:14PM        < end of copied text >      IMPRESSION:  ACUTE HYPOXIC  RESPIRATORY FAILURE-NOW AGAIN IMPROVING  PNEUMONIA- BETTER  PULMONARY HYPERTENSION  RIGHT SIDED CHF  RIGHT TIBIAL VEIN THROMBOSIS-CHRONIC  BLADDER TUMOR  ANEMIA-STABLE  LEUKOCYTOSIS-DUE TO STEROIDS-IMPROVING  HEMATURIA DUE TO BLADDER TUMOR  CKD-STAGE 3  OLD CVA  AORTO-FEM BYPASS  RIGHT CAROTID STENT  PVD  ABNORMAL LFT'S DUE TO MEDS AND INFECTION            PLAN:  BIPAP  NASAL O2 -HOME DEPENDENT  CONTINUE IV LASIX TODAY THAN SWITCH TO PO LASIX  MONITOR I& O'S AND BMP  PROGNOSIS POOR-FAMILY INFORMED  REPLACE MG++    I SPENT 30 MINS. EXAMINING,EVALUATING AND COUNSELING  PATIENT  AND DAUGHTER AND COORDINATING CARE   BY ATTENDING PHYSICIAN

## 2019-02-09 NOTE — CONSULT NOTE ADULT - SUBJECTIVE AND OBJECTIVE BOX
LILA VELA  85y, Female  Allergy: No Known Allergies      HPI:  84 yo F with PMHx of COPD on 2 L NC, PVD, CKD3, essential HTN, DLD, hx of CVA, ex-smoker, recently discharged from hospital Jan 13 after being admitted for pneumonia, presented for SOB of one day duration, patient has finished her course of Levaquin and prednisone on Friday and was doing well , until Sunday evening when she starting feeling SOB again that worsening today, patient denies any chest pain, cough, lower extremity swelling.  EMS found her to be hypoxic to the 70s on room air.  On arrival patient, hypoxic to 67%, + tachypnea, speaking few words at a time. + wheezing, bilaterally, clavicular retractions.  Patient was placed on BiPAP and SOB improved. In Ed patient received cefepime,  Levaquin , and solumedrol. Patient mentioned history of dysuria of 2 days duration. (21 Jan 2019 14:45)    Pt was diagnosed with coronavirus.    FAMILY HISTORY:  Family history of atherosclerosis (Mother)    PAST MEDICAL & SURGICAL HISTORY:  PVD (peripheral vascular disease)  COPD (chronic obstructive pulmonary disease)  Kidney disease, chronic, stage I (GFR over 89 ml/min)  Peripheral vascular disease  HTN (hypertension)  High cholesterol  CVA (cerebral vascular accident)  H/O aorto-femoral bypass  History of right common carotid artery stent placement      ROS negative except as per HPI    VITALS:  T(F): 96.2, Max: 97.5 (02-08-19 @ 21:07)  HR: 18  BP: 91/46  RR: 18Vital Signs Last 24 Hrs  T(C): 35.7 (09 Feb 2019 12:39), Max: 36.4 (08 Feb 2019 21:07)  T(F): 96.2 (09 Feb 2019 12:39), Max: 97.5 (08 Feb 2019 21:07)  HR: 18 (09 Feb 2019 12:39) (18 - 125)  BP: 91/46 (09 Feb 2019 12:39) (91/46 - 138/919)  BP(mean): --  RR: 18 (09 Feb 2019 12:39) (17 - 20)  SpO2: 94% (09 Feb 2019 07:45) (92% - 95%)    PHYSICAL EXAM:  Gen: Elderly F on NC  HEENT: NCAT. EOMI. MMM.   Neck: Supple, no cervical LAD  CV: RRR  Lungs: CTAB, no w/r/r  Abd: Soft. NTND  Extr: wwp, no edema  Skin: no rash  Neuro: No focal deficits  Lines: clean      TESTS & MEASUREMENTS:                        10.3   13.07 )-----------( 265      ( 09 Feb 2019 07:02 )             32.9     02-09    148<H>  |  99  |  51<H>  ----------------------------<  126<H>  4.4   |  29  |  1.7<H>    Ca    9.0      09 Feb 2019 07:02  Phos  4.2     02-09  Mg     2.4     02-09                RADIOLOGY & ADDITIONAL TESTS:    ANTIBIOTICS:  cefepime   IVPB   100 mL/Hr IV Intermittent (01-21-19 @ 12:28)    levoFLOXacin  Tablet   750 milliGRAM(s) Oral (01-22-19 @ 06:02)    levoFLOXacin  Tablet   250 milliGRAM(s) Oral (02-02-19 @ 18:35)   250 milliGRAM(s) Oral (02-01-19 @ 18:22)   250 milliGRAM(s) Oral (01-31-19 @ 18:15)   250 milliGRAM(s) Oral (01-30-19 @ 18:49)    levoFLOXacin IVPB   100 mL/Hr IV Intermittent (01-21-19 @ 12:29)    meropenem  IVPB   100 mL/Hr IV Intermittent (01-29-19 @ 06:13)   100 mL/Hr IV Intermittent (01-28-19 @ 21:13)   100 mL/Hr IV Intermittent (01-28-19 @ 15:06)   100 mL/Hr IV Intermittent (01-28-19 @ 06:11)   100 mL/Hr IV Intermittent (01-27-19 @ 21:45)   100 mL/Hr IV Intermittent (01-27-19 @ 16:56)   100 mL/Hr IV Intermittent (01-27-19 @ 05:20)   100 mL/Hr IV Intermittent (01-26-19 @ 21:34)   100 mL/Hr IV Intermittent (01-26-19 @ 14:05)   100 mL/Hr IV Intermittent (01-26-19 @ 05:47)   100 mL/Hr IV Intermittent (01-25-19 @ 21:38)   100 mL/Hr IV Intermittent (01-25-19 @ 14:42)   100 mL/Hr IV Intermittent (01-25-19 @ 06:40)   100 mL/Hr IV Intermittent (01-24-19 @ 21:29)   100 mL/Hr IV Intermittent (01-24-19 @ 14:27)   100 mL/Hr IV Intermittent (01-24-19 @ 05:18)   100 mL/Hr IV Intermittent (01-23-19 @ 22:04)   100 mL/Hr IV Intermittent (01-23-19 @ 14:32)    meropenem  IVPB   100 mL/Hr IV Intermittent (01-23-19 @ 05:04)   100 mL/Hr IV Intermittent (01-22-19 @ 17:32)   100 mL/Hr IV Intermittent (01-22-19 @ 05:16)   100 mL/Hr IV Intermittent (01-21-19 @ 18:32)    meropenem  IVPB   100 mL/Hr IV Intermittent (02-07-19 @ 16:20)    meropenem  IVPB   100 mL/Hr IV Intermittent (02-09-19 @ 13:55)   100 mL/Hr IV Intermittent (02-09-19 @ 06:33)   100 mL/Hr IV Intermittent (02-08-19 @ 21:12)   100 mL/Hr IV Intermittent (02-08-19 @ 13:43)   100 mL/Hr IV Intermittent (02-08-19 @ 05:56)   100 mL/Hr IV Intermittent (02-07-19 @ 21:31)   100 mL/Hr IV Intermittent (02-07-19 @ 16:38)    vancomycin  IVPB   250 mL/Hr IV Intermittent (01-22-19 @ 17:24)   250 mL/Hr IV Intermittent (01-21-19 @ 18:31)    vancomycin  IVPB   250 mL/Hr IV Intermittent (02-07-19 @ 16:37)    vancomycin  IVPB   250 mL/Hr IV Intermittent (02-09-19 @ 07:20)   250 mL/Hr IV Intermittent (02-08-19 @ 18:02)   250 mL/Hr IV Intermittent (02-08-19 @ 05:56)

## 2019-02-09 NOTE — CONSULT NOTE ADULT - ASSESSMENT
85F    COPD   Peripheral vascular disease  HTN  High cholesterol  CVA   H/O aorto-femoral bypass  History of right common carotid artery stent placement    Admitted 1/21 + coronavirus   Blood and urine cx NGTD, legionella neg  CT 1.2 cm right hepatic lobe lesion with arterial enhancement,. 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  CXR Right midlung opacity unchanged--> no real change since admission despite multiple courses of abx    Olga Lidia 1/21-1/29, 2/7- present    - Consider CT Chest as no real change in CXR since admission  despite multiple courses of abx  - Pulm following, agree with PO levaquin, consider D/C abx altogether   - ?Underlying malignancy  - on steroids

## 2019-02-09 NOTE — PROGRESS NOTE ADULT - SUBJECTIVE AND OBJECTIVE BOX
LILA VELA  85y, Female  Allergy: No Known Allergies      LAST 24-Hr EVENTS:  pt seen examined  sob improved  feeling better  family at bedside    VITALS:  T(F): 96.2 (02-09-19 @ 12:39), Max: 97.5 (02-08-19 @ 21:07)  HR: 18 (02-09-19 @ 12:39)  BP: 91/46 (02-09-19 @ 12:39) (91/46 - 138/919)  RR: 18 (02-09-19 @ 12:39)  SpO2: 94% (02-09-19 @ 07:45)    PHYSICAL EXAM:    GENERAL: NAD  NECK: Supple, No JVD  CHEST/LUNG: CTA b/l  HEART: Regular rate and rhythm  ABDOMEN: Soft, Nontender, Nondistended  EXTREMITIES:  No clubbing, edema absent        TESTS & MEASUREMENTS:    IN: 0 mL / OUT: 1225 mL / NET: -1225 mL    IN: 360 mL / OUT: 500 mL / NET: -140 mL                            10.3   13.07 )-----------( 265      ( 09 Feb 2019 07:02 )             32.9       02-09    148<H>  |  99  |  51<H>  ----------------------------<  126<H>  4.4   |  29  |  1.7<H>    Ca    9.0      09 Feb 2019 07:02  Phos  4.2     02-09  Mg     2.4     02-09                  ABG & VENT SETTINGS: (when applicable)  ABG - ( 08 Feb 2019 16:40 )  pH, Arterial: 7.48  pH, Blood: x     /  pCO2: 46    /  pO2: 65    / HCO3: 34    / Base Excess: 9.7   /  SaO2: 93                    RADIOLOGY & ADDITIONAL TESTS:        MEDICATIONS:  MEDICATIONS  (STANDING):  ALBUTerol    90 MICROgram(s) HFA Inhaler 1 Puff(s) Inhalation every 4 hours  ALBUTerol/ipratropium for Nebulization 3 milliLiter(s) Nebulizer every 6 hours  aspirin enteric coated 81 milliGRAM(s) Oral <User Schedule>  atorvastatin 10 milliGRAM(s) Oral at bedtime  buDESOnide 160 MICROgram(s)/formoterol 4.5 MICROgram(s) Inhaler 2 Puff(s) Inhalation two times a day  chlorhexidine 4% Liquid 1 Application(s) Topical <User Schedule>  cilostazol 100 milliGRAM(s) Oral two times a day  clopidogrel Tablet 75 milliGRAM(s) Oral daily  docusate sodium 100 milliGRAM(s) Oral two times a day  furosemide   Injectable 40 milliGRAM(s) IV Push every 24 hours  heparin  Injectable 5000 Unit(s) SubCutaneous every 8 hours  levETIRAcetam  IVPB 500 milliGRAM(s) IV Intermittent every 12 hours  meropenem  IVPB 1000 milliGRAM(s) IV Intermittent every 8 hours  methylPREDNISolone sodium succinate Injectable 60 milliGRAM(s) IV Push every 8 hours  metoprolol tartrate 25 milliGRAM(s) Oral two times a day  nystatin Powder 1 Application(s) Topical two times a day  pantoprazole    Tablet 40 milliGRAM(s) Oral before breakfast  senna 2 Tablet(s) Oral at bedtime  tiotropium 18 MICROgram(s) Capsule 1 Capsule(s) Inhalation daily  vancomycin  IVPB 750 milliGRAM(s) IV Intermittent every 12 hours  vancomycin  IVPB        MEDICATIONS  (PRN):  ALBUTerol/ipratropium for Nebulization 3 milliLiter(s) Nebulizer every 6 hours PRN Shortness of Breath and/or Wheezing  guaiFENesin    Syrup 100 milliGRAM(s) Oral every 6 hours PRN Cough  lactulose Syrup 10 Gram(s) Oral every 6 hours PRN constipation

## 2019-02-09 NOTE — PROGRESS NOTE ADULT - ASSESSMENT
1. CKD III, worsening RF likely due to overdiuresis  2. Bladder mass/hematuria  3. DVT  4. HTN  5. PNA  6. R heart failure  7. Pulmonary HTN    Plan:    Hold IV Lasix  no ACE or ARB for now  Check BMP in am  f/u UO  continue Vanco and meropenem, f/u vanco level   Urology f/u-outpatient cyto  Taper prednisone

## 2019-02-10 NOTE — PROGRESS NOTE ADULT - SUBJECTIVE AND OBJECTIVE BOX
LILA VELA  85y, Female      OVERNIGHT EVENTS:  afebrile    ROS negative except as per above    VITALS:  T(F): 96.9, Max: 97.2 (02-09-19 @ 19:58)  HR: 110  BP: 151/67  RR: 18Vital Signs Last 24 Hrs  T(C): 36.1 (10 Feb 2019 14:33), Max: 36.2 (09 Feb 2019 19:58)  T(F): 96.9 (10 Feb 2019 14:33), Max: 97.2 (09 Feb 2019 19:58)  HR: 110 (10 Feb 2019 14:33) (96 - 116)  BP: 151/67 (10 Feb 2019 14:33) (120/55 - 172/75)  BP(mean): --  RR: 18 (10 Feb 2019 14:33) (18 - 20)  SpO2: 96% (10 Feb 2019 09:23) (92% - 96%)    PHYSICAL EXAM:  Gen: Elderly F on NC, lethargic  HEENT: NCAT. EOMI. MMM.   Neck: Supple, no cervical LAD  CV: RRR  Lungs: CTAB, no w/r/r  Abd: Soft. NTND  Extr: wwp, no edema  Skin: no rash  Neuro: No focal deficits  Lines: clean    TESTS & MEASUREMENTS:                        9.7    13.65 )-----------( 261      ( 10 Feb 2019 07:33 )             31.8     02-10    152<H>  |  104  |  48<H>  ----------------------------<  99  4.1   |  34<H>  |  1.2    Ca    8.8      10 Feb 2019 07:33  Phos  3.7     02-10  Mg     2.2     02-10              RADIOLOGY & ADDITIONAL TESTS:    ANTIBIOTICS:  cefepime   IVPB   100 mL/Hr IV Intermittent (01-21-19 @ 12:28)    levoFLOXacin  Tablet   750 milliGRAM(s) Oral (01-22-19 @ 06:02)    levoFLOXacin  Tablet   250 milliGRAM(s) Oral (02-02-19 @ 18:35)   250 milliGRAM(s) Oral (02-01-19 @ 18:22)   250 milliGRAM(s) Oral (01-31-19 @ 18:15)   250 milliGRAM(s) Oral (01-30-19 @ 18:49)    levoFLOXacin IVPB   100 mL/Hr IV Intermittent (01-21-19 @ 12:29)    meropenem  IVPB   100 mL/Hr IV Intermittent (01-29-19 @ 06:13)   100 mL/Hr IV Intermittent (01-28-19 @ 21:13)   100 mL/Hr IV Intermittent (01-28-19 @ 15:06)   100 mL/Hr IV Intermittent (01-28-19 @ 06:11)   100 mL/Hr IV Intermittent (01-27-19 @ 21:45)   100 mL/Hr IV Intermittent (01-27-19 @ 16:56)   100 mL/Hr IV Intermittent (01-27-19 @ 05:20)   100 mL/Hr IV Intermittent (01-26-19 @ 21:34)   100 mL/Hr IV Intermittent (01-26-19 @ 14:05)   100 mL/Hr IV Intermittent (01-26-19 @ 05:47)   100 mL/Hr IV Intermittent (01-25-19 @ 21:38)   100 mL/Hr IV Intermittent (01-25-19 @ 14:42)   100 mL/Hr IV Intermittent (01-25-19 @ 06:40)   100 mL/Hr IV Intermittent (01-24-19 @ 21:29)   100 mL/Hr IV Intermittent (01-24-19 @ 14:27)   100 mL/Hr IV Intermittent (01-24-19 @ 05:18)   100 mL/Hr IV Intermittent (01-23-19 @ 22:04)   100 mL/Hr IV Intermittent (01-23-19 @ 14:32)    meropenem  IVPB   100 mL/Hr IV Intermittent (01-23-19 @ 05:04)   100 mL/Hr IV Intermittent (01-22-19 @ 17:32)   100 mL/Hr IV Intermittent (01-22-19 @ 05:16)   100 mL/Hr IV Intermittent (01-21-19 @ 18:32)    meropenem  IVPB   100 mL/Hr IV Intermittent (02-07-19 @ 16:20)    meropenem  IVPB   100 mL/Hr IV Intermittent (02-10-19 @ 13:42)   100 mL/Hr IV Intermittent (02-10-19 @ 05:07)   100 mL/Hr IV Intermittent (02-09-19 @ 21:13)   100 mL/Hr IV Intermittent (02-09-19 @ 13:55)   100 mL/Hr IV Intermittent (02-09-19 @ 06:33)   100 mL/Hr IV Intermittent (02-08-19 @ 21:12)   100 mL/Hr IV Intermittent (02-08-19 @ 13:43)   100 mL/Hr IV Intermittent (02-08-19 @ 05:56)   100 mL/Hr IV Intermittent (02-07-19 @ 21:31)   100 mL/Hr IV Intermittent (02-07-19 @ 16:38)    vancomycin  IVPB   250 mL/Hr IV Intermittent (01-22-19 @ 17:24)   250 mL/Hr IV Intermittent (01-21-19 @ 18:31)    vancomycin  IVPB   250 mL/Hr IV Intermittent (02-07-19 @ 16:37)    vancomycin  IVPB   250 mL/Hr IV Intermittent (02-10-19 @ 05:07)   250 mL/Hr IV Intermittent (02-09-19 @ 17:21)   250 mL/Hr IV Intermittent (02-09-19 @ 07:20)   250 mL/Hr IV Intermittent (02-08-19 @ 18:02)   250 mL/Hr IV Intermittent (02-08-19 @ 05:56)        meropenem  IVPB 1000 milliGRAM(s) IV Intermittent every 8 hours  vancomycin  IVPB 750 milliGRAM(s) IV Intermittent every 12 hours  vancomycin  IVPB

## 2019-02-10 NOTE — PROGRESS NOTE ADULT - SUBJECTIVE AND OBJECTIVE BOX
LENGTH OF HOSPITAL STAY: 20d      CHIEF COMPLAINT:   Patient is a 85y old  Female who presents with a chief complaint of sob (10 Feb 2019 15:30)      OVER Past 24hrs:  The patient was seen and examined at bedside there were no acute events during  the night. She  still has  sob  but is tolerating NC. Family complains of Anxiety.         PAST MEDICAL & SURGICAL HISTORY  PAST MEDICAL & SURGICAL HISTORY:  PVD (peripheral vascular disease)  COPD (chronic obstructive pulmonary disease)  Kidney disease, chronic, stage I (GFR over 89 ml/min)  Peripheral vascular disease  HTN (hypertension)  High cholesterol  CVA (cerebral vascular accident)  H/O aorto-femoral bypass  History of right common carotid artery stent placement        REVIEW OF SYSTEMS  RESPIRATORY: No cough,  wheezing, chills or hemoptysis; + shortness of breath  CARDIOVASCULAR: No chest pain, palpitations, dizziness, or leg swelling    ALLERGIES:  No Known Allergies    MEDICATIONS:  STANDING MEDICATIONS  ALBUTerol    90 MICROgram(s) HFA Inhaler 1 Puff(s) Inhalation every 4 hours  ALBUTerol/ipratropium for Nebulization 3 milliLiter(s) Nebulizer every 6 hours  aspirin enteric coated 81 milliGRAM(s) Oral <User Schedule>  atorvastatin 10 milliGRAM(s) Oral at bedtime  buDESOnide 160 MICROgram(s)/formoterol 4.5 MICROgram(s) Inhaler 2 Puff(s) Inhalation two times a day  chlorhexidine 4% Liquid 1 Application(s) Topical <User Schedule>  cilostazol 100 milliGRAM(s) Oral two times a day  clopidogrel Tablet 75 milliGRAM(s) Oral daily  docusate sodium 100 milliGRAM(s) Oral two times a day  heparin  Injectable 5000 Unit(s) SubCutaneous every 8 hours  levETIRAcetam  IVPB 500 milliGRAM(s) IV Intermittent every 12 hours  meropenem  IVPB 1000 milliGRAM(s) IV Intermittent every 8 hours  methylPREDNISolone sodium succinate Injectable 60 milliGRAM(s) IV Push every 12 hours  metoprolol tartrate 25 milliGRAM(s) Oral two times a day  nystatin Powder 1 Application(s) Topical two times a day  pantoprazole    Tablet 40 milliGRAM(s) Oral before breakfast  senna 2 Tablet(s) Oral at bedtime  tiotropium 18 MICROgram(s) Capsule 1 Capsule(s) Inhalation daily  vancomycin  IVPB 750 milliGRAM(s) IV Intermittent every 12 hours  vancomycin  IVPB        PRN MEDICATIONS  ALBUTerol/ipratropium for Nebulization 3 milliLiter(s) Nebulizer every 6 hours PRN  guaiFENesin    Syrup 100 milliGRAM(s) Oral every 6 hours PRN  lactulose Syrup 10 Gram(s) Oral every 6 hours PRN    VITALS:   T(F): 96.9  HR: 110  BP: 151/67  RR: 18  SpO2: 96%    PHYSICAL EXAM:  General: No acute distress.  Alert, oriented, interactive, nonfocal.    HEENT: Pupils equal, reactive to light symmetrically.    PULM: wheezing improved today  auscultation bilaterally, no significant sputum production.       CVS: Regular rate and rhythm, no murmurs, rubs, or gallops.    ABD: Soft, nondistended, nontender, no masses.    EXT: No edema, nontender.    SKIN: Warm and well perfused, no rashes note.    LABS:                        9.7    13.65 )-----------( 261      ( 10 Feb 2019 07:33 )             31.8     02-10    152<H>  |  104  |  48<H>  ----------------------------<  99  4.1   |  34<H>  |  1.2    Ca    8.8      10 Feb 2019 07:33  Phos  3.7     02-10  Mg     2.2     02-10          ABG - ( 08 Feb 2019 16:40 )  pH, Arterial: 7.48  pH, Blood: x     /  pCO2: 46    /  pO2: 65    / HCO3: 34    / Base Excess: 9.7   /  SaO2: 93

## 2019-02-10 NOTE — PROGRESS NOTE ADULT - SUBJECTIVE AND OBJECTIVE BOX
LILA VELA  85y  Female      SUBJECTIVE:  no c/o    Progress Note:      REVIEW OF SYSTEMS:    T(C): 35.8 (02-10-19 @ 04:30), Max: 36.2 (02-09-19 @ 19:58)  HR: 96 (02-10-19 @ 04:30) (18 - 116)  BP: 172/75 (02-10-19 @ 04:30) (91/46 - 172/75)  RR: 18 (02-10-19 @ 04:30) (18 - 20)  SpO2: 93% (02-10-19 @ 00:48) (92% - 93%)  Wt(kg): --Vital Signs Last 24 Hrs  T(C): 35.8 (10 Feb 2019 04:30), Max: 36.2 (09 Feb 2019 19:58)  T(F): 96.4 (10 Feb 2019 04:30), Max: 97.2 (09 Feb 2019 19:58)  HR: 96 (10 Feb 2019 04:30) (18 - 116)  BP: 172/75 (10 Feb 2019 04:30) (91/46 - 172/75)  BP(mean): --  RR: 18 (10 Feb 2019 04:30) (18 - 20)  SpO2: 93% (10 Feb 2019 00:48) (92% - 93%)    PHYSICAL EXAM:  lungs-clear  cor reg nl s! & s2  ext-no calf tenderness  LABS:                          10.3   13.07 )-----------( 265      ( 09 Feb 2019 07:02 )             32.9   02-09    148<H>  |  99  |  51<H>  ----------------------------<  126<H>  4.4   |  29  |  1.7<H>    Ca    9.0      09 Feb 2019 07:02  Phos  4.2     02-09  Mg     2.4     02-09      RADIOLOGY:      IMPRESSION:  ACUTE HYPOXIC  RESPIRATORY FAILURE-NOW AGAIN IMPROVING  PNEUMONIA- BETTER  PULMONARY HYPERTENSION  RIGHT SIDED CHF  RIGHT TIBIAL VEIN THROMBOSIS-CHRONIC  BLADDER TUMOR  ANEMIA-STABLE  LEUKOCYTOSIS-DUE TO STEROIDS-IMPROVING  HEMATURIA DUE TO BLADDER TUMOR  CKD-STAGE 3-NOW WORSE DUE TO DIURESIS  OLD CVA  AORTO-FEM BYPASS  RIGHT CAROTID STENT  PVD  ABNORMAL LFT'S DUE TO MEDS AND INFECTION            PLAN:  BIPAP  NASAL O2 -HOME DEPENDENT  AGREE WITH STOPPING  LASIX  MONITOR I& O'S AND BMP  PROGNOSIS POOR-FAMILY INFORMED  TAPER STEROIDS    I SPENT 30 MINS. EXAMINING,EVALUATING AND COUNSELING  PATIENT  AND DAUGHTER AND COORDINATING CARE   BY ATTENDING PHYSICIAN

## 2019-02-10 NOTE — PROGRESS NOTE ADULT - ASSESSMENT
84 yo F, CCU Downgrade, PMHx COPD on 2 L NC, PVD, CKD3, HTN, DLD, hx of CVA, ex-smoker, recently [facility] on 01/13 after PNA tx w/Levaquin and Prednisone, presented/w SOB and dysuria. She was doing well until 01/20 when she starting feeling SOB again.     Pt was admitted to CCU for acute on chronic hypoxic and chronic hypercapnic respiratory failure 2/2 COPD exacerbation likely due to RLL GNR PNA exacerbated by coronavirus coinfection. She was on BiPaP prn, solumedrol, bronchodilators and Meropenem. She was treated for distal DVT and subsequently developed hematuria. Further w/u was positive for moderate ascites (2D echo), +UA (negative urine and blood cultures). MRSA and Flu were negative.       Today Events: c/w levo ,  DC lasix , c/w IV steroid decreased to q12, Maintain O2 sat 88-92,  , BIPAP 4/4 on/off ,  aspiration precautions, Pulm following,  Cardio following, ID following     IMPRESSION   PNA   COPD exacerbation improved        Plan  # Acute on chronic hypoxic and chronic hypercapnic respiratory failure 2/2 COPD exacerbation 2/2 Possible RLL GNR pneumonia exacerbated by coronavirus coinfection: worse  - EKG wnl  - ABG noticed chronic Hypercapnic   -Continue BiPAP 4/4 off/on  and O2 NC 4L 88-92 o2sat   - solumedrol 60mg q12  - DC IV lasix   - pulm following   - c/w levo   - albuterol Neb patient  Sinus tachy     # HFpEF: Stable  ECHO 1/22- EF 55-60%, severe pulm HTN, decreased RV systolic fx, RV vol and pressure overload,   RV enlarged, severe mitral annular calcification    - Metoprolol    # Right Tibial vein DVT: Stable  Duplex LE- Right post tibial DVT  No need for therapeutic AC as per Dr Dunham- maxx repeat Duplex unremarkable    - ppx heparin     # Hematuria: Resolved  Cont prophylactic heparin   HGB stable  CT Urogram: 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  1.2 cm right hepatic lobe lesion with arterial enhancement, incompletely characterized but could be a benign lesion. Consider nonemergent MRI of the abdomen with contrast if clinically indicated.  - OP f/u urology      # Mod ascites noted on echo  -dc Lasix     # Transaminitis: waxing and waning  RUQ US: negative    # Constipation: Resolved   Cont. Senna, Colace  lactulose PRN     Electrolyte Imbalances: repletion   []  Hyponatremia   /   Hypernatremia  [x]  encouraged to Drink PO   []  Hypokalemia   /   Hyperkalemia  []   []  Hypochloremia   /    Hyperchloremia  []   []  Hypocapnia   /   Hypercapnia  []   []  Hypomagnesemia   /   Hypermagnesemia  []   []  Hypophosphatemia   /   Hyperphosphatemia  []           GI ppx:                                   [] Not indicated   /   [x] Pantoprazole 40mg PO Daily    DVT ppx:  [] Not indicated / [x] Heparin 5000mg SubQ / [] Lovenox 40mg SubQ / [] SCDs    Fluids:   [x] PO  |  [] IVF    Activity:  [X] Assisatnce needed   [X] Increase as Tolerated  /  [] OOB w/ assist  /  [] Bed Rest      DISPO:   attempting NH placement after optimization   [ ] From Home     [ x] NH/SNF   [ ] 4[facility] Rehab  [ ] Detox Clinic  [X] Plan Discussion with patient and/or family.  [X] Discussed Case and Plan with the Medical Attending.    CODE STATUS  [X] FULL   /    [] DNR

## 2019-02-10 NOTE — PROGRESS NOTE ADULT - ASSESSMENT
Assessment and Plan:   · Assessment		  1. CKD III,  Cr back to baseline off Lasix  2. Bladder mass/hematuria  3. DVT  4. HTN  5. PNA  6. R heart failure  7. Pulmonary HTN    Plan:    keep off Lasix for now  encourage PO free water intake for high Na  no ACE or ARB for now  Check BMP in am  f/u UO  continue Vanco and meropenem, f/u vanco level   Urology f/u-outpatient cyto  Taper prednisone

## 2019-02-10 NOTE — PROGRESS NOTE ADULT - ASSESSMENT
85F    COPD   Peripheral vascular disease  HTN  High cholesterol  CVA   H/O aorto-femoral bypass  History of right common carotid artery stent placement    Admitted 1/21 + coronavirus   Blood and urine cx NGTD, legionella neg  CT 1.2 cm right hepatic lobe lesion with arterial enhancement,. 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  CXR Right midlung opacity unchanged--> no real change since admission despite multiple courses of abx    Olga Lidia 1/21-1/29, 2/7- present    - Pulm following, agree with PO levaquin, consider D/C abx altogether   - Consider CT Chest as no real change in CXR since admission  despite multiple courses of abx  - ?Underlying malignancy  - on steroids  - poor prognosis

## 2019-02-10 NOTE — PROGRESS NOTE ADULT - SUBJECTIVE AND OBJECTIVE BOX
[facility] FOLLOW UP NOTE  --------------------------------------------------------------------------------  Chief Complaint/24 hour events/subjective:    still cough, off Lasix due to high Cr    PAST HISTORY  --------------------------------------------------------------------------------  No significant changes to PMH, PSH, FHx, SHx, unless otherwise noted    ALLERGIES & MEDICATIONS  --------------------------------------------------------------------------------  Allergies    No Known Allergies    Intolerances      Standing Inpatient Medications  ALBUTerol    90 MICROgram(s) HFA Inhaler 1 Puff(s) Inhalation every 4 hours  ALBUTerol/ipratropium for Nebulization 3 milliLiter(s) Nebulizer every 6 hours  aspirin enteric coated 81 milliGRAM(s) Oral <User Schedule>  atorvastatin 10 milliGRAM(s) Oral at bedtime  buDESOnide 160 MICROgram(s)/formoterol 4.5 MICROgram(s) Inhaler 2 Puff(s) Inhalation two times a day  chlorhexidine 4% Liquid 1 Application(s) Topical <User Schedule>  cilostazol 100 milliGRAM(s) Oral two times a day  clopidogrel Tablet 75 milliGRAM(s) Oral daily  docusate sodium 100 milliGRAM(s) Oral two times a day  heparin  Injectable 5000 Unit(s) SubCutaneous every 8 hours  levETIRAcetam  IVPB 500 milliGRAM(s) IV Intermittent every 12 hours  meropenem  IVPB 1000 milliGRAM(s) IV Intermittent every 8 hours  methylPREDNISolone sodium succinate Injectable 60 milliGRAM(s) IV Push every 12 hours  metoprolol tartrate 25 milliGRAM(s) Oral two times a day  nystatin Powder 1 Application(s) Topical two times a day  pantoprazole    Tablet 40 milliGRAM(s) Oral before breakfast  senna 2 Tablet(s) Oral at bedtime  tiotropium 18 MICROgram(s) Capsule 1 Capsule(s) Inhalation daily  vancomycin  IVPB 750 milliGRAM(s) IV Intermittent every 12 hours  vancomycin  IVPB        PRN Inpatient Medications  ALBUTerol/ipratropium for Nebulization 3 milliLiter(s) Nebulizer every 6 hours PRN  guaiFENesin    Syrup 100 milliGRAM(s) Oral every 6 hours PRN  lactulose Syrup 10 Gram(s) Oral every 6 hours PRN      REVIEW OF SYSTEMS  --------------------------------------------------------------------------------    All other systems were reviewed and are negative, except as noted.    VITALS/PHYSICAL EXAM  --------------------------------------------------------------------------------  T(C): 35.8 (02-10-19 @ 04:30), Max: 36.2 (02-09-19 @ 19:58)  HR: 96 (02-10-19 @ 04:30) (18 - 116)  BP: 172/75 (02-10-19 @ 04:30) (91/46 - 172/75)  RR: 18 (02-10-19 @ 04:30) (18 - 20)  SpO2: 96% (02-10-19 @ 09:23) (92% - 96%)  Wt(kg): --        02-10-19 @ 07:01  -  02-10-19 @ 12:14  --------------------------------------------------------  IN: 240 mL / OUT: 0 mL / NET: 240 mL      Physical Exam:    	Gen: no distress   	Pulm:  B/L rhonchi  and rales  	CV: S1S2; no rub  	Abd: +BS, soft, nontender/nondistended  	LE:  no  edema      LABS/STUDIES  --------------------------------------------------------------------------------              9.7    13.65 >-----------<  261      [02-10-19 @ 07:33]              31.8     152  |  104  |  48  ----------------------------<  99      [02-10-19 @ 07:33]  4.1   |  34  |  1.2        Ca     8.8     [02-10-19 @ 07:33]      Mg     2.2     [02-10-19 @ 07:33]      Phos  3.7     [02-10-19 @ 07:33]            Creatinine Trend:  SCr 1.2 [02-10 @ 07:33]  SCr 1.7 [02-09 @ 07:02]  SCr 1.3 [02-08 @ 06:44]  SCr 1.2 [02-07 @ 18:41]  SCr 1.2 [02-05 @ 08:25]    Urinalysis - [01-24-19 @ 04:30]      Color Red / Appearance Turbid / SG >=1.030 / pH 5.0      Gluc 100 / Ketone 40  / Bili Large / Urobili 1.0       Blood Large / Protein >=300 / Leuk Est Moderate / Nitrite Positive      RBC >50 / WBC  / Hyaline  / Gran 3-5 / Sq Epi  / Non Sq Epi  / Bacteria Moderate      HbA1c 5.7      [01-11-19 @ 07:14]  Lipid: chol 171, TG 43, HDL 90, LDL 78      [01-11-19 @ 07:14]

## 2019-02-11 NOTE — PROGRESS NOTE ADULT - SUBJECTIVE AND OBJECTIVE BOX
[place] [facility] FOLLOW UP NOTE  --------------------------------------------------------------------------------  24 hour events/subjective: Patient examined with son at bedside. Appears comfortable.    PAST HISTORY  --------------------------------------------------------------------------------  No significant changes to PMH, PSH, FHx, SHx, unless otherwise noted    ALLERGIES & MEDICATIONS  --------------------------------------------------------------------------------  Allergies    No Known Allergies    Standing Inpatient Medications  ALBUTerol    90 MICROgram(s) HFA Inhaler 1 Puff(s) Inhalation every 4 hours  ALBUTerol/ipratropium for Nebulization 3 milliLiter(s) Nebulizer every 6 hours  aspirin enteric coated 81 milliGRAM(s) Oral <User Schedule>  atorvastatin 10 milliGRAM(s) Oral at bedtime  buDESOnide 160 MICROgram(s)/formoterol 4.5 MICROgram(s) Inhaler 2 Puff(s) Inhalation two times a day  chlorhexidine 4% Liquid 1 Application(s) Topical <User Schedule>  cilostazol 100 milliGRAM(s) Oral two times a day  clopidogrel Tablet 75 milliGRAM(s) Oral daily  docusate sodium 100 milliGRAM(s) Oral two times a day  heparin  Injectable 5000 Unit(s) SubCutaneous every 8 hours  levETIRAcetam  IVPB 500 milliGRAM(s) IV Intermittent every 12 hours  levoFLOXacin IVPB 250 milliGRAM(s) IV Intermittent every 24 hours  linezolid  IVPB 600 milliGRAM(s) IV Intermittent every 12 hours  methylPREDNISolone sodium succinate Injectable 40 milliGRAM(s) IV Push every 12 hours  metoprolol tartrate 25 milliGRAM(s) Oral two times a day  nystatin Powder 1 Application(s) Topical two times a day  pantoprazole    Tablet 40 milliGRAM(s) Oral before breakfast  piperacillin/tazobactam IVPB. 3.375 Gram(s) IV Intermittent every 6 hours  senna 2 Tablet(s) Oral at bedtime  tiotropium 18 MICROgram(s) Capsule 1 Capsule(s) Inhalation daily    PRN Inpatient Medications  ALBUTerol/ipratropium for Nebulization 3 milliLiter(s) Nebulizer every 6 hours PRN  ALPRAZolam 0.125 milliGRAM(s) Oral every 12 hours PRN  guaiFENesin    Syrup 100 milliGRAM(s) Oral every 6 hours PRN  lactulose Syrup 10 Gram(s) Oral every 6 hours PRN      VITALS/PHYSICAL EXAM  --------------------------------------------------------------------------------  T(C): 36.1 (02-11-19 @ 14:05), Max: 36.2 (02-11-19 @ 04:30)  HR: 100 (02-11-19 @ 14:05) (96 - 114)  BP: 159/72 (02-11-19 @ 14:05) (112/66 - 159/72)  RR: 20 (02-11-19 @ 14:05) (18 - 24)  SpO2: 90% (02-11-19 @ 12:29) (90% - 94%)    02-10-19 @ 07:01  -  02-11-19 @ 07:00  --------------------------------------------------------  IN: 240 mL / OUT: 305 mL / NET: -65 mL    Physical Exam:  	Gen: NAD  	Pulm: CTA B/L  	CV: RRR, S1S2  	Abd: +BS, soft, nontender/nondistended  	: No suprapubic tenderness  	LE: Warm, no edema    LABS/STUDIES  --------------------------------------------------------------------------------              10.0   13.01 >-----------<  241      [02-11-19 @ 07:03]              33.3     152  |  106  |  40  ----------------------------<  79      [02-11-19 @ 07:03]  4.6   |  37  |  1.2        Ca     9.2     [02-11-19 @ 07:03]      Mg     2.2     [02-11-19 @ 07:03]      Phos  3.1     [02-11-19 @ 07:03]    Creatinine Trend:  SCr 1.2 [02-11 @ 07:03]  SCr 1.2 [02-10 @ 07:33]  SCr 1.7 [02-09 @ 07:02]  SCr 1.3 [02-08 @ 06:44]  SCr 1.2 [02-07 @ 18:41]    Urinalysis - [01-24-19 @ 04:30]      Color Red / Appearance Turbid / SG >=1.030 / pH 5.0      Gluc 100 / Ketone 40  / Bili Large / Urobili 1.0       Blood Large / Protein >=300 / Leuk Est Moderate / Nitrite Positive      RBC >50 / WBC  / Hyaline  / Gran 3-5 / Sq Epi  / Non Sq Epi  / Bacteria Moderate      HbA1c 5.7      [01-11-19 @ 07:14]  Lipid: chol 171, TG 43, HDL 90, LDL 78      [01-11-19 @ 07:14]

## 2019-02-11 NOTE — PROGRESS NOTE ADULT - ASSESSMENT
86 yo F, CCU Downgrade, PMHx COPD on 2 L NC, PVD, CKD3, HTN, DLD, hx of CVA, ex-smoker, recently discharged from [facility] on 01/13 after PNA tx w/Levaquin and Prednisone, presented/w SOB and dysuria. She was doing well until 01/20 when she starting feeling SOB again.     Pt was admitted to CCU for acute on chronic hypoxic and chronic hypercapnic respiratory failure 2/2 COPD exacerbation likely due to RLL GNR PNA exacerbated by coronavirus coinfection. She was on BiPaP prn, solumedrol, bronchodilators and Meropenem. She was treated for distal DVT and subsequently developed hematuria. Further w/u was positive for moderate ascites (2D echo), +UA (negative urine and blood cultures). MRSA and Flu were negative.       Today Events:, day two off Lasix , c/w IV steroid decreased 40mg  q12, Maintain O2 sat 88-92, xanax 0.125mg q12,  aspiration precautions, Pulm following,  ID following starting Levo IV, Unasyn IV and Zyvox IV.  CT Chest w/con    IMPRESSION   PNA  vs other Unknown Etiology not excluding CA  COPD exacerbation       Plan  # Acute on chronic hypoxic and chronic hypercapnic respiratory failure exacerbation 2/2   - maintain  88-92 o2sat   - solumedrol 40mg q12   - pulm following   - start  Levo IV, Unasyn IV and Zyvox IV.   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  1.2 cm right hepatic lobe lesion with arterial enhancement, incompletely characterized but could be a benign lesion. Consider nonemergent MRI of the abdomen with contrast if clinically indicated.  - OP f/u urology        # Transaminitis: waxing and waning  RUQ US: negative    # Constipation:   Cont. Senna, Colace  lactulose PRN     Electrolyte Imbalances: repletion   []  Hyponatremia   /   Hypernatremia  [x]  encouraged to Drink PO   []  Hypokalemia   /   Hyperkalemia  []   []  Hypochloremia   /    Hyperchloremia  []   []  Hypocapnia   /   Hypercapnia  []   []  Hypomagnesemia   /   Hypermagnesemia  []   []  Hypophosphatemia   /   Hyperphosphatemia  []           GI ppx:                                   [] Not indicated   /   [x] Pantoprazole 40mg PO Daily    DVT ppx:  [] Not indicated / [x] Heparin 5000mg SubQ / [] Lovenox 40mg SubQ / [] SCDs    Fluids:   [x] PO  |  [] IVF    Activity:  [X] Assisatnce needed   [X] Increase as Tolerated  /  [] OOB w/ assist  /  [] Bed Rest      DISPO:   attempting NH placement after optimization   [ ] From Home     [ x] NH/SNF   [ ] 4A Rehab  [ ] Detox Clinic  [X] Plan Discussion with patient and/or family.  [X] Discussed Case and Plan with the Medical Attending.    CODE STATUS  [X] FULL   /    [] DNR

## 2019-02-11 NOTE — PROGRESS NOTE ADULT - SUBJECTIVE AND OBJECTIVE BOX
LILA VELA  85y, Female  Allergy: No Known Allergies      LAST 24-Hr EVENTS:  pt seen examined  c/o sob  chest congestion    VITALS:  T(F): 97.1 (02-11-19 @ 04:30), Max: 97.1 (02-11-19 @ 04:30)  HR: 97 (02-11-19 @ 04:30)  BP: 152/67 (02-11-19 @ 04:30) (112/66 - 152/67)  RR: 19 (02-11-19 @ 04:30)  SpO2: 94% (02-10-19 @ 20:10)    PHYSICAL EXAM:    GENERAL: NAD  NECK: Supple, No JVD  CHEST/LUNG: ronchi  HEART: Regular rate and rhythm  ABDOMEN: Soft, Nontender, Nondistended  EXTREMITIES:  No clubbing, edema absent        TESTS & MEASUREMENTS:    IN: 240 mL / OUT: 305 mL / NET: -65 mL                            10.0   13.01 )-----------( 241      ( 11 Feb 2019 07:03 )             33.3       02-10    152<H>  |  104  |  48<H>  ----------------------------<  99  4.1   |  34<H>  |  1.2    Ca    8.8      10 Feb 2019 07:33  Phos  3.7     02-10  Mg     2.2     02-10            MEDICATIONS:  MEDICATIONS  (STANDING):  ALBUTerol    90 MICROgram(s) HFA Inhaler 1 Puff(s) Inhalation every 4 hours  ALBUTerol/ipratropium for Nebulization 3 milliLiter(s) Nebulizer every 6 hours  aspirin enteric coated 81 milliGRAM(s) Oral <User Schedule>  atorvastatin 10 milliGRAM(s) Oral at bedtime  buDESOnide 160 MICROgram(s)/formoterol 4.5 MICROgram(s) Inhaler 2 Puff(s) Inhalation two times a day  chlorhexidine 4% Liquid 1 Application(s) Topical <User Schedule>  cilostazol 100 milliGRAM(s) Oral two times a day  clopidogrel Tablet 75 milliGRAM(s) Oral daily  docusate sodium 100 milliGRAM(s) Oral two times a day  heparin  Injectable 5000 Unit(s) SubCutaneous every 8 hours  levETIRAcetam  IVPB 500 milliGRAM(s) IV Intermittent every 12 hours  levoFLOXacin  Tablet 250 milliGRAM(s) Oral every 24 hours  methylPREDNISolone sodium succinate Injectable 60 milliGRAM(s) IV Push every 12 hours  metoprolol tartrate 25 milliGRAM(s) Oral two times a day  nystatin Powder 1 Application(s) Topical two times a day  pantoprazole    Tablet 40 milliGRAM(s) Oral before breakfast  senna 2 Tablet(s) Oral at bedtime  tiotropium 18 MICROgram(s) Capsule 1 Capsule(s) Inhalation daily    MEDICATIONS  (PRN):  ALBUTerol/ipratropium for Nebulization 3 milliLiter(s) Nebulizer every 6 hours PRN Shortness of Breath and/or Wheezing  guaiFENesin    Syrup 100 milliGRAM(s) Oral every 6 hours PRN Cough  lactulose Syrup 10 Gram(s) Oral every 6 hours PRN constipation

## 2019-02-11 NOTE — PROGRESS NOTE ADULT - SUBJECTIVE AND OBJECTIVE BOX
02-11-19 @ 10:16    LILA VELA  85y  Female  Diminished responsiveness. Does reply to verbal.   Jose Angel Flower at bedside.   In resp discomfort.     INTERVAL EVENTS: Worsening SOB, congestion.     MEDICATIONS  (STANDING):  ALBUTerol    90 MICROgram(s) HFA Inhaler 1 Puff(s) Inhalation every 4 hours  ALBUTerol/ipratropium for Nebulization 3 milliLiter(s) Nebulizer every 6 hours  aspirin enteric coated 81 milliGRAM(s) Oral <User Schedule>  atorvastatin 10 milliGRAM(s) Oral at bedtime  buDESOnide 160 MICROgram(s)/formoterol 4.5 MICROgram(s) Inhaler 2 Puff(s) Inhalation two times a day  chlorhexidine 4% Liquid 1 Application(s) Topical <User Schedule>  cilostazol 100 milliGRAM(s) Oral two times a day  clopidogrel Tablet 75 milliGRAM(s) Oral daily  docusate sodium 100 milliGRAM(s) Oral two times a day  heparin  Injectable 5000 Unit(s) SubCutaneous every 8 hours  levETIRAcetam  IVPB 500 milliGRAM(s) IV Intermittent every 12 hours  levoFLOXacin  Tablet 250 milliGRAM(s) Oral every 24 hours  methylPREDNISolone sodium succinate Injectable 60 milliGRAM(s) IV Push every 12 hours  metoprolol tartrate 25 milliGRAM(s) Oral two times a day  nystatin Powder 1 Application(s) Topical two times a day  pantoprazole    Tablet 40 milliGRAM(s) Oral before breakfast  senna 2 Tablet(s) Oral at bedtime  tiotropium 18 MICROgram(s) Capsule 1 Capsule(s) Inhalation daily    MEDICATIONS  (PRN):  ALBUTerol/ipratropium for Nebulization 3 milliLiter(s) Nebulizer every 6 hours PRN Shortness of Breath and/or Wheezing  guaiFENesin    Syrup 100 milliGRAM(s) Oral every 6 hours PRN Cough  lactulose Syrup 10 Gram(s) Oral every 6 hours PRN constipation      T(C): 36.2 (02-11-19 @ 04:30), Max: 36.2 (02-11-19 @ 04:30)  HR: 97 (02-11-19 @ 04:30) (96 - 114)  BP: 152/67 (02-11-19 @ 04:30) (112/66 - 152/67)  RR: 19 (02-11-19 @ 04:30) (18 - 24)  SpO2: 94% (02-10-19 @ 20:10) (93% - 94%)  Wt(kg): --Vital Signs Last 24 Hrs  T(C): 36.2 (11 Feb 2019 04:30), Max: 36.2 (11 Feb 2019 04:30)  T(F): 97.1 (11 Feb 2019 04:30), Max: 97.1 (11 Feb 2019 04:30)  HR: 97 (11 Feb 2019 04:30) (96 - 114)  BP: 152/67 (11 Feb 2019 04:30) (112/66 - 152/67)  BP(mean): --  RR: 19 (11 Feb 2019 04:30) (18 - 24)  SpO2: 94% (10 Feb 2019 20:10) (93% - 94%)    PHYSICAL EXAM:  GENERAL:   NECK:   CHEST/LUNG:  HEART: S1  ABDOMEN:   EXTREMITIES:                           10.0   13.01 )-----------( 241      ( 11 Feb 2019 07:03 )             33.3     02-11    152<H>  |  106  |  40<H>  ----------------------------<  79  4.6   |  37<H>  |  1.2    Ca    9.2      11 Feb 2019 07:03  Phos  3.1     02-11  Mg     2.2     02-11              RADIOLOGY & ADDITIONAL TESTS:      ASSESSMENT / PLAN  :    WORSENING SOB/HYPOXIA : Known COPD, Had RLL PNA c/w Bacterial + viral. Tx-ed w/ Abx, completed. Was doing better. Has Pulm HTN also. ? of recent type II MI. Will get reeval by Cardio, to help Tx plan. Worsening. Will have CT chest. Overall prognosis guarded now. Discussed w/ son.   ACUTE HYPOXIC  RESPIRATORY FAILURE : Somewhat worsened in her improved condition. Pulm f/u appreciated.   PNEUMONIA ; Off Abx. No fever No new sx.   See above also.   COPD exacerbation ; has +viral. Chr. O2 dependent. BiPap at night, as advised by pulm. Fluctuating state. Will repeat CXR   BL PLEURAL EFFUSION : was doing better. ? 2' CHF, did better after diuresis. Though clinically look dehydrated. Need to monitor Is-Os.   RIGHT TIBIAL VEIN THROMBOSIS ; Conservative. Maintain ambulation. No Ac DVT.  ANEMIA ; chronic stable.   RECENT HEMATURIA : Seen by Uro. as advised, CT done, showed ant bladder mass. OP cysto & further management, when stable.     CKD III : Stable. Avoid dehydration   OLD CVA ; stable.   Hx. AORTO-FEM BYPASS  RIGHT CAROTID STENT        Discussed w/ Dr. Roca in details. 02-11-19 @ 10:16    LILA VELA  85y  Female  Diminished responsiveness. Does reply to verbal.   Jose Angel Flower at bedside.   In resp discomfort.     INTERVAL EVENTS: Worsening SOB, congestion.     MEDICATIONS  (STANDING):  ALBUTerol    90 MICROgram(s) HFA Inhaler 1 Puff(s) Inhalation every 4 hours  ALBUTerol/ipratropium for Nebulization 3 milliLiter(s) Nebulizer every 6 hours  aspirin enteric coated 81 milliGRAM(s) Oral <User Schedule>  atorvastatin 10 milliGRAM(s) Oral at bedtime  buDESOnide 160 MICROgram(s)/formoterol 4.5 MICROgram(s) Inhaler 2 Puff(s) Inhalation two times a day  chlorhexidine 4% Liquid 1 Application(s) Topical <User Schedule>  cilostazol 100 milliGRAM(s) Oral two times a day  clopidogrel Tablet 75 milliGRAM(s) Oral daily  docusate sodium 100 milliGRAM(s) Oral two times a day  heparin  Injectable 5000 Unit(s) SubCutaneous every 8 hours  levETIRAcetam  IVPB 500 milliGRAM(s) IV Intermittent every 12 hours  levoFLOXacin  Tablet 250 milliGRAM(s) Oral every 24 hours  methylPREDNISolone sodium succinate Injectable 60 milliGRAM(s) IV Push every 12 hours  metoprolol tartrate 25 milliGRAM(s) Oral two times a day  nystatin Powder 1 Application(s) Topical two times a day  pantoprazole    Tablet 40 milliGRAM(s) Oral before breakfast  senna 2 Tablet(s) Oral at bedtime  tiotropium 18 MICROgram(s) Capsule 1 Capsule(s) Inhalation daily    MEDICATIONS  (PRN):  ALBUTerol/ipratropium for Nebulization 3 milliLiter(s) Nebulizer every 6 hours PRN Shortness of Breath and/or Wheezing  guaiFENesin    Syrup 100 milliGRAM(s) Oral every 6 hours PRN Cough  lactulose Syrup 10 Gram(s) Oral every 6 hours PRN constipation      T(C): 36.2 (02-11-19 @ 04:30), Max: 36.2 (02-11-19 @ 04:30)  HR: 97 (02-11-19 @ 04:30) (96 - 114)  BP: 152/67 (02-11-19 @ 04:30) (112/66 - 152/67)  RR: 19 (02-11-19 @ 04:30) (18 - 24)  SpO2: 94% (02-10-19 @ 20:10) (93% - 94%)  Wt(kg): --Vital Signs Last 24 Hrs  T(C): 36.2 (11 Feb 2019 04:30), Max: 36.2 (11 Feb 2019 04:30)  T(F): 97.1 (11 Feb 2019 04:30), Max: 97.1 (11 Feb 2019 04:30)  HR: 97 (11 Feb 2019 04:30) (96 - 114)  BP: 152/67 (11 Feb 2019 04:30) (112/66 - 152/67)  BP(mean): --  RR: 19 (11 Feb 2019 04:30) (18 - 24)  SpO2: 94% (10 Feb 2019 20:10) (93% - 94%)    PHYSICAL EXAM:  GENERAL: Looks sick, on mask, tired look.   NECK: supple. no JVD  CHEST/LUNG: Coarse BS. BL AE appreciated. Prolonged expn.   HEART: S1S2, tachy  ABDOMEN: soft. BS noted.   EXTREMITIES: mult ecchymoses. no edema.                           10.0   13.01 )-----------( 241      ( 11 Feb 2019 07:03 )             33.3     02-11    152<H>  |  106  |  40<H>  ----------------------------<  79  4.6   |  37<H>  |  1.2    Ca    9.2      11 Feb 2019 07:03  Phos  3.1     02-11  Mg     2.2     02-11              RADIOLOGY & ADDITIONAL TESTS:      ASSESSMENT / PLAN  :    WORSENING SOB/HYPOXIA : Known COPD, Had RLL PNA c/w Bacterial + viral. Tx-ed w/ Abx, completed. Was doing better. Has Pulm HTN also. ? of recent type II MI. Overall much worsened. Will have CT chest, reculture. Reevaluated by ID to start on broad spectrum Abx. Seen by pulm for f/u. O2 supplement.  Overall prognosis guarded now. Discussed w/ son.   ACUTE HYPOXIC  RESPIRATORY FAILURE : Worsened condition.    PNEUMONIA ; Was off Abx. Worsened. will have CT chest.    BL PLEURAL EFFUSION : was doing better. ? 2' CHF, did better after diuresis. Though clinically look dehydrated. Monitor Is-Os.   RIGHT TIBIAL VEIN THROMBOSIS ; Not an active issue now.   ANEMIA ; chronic stable.   RECENT HEMATURIA : Seen by Uro. as advised, CT done, showed ant bladder mass. OP cysto & further management, when stable.     CKD III : Stable. Avoid dehydration   OLD CVA ; stable.   Hx. AORTO-FEM BYPASS  RIGHT CAROTID STENT        Discussed w/ Dr. Roca in details.     Discussed w/ son. Condition, plans.     Spent 30 min in care & coordination.

## 2019-02-11 NOTE — PROGRESS NOTE ADULT - ASSESSMENT
1. CKD III-stable  2. Bladder mass/hematuria  3. DVT  4. HTN  5. PNA  6. R heart failure  7. Pulmonary HTN  8. Hypernatremia    Plan:  Encourage water intake  Followup CT scan  Urology f/u-outpatient cyto  Daily BMP

## 2019-02-11 NOTE — PROGRESS NOTE ADULT - ASSESSMENT
IMPRESSION:   RLL GNR PNA exacerbated by a Coronavirus coinfection.  WBC 13.6  BCx NTD  Legionella negative  Clinically worse  CXR with no change    RECOMMENDATIONS:  CT chest  Nares for ORSA  Zyvox 600 mg iv q12h  Zosyn 3.375 mg iv q6h  Levoquin 250 mg iv q24h

## 2019-02-11 NOTE — PROGRESS NOTE ADULT - SUBJECTIVE AND OBJECTIVE BOX
LILA VELA  85y, Female      OVERNIGHT EVENTS:    no fevers, has SOB/ cough/ no chest pain    VITALS:  T(F): 97.1, Max: 97.1 (02-11-19 @ 04:30)  HR: 97  BP: 152/67  RR: 19Vital Signs Last 24 Hrs  T(C): 36.2 (11 Feb 2019 04:30), Max: 36.2 (11 Feb 2019 04:30)  T(F): 97.1 (11 Feb 2019 04:30), Max: 97.1 (11 Feb 2019 04:30)  HR: 97 (11 Feb 2019 04:30) (96 - 114)  BP: 152/67 (11 Feb 2019 04:30) (112/66 - 152/67)  BP(mean): --  RR: 19 (11 Feb 2019 04:30) (18 - 24)  SpO2: 94% (10 Feb 2019 20:10) (93% - 96%)    TESTS & MEASUREMENTS:                        9.7    13.65 )-----------( 261      ( 10 Feb 2019 07:33 )             31.8     02-10    152<H>  |  104  |  48<H>  ----------------------------<  99  4.1   |  34<H>  |  1.2    Ca    8.8      10 Feb 2019 07:33  Phos  3.7     02-10  Mg     2.2     02-10                RADIOLOGY & ADDITIONAL TESTS:    ANTIBIOTICS:  levoFLOXacin  Tablet 250 milliGRAM(s) Oral every 24 hours

## 2019-02-11 NOTE — PROGRESS NOTE ADULT - SUBJECTIVE AND OBJECTIVE BOX
LENGTH OF HOSPITAL STAY: 21d      CHIEF COMPLAINT:   Patient is a 85y old  Female who presents with a chief complaint of sob (11 Feb 2019 10:16)      OVER Past 24hrs:  The patient was seen and examined at bedside there were no acute events during the night patient is not tolerating NC. She denies fevers chills chest pain. But complains of SOB.       PAST MEDICAL & SURGICAL HISTORY  PAST MEDICAL & SURGICAL HISTORY:  PVD (peripheral vascular disease)  COPD (chronic obstructive pulmonary disease)  Kidney disease, chronic, stage I (GFR over 89 ml/min)  Peripheral vascular disease  HTN (hypertension)  High cholesterol  CVA (cerebral vascular accident)  H/O aorto-femoral bypass  History of right common carotid artery stent placement    SOCIAL HISTORY:    REVIEW OF SYSTEMS  RESPIRATORY: No cough,  wheezing, chills or hemoptysis; + shortness of breath  CARDIOVASCULAR: No chest pain, palpitations, dizziness, or leg swelling    ALLERGIES:  No Known Allergies    MEDICATIONS:  STANDING MEDICATIONS  ALBUTerol    90 MICROgram(s) HFA Inhaler 1 Puff(s) Inhalation every 4 hours  ALBUTerol/ipratropium for Nebulization 3 milliLiter(s) Nebulizer every 6 hours  aspirin enteric coated 81 milliGRAM(s) Oral <User Schedule>  atorvastatin 10 milliGRAM(s) Oral at bedtime  buDESOnide 160 MICROgram(s)/formoterol 4.5 MICROgram(s) Inhaler 2 Puff(s) Inhalation two times a day  chlorhexidine 4% Liquid 1 Application(s) Topical <User Schedule>  cilostazol 100 milliGRAM(s) Oral two times a day  clopidogrel Tablet 75 milliGRAM(s) Oral daily  docusate sodium 100 milliGRAM(s) Oral two times a day  heparin  Injectable 5000 Unit(s) SubCutaneous every 8 hours  levETIRAcetam  IVPB 500 milliGRAM(s) IV Intermittent every 12 hours  levoFLOXacin  Tablet 250 milliGRAM(s) Oral every 24 hours  methylPREDNISolone sodium succinate Injectable 60 milliGRAM(s) IV Push every 12 hours  metoprolol tartrate 25 milliGRAM(s) Oral two times a day  nystatin Powder 1 Application(s) Topical two times a day  pantoprazole    Tablet 40 milliGRAM(s) Oral before breakfast  senna 2 Tablet(s) Oral at bedtime  tiotropium 18 MICROgram(s) Capsule 1 Capsule(s) Inhalation daily    PRN MEDICATIONS  ALBUTerol/ipratropium for Nebulization 3 milliLiter(s) Nebulizer every 6 hours PRN  guaiFENesin    Syrup 100 milliGRAM(s) Oral every 6 hours PRN  lactulose Syrup 10 Gram(s) Oral every 6 hours PRN    VITALS:   T(F): 97  HR: 100  BP: 159/72  RR: 20  SpO2: 90%    PHYSICAL EXAM:  General: No acute distress.  Alert, oriented, interactive, nonfocal.    HEENT: Pupils equal, reactive to light symmetrically.    PULM: wheezing improved today  auscultation bilaterally, no significant sputum production.       CVS: Regular rate and rhythm, no murmurs, rubs, or gallops.    ABD: Soft, nondistended, nontender, no masses.    EXT: No edema, nontender.    SKIN: Warm and well perfused, no rashes note.    LABS:                        10.0   13.01 )-----------( 241      ( 11 Feb 2019 07:03 )             33.3     02-11    152<H>  |  106  |  40<H>  ----------------------------<  79  4.6   |  37<H>  |  1.2    Ca    9.2      11 Feb 2019 07:03  Phos  3.1     02-11  Mg     2.2     02-11

## 2019-02-11 NOTE — PROGRESS NOTE ADULT - ASSESSMENT
Acute on chronic hypoxic respiratory failure  Copd exacerbation, improved  Pneumonia- concern for mdr pathogen  CHF  Anxiety        oxygen per pulse oximetry, wean fio2 , maintain sat 88-92  obtain ct chest non contrast  abx per id  dec iv steroids 40 q 12hours  continue symbicort and spiriva  lasix held  monitor i/os cr and lytes  free H20 supplementation  xanax 0.125 q 12hours prn  discussed with family at bed side as well as house staff  overall prognosis guarded

## 2019-02-12 NOTE — PROCEDURE NOTE - NSINFORMCONSENT_GEN_A_CORE
Patient/Benefits, risks, and possible complications of procedure explained to patient/caregiver who verbalized understanding and gave verbal consent.

## 2019-02-12 NOTE — PROGRESS NOTE ADULT - ASSESSMENT
84 yo F, CCU Downgrade, PMHx COPD on 2 L NC, PVD, CKD3, HTN, DLD, hx of CVA, ex-smoker, recently discharged from [facility] on 01/13 after PNA tx w/Levaquin and Prednisone, presented/w SOB and dysuria. She was doing well until 01/20 when she starting feeling SOB again.     Pt was admitted to CCU for acute on chronic hypoxic and chronic hypercapnic respiratory failure 2/2 COPD exacerbation likely due to RLL GNR PNA exacerbated by coronavirus coinfection. She was on BiPaP prn, solumedrol, bronchodilators and Meropenem. She was treated for distal DVT and subsequently developed hematuria. Further w/u was positive for moderate ascites (2D echo), +UA (negative urine and blood cultures). MRSA and Flu were negative.       Today Events:, day 3 off Lasix , c/w IV steroid decreased 40mg  q12, Maintain O2 sat 88-92, xanax 0.125mg q12,  aspiration precautions, Pulm following,  ID following c/w Levo IV, Unasyn IV and Zyvox IV. will consider DC,  f/u CT Chest w/con gave one L 1/2NS    IMPRESSION   PNA  vs other Unknown Etiology not excluding CA  COPD exacerbation       Plan  # Acute on chronic hypoxic and chronic hypercapnic respiratory failure exacerbation etiology Unknown    - maintain  88-92 o2sat   - solumedrol 40mg q12   - pulm following   - c/w   Levo IV, Unasyn IV and Zyvox IV.   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  1.2 cm right hepatic lobe lesion with arterial enhancement, incompletely characterized but could be a benign lesion. Consider nonemergent MRI of the abdomen with contrast if clinically indicated.  - OP f/u urology        # Transaminitis: waxing and waning  RUQ US: negative    # Constipation:   Cont. Senna, Colace  lactulose PRN     Electrolyte Imbalances: repletion   []  Hyponatremia   /   Hypernatremia  [x]  encouraged to Drink PO gave IVF  []  Hypokalemia   /   Hyperkalemia  []   []  Hypochloremia   /    Hyperchloremia  []   []  Hypocapnia   /   Hypercapnia  []   []  Hypomagnesemia   /   Hypermagnesemia  []   []  Hypophosphatemia   /   Hyperphosphatemia  []           GI ppx:                                   [] Not indicated   /   [x] Pantoprazole 40mg PO Daily    DVT ppx:  [] Not indicated / [x] Heparin 5000mg SubQ / [] Lovenox 40mg SubQ / [] SCDs    Fluids:   [x] PO  |  [x] IVF one L only     Activity:  [X] Assisatnce needed   [X] Increase as Tolerated  /  [] OOB w/ assist  /  [] Bed Rest      DISPO:   attempting NH placement after optimization   [ ] From Home     [ x] NH/SNF   [ ] 4A Rehab  [ ] Detox Clinic  [X] Plan Discussion with patient and/or family.  [X] Discussed Case and Plan with the Medical Attending.    CODE STATUS  [X] FULL   /    [] DNR

## 2019-02-12 NOTE — PROGRESS NOTE ADULT - SUBJECTIVE AND OBJECTIVE BOX
[facility] [place] [facility] FOLLOW UP NOTE  --------------------------------------------------------------------------------  24 hour events/subjective: Patient examined. Appears comfortable.    PAST HISTORY  --------------------------------------------------------------------------------  No significant changes to PMH, PSH, FHx, SHx, unless otherwise noted    ALLERGIES & MEDICATIONS  --------------------------------------------------------------------------------  Allergies    No Known Allergies    Standing Inpatient Medications  ALBUTerol    90 MICROgram(s) HFA Inhaler 1 Puff(s) Inhalation every 4 hours  ALBUTerol/ipratropium for Nebulization 3 milliLiter(s) Nebulizer every 6 hours  aspirin enteric coated 81 milliGRAM(s) Oral <User Schedule>  atorvastatin 10 milliGRAM(s) Oral at bedtime  buDESOnide 160 MICROgram(s)/formoterol 4.5 MICROgram(s) Inhaler 2 Puff(s) Inhalation two times a day  chlorhexidine 4% Liquid 1 Application(s) Topical <User Schedule>  cilostazol 100 milliGRAM(s) Oral two times a day  clopidogrel Tablet 75 milliGRAM(s) Oral daily  docusate sodium 100 milliGRAM(s) Oral two times a day  heparin  Injectable 5000 Unit(s) SubCutaneous every 8 hours  levETIRAcetam  IVPB 500 milliGRAM(s) IV Intermittent every 12 hours  levoFLOXacin IVPB 250 milliGRAM(s) IV Intermittent every 24 hours  linezolid  IVPB 600 milliGRAM(s) IV Intermittent every 12 hours  metoprolol tartrate 25 milliGRAM(s) Oral two times a day  nystatin Powder 1 Application(s) Topical two times a day  pantoprazole    Tablet 40 milliGRAM(s) Oral before breakfast  piperacillin/tazobactam IVPB. 3.375 Gram(s) IV Intermittent every 6 hours  predniSONE   Tablet 40 milliGRAM(s) Oral daily  senna 2 Tablet(s) Oral at bedtime  tiotropium 18 MICROgram(s) Capsule 1 Capsule(s) Inhalation daily    PRN Inpatient Medications  acetaminophen   Tablet .. 650 milliGRAM(s) Oral every 6 hours PRN  ALBUTerol/ipratropium for Nebulization 3 milliLiter(s) Nebulizer every 6 hours PRN  ALPRAZolam 0.125 milliGRAM(s) Oral every 12 hours PRN  guaiFENesin    Syrup 100 milliGRAM(s) Oral every 6 hours PRN  lactulose Syrup 10 Gram(s) Oral every 6 hours PRN    VITALS/PHYSICAL EXAM  --------------------------------------------------------------------------------  T(C): 36.2 (02-12-19 @ 05:23), Max: 36.2 (02-12-19 @ 05:23)  HR: 97 (02-12-19 @ 05:23) (76 - 97)  BP: 119/70 (02-12-19 @ 05:23) (119/70 - 151/78)  RR: 18 (02-12-19 @ 05:23) (18 - 18)  SpO2: 93% (02-11-19 @ 22:10) (93% - 93%)    02-11-19 @ 07:01  -  02-12-19 @ 07:00  --------------------------------------------------------  IN: 600 mL / OUT: 300 mL / NET: 300 mL    Physical Exam:  	Gen: NAD  	Pulm: CTA B/L  	CV: RRR, S1S2  	Abd: +BS, soft, nontender/nondistended  	: No suprapubic tenderness  	LE: Warm, no edema    LABS/STUDIES  --------------------------------------------------------------------------------              10.4   13.77 >-----------<  273      [02-12-19 @ 07:17]              33.9     148  |  103  |  37  ----------------------------<  99      [02-12-19 @ 07:17]  4.7   |  31  |  1.1        Ca     9.3     [02-12-19 @ 07:17]      Mg     2.0     [02-12-19 @ 07:17]      Phos  3.0     [02-12-19 @ 07:17]    Creatinine Trend:  SCr 1.1 [02-12 @ 07:17]  SCr 1.2 [02-11 @ 07:03]  SCr 1.2 [02-10 @ 07:33]  SCr 1.7 [02-09 @ 07:02]  SCr 1.3 [02-08 @ 06:44]    Urinalysis - [01-24-19 @ 04:30]      Color Red / Appearance Turbid / SG >=1.030 / pH 5.0      Gluc 100 / Ketone 40  / Bili Large / Urobili 1.0       Blood Large / Protein >=300 / Leuk Est Moderate / Nitrite Positive      RBC >50 / WBC  / Hyaline  / Gran 3-5 / Sq Epi  / Non Sq Epi  / Bacteria Moderate    HbA1c 5.7      [01-11-19 @ 07:14]  Lipid: chol 171, TG 43, HDL 90, LDL 78      [01-11-19 @ 07:14]

## 2019-02-12 NOTE — CONSULT NOTE ADULT - SUBJECTIVE AND OBJECTIVE BOX
REQUESTED OF: DR milligan     CLINICAL ISSUE TO BE EVALUATED BY CONSULTANT: goals of care and symptom management        LILA VELA 85yFemale  HPI:  84 yo F with PMHx of COPD on 2 L NC, PVD, CKD3, essential HTN, DLD, hx of CVA, ex-smoker, recently discharged from hospital Jan 13 after being admitted for pneumonia, presented for SOB of one day duration, patient has finished her course of Levaquin and prednisone on Friday and was doing well , until Sunday evening when she starting feeling SOB again that worsening today, patient denies any chest pain, cough, lower extremity swelling.  EMS found her to be hypoxic to the 70s on room air.  On arrival patient, hypoxic to 67%, + tachypnea, speaking few words at a time. + wheezing, bilaterally, clavicular retractions.  Patient was placed on BiPAP and SOB improved. In Ed patient received cefepime,  Levaquin , and solumedrol. Patient mentioned history of dysuria of 2 days duration. (21 Jan 2019 14:45)        PAST MEDICAL & SURGICAL HISTORY:  PVD (peripheral vascular disease)  COPD (chronic obstructive pulmonary disease)  Kidney disease, chronic, stage I (GFR over 89 ml/min)  Peripheral vascular disease  HTN (hypertension)  High cholesterol  CVA (cerebral vascular accident)  H/O aorto-femoral bypass  History of right common carotid artery stent placement        PHYSICAL EXAM:      Constitutional: Pt appears chronically ill    Respiratory: on 50% venti mask no dyspnea - deep cough, unable to expectorate    Cardiovascular: (-) JVD    Gastrointestinal: (+) BS abdomen flat    Genitourinary: non tender    Extremities: no edema     Neurological: pt alert and conversant    Skin: intact            T(C): 35.8, Max: 36.2 (05:23)  HR: 105 (76 - 105)  BP: 112/60 (112/60 - 151/78)  RR: 20 (18 - 20)  SpO2: 93% (93% - 93%)      LABS/STUDIES:  02-12    148<H>  |  103  |  37<H>  ----------------------------<  99  4.7   |  31  |  1.1    Ca    9.3      12 Feb 2019 07:17  Phos  3.0     02-12  Mg     2.0     02-12                              10.4   13.77 )-----------( 273      ( 12 Feb 2019 07:17 )             33.9       MEDICATIONS  (STANDING):  ALBUTerol    90 MICROgram(s) HFA Inhaler 1 Puff(s) Inhalation every 4 hours  ALBUTerol/ipratropium for Nebulization 3 milliLiter(s) Nebulizer every 6 hours  aspirin enteric coated 81 milliGRAM(s) Oral <User Schedule>  atorvastatin 10 milliGRAM(s) Oral at bedtime  buDESOnide 160 MICROgram(s)/formoterol 4.5 MICROgram(s) Inhaler 2 Puff(s) Inhalation two times a day  chlorhexidine 4% Liquid 1 Application(s) Topical <User Schedule>  cilostazol 100 milliGRAM(s) Oral two times a day  clopidogrel Tablet 75 milliGRAM(s) Oral daily  docusate sodium 100 milliGRAM(s) Oral two times a day  heparin  Injectable 5000 Unit(s) SubCutaneous every 8 hours  levETIRAcetam  IVPB 500 milliGRAM(s) IV Intermittent every 12 hours  levoFLOXacin IVPB 250 milliGRAM(s) IV Intermittent every 24 hours  linezolid  IVPB 600 milliGRAM(s) IV Intermittent every 12 hours  metoprolol tartrate 25 milliGRAM(s) Oral two times a day  nystatin Powder 1 Application(s) Topical two times a day  pantoprazole    Tablet 40 milliGRAM(s) Oral before breakfast  piperacillin/tazobactam IVPB. 3.375 Gram(s) IV Intermittent every 6 hours  predniSONE   Tablet 40 milliGRAM(s) Oral daily  senna 2 Tablet(s) Oral at bedtime  tiotropium 18 MICROgram(s) Capsule 1 Capsule(s) Inhalation daily    MEDICATIONS  (PRN):  acetaminophen   Tablet .. 650 milliGRAM(s) Oral every 6 hours PRN Temp greater or equal to 38C (100.4F), Moderate Pain (4 - 6)  ALBUTerol/ipratropium for Nebulization 3 milliLiter(s) Nebulizer every 6 hours PRN Shortness of Breath and/or Wheezing  ALPRAZolam 0.125 milliGRAM(s) Oral every 12 hours PRN anxiety  guaiFENesin    Syrup 100 milliGRAM(s) Oral every 6 hours PRN Cough  lactulose Syrup 10 Gram(s) Oral every 6 hours PRN constipation          PPS (Palliative Performance Scale):  30

## 2019-02-12 NOTE — PROGRESS NOTE ADULT - ASSESSMENT
85F    COPD   Peripheral vascular disease  HTN  High cholesterol  CVA   H/O aorto-femoral bypass  History of right common carotid artery stent placement    Admitted 1/21 + coronavirus   Blood and urine cx NGTD, legionella neg  CT 1.2 cm right hepatic lobe lesion with arterial enhancement,. 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  CXR Right midlung opacity unchanged--> no real change since admission despite multiple courses of abx    Olga Lidia 1/21-1/29, 2/7- present    - Pulm following  - CT Chest pending (no real change in CXR since admission  despite multiple courses of abx)  - ?Underlying malignancy  - on steroids  - consider d/c all abx, on linezolid (2/11-), zosyn, levofloxacin  - poor prognosis

## 2019-02-12 NOTE — PROGRESS NOTE ADULT - ASSESSMENT
Acute on chronic hypoxic respiratory failure  Copd exacerbation, improved  Pneumonia- concern for mdr pathogen  Pulmonary nodule  CHF  Anxiety  Bladder mass   Advanced directives- Full code       oxygen per pulse oximetry, wean fio2 , maintain sat 88-92  obtain ct chest, ok with contrast  abx per id  po prednisone 40 mg daily  continue symbicort and spiriva  ns 40 cc per hour x 1 L  only  monitor i/os cr and lytes  xanax 0.125 q 12hours prn  palliative care consult- discussed case in detail  discussed with family at bed side as well as house staff  overall grim prognosis  Advanced directives at this time full code- prognosis remain guarded overall  overall prognosis guarded

## 2019-02-12 NOTE — PROGRESS NOTE ADULT - SUBJECTIVE AND OBJECTIVE BOX
LILA VELA  85y, Female      OVERNIGHT EVENTS:  pending CT    ROS negative except as per above    VITALS:  T(F): 96.4, Max: 97.1 (02-12-19 @ 05:23)  HR: 105  BP: 112/60  RR: 20Vital Signs Last 24 Hrs  T(C): 35.8 (12 Feb 2019 14:34), Max: 36.2 (12 Feb 2019 05:23)  T(F): 96.4 (12 Feb 2019 14:34), Max: 97.1 (12 Feb 2019 05:23)  HR: 105 (12 Feb 2019 14:34) (76 - 105)  BP: 112/60 (12 Feb 2019 14:34) (112/60 - 151/78)  BP(mean): --  RR: 20 (12 Feb 2019 14:34) (18 - 20)  SpO2: 93% (11 Feb 2019 22:10) (93% - 93%)    PHYSICAL EXAM:  Gen: Elderly F on NC, chronically ill appearing   HEENT: NCAT. EOMI. MMM.   Neck: Supple  CV: RRR  Lungs: CTAB, no w/r/r  Abd: Soft. NTND  Extr: wwp, no edema  Skin: no rash  Neuro: No focal deficits  Lines: clean    TESTS & MEASUREMENTS:                        10.4   13.77 )-----------( 273      ( 12 Feb 2019 07:17 )             33.9     02-12    148<H>  |  103  |  37<H>  ----------------------------<  99  4.7   |  31  |  1.1    Ca    9.3      12 Feb 2019 07:17  Phos  3.0     02-12  Mg     2.0     02-12              RADIOLOGY & ADDITIONAL TESTS:    ANTIBIOTICS:  cefepime   IVPB   100 mL/Hr IV Intermittent (01-21-19 @ 12:28)    levoFLOXacin  Tablet   250 milliGRAM(s) Oral (02-10-19 @ 18:31)    levoFLOXacin  Tablet   750 milliGRAM(s) Oral (01-22-19 @ 06:02)    levoFLOXacin  Tablet   250 milliGRAM(s) Oral (02-02-19 @ 18:35)   250 milliGRAM(s) Oral (02-01-19 @ 18:22)   250 milliGRAM(s) Oral (01-31-19 @ 18:15)   250 milliGRAM(s) Oral (01-30-19 @ 18:49)    levoFLOXacin IVPB   100 mL/Hr IV Intermittent (01-21-19 @ 12:29)    levoFLOXacin IVPB   50 mL/Hr IV Intermittent (02-11-19 @ 18:18)    linezolid  IVPB   300 mL/Hr IV Intermittent (02-12-19 @ 09:11)   300 mL/Hr IV Intermittent (02-11-19 @ 21:42)    meropenem  IVPB   100 mL/Hr IV Intermittent (01-29-19 @ 06:13)   100 mL/Hr IV Intermittent (01-28-19 @ 21:13)   100 mL/Hr IV Intermittent (01-28-19 @ 15:06)   100 mL/Hr IV Intermittent (01-28-19 @ 06:11)   100 mL/Hr IV Intermittent (01-27-19 @ 21:45)   100 mL/Hr IV Intermittent (01-27-19 @ 16:56)   100 mL/Hr IV Intermittent (01-27-19 @ 05:20)   100 mL/Hr IV Intermittent (01-26-19 @ 21:34)   100 mL/Hr IV Intermittent (01-26-19 @ 14:05)   100 mL/Hr IV Intermittent (01-26-19 @ 05:47)   100 mL/Hr IV Intermittent (01-25-19 @ 21:38)   100 mL/Hr IV Intermittent (01-25-19 @ 14:42)   100 mL/Hr IV Intermittent (01-25-19 @ 06:40)   100 mL/Hr IV Intermittent (01-24-19 @ 21:29)   100 mL/Hr IV Intermittent (01-24-19 @ 14:27)   100 mL/Hr IV Intermittent (01-24-19 @ 05:18)   100 mL/Hr IV Intermittent (01-23-19 @ 22:04)   100 mL/Hr IV Intermittent (01-23-19 @ 14:32)    meropenem  IVPB   100 mL/Hr IV Intermittent (01-23-19 @ 05:04)   100 mL/Hr IV Intermittent (01-22-19 @ 17:32)   100 mL/Hr IV Intermittent (01-22-19 @ 05:16)   100 mL/Hr IV Intermittent (01-21-19 @ 18:32)    meropenem  IVPB   100 mL/Hr IV Intermittent (02-07-19 @ 16:20)    meropenem  IVPB   100 mL/Hr IV Intermittent (02-10-19 @ 13:42)   100 mL/Hr IV Intermittent (02-10-19 @ 05:07)   100 mL/Hr IV Intermittent (02-09-19 @ 21:13)   100 mL/Hr IV Intermittent (02-09-19 @ 13:55)   100 mL/Hr IV Intermittent (02-09-19 @ 06:33)   100 mL/Hr IV Intermittent (02-08-19 @ 21:12)   100 mL/Hr IV Intermittent (02-08-19 @ 13:43)   100 mL/Hr IV Intermittent (02-08-19 @ 05:56)   100 mL/Hr IV Intermittent (02-07-19 @ 21:31)   100 mL/Hr IV Intermittent (02-07-19 @ 16:38)    piperacillin/tazobactam IVPB.   25 mL/Hr IV Intermittent (02-12-19 @ 13:25)   25 mL/Hr IV Intermittent (02-12-19 @ 07:13)   25 mL/Hr IV Intermittent (02-12-19 @ 01:21)    vancomycin  IVPB   250 mL/Hr IV Intermittent (01-22-19 @ 17:24)   250 mL/Hr IV Intermittent (01-21-19 @ 18:31)    vancomycin  IVPB   250 mL/Hr IV Intermittent (02-07-19 @ 16:37)    vancomycin  IVPB   250 mL/Hr IV Intermittent (02-10-19 @ 05:07)   250 mL/Hr IV Intermittent (02-09-19 @ 17:21)   250 mL/Hr IV Intermittent (02-09-19 @ 07:20)   250 mL/Hr IV Intermittent (02-08-19 @ 18:02)   250 mL/Hr IV Intermittent (02-08-19 @ 05:56)        levoFLOXacin IVPB 250 milliGRAM(s) IV Intermittent every 24 hours  linezolid  IVPB 600 milliGRAM(s) IV Intermittent every 12 hours  piperacillin/tazobactam IVPB. 3.375 Gram(s) IV Intermittent every 6 hours

## 2019-02-12 NOTE — CONSULT NOTE ADULT - ASSESSMENT
85yFemale being evaluated for goals of care and symptom management. Pt in bed with venti mask on, no dyspnea, no pain. Pt is debiliated and frail. Pt taking small amounts of food and fluids. Needs assistance with all ADLs. At this point pt treated with ABT and steroids, still cannot ween off venti mask at this time. Pts daughters met with pulmonary and are aware of pt's overall prognosis and medical management.    Daughters met with palliative team, NP, MD and SW. Palliative care introduced. Discussed overall options DNR/DNI, and hospice care. Discussion of treatment for respiratory distress at end of life. Both daughters verbalized understanding. They were encouraged to discuss and also made ourselves available for a family meeting. Palliative contact info provided          Recommendations:  Full code  Palliative care following  pt is hospice appropriate  Family wants to get results of CT Scan before making decisions   [phone] for any questions 85yFemale being evaluated for goals of care and symptom management. Pt in bed with venti mask on, no dyspnea, no pain. Pt is debiliated and frail. Pt taking small amounts of food and fluids. Needs assistance with all ADLs. At this point pt treated with ABT and steroids, still cannot ween off venti mask at this time. Pts daughters met with pulmonary and are aware of pt's overall poor prognosis and medical management.    Daughters met with palliative team, NP, MD and SW. Palliative care introduced. Discussed overall options DNR/DNI, and hospice care. Discussion of treatment for respiratory distress at end of life. Both daughters verbalized understanding. They were encouraged to discuss and also made ourselves available for a family meeting. Palliative contact info provided          Recommendations:  Full code  Palliative care following  pt is hospice appropriate  Family wants to get results of CT Scan before making decisions   [phone] for any questions

## 2019-02-12 NOTE — CHART NOTE - NSCHARTNOTEFT_GEN_A_CORE
Registered Dietitian Limited Follow Up    Daughters met with palliative team, NP, MD and SW. Palliative care introduced; pt is hospice appropriate. Family wants to get results of CT Scan before making decisions. Meds and labs reviewed; LBM 2/11. Skin ecchymotic and no edema noted. Pt was getting changed by PCA during time of RD visit, however pt's daughter and son outside room and confirmed with RD that pt's appetite/po intake still remains the same. Pt is still drinking Ensure Compact (2 daily), however pt's children are requesting pt receive 1 with each meal. Will communicate oral supplement request to LIP. No further nutritional interventions at this time; will reassess pt again in 7 days.

## 2019-02-12 NOTE — CONSULT NOTE ADULT - PROVIDER SPECIALTY LIST ADULT
Cardiology
Critical Care
Infectious Disease
Nephrology
Palliative Care
Physiatry
Pulmonology
Urology
Infectious Disease

## 2019-02-12 NOTE — PROGRESS NOTE ADULT - SUBJECTIVE AND OBJECTIVE BOX
LENGTH OF HOSPITAL STAY: 22d      CHIEF COMPLAINT:   Patient is a 85y old  Female who presents with a chief complaint of sob (12 Feb 2019 15:25)      OVER Past 24hrs:  The patient was seen and examined at bedside there were no acute events during the night patient remains of venti mask. She denies fevers chills chest pain.         PAST MEDICAL & SURGICAL HISTORY  PAST MEDICAL & SURGICAL HISTORY:  PVD (peripheral vascular disease)  COPD (chronic obstructive pulmonary disease)  Kidney disease, chronic, stage I (GFR over 89 ml/min)  Peripheral vascular disease  HTN (hypertension)  High cholesterol  CVA (cerebral vascular accident)  H/O aorto-femoral bypass  History of right common carotid artery stent placement        REVIEW OF SYSTEMS  RESPIRATORY: No cough,  wheezing, chills or hemoptysis; + shortness of breath  CARDIOVASCULAR: No chest pain, palpitations, dizziness, or leg swelling    ALLERGIES:  No Known Allergies    MEDICATIONS:  STANDING MEDICATIONS  ALBUTerol    90 MICROgram(s) HFA Inhaler 1 Puff(s) Inhalation every 4 hours  ALBUTerol/ipratropium for Nebulization 3 milliLiter(s) Nebulizer every 6 hours  aspirin enteric coated 81 milliGRAM(s) Oral <User Schedule>  atorvastatin 10 milliGRAM(s) Oral at bedtime  buDESOnide 160 MICROgram(s)/formoterol 4.5 MICROgram(s) Inhaler 2 Puff(s) Inhalation two times a day  chlorhexidine 4% Liquid 1 Application(s) Topical <User Schedule>  cilostazol 100 milliGRAM(s) Oral two times a day  clopidogrel Tablet 75 milliGRAM(s) Oral daily  docusate sodium 100 milliGRAM(s) Oral two times a day  heparin  Injectable 5000 Unit(s) SubCutaneous every 8 hours  levETIRAcetam  IVPB 500 milliGRAM(s) IV Intermittent every 12 hours  levoFLOXacin IVPB 250 milliGRAM(s) IV Intermittent every 24 hours  linezolid  IVPB 600 milliGRAM(s) IV Intermittent every 12 hours  metoprolol tartrate 25 milliGRAM(s) Oral two times a day  nystatin Powder 1 Application(s) Topical two times a day  pantoprazole    Tablet 40 milliGRAM(s) Oral before breakfast  piperacillin/tazobactam IVPB. 3.375 Gram(s) IV Intermittent every 6 hours  predniSONE   Tablet 40 milliGRAM(s) Oral daily  senna 2 Tablet(s) Oral at bedtime  tiotropium 18 MICROgram(s) Capsule 1 Capsule(s) Inhalation daily    PRN MEDICATIONS  acetaminophen   Tablet .. 650 milliGRAM(s) Oral every 6 hours PRN  ALBUTerol/ipratropium for Nebulization 3 milliLiter(s) Nebulizer every 6 hours PRN  ALPRAZolam 0.125 milliGRAM(s) Oral every 12 hours PRN  guaiFENesin    Syrup 100 milliGRAM(s) Oral every 6 hours PRN  lactulose Syrup 10 Gram(s) Oral every 6 hours PRN    VITALS:   T(F): 96.4  HR: 105  BP: 112/60  RR: 20  SpO2: 93%    PHYSICAL EXAM:  General: No acute distress.  Alert, oriented, interactive, nonfocal.    HEENT: Pupils equal, reactive to light symmetrically.    PULM: clear today   auscultation bilaterally, no significant sputum production.       CVS: Regular rate and rhythm, no murmurs, rubs, or gallops.    ABD: Soft, nondistended, nontender, no masses.    EXT: No edema, nontender.  LABS:                        10.4   13.77 )-----------( 273      ( 12 Feb 2019 07:17 )             33.9     02-12    148<H>  |  103  |  37<H>  ----------------------------<  99  4.7   |  31  |  1.1    Ca    9.3      12 Feb 2019 07:17  Phos  3.0     02-12  Mg     2.0     02-12

## 2019-02-12 NOTE — CONSULT NOTE ADULT - CONSULT REASON
PNA
ACUTE RESPIRATORY FAILURE
AHRF
CKD III
Goals of care and Symptom management
Hematuria
PNA
RV enlargement
gait dysfunction

## 2019-02-12 NOTE — CONSULT NOTE ADULT - CONSULT REQUESTED DATE/TIME
09-Feb-2019 00:00
12-Feb-2019
21-Jan-2019 13:21
22-Jan-2019 07:25
26-Jan-2019 19:33
28-Jan-2019
30-Jan-2019 11:25
31-Jan-2019 16:41
23-Jan-2019 06:02

## 2019-02-12 NOTE — CONSULT NOTE ADULT - ATTENDING COMMENTS
Agree, pt will also need urine FISH/cytology and outpt cysto
Pt seen, case d/w Pulm, assessment as above. Awaiting CT chest.   Palliative Care services introduced  Family considering DNR/DNI status as they understand aggressive life sustaining means would likely impose a greater physical burden rather than medical benefit at this time.   Comfort care options explained in detail. Family considering comfort care if no significant improvement in status or further clinical decline.     35 minutes spent discussing advanced are planning  Support provided    We will follow.  [id number]

## 2019-02-12 NOTE — PROGRESS NOTE ADULT - SUBJECTIVE AND OBJECTIVE BOX
LILA VELA  85y  Female      SUBJECTIVE:  no c/o    Progress Note:      REVIEW OF SYSTEMS:    T(C): 36.2 (02-12-19 @ 05:23), Max: 36.2 (02-12-19 @ 05:23)  HR: 97 (02-12-19 @ 05:23) (76 - 100)  BP: 119/70 (02-12-19 @ 05:23) (119/70 - 159/72)  RR: 18 (02-12-19 @ 05:23) (18 - 20)  SpO2: 93% (02-11-19 @ 22:10) (90% - 93%)  Wt(kg): --Vital Signs Last 24 Hrs  T(C): 36.2 (12 Feb 2019 05:23), Max: 36.2 (12 Feb 2019 05:23)  T(F): 97.1 (12 Feb 2019 05:23), Max: 97.1 (12 Feb 2019 05:23)  HR: 97 (12 Feb 2019 05:23) (76 - 100)  BP: 119/70 (12 Feb 2019 05:23) (119/70 - 159/72)  BP(mean): --  RR: 18 (12 Feb 2019 05:23) (18 - 20)  SpO2: 93% (11 Feb 2019 22:10) (90% - 93%)    PHYSICAL EXAM:  Bipap in use  lungs-clear  cor reg nl s! & s2  ext-no calf tenderness  LABS:                        10.0   13.01 )-----------( 241      ( 11 Feb 2019 07:03 )             33.3   02-11    152<H>  |  106  |  40<H>  ----------------------------<  79  4.6   |  37<H>  |  1.2    Ca    9.2      11 Feb 2019 07:03  Phos  3.1     02-11  Mg     2.2     02-11                  RADIOLOGY:      IMPRESSION:  ACUTE HYPOXIC  RESPIRATORY FAILURE-  PNEUMONIA- BETTER  PULMONARY HYPERTENSION  RIGHT SIDED CHF  RIGHT TIBIAL VEIN THROMBOSIS-CHRONIC  BLADDER TUMOR  ANEMIA-STABLE  LEUKOCYTOSIS-DUE TO STEROIDS-IMPROVING  HEMATURIA DUE TO BLADDER TUMOR  CKD-STAGE 3-IMPROVED  HYPERNATREMIA  OLD CVA  AORTO-FEM BYPASS  RIGHT CAROTID STENT  PVD  ABNORMAL LFT'S DUE TO MEDS AND INFECTION            PLAN:  BIPAP  NASAL O2 -HOME DEPENDENT  IV FLUIDS  MONITOR I& O'S AND BMP  PROGNOSIS POOR-FAMILY INFORMED  TAPER STEROIDS  CT OF CHEST WITH CONTRAST AS PER PULMONART AND ID    I SPENT 30 MINS. EXAMINING,EVALUATING AND COUNSELING  PATIENT  AND DAUGHTER ANDC SON  AND COORDINATING CARE WITH RESIDENT   BY ATTENDING PHYSICIAN

## 2019-02-12 NOTE — PROGRESS NOTE ADULT - SUBJECTIVE AND OBJECTIVE BOX
LILA VELA  85y, Female  Allergy: No Known Allergies      LAST 24-Hr EVENTS:  pt seen examined  on 40 percent fio2  cough sob improved  awaiting ct scan     VITALS:  T(F): 97.1 (02-12-19 @ 05:23), Max: 97.1 (02-12-19 @ 05:23)  HR: 97 (02-12-19 @ 05:23)  BP: 119/70 (02-12-19 @ 05:23) (119/70 - 159/72)  RR: 18 (02-12-19 @ 05:23)  SpO2: 93% (02-11-19 @ 22:10)    PHYSICAL EXAM:    GENERAL: NAD, cachectic  NECK: Supple, No JVD  CHEST/LUNG: minimal wheeze  HEART: Regular rate and rhythm  ABDOMEN: Soft, Nontender, Nondistended  EXTREMITIES:  No clubbing, edema absent        TESTS & MEASUREMENTS:    IN: 240 mL / OUT: 305 mL / NET: -65 mL    IN: 600 mL / OUT: 300 mL / NET: 300 mL                            10.4   13.77 )-----------( 273      ( 12 Feb 2019 07:17 )             33.9       02-12    148<H>  |  103  |  37<H>  ----------------------------<  99  4.7   |  31  |  1.1    Ca    9.3      12 Feb 2019 07:17  Phos  3.0     02-12  Mg     2.0     02-12        MEDICATIONS:  MEDICATIONS  (STANDING):  ALBUTerol    90 MICROgram(s) HFA Inhaler 1 Puff(s) Inhalation every 4 hours  ALBUTerol/ipratropium for Nebulization 3 milliLiter(s) Nebulizer every 6 hours  aspirin enteric coated 81 milliGRAM(s) Oral <User Schedule>  atorvastatin 10 milliGRAM(s) Oral at bedtime  buDESOnide 160 MICROgram(s)/formoterol 4.5 MICROgram(s) Inhaler 2 Puff(s) Inhalation two times a day  chlorhexidine 4% Liquid 1 Application(s) Topical <User Schedule>  cilostazol 100 milliGRAM(s) Oral two times a day  clopidogrel Tablet 75 milliGRAM(s) Oral daily  docusate sodium 100 milliGRAM(s) Oral two times a day  heparin  Injectable 5000 Unit(s) SubCutaneous every 8 hours  levETIRAcetam  IVPB 500 milliGRAM(s) IV Intermittent every 12 hours  levoFLOXacin IVPB 250 milliGRAM(s) IV Intermittent every 24 hours  linezolid  IVPB 600 milliGRAM(s) IV Intermittent every 12 hours  metoprolol tartrate 25 milliGRAM(s) Oral two times a day  nystatin Powder 1 Application(s) Topical two times a day  pantoprazole    Tablet 40 milliGRAM(s) Oral before breakfast  piperacillin/tazobactam IVPB. 3.375 Gram(s) IV Intermittent every 6 hours  predniSONE   Tablet 40 milliGRAM(s) Oral daily  senna 2 Tablet(s) Oral at bedtime  sodium chloride 0.45%. 1000 milliLiter(s) (40 mL/Hr) IV Continuous <Continuous>  tiotropium 18 MICROgram(s) Capsule 1 Capsule(s) Inhalation daily    MEDICATIONS  (PRN):  ALBUTerol/ipratropium for Nebulization 3 milliLiter(s) Nebulizer every 6 hours PRN Shortness of Breath and/or Wheezing  ALPRAZolam 0.125 milliGRAM(s) Oral every 12 hours PRN anxiety  guaiFENesin    Syrup 100 milliGRAM(s) Oral every 6 hours PRN Cough  lactulose Syrup 10 Gram(s) Oral every 6 hours PRN constipation        < from: CT Abdomen and Pelvis w/ IV Cont (01.30.19 @ 18:58) >  MPRESSION:     3.3 cm right anterior urinary bladder mass. Questionable invasion of the   perivesicular fat anteriorly at the bladder base on series 7 image 251.     No convincing evidence of upper tract disease (note that much of the left   ureter is not well opacified, but there is no evidence of hydronephrosis   to suggest an intraluminal lesion).    No lymphadenopathy.    5 mm left lower lobe solitary pulmonary nodule.    1.2 cm right hepatic lobe lesion with arterial enhancement, incompletely   characterized but could be a benign lesion. Consider nonemergent MRI of   the abdomen with contrast if clinically indicated.    < end of copied text >

## 2019-02-13 NOTE — PROGRESS NOTE ADULT - SUBJECTIVE AND OBJECTIVE BOX
[facility] FOLLOW UP NOTE  --------------------------------------------------------------------------------  24 hour events/subjective: Patient examined. Appears comfortable.    PAST HISTORY  --------------------------------------------------------------------------------  No significant changes to PMH, PSH, FHx, SHx, unless otherwise noted    ALLERGIES & MEDICATIONS  --------------------------------------------------------------------------------  Allergies    No Known Allergies    Intolerances      Standing Inpatient Medications  ALBUTerol    90 MICROgram(s) HFA Inhaler 1 Puff(s) Inhalation every 4 hours  ALBUTerol/ipratropium for Nebulization 3 milliLiter(s) Nebulizer every 6 hours  aspirin enteric coated 81 milliGRAM(s) Oral <User Schedule>  atorvastatin 10 milliGRAM(s) Oral at bedtime  buDESOnide 160 MICROgram(s)/formoterol 4.5 MICROgram(s) Inhaler 2 Puff(s) Inhalation two times a day  chlorhexidine 4% Liquid 1 Application(s) Topical <User Schedule>  cilostazol 100 milliGRAM(s) Oral two times a day  clopidogrel Tablet 75 milliGRAM(s) Oral daily  docusate sodium 100 milliGRAM(s) Oral two times a day  heparin  Injectable 5000 Unit(s) SubCutaneous every 8 hours  levETIRAcetam  IVPB 500 milliGRAM(s) IV Intermittent every 12 hours  levoFLOXacin IVPB 250 milliGRAM(s) IV Intermittent every 24 hours  linezolid  IVPB 600 milliGRAM(s) IV Intermittent every 12 hours  metoprolol tartrate 25 milliGRAM(s) Oral two times a day  nystatin Powder 1 Application(s) Topical two times a day  pantoprazole    Tablet 40 milliGRAM(s) Oral before breakfast  piperacillin/tazobactam IVPB. 3.375 Gram(s) IV Intermittent every 6 hours  predniSONE   Tablet 40 milliGRAM(s) Oral daily  senna 2 Tablet(s) Oral at bedtime  tiotropium 18 MICROgram(s) Capsule 1 Capsule(s) Inhalation daily    PRN Inpatient Medications  acetaminophen   Tablet .. 650 milliGRAM(s) Oral every 6 hours PRN  ALBUTerol/ipratropium for Nebulization 3 milliLiter(s) Nebulizer every 6 hours PRN  ALPRAZolam 0.125 milliGRAM(s) Oral every 12 hours PRN  guaiFENesin    Syrup 100 milliGRAM(s) Oral every 6 hours PRN  lactulose Syrup 10 Gram(s) Oral every 6 hours PRN      VITALS/PHYSICAL EXAM  --------------------------------------------------------------------------------  T(C): 35.4 (02-13-19 @ 04:45), Max: 35.8 (02-12-19 @ 14:34)  HR: 95 (02-13-19 @ 04:45) (91 - 105)  BP: 116/68 (02-13-19 @ 04:45) (112/60 - 156/74)  RR: 18 (02-13-19 @ 04:45) (18 - 20)  SpO2: 92% (02-13-19 @ 08:00) (89% - 92%)  Wt(kg): --        02-12-19 @ 07:01  -  02-13-19 @ 07:00  --------------------------------------------------------  IN: 900 mL / OUT: 0 mL / NET: 900 mL      Physical Exam:  	Gen: NAD  	Pulm: CTA B/L  	CV: RRR, S1S2  	Abd: +BS, soft, nontender/nondistended  	: No suprapubic tenderness  	LE: Warm, FROM, no clubbing, intact strength; no edema  	Vascular access:    LABS/STUDIES  --------------------------------------------------------------------------------              9.8    14.47 >-----------<  278      [02-13-19 @ 07:40]              31.5     140  |  97  |  27  ----------------------------<  206      [02-13-19 @ 07:40]  3.8   |  29  |  1.0        Ca     8.5     [02-13-19 @ 07:40]      Mg     1.8     [02-13-19 @ 07:40]      Phos  2.7     [02-13-19 @ 07:40]    Creatinine Trend:  SCr 1.0 [02-13 @ 07:40]  SCr 1.1 [02-12 @ 07:17]  SCr 1.2 [02-11 @ 07:03]  SCr 1.2 [02-10 @ 07:33]  SCr 1.7 [02-09 @ 07:02]    Urinalysis - [01-24-19 @ 04:30]      Color Red / Appearance Turbid / SG >=1.030 / pH 5.0      Gluc 100 / Ketone 40  / Bili Large / Urobili 1.0       Blood Large / Protein >=300 / Leuk Est Moderate / Nitrite Positive      RBC >50 / WBC  / Hyaline  / Gran 3-5 / Sq Epi  / Non Sq Epi  / Bacteria Moderate    HbA1c 5.7      [01-11-19 @ 07:14]  Lipid: chol 171, TG 43, HDL 90, LDL 78      [01-11-19 @ 07:14]

## 2019-02-13 NOTE — PROGRESS NOTE ADULT - ASSESSMENT
acute on chronic hypoxic respiratory failure  copd exacerbation  HCAP  lung nodules  chf  pleural effusions  bladder mass      continue oxygen per pulse oximetry  bronchodilators  prednisone  antibiotics  nutritional support  out of bed  anxiolytic therapy/analgesia as needed  palliative care evaluation in progress and appreciated

## 2019-02-13 NOTE — PROGRESS NOTE ADULT - SUBJECTIVE AND OBJECTIVE BOX
85yFemale with diagnosis: SEPSIS;PNEUMONIA;CHF;ELEVATED TROPONIN      Patient seen for follow up      PHYSICAL EXAM  Pt alert conversant  Mild SOB  cough noted deep course BS      T(C): , Max: 35.6 (20:49)  T(F): 96  HR: 79 (79 - 95)  BP: 119/70 (116/68 - 156/74)  RR: 18 (18 - 18)  SpO2: 92% (89% - 92%)              LABS:                          9.8    14.47 )-----------( 278      ( 13 Feb 2019 07:40 )             31.5                                                                                      02-13    140  |  97<L>  |  27<H>  ----------------------------<  206<H>  3.8   |  29  |  1.0    Ca    8.5      13 Feb 2019 07:40  Phos  2.7     02-13  Mg     1.8     02-13                                                        MEDICATIONS  (STANDING):  ALBUTerol    90 MICROgram(s) HFA Inhaler 1 Puff(s) Inhalation every 4 hours  ALBUTerol/ipratropium for Nebulization 3 milliLiter(s) Nebulizer every 6 hours  aspirin enteric coated 81 milliGRAM(s) Oral <User Schedule>  atorvastatin 10 milliGRAM(s) Oral at bedtime  buDESOnide 160 MICROgram(s)/formoterol 4.5 MICROgram(s) Inhaler 2 Puff(s) Inhalation two times a day  chlorhexidine 4% Liquid 1 Application(s) Topical <User Schedule>  cilostazol 100 milliGRAM(s) Oral two times a day  clopidogrel Tablet 75 milliGRAM(s) Oral daily  docusate sodium 100 milliGRAM(s) Oral two times a day  heparin  Injectable 5000 Unit(s) SubCutaneous every 8 hours  levETIRAcetam  IVPB 500 milliGRAM(s) IV Intermittent every 12 hours  levoFLOXacin IVPB 250 milliGRAM(s) IV Intermittent every 24 hours  linezolid  IVPB 600 milliGRAM(s) IV Intermittent every 12 hours  metoprolol tartrate 25 milliGRAM(s) Oral two times a day  nystatin Powder 1 Application(s) Topical two times a day  pantoprazole    Tablet 40 milliGRAM(s) Oral before breakfast  piperacillin/tazobactam IVPB. 3.375 Gram(s) IV Intermittent every 6 hours  predniSONE   Tablet 40 milliGRAM(s) Oral daily  senna 2 Tablet(s) Oral at bedtime  tiotropium 18 MICROgram(s) Capsule 1 Capsule(s) Inhalation daily    MEDICATIONS  (PRN):  acetaminophen   Tablet .. 650 milliGRAM(s) Oral every 6 hours PRN Temp greater or equal to 38C (100.4F), Moderate Pain (4 - 6)  ALBUTerol/ipratropium for Nebulization 3 milliLiter(s) Nebulizer every 6 hours PRN Shortness of Breath and/or Wheezing  ALPRAZolam 0.125 milliGRAM(s) Oral every 12 hours PRN anxiety  guaiFENesin    Syrup 100 milliGRAM(s) Oral every 6 hours PRN Cough  lactulose Syrup 10 Gram(s) Oral every 6 hours PRN constipation

## 2019-02-13 NOTE — PROGRESS NOTE ADULT - ASSESSMENT
84 yo F, CCU Downgrade, PMHx COPD on 2 L NC, PVD, CKD3, HTN, DLD, hx of CVA, ex-smoker, recently discharged from [facility] on 01/13 after PNA tx w/Levaquin and Prednisone, presented/w SOB and dysuria. She was doing well until 01/20 when she starting feeling SOB again.     Pt was admitted to CCU for acute on chronic hypoxic and chronic hypercapnic respiratory failure 2/2 COPD exacerbation likely due to RLL GNR PNA exacerbated by coronavirus coinfection. She was on BiPaP prn, solumedrol, bronchodilators and Meropenem. She was treated for distal DVT and subsequently developed hematuria. Further w/u was positive for moderate ascites (2D echo), +UA (negative urine and blood cultures). MRSA and Flu were negative.       Today Events:, Patient made DNR/DNI, day 4 off Lasix , c/w medical management, starting prednisone 40mg, Pulm following- evaluate for need of invasive lung Dx procedures,   ID following c/w Levo IV, Unasyn IV and Zyvox IV,  CT Chest w/con RLL consolidation.    IMPRESSION   RLL PNA  vs other Unknown Etiology not excluding CA  COPD exacerbation       Plan  # Acute on chronic hypoxic and chronic hypercapnic respiratory failure exacerbation etiology Unknown    - maintain  88-92 o2sat   - Prednisone daily 40mg    - pulm following   - c/w   Levo IV, Unasyn IV and Zyvox IV.   - albuterol Neb patient    -  CT chest with con showing RLL consolidation     # HFpEF:   ECHO 1/22- EF 55-60%, severe pulm HTN, decreased RV systolic fx, RV vol and pressure overload,   RV enlarged, severe mitral annular calcification    - Metoprolol    # Right Tibial vein DVT: Stable  Duplex LE- Right post tibial DVT  No need for therapeutic AC as per Dr Dunham- will repeat Duplex unremarkable    - ppx heparin     # Hematuria: Resolved  Cont prophylactic heparin   HGB stable  CT Urogram: 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  1.2 cm right hepatic lobe lesion with arterial enhancement, incompletely characterized but could be a benign lesion. Consider nonemergent MRI of the abdomen with contrast if clinically indicated.  - OP f/u urology        # Transaminitis: waxing and waning  RUQ US: negative    # Constipation:   Cont. Senna, Colace  lactulose PRN     Electrolyte Imbalances: repletion   []  Hyponatremia   /   Hypernatremia  [x]  encouraged to Drink PO gave IVF  []  Hypokalemia   /   Hyperkalemia  []   []  Hypochloremia   /    Hyperchloremia  []   []  Hypocapnia   /   Hypercapnia  []   []  Hypomagnesemia   /   Hypermagnesemia  []   []  Hypophosphatemia   /   Hyperphosphatemia  []           GI ppx:                                   [] Not indicated   /   [x] Pantoprazole 40mg PO Daily    DVT ppx:  [] Not indicated / [x] Heparin 5000mg SubQ / [] Lovenox 40mg SubQ / [] SCDs    Fluids:   [x] PO  |  [x] IVF one L only     Activity:  [X] Assisatnce needed   [X] Increase as Tolerated  /  [] OOB w/ assist  /  [] Bed Rest      DISPO:   attempting NH placement after optimization   [ ] From Home     [ x] NH/SNF   [ ] 4A Rehab  [ ] Detox Clinic  [X] Plan Discussion with patient and/or family.  [X] Discussed Case and Plan with the Medical Attending.    CODE STATUS  [] FULL   /    [X] DNR/DNI

## 2019-02-13 NOTE — PROGRESS NOTE ADULT - SUBJECTIVE AND OBJECTIVE BOX
LILA VELA  85y, Female  Allergy: No Known Allergies      LAST 24-Hr EVENTS:  patient offering no complaints, family at bedside  VITALS:  T(F): 96 (02-13-19 @ 14:00), Max: 96.1 (02-12-19 @ 20:49)  HR: 110 (02-13-19 @ 17:30)  BP: 145/74 (02-13-19 @ 17:30) (116/68 - 156/74)  RR: 18 (02-13-19 @ 14:00)  SpO2: 89% (02-13-19 @ 17:30)on 50% AM    PHYSICAL EXAM:    GENERAL: NAD, well-developed  HEAD:  Atraumatic, Normocephalic  NECK: Supple, No JVD  CHEST/LUNG: bilateral rhonchi; No wheeze  HEART: Regular rate and rhythm; No murmurs, rubs, or gallops  ABDOMEN: Soft, Nontender, Nondistended; Bowel sounds present  EXTREMITIES:  2+ Peripheral Pulses, No clubbing, cyanosis, or edema        TESTS & MEASUREMENTS:    IN: 600 mL / OUT: 300 mL / NET: 300 mL    IN: 900 mL / OUT: 0 mL / NET: 900 mL                            9.8    14.47 )-----------( 278      ( 13 Feb 2019 07:40 )             31.5       02-13    140  |  97<L>  |  27<H>  ----------------------------<  206<H>  3.8   |  29  |  1.0    Ca    8.5      13 Feb 2019 07:40  Phos  2.7     02-13  Mg     1.8     02-13                  ABG & VENT SETTINGS: (when applicable)    n/a    RADIOLOGY & ADDITIONAL TESTS:  < from: CT Angio Chest w/ IV Cont (02.12.19 @ 18:35) >  IMPRESSION:    1.   Focal consolidatedand groundglass right lower lobe and right middle   lobe opacity, compatible with pneumonia in the appropriate clinical   setting; follow-up to complete resolution is suggested.    2. Small bilateral pleural effusions, right greater than left.    3. Since April 2018, increased size 1.2 cm left upper lobe pulmonary   nodule.              < end of copied text >    MEDICATIONS:  MEDICATIONS  (STANDING):  ALBUTerol    90 MICROgram(s) HFA Inhaler 1 Puff(s) Inhalation every 4 hours  ALBUTerol/ipratropium for Nebulization 3 milliLiter(s) Nebulizer every 6 hours  aspirin enteric coated 81 milliGRAM(s) Oral <User Schedule>  atorvastatin 10 milliGRAM(s) Oral at bedtime  buDESOnide 160 MICROgram(s)/formoterol 4.5 MICROgram(s) Inhaler 2 Puff(s) Inhalation two times a day  chlorhexidine 4% Liquid 1 Application(s) Topical <User Schedule>  cilostazol 100 milliGRAM(s) Oral two times a day  clopidogrel Tablet 75 milliGRAM(s) Oral daily  docusate sodium 100 milliGRAM(s) Oral two times a day  heparin  Injectable 5000 Unit(s) SubCutaneous every 8 hours  levETIRAcetam  IVPB 500 milliGRAM(s) IV Intermittent every 12 hours  levoFLOXacin IVPB 250 milliGRAM(s) IV Intermittent every 24 hours  linezolid  IVPB 600 milliGRAM(s) IV Intermittent every 12 hours  metoprolol tartrate 25 milliGRAM(s) Oral two times a day  nystatin Powder 1 Application(s) Topical two times a day  pantoprazole    Tablet 40 milliGRAM(s) Oral before breakfast  piperacillin/tazobactam IVPB. 3.375 Gram(s) IV Intermittent every 6 hours  predniSONE   Tablet 40 milliGRAM(s) Oral daily  senna 2 Tablet(s) Oral at bedtime  tiotropium 18 MICROgram(s) Capsule 1 Capsule(s) Inhalation daily    MEDICATIONS  (PRN):  acetaminophen   Tablet .. 650 milliGRAM(s) Oral every 6 hours PRN Temp greater or equal to 38C (100.4F), Moderate Pain (4 - 6)  ALBUTerol/ipratropium for Nebulization 3 milliLiter(s) Nebulizer every 6 hours PRN Shortness of Breath and/or Wheezing  ALPRAZolam 0.125 milliGRAM(s) Oral every 12 hours PRN anxiety  guaiFENesin    Syrup 100 milliGRAM(s) Oral every 6 hours PRN Cough  lactulose Syrup 10 Gram(s) Oral every 6 hours PRN constipation

## 2019-02-13 NOTE — PROGRESS NOTE ADULT - ASSESSMENT
85F    COPD   Peripheral vascular disease  HTN  High cholesterol  CVA   H/O aorto-femoral bypass  History of right common carotid artery stent placement    Admitted 1/21 + coronavirus   Blood and urine cx NGTD, legionella neg  CT 1.2 cm right hepatic lobe lesion with arterial enhancement,. 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  CXR Right midlung opacity unchanged--> no real change since admission despite multiple courses of abx    Olga Lidia 1/21-1/29, 2/7-2/10, zosyn 2/12-, levo 2/10-    - CT Chest read pending  - Pulm following ?thoracentesis, risk may outweigh benefit   - on steroids  -ok to continue abx for now, on linezolid (2/11-), zosyn, levofloxacin  - poor prognosis

## 2019-02-13 NOTE — PROGRESS NOTE ADULT - SUBJECTIVE AND OBJECTIVE BOX
LENGTH OF HOSPITAL STAY: 23d      CHIEF COMPLAINT:   Patient is a 85y old  Female who presents with a chief complaint of sob (13 Feb 2019 12:42)      OVER Past 24hrs:  The patient was seen and examined at bedside there were acute events during the night. NO improvement noted, still requiring BIPAP at times. Denies fever or chills or chest pain.         PAST MEDICAL & SURGICAL HISTORY  PAST MEDICAL & SURGICAL HISTORY:  PVD (peripheral vascular disease)  COPD (chronic obstructive pulmonary disease)  Kidney disease, chronic, stage I (GFR over 89 ml/min)  Peripheral vascular disease  HTN (hypertension)  High cholesterol  CVA (cerebral vascular accident)  H/O aorto-femoral bypass  History of right common carotid artery stent placement        REVIEW OF SYSTEMS  RESPIRATORY: No cough,  wheezing, chills or hemoptysis; + shortness of breath  CARDIOVASCULAR: No chest pain, palpitations, dizziness, or leg swelling      ALLERGIES:  No Known Allergies    MEDICATIONS:  STANDING MEDICATIONS  ALBUTerol    90 MICROgram(s) HFA Inhaler 1 Puff(s) Inhalation every 4 hours  ALBUTerol/ipratropium for Nebulization 3 milliLiter(s) Nebulizer every 6 hours  aspirin enteric coated 81 milliGRAM(s) Oral <User Schedule>  atorvastatin 10 milliGRAM(s) Oral at bedtime  buDESOnide 160 MICROgram(s)/formoterol 4.5 MICROgram(s) Inhaler 2 Puff(s) Inhalation two times a day  chlorhexidine 4% Liquid 1 Application(s) Topical <User Schedule>  cilostazol 100 milliGRAM(s) Oral two times a day  clopidogrel Tablet 75 milliGRAM(s) Oral daily  docusate sodium 100 milliGRAM(s) Oral two times a day  heparin  Injectable 5000 Unit(s) SubCutaneous every 8 hours  levETIRAcetam  IVPB 500 milliGRAM(s) IV Intermittent every 12 hours  levoFLOXacin IVPB 250 milliGRAM(s) IV Intermittent every 24 hours  linezolid  IVPB 600 milliGRAM(s) IV Intermittent every 12 hours  metoprolol tartrate 25 milliGRAM(s) Oral two times a day  nystatin Powder 1 Application(s) Topical two times a day  pantoprazole    Tablet 40 milliGRAM(s) Oral before breakfast  piperacillin/tazobactam IVPB. 3.375 Gram(s) IV Intermittent every 6 hours  predniSONE   Tablet 40 milliGRAM(s) Oral daily  senna 2 Tablet(s) Oral at bedtime  tiotropium 18 MICROgram(s) Capsule 1 Capsule(s) Inhalation daily    PRN MEDICATIONS  acetaminophen   Tablet .. 650 milliGRAM(s) Oral every 6 hours PRN  ALBUTerol/ipratropium for Nebulization 3 milliLiter(s) Nebulizer every 6 hours PRN  ALPRAZolam 0.125 milliGRAM(s) Oral every 12 hours PRN  guaiFENesin    Syrup 100 milliGRAM(s) Oral every 6 hours PRN  lactulose Syrup 10 Gram(s) Oral every 6 hours PRN    VITALS:   T(F): 96  HR: 79  BP: 119/70  RR: 18  SpO2: 92%    PHYSICAL EXAM:  General: No acute distress.  Alert, oriented, interactive, nonfocal.    HEENT: Pupils equal, reactive to light symmetrically.    PULM: clear today   auscultation bilaterally, no significant sputum production.       CVS: Regular rate and rhythm, no murmurs, rubs, or gallops.    ABD: Soft, nondistended, nontender, no masses.    EXT: No edema, nontender.    LABS:                        9.8    14.47 )-----------( 278      ( 13 Feb 2019 07:40 )             31.5     02-13    140  |  97<L>  |  27<H>  ----------------------------<  206<H>  3.8   |  29  |  1.0    Ca    8.5      13 Feb 2019 07:40  Phos  2.7     02-13  Mg     1.8     02-13

## 2019-02-13 NOTE — PROGRESS NOTE ADULT - ASSESSMENT
85yFemale being evaluated for goals of care and symptom management. Pt family at bedside all 3 children - two daughters and son. Pt OOB in chair with venti mask appears comfortable. Pt still requiring higher levels of O2 -BIPAP and now weened to venti mask. Family met with palliative team yesterday. Today decided on advanced directives/MOLST done by resident MD          Recommendations:  DNR/DNI  pt being weened off higher O2  if unable to ween will discuss Morphine for symptom management  palliative following  [phone] for any questions

## 2019-02-13 NOTE — PROGRESS NOTE ADULT - SUBJECTIVE AND OBJECTIVE BOX
LILA VELA  85y  Female      SUBJECTIVE:  no c/o    Progress Note:      REVIEW OF SYSTEMS:    T(C): 35.4 (02-13-19 @ 04:45), Max: 35.8 (02-12-19 @ 14:34)  HR: 95 (02-13-19 @ 04:45) (91 - 105)  BP: 116/68 (02-13-19 @ 04:45) (112/60 - 156/74)  RR: 18 (02-13-19 @ 04:45) (18 - 20)  SpO2: 92% (02-13-19 @ 08:00) (89% - 92%)  Wt(kg): --Vital Signs Last 24 Hrs  T(C): 35.4 (13 Feb 2019 04:45), Max: 35.8 (12 Feb 2019 14:34)  T(F): 95.7 (13 Feb 2019 04:45), Max: 96.4 (12 Feb 2019 14:34)  HR: 95 (13 Feb 2019 04:45) (91 - 105)  BP: 116/68 (13 Feb 2019 04:45) (112/60 - 156/74)  BP(mean): --  RR: 18 (13 Feb 2019 04:45) (18 - 20)  SpO2: 92% (13 Feb 2019 08:00) (89% - 92%)    PHYSICAL EXAM:  Bipap in use  lungs-clear  cor reg nl s! & s2  ext-no calf tenderness  LABS:                        9.8    14.47 )-----------( 278      ( 13 Feb 2019 07:40 )             31.5   02-13    140  |  97<L>  |  27<H>  ----------------------------<  206<H>  3.8   |  29  |  1.0    Ca    8.5      13 Feb 2019 07:40  Phos  2.7     02-13  Mg     1.8     02-13        RADIOLOGY:      IMPRESSION:  ACUTE HYPOXIC  RESPIRATORY FAILURE-CLINICALLY PATIENT WORSE  PNEUMONIA- WORSE ON CT SCAN  PULMONARY HYPERTENSION  RIGHT SIDED CHF  RIGHT TIBIAL VEIN THROMBOSIS-CHRONIC  BLADDER TUMOR  ANEMIA-STABLE  LEUKOCYTOSIS-DUE TO STEROIDS-IMPROVING  HEMATURIA DUE TO BLADDER TUMOR  CKD-STAGE 3-IMPROVED  HYPERNATREMIA  OLD CVA  AORTO-FEM BYPASS  RIGHT CAROTID STENT  PVD  ABNORMAL LFT'S DUE TO MEDS AND INFECTION            PLAN:  BIPAP  ANTIBIOTICS AS PER ID  MAY NEED BRONCHOSCOPY BUT PATIEN VERY HIGH RISK  PATIENT DNI/DNR AS PER FAMILY AT BEDSIDE  IV FLUIDS  MONITOR I& O'S AND BMP  PROGNOSIS POOR-FAMILY INFORMED  I SPENT 30 MINS.  EXAMINING,EVALUATING ,COUNSELING PATIENT AND FAMILY AND COORDINATING CARE WITH RESIDENT AND ID ATTENDING

## 2019-02-13 NOTE — PROGRESS NOTE ADULT - SUBJECTIVE AND OBJECTIVE BOX
LILA VELA  85y, Female      OVERNIGHT EVENTS:  CT Chest read pending, large LLL consolidation/fluid    ROS negative except as per above    VITALS:  T(F): 95.7, Max: 96.4 (02-12-19 @ 14:34)  HR: 95  BP: 116/68  RR: 18Vital Signs Last 24 Hrs  T(C): 35.4 (13 Feb 2019 04:45), Max: 35.8 (12 Feb 2019 14:34)  T(F): 95.7 (13 Feb 2019 04:45), Max: 96.4 (12 Feb 2019 14:34)  HR: 95 (13 Feb 2019 04:45) (91 - 105)  BP: 116/68 (13 Feb 2019 04:45) (112/60 - 156/74)  BP(mean): --  RR: 18 (13 Feb 2019 04:45) (18 - 20)  SpO2: 92% (13 Feb 2019 08:00) (89% - 92%)    PHYSICAL EXAM:  Gen: Elderly F on NC, chronically ill appearing   HEENT: NCAT. EOMI. MMM.   Neck: Supple  CV: RRR  Lungs: CTAB, no w/r/r  Abd: Soft. NTND  Extr: wwp, no edema  Skin: no rash  Neuro: No focal deficits  Lines: clean    TESTS & MEASUREMENTS:                        9.8    14.47 )-----------( 278      ( 13 Feb 2019 07:40 )             31.5     02-13    140  |  97<L>  |  27<H>  ----------------------------<  206<H>  3.8   |  29  |  1.0    Ca    8.5      13 Feb 2019 07:40  Phos  2.7     02-13  Mg     1.8     02-13              RADIOLOGY & ADDITIONAL TESTS:    ANTIBIOTICS:  cefepime   IVPB   100 mL/Hr IV Intermittent (01-21-19 @ 12:28)    levoFLOXacin  Tablet   250 milliGRAM(s) Oral (02-10-19 @ 18:31)    levoFLOXacin  Tablet   750 milliGRAM(s) Oral (01-22-19 @ 06:02)    levoFLOXacin  Tablet   250 milliGRAM(s) Oral (02-02-19 @ 18:35)   250 milliGRAM(s) Oral (02-01-19 @ 18:22)   250 milliGRAM(s) Oral (01-31-19 @ 18:15)   250 milliGRAM(s) Oral (01-30-19 @ 18:49)    levoFLOXacin IVPB   100 mL/Hr IV Intermittent (01-21-19 @ 12:29)    levoFLOXacin IVPB   50 mL/Hr IV Intermittent (02-12-19 @ 19:11)   50 mL/Hr IV Intermittent (02-11-19 @ 18:18)    linezolid  IVPB   300 mL/Hr IV Intermittent (02-13-19 @ 06:29)   300 mL/Hr IV Intermittent (02-12-19 @ 22:52)   300 mL/Hr IV Intermittent (02-12-19 @ 09:11)   300 mL/Hr IV Intermittent (02-11-19 @ 21:42)    meropenem  IVPB   100 mL/Hr IV Intermittent (01-29-19 @ 06:13)   100 mL/Hr IV Intermittent (01-28-19 @ 21:13)   100 mL/Hr IV Intermittent (01-28-19 @ 15:06)   100 mL/Hr IV Intermittent (01-28-19 @ 06:11)   100 mL/Hr IV Intermittent (01-27-19 @ 21:45)   100 mL/Hr IV Intermittent (01-27-19 @ 16:56)   100 mL/Hr IV Intermittent (01-27-19 @ 05:20)   100 mL/Hr IV Intermittent (01-26-19 @ 21:34)   100 mL/Hr IV Intermittent (01-26-19 @ 14:05)   100 mL/Hr IV Intermittent (01-26-19 @ 05:47)   100 mL/Hr IV Intermittent (01-25-19 @ 21:38)   100 mL/Hr IV Intermittent (01-25-19 @ 14:42)   100 mL/Hr IV Intermittent (01-25-19 @ 06:40)   100 mL/Hr IV Intermittent (01-24-19 @ 21:29)   100 mL/Hr IV Intermittent (01-24-19 @ 14:27)   100 mL/Hr IV Intermittent (01-24-19 @ 05:18)   100 mL/Hr IV Intermittent (01-23-19 @ 22:04)   100 mL/Hr IV Intermittent (01-23-19 @ 14:32)    meropenem  IVPB   100 mL/Hr IV Intermittent (01-23-19 @ 05:04)   100 mL/Hr IV Intermittent (01-22-19 @ 17:32)   100 mL/Hr IV Intermittent (01-22-19 @ 05:16)   100 mL/Hr IV Intermittent (01-21-19 @ 18:32)    meropenem  IVPB   100 mL/Hr IV Intermittent (02-07-19 @ 16:20)    meropenem  IVPB   100 mL/Hr IV Intermittent (02-10-19 @ 13:42)   100 mL/Hr IV Intermittent (02-10-19 @ 05:07)   100 mL/Hr IV Intermittent (02-09-19 @ 21:13)   100 mL/Hr IV Intermittent (02-09-19 @ 13:55)   100 mL/Hr IV Intermittent (02-09-19 @ 06:33)   100 mL/Hr IV Intermittent (02-08-19 @ 21:12)   100 mL/Hr IV Intermittent (02-08-19 @ 13:43)   100 mL/Hr IV Intermittent (02-08-19 @ 05:56)   100 mL/Hr IV Intermittent (02-07-19 @ 21:31)   100 mL/Hr IV Intermittent (02-07-19 @ 16:38)    piperacillin/tazobactam IVPB.   25 mL/Hr IV Intermittent (02-13-19 @ 11:05)   25 mL/Hr IV Intermittent (02-13-19 @ 06:43)   25 mL/Hr IV Intermittent (02-13-19 @ 00:10)   25 mL/Hr IV Intermittent (02-12-19 @ 18:48)   25 mL/Hr IV Intermittent (02-12-19 @ 13:25)   25 mL/Hr IV Intermittent (02-12-19 @ 07:13)   25 mL/Hr IV Intermittent (02-12-19 @ 01:21)    vancomycin  IVPB   250 mL/Hr IV Intermittent (01-22-19 @ 17:24)   250 mL/Hr IV Intermittent (01-21-19 @ 18:31)    vancomycin  IVPB   250 mL/Hr IV Intermittent (02-07-19 @ 16:37)    vancomycin  IVPB   250 mL/Hr IV Intermittent (02-10-19 @ 05:07)   250 mL/Hr IV Intermittent (02-09-19 @ 17:21)   250 mL/Hr IV Intermittent (02-09-19 @ 07:20)   250 mL/Hr IV Intermittent (02-08-19 @ 18:02)   250 mL/Hr IV Intermittent (02-08-19 @ 05:56)        levoFLOXacin IVPB 250 milliGRAM(s) IV Intermittent every 24 hours  linezolid  IVPB 600 milliGRAM(s) IV Intermittent every 12 hours  piperacillin/tazobactam IVPB. 3.375 Gram(s) IV Intermittent every 6 hours

## 2019-02-14 NOTE — CHART NOTE - NSCHARTNOTEFT_GEN_A_CORE
LMSW discussed case in palliative care rounds and reviewed electronic record.  Encountered pt. siting in bed, oxygen in place; pt. was AOx3 and appeared comfortable at time of visit.  Daughter present at bedside.  Support provided to pt. and daughter as both cope with prolonged hospital stay and pt.'s chronic illness.  DNR/I status.  Will continue to follow.

## 2019-02-14 NOTE — PROGRESS NOTE ADULT - ASSESSMENT
85yFemale being evaluated for goals of care. Pt had repeat chest CT daughter at bedside says they are hopeful because pt on new ABT (Zyvox) added to levaquin and piperacillin IV. Pt now on high flow O2 an even higher level of O2. Daughter seemed anxious. They are hoping for a turn around in mom's condition. No acute respiratory distress noted. Has had poor po intake, speech and swallow following          Recommendations:  DNR/DNI  ongoing medical management  ID following  pulmonary following  Palliative will monitor and follow

## 2019-02-14 NOTE — PROGRESS NOTE ADULT - SUBJECTIVE AND OBJECTIVE BOX
LILA VELA  85y, Female      OVERNIGHT EVENTS:  afebrile    ROS negative except as per above    VITALS:  T(F): 96.2, Max: 96.2 (02-14-19 @ 05:08)  HR: 84  BP: 107/71  RR: 18Vital Signs Last 24 Hrs  T(C): 35.7 (14 Feb 2019 05:08), Max: 35.7 (14 Feb 2019 05:08)  T(F): 96.2 (14 Feb 2019 05:08), Max: 96.2 (14 Feb 2019 05:08)  HR: 84 (14 Feb 2019 05:08) (79 - 110)  BP: 107/71 (14 Feb 2019 05:08) (91/56 - 145/74)  BP(mean): --  RR: 18 (14 Feb 2019 05:08) (16 - 18)  SpO2: 89% (13 Feb 2019 17:30) (89% - 89%)    PHYSICAL EXAM:  Gen: Elderly F on HFNC, lethargic, chronically ill appearing   HEENT: NCAT. EOMI. MMM.   Neck: Supple  CV: RRR  Lungs: poor air entry  Abd: Soft. NTND  Extr: wwp, no edema  Skin: no rash  Neuro: No focal deficits  Lines: clean      TESTS & MEASUREMENTS:                        8.8    12.77 )-----------( 246      ( 14 Feb 2019 07:33 )             28.5     02-13    140  |  97<L>  |  27<H>  ----------------------------<  206<H>  3.8   |  29  |  1.0    Ca    8.5      13 Feb 2019 07:40  Phos  2.7     02-13  Mg     1.9     02-14              RADIOLOGY & ADDITIONAL TESTS:    ANTIBIOTICS:  cefepime   IVPB   100 mL/Hr IV Intermittent (01-21-19 @ 12:28)    levoFLOXacin  Tablet   250 milliGRAM(s) Oral (02-10-19 @ 18:31)    levoFLOXacin  Tablet   750 milliGRAM(s) Oral (01-22-19 @ 06:02)    levoFLOXacin  Tablet   250 milliGRAM(s) Oral (02-02-19 @ 18:35)   250 milliGRAM(s) Oral (02-01-19 @ 18:22)   250 milliGRAM(s) Oral (01-31-19 @ 18:15)   250 milliGRAM(s) Oral (01-30-19 @ 18:49)    levoFLOXacin IVPB   100 mL/Hr IV Intermittent (01-21-19 @ 12:29)    levoFLOXacin IVPB   50 mL/Hr IV Intermittent (02-13-19 @ 17:54)   50 mL/Hr IV Intermittent (02-12-19 @ 19:11)   50 mL/Hr IV Intermittent (02-11-19 @ 18:18)    linezolid  IVPB   300 mL/Hr IV Intermittent (02-14-19 @ 05:06)   300 mL/Hr IV Intermittent (02-13-19 @ 17:43)   300 mL/Hr IV Intermittent (02-13-19 @ 06:29)   300 mL/Hr IV Intermittent (02-12-19 @ 22:52)   300 mL/Hr IV Intermittent (02-12-19 @ 09:11)   300 mL/Hr IV Intermittent (02-11-19 @ 21:42)    meropenem  IVPB   100 mL/Hr IV Intermittent (01-29-19 @ 06:13)   100 mL/Hr IV Intermittent (01-28-19 @ 21:13)   100 mL/Hr IV Intermittent (01-28-19 @ 15:06)   100 mL/Hr IV Intermittent (01-28-19 @ 06:11)   100 mL/Hr IV Intermittent (01-27-19 @ 21:45)   100 mL/Hr IV Intermittent (01-27-19 @ 16:56)   100 mL/Hr IV Intermittent (01-27-19 @ 05:20)   100 mL/Hr IV Intermittent (01-26-19 @ 21:34)   100 mL/Hr IV Intermittent (01-26-19 @ 14:05)   100 mL/Hr IV Intermittent (01-26-19 @ 05:47)   100 mL/Hr IV Intermittent (01-25-19 @ 21:38)   100 mL/Hr IV Intermittent (01-25-19 @ 14:42)   100 mL/Hr IV Intermittent (01-25-19 @ 06:40)   100 mL/Hr IV Intermittent (01-24-19 @ 21:29)   100 mL/Hr IV Intermittent (01-24-19 @ 14:27)   100 mL/Hr IV Intermittent (01-24-19 @ 05:18)   100 mL/Hr IV Intermittent (01-23-19 @ 22:04)   100 mL/Hr IV Intermittent (01-23-19 @ 14:32)    meropenem  IVPB   100 mL/Hr IV Intermittent (01-23-19 @ 05:04)   100 mL/Hr IV Intermittent (01-22-19 @ 17:32)   100 mL/Hr IV Intermittent (01-22-19 @ 05:16)   100 mL/Hr IV Intermittent (01-21-19 @ 18:32)    meropenem  IVPB   100 mL/Hr IV Intermittent (02-07-19 @ 16:20)    meropenem  IVPB   100 mL/Hr IV Intermittent (02-10-19 @ 13:42)   100 mL/Hr IV Intermittent (02-10-19 @ 05:07)   100 mL/Hr IV Intermittent (02-09-19 @ 21:13)   100 mL/Hr IV Intermittent (02-09-19 @ 13:55)   100 mL/Hr IV Intermittent (02-09-19 @ 06:33)   100 mL/Hr IV Intermittent (02-08-19 @ 21:12)   100 mL/Hr IV Intermittent (02-08-19 @ 13:43)   100 mL/Hr IV Intermittent (02-08-19 @ 05:56)   100 mL/Hr IV Intermittent (02-07-19 @ 21:31)   100 mL/Hr IV Intermittent (02-07-19 @ 16:38)    piperacillin/tazobactam IVPB.   25 mL/Hr IV Intermittent (02-14-19 @ 05:06)   25 mL/Hr IV Intermittent (02-13-19 @ 23:35)   25 mL/Hr IV Intermittent (02-13-19 @ 17:45)   25 mL/Hr IV Intermittent (02-13-19 @ 11:05)   25 mL/Hr IV Intermittent (02-13-19 @ 06:43)   25 mL/Hr IV Intermittent (02-13-19 @ 00:10)   25 mL/Hr IV Intermittent (02-12-19 @ 18:48)   25 mL/Hr IV Intermittent (02-12-19 @ 13:25)   25 mL/Hr IV Intermittent (02-12-19 @ 07:13)   25 mL/Hr IV Intermittent (02-12-19 @ 01:21)    vancomycin  IVPB   250 mL/Hr IV Intermittent (01-22-19 @ 17:24)   250 mL/Hr IV Intermittent (01-21-19 @ 18:31)    vancomycin  IVPB   250 mL/Hr IV Intermittent (02-07-19 @ 16:37)    vancomycin  IVPB   250 mL/Hr IV Intermittent (02-10-19 @ 05:07)   250 mL/Hr IV Intermittent (02-09-19 @ 17:21)   250 mL/Hr IV Intermittent (02-09-19 @ 07:20)   250 mL/Hr IV Intermittent (02-08-19 @ 18:02)   250 mL/Hr IV Intermittent (02-08-19 @ 05:56)        levoFLOXacin IVPB 250 milliGRAM(s) IV Intermittent every 24 hours  linezolid  IVPB 600 milliGRAM(s) IV Intermittent every 12 hours  piperacillin/tazobactam IVPB. 3.375 Gram(s) IV Intermittent every 6 hours

## 2019-02-14 NOTE — PROGRESS NOTE ADULT - SUBJECTIVE AND OBJECTIVE BOX
85yFemale with diagnosis: SEPSIS;PNEUMONIA;CHF;ELEVATED TROPONIN      Patient seen for follow up      PHYSICAL EXAM  Pt SOB on high flow O2  (+) cough still present occasionally  Pt conversant but slow to answer questions    T(C): , Max: 36.5 (14:54)  T(F): 97.7  HR: 88 (84 - 110)  BP: 124/62 (91/56 - 145/74)  RR: 18 (16 - 18)  SpO2: 89% (89% - 89%)              LABS:                          8.8    12.77 )-----------( 246      ( 14 Feb 2019 07:33 )             28.5                                                                                      02-13    140  |  97<L>  |  27<H>  ----------------------------<  206<H>  3.8   |  29  |  1.0    Ca    8.5      13 Feb 2019 07:40  Phos  2.7     02-13  Mg     1.9     02-14                                                        MEDICATIONS  (STANDING):  ALBUTerol    90 MICROgram(s) HFA Inhaler 1 Puff(s) Inhalation every 4 hours  ALBUTerol/ipratropium for Nebulization 3 milliLiter(s) Nebulizer every 6 hours  aspirin enteric coated 81 milliGRAM(s) Oral <User Schedule>  atorvastatin 10 milliGRAM(s) Oral at bedtime  buDESOnide 160 MICROgram(s)/formoterol 4.5 MICROgram(s) Inhaler 2 Puff(s) Inhalation two times a day  chlorhexidine 4% Liquid 1 Application(s) Topical <User Schedule>  cilostazol 100 milliGRAM(s) Oral two times a day  clopidogrel Tablet 75 milliGRAM(s) Oral daily  docusate sodium 100 milliGRAM(s) Oral two times a day  heparin  Injectable 5000 Unit(s) SubCutaneous every 8 hours  levETIRAcetam  IVPB 500 milliGRAM(s) IV Intermittent every 12 hours  levoFLOXacin IVPB 250 milliGRAM(s) IV Intermittent every 24 hours  linezolid  IVPB 600 milliGRAM(s) IV Intermittent every 12 hours  metoprolol tartrate 25 milliGRAM(s) Oral two times a day  nystatin Powder 1 Application(s) Topical two times a day  pantoprazole    Tablet 40 milliGRAM(s) Oral before breakfast  piperacillin/tazobactam IVPB. 3.375 Gram(s) IV Intermittent every 6 hours  predniSONE   Tablet 40 milliGRAM(s) Oral daily  senna 2 Tablet(s) Oral at bedtime  tiotropium 18 MICROgram(s) Capsule 1 Capsule(s) Inhalation daily    MEDICATIONS  (PRN):  acetaminophen   Tablet .. 650 milliGRAM(s) Oral every 6 hours PRN Temp greater or equal to 38C (100.4F), Moderate Pain (4 - 6)  ALBUTerol/ipratropium for Nebulization 3 milliLiter(s) Nebulizer every 6 hours PRN Shortness of Breath and/or Wheezing  ALPRAZolam 0.125 milliGRAM(s) Oral every 12 hours PRN anxiety  guaiFENesin    Syrup 100 milliGRAM(s) Oral every 6 hours PRN Cough  lactulose Syrup 10 Gram(s) Oral every 6 hours PRN constipation

## 2019-02-14 NOTE — PROGRESS NOTE ADULT - SUBJECTIVE AND OBJECTIVE BOX
02-14-19 @ 12:42    LILA VELA  85y  Female  Looks better than Monday last, when I was here.   Son Mundo, dtr. Nancy at bedside.   Discussed w/ Dr. Gonzales also.     INTERVAL EVENTS:    MEDICATIONS  (STANDING):  ALBUTerol    90 MICROgram(s) HFA Inhaler 1 Puff(s) Inhalation every 4 hours  ALBUTerol/ipratropium for Nebulization 3 milliLiter(s) Nebulizer every 6 hours  aspirin enteric coated 81 milliGRAM(s) Oral <User Schedule>  atorvastatin 10 milliGRAM(s) Oral at bedtime  buDESOnide 160 MICROgram(s)/formoterol 4.5 MICROgram(s) Inhaler 2 Puff(s) Inhalation two times a day  chlorhexidine 4% Liquid 1 Application(s) Topical <User Schedule>  cilostazol 100 milliGRAM(s) Oral two times a day  clopidogrel Tablet 75 milliGRAM(s) Oral daily  docusate sodium 100 milliGRAM(s) Oral two times a day  heparin  Injectable 5000 Unit(s) SubCutaneous every 8 hours  levETIRAcetam  IVPB 500 milliGRAM(s) IV Intermittent every 12 hours  levoFLOXacin IVPB 250 milliGRAM(s) IV Intermittent every 24 hours  linezolid  IVPB 600 milliGRAM(s) IV Intermittent every 12 hours  metoprolol tartrate 25 milliGRAM(s) Oral two times a day  nystatin Powder 1 Application(s) Topical two times a day  pantoprazole    Tablet 40 milliGRAM(s) Oral before breakfast  piperacillin/tazobactam IVPB. 3.375 Gram(s) IV Intermittent every 6 hours  predniSONE   Tablet 40 milliGRAM(s) Oral daily  senna 2 Tablet(s) Oral at bedtime  tiotropium 18 MICROgram(s) Capsule 1 Capsule(s) Inhalation daily    MEDICATIONS  (PRN):  acetaminophen   Tablet .. 650 milliGRAM(s) Oral every 6 hours PRN Temp greater or equal to 38C (100.4F), Moderate Pain (4 - 6)  ALBUTerol/ipratropium for Nebulization 3 milliLiter(s) Nebulizer every 6 hours PRN Shortness of Breath and/or Wheezing  ALPRAZolam 0.125 milliGRAM(s) Oral every 12 hours PRN anxiety  guaiFENesin    Syrup 100 milliGRAM(s) Oral every 6 hours PRN Cough  lactulose Syrup 10 Gram(s) Oral every 6 hours PRN constipation      T(C): 35.7 (02-14-19 @ 05:08), Max: 35.7 (02-14-19 @ 05:08)  HR: 84 (02-14-19 @ 05:08) (79 - 110)  BP: 107/71 (02-14-19 @ 05:08) (91/56 - 145/74)  RR: 18 (02-14-19 @ 05:08) (16 - 18)  SpO2: 89% (02-13-19 @ 17:30) (89% - 89%)  Wt(kg): --Vital Signs Last 24 Hrs  T(C): 35.7 (14 Feb 2019 05:08), Max: 35.7 (14 Feb 2019 05:08)  T(F): 96.2 (14 Feb 2019 05:08), Max: 96.2 (14 Feb 2019 05:08)  HR: 84 (14 Feb 2019 05:08) (79 - 110)  BP: 107/71 (14 Feb 2019 05:08) (91/56 - 145/74)  BP(mean): --  RR: 18 (14 Feb 2019 05:08) (16 - 18)  SpO2: 89% (13 Feb 2019 17:30) (89% - 89%)    PHYSICAL EXAM:  GENERAL:   NECK:   CHEST/LUNG:  HEART: S1  ABDOMEN:   EXTREMITIES:                           8.8    12.77 )-----------( 246      ( 14 Feb 2019 07:33 )             28.5     02-13    140  |  97<L>  |  27<H>  ----------------------------<  206<H>  3.8   |  29  |  1.0    Ca    8.5      13 Feb 2019 07:40  Phos  2.7     02-13  Mg     1.9     02-14              RADIOLOGY & ADDITIONAL TESTS:      ASSESSMENT / PLAN  : 02-14-19 @ 12:42    LILA VELA  85y  Female  Looks better than Monday last, when I was here.   Son Mundo, dtr. Nancy at bedside.   Discussed w/ Dr. Gonzales also.     INTERVAL EVENTS:    MEDICATIONS  (STANDING):  ALBUTerol    90 MICROgram(s) HFA Inhaler 1 Puff(s) Inhalation every 4 hours  ALBUTerol/ipratropium for Nebulization 3 milliLiter(s) Nebulizer every 6 hours  aspirin enteric coated 81 milliGRAM(s) Oral <User Schedule>  atorvastatin 10 milliGRAM(s) Oral at bedtime  buDESOnide 160 MICROgram(s)/formoterol 4.5 MICROgram(s) Inhaler 2 Puff(s) Inhalation two times a day  chlorhexidine 4% Liquid 1 Application(s) Topical <User Schedule>  cilostazol 100 milliGRAM(s) Oral two times a day  clopidogrel Tablet 75 milliGRAM(s) Oral daily  docusate sodium 100 milliGRAM(s) Oral two times a day  heparin  Injectable 5000 Unit(s) SubCutaneous every 8 hours  levETIRAcetam  IVPB 500 milliGRAM(s) IV Intermittent every 12 hours  levoFLOXacin IVPB 250 milliGRAM(s) IV Intermittent every 24 hours  linezolid  IVPB 600 milliGRAM(s) IV Intermittent every 12 hours  metoprolol tartrate 25 milliGRAM(s) Oral two times a day  nystatin Powder 1 Application(s) Topical two times a day  pantoprazole    Tablet 40 milliGRAM(s) Oral before breakfast  piperacillin/tazobactam IVPB. 3.375 Gram(s) IV Intermittent every 6 hours  predniSONE   Tablet 40 milliGRAM(s) Oral daily  senna 2 Tablet(s) Oral at bedtime  tiotropium 18 MICROgram(s) Capsule 1 Capsule(s) Inhalation daily    MEDICATIONS  (PRN):  acetaminophen   Tablet .. 650 milliGRAM(s) Oral every 6 hours PRN Temp greater or equal to 38C (100.4F), Moderate Pain (4 - 6)  ALBUTerol/ipratropium for Nebulization 3 milliLiter(s) Nebulizer every 6 hours PRN Shortness of Breath and/or Wheezing  ALPRAZolam 0.125 milliGRAM(s) Oral every 12 hours PRN anxiety  guaiFENesin    Syrup 100 milliGRAM(s) Oral every 6 hours PRN Cough  lactulose Syrup 10 Gram(s) Oral every 6 hours PRN constipation      T(C): 35.7 (02-14-19 @ 05:08), Max: 35.7 (02-14-19 @ 05:08)  HR: 84 (02-14-19 @ 05:08) (79 - 110)  BP: 107/71 (02-14-19 @ 05:08) (91/56 - 145/74)  RR: 18 (02-14-19 @ 05:08) (16 - 18)  SpO2: 89% (02-13-19 @ 17:30) (89% - 89%)  Wt(kg): --Vital Signs Last 24 Hrs  T(C): 35.7 (14 Feb 2019 05:08), Max: 35.7 (14 Feb 2019 05:08)  T(F): 96.2 (14 Feb 2019 05:08), Max: 96.2 (14 Feb 2019 05:08)  HR: 84 (14 Feb 2019 05:08) (79 - 110)  BP: 107/71 (14 Feb 2019 05:08) (91/56 - 145/74)  BP(mean): --  RR: 18 (14 Feb 2019 05:08) (16 - 18)  SpO2: 89% (13 Feb 2019 17:30) (89% - 89%)    PHYSICAL EXAM:  GENERAL: More alert, less distress than my last visit. Tolerating Bipap. Able to talk to me, asking about my well being. mild distress suggestive, but subjectively she does feel better.   NECK: supple. No JVD  CHEST/LUNG: Coarse BS L > R mild wheeze  HEART: S1, S2, tachy  ABDOMEN: soft. NT, BS +  EXTREMITIES: No CCE. Few areas of ecchymoses.                           8.8    12.77 )-----------( 246      ( 14 Feb 2019 07:33 )             28.5     02-13    140  |  97<L>  |  27<H>  ----------------------------<  206<H>  3.8   |  29  |  1.0    Ca    8.5      13 Feb 2019 07:40  Phos  2.7     02-13  Mg     1.9     02-14    Bl. Cult-s  Negative so far.   Ur. Cult : negative  Legionella Ag : negative.   INfluenza : -ve.           RADIOLOGY & ADDITIONAL TESTS:    CT chest :    FINDINGS:    LUNGS: Layering fluid/secretions are noted within the central   tracheobronchial tree. Focal consolidated and groundglass right lower   lobe and right middle lobe opacity is noted. Small bilateral pleural   effusions are noted, right greater than left. Moderately severe   centrilobular emphysema.     Since April 2018, a 1.2 x 0.9 cm left upper lobe nodular opacity has   slightly increased in size (previously 1.0 x 0.9 cm). A 6 mm left lower   lobe pulmonary nodule is overall unchanged dating back to February 2006.    HEART/GREAT VESSELS: The heart is enlarged. No pericardial effusion.   Extensive atherosclerotic calcifications of the coronary arteries and   thoracic aorta. The great vessels are normal in caliber    MEDIASTINUM/THORACIC NODES: Multiple enlarged mediastinal and right hilar   lymph nodes are noted. For example, subcarinal adenopathy measures 2.4 x   2.0 cm. Right hilar adenopathy measures 2.6 x 1.6 cm.    VISUALIZED UPPER ABDOMEN: Limited imaging through the upper abdomen   demonstrates a partially imaged left renal upper pole cyst.    BONES/SOFT TISSUES: No acute osseous abnormality. Degenerative changes of   the spine. No suspicious osseous lesion.      IMPRESSION:    1.   Focal consolidatedand groundglass right lower lobe and right middle   lobe opacity, compatible with pneumonia in the appropriate clinical   setting; follow-up to complete resolution is suggested.    2. Small bilateral pleural effusions, right greater than left.    3. Since April 2018, increased size 1.2 cm left upper lobe pulmonary   nodule.      ABUNDIO GONZALEZ M.D., RESIDENT RADIOLOGIST  This document has been electronically signed.  BRAULIO ANDERSON M.D., ATTENDING RADIOLOGIST  This document has been electronically signed. Feb 13 2019  1:13PM              ASSESSMENT / PLAN  :    WORSENING SOB/HYPOXIA : Known COPD, Been Hypoxic, Tx as being guided by pulmonary. Prognosis looks better than 3 d ago, but still guarded. Has Pulm HTN also. ? of recent type II MI. Overall much worsened. Will have CT chest, reculture. Reevaluated by ID to start on broad spectrum Abx. Seen by pulm for f/u. O2 supplement.  Discussed w/ son & daughter at bedside.   ACUTE HYPOXIC  RESPIRATORY FAILURE : Worsened condition.    PNEUMONIA ; Being f/u by ID & Pulm. Abx as per ID. Cult-s -ve so far.     BL PLEURAL EFFUSION : CT more c/w PNA, min effusion.  MARINA lesion : Also has multiple adenopathy. She is not in a condition to pursue further invasive procedure.    RIGHT TIBIAL VEIN THROMBOSIS ; Not an active issue now.   ANEMIA ; chronic, cont monitor.   RECENT HEMATURIA : was seen by Uro. as advised, CT done, showed ant bladder mass. OP cysto & further management, if & when stable.     CKD III : Stable. Avoid dehydration   OLD CVA ; stable.   Hx. AORTO-FEM BYPASS, RIGHT CAROTID STENT : not an issue now.     Being seen by palliative care. Though her resp status showed some fluctuation towards betterment, overall prognosis continues to be poor, in view of Pulmonary nodules with L/N-s, Bladder mass on her baseline multiple med. issues. Family understands but hopeful.     DNR/DNI.     Discussed w/ families, resident.     Spent 30 min. in care & coordination.

## 2019-02-14 NOTE — PROGRESS NOTE ADULT - ASSESSMENT
Acute on chronic hypoxic respiratory failure  Copd exacerbation, improved  Pneumonia- concern for mdr pathogen  Pulmonary nodule  CHF  Anxiety  Bladder mass   Advanced directives- Full code       oxygen per pulse oximetry,  wean fio2 , maintain sat 88-92  abx per id  dec prednisone 20 mg daily starting tomorrow  continue symbicort and spiriva  xanax 0.125 q 12hours prn  palliative care consult appreciated  discussed with family at bed side   overall grim prognosis  Advanced directives - dnr/dni  d/w house staff

## 2019-02-14 NOTE — PROGRESS NOTE ADULT - SUBJECTIVE AND OBJECTIVE BOX
Home NEPHROLOGY FOLLOW UP NOTE  --------------------------------------------------------------------------------  24 hour events/subjective: Patient examined. Appears comfortable.    PAST HISTORY  --------------------------------------------------------------------------------  No significant changes to PMH, PSH, FHx, SHx, unless otherwise noted    ALLERGIES & MEDICATIONS  --------------------------------------------------------------------------------  Allergies    No Known Allergies    Standing Inpatient Medications  ALBUTerol    90 MICROgram(s) HFA Inhaler 1 Puff(s) Inhalation every 4 hours  ALBUTerol/ipratropium for Nebulization 3 milliLiter(s) Nebulizer every 6 hours  aspirin enteric coated 81 milliGRAM(s) Oral <User Schedule>  atorvastatin 10 milliGRAM(s) Oral at bedtime  buDESOnide 160 MICROgram(s)/formoterol 4.5 MICROgram(s) Inhaler 2 Puff(s) Inhalation two times a day  chlorhexidine 4% Liquid 1 Application(s) Topical <User Schedule>  cilostazol 100 milliGRAM(s) Oral two times a day  clopidogrel Tablet 75 milliGRAM(s) Oral daily  docusate sodium 100 milliGRAM(s) Oral two times a day  heparin  Injectable 5000 Unit(s) SubCutaneous every 8 hours  levETIRAcetam  IVPB 500 milliGRAM(s) IV Intermittent every 12 hours  levoFLOXacin IVPB 250 milliGRAM(s) IV Intermittent every 24 hours  linezolid  IVPB 600 milliGRAM(s) IV Intermittent every 12 hours  metoprolol tartrate 25 milliGRAM(s) Oral two times a day  nystatin Powder 1 Application(s) Topical two times a day  pantoprazole    Tablet 40 milliGRAM(s) Oral before breakfast  piperacillin/tazobactam IVPB. 3.375 Gram(s) IV Intermittent every 6 hours  predniSONE   Tablet 40 milliGRAM(s) Oral daily  senna 2 Tablet(s) Oral at bedtime  tiotropium 18 MICROgram(s) Capsule 1 Capsule(s) Inhalation daily    PRN Inpatient Medications  acetaminophen   Tablet .. 650 milliGRAM(s) Oral every 6 hours PRN  ALBUTerol/ipratropium for Nebulization 3 milliLiter(s) Nebulizer every 6 hours PRN  ALPRAZolam 0.125 milliGRAM(s) Oral every 12 hours PRN  guaiFENesin    Syrup 100 milliGRAM(s) Oral every 6 hours PRN  lactulose Syrup 10 Gram(s) Oral every 6 hours PRN    VITALS/PHYSICAL EXAM  --------------------------------------------------------------------------------  T(C): 36.5 (02-14-19 @ 14:54), Max: 36.5 (02-14-19 @ 14:54)  HR: 88 (02-14-19 @ 14:54) (84 - 110)  BP: 124/62 (02-14-19 @ 14:54) (91/56 - 145/74)  RR: 18 (02-14-19 @ 14:54) (16 - 18)  SpO2: 89% (02-13-19 @ 17:30) (89% - 89%)      Physical Exam:  	Gen: NAD  	Pulm: R rales  	CV: RRR, S1S2  	Abd: +BS, soft, nontender/nondistended  	: No suprapubic tenderness  	LE: Warm, no edema    LABS/STUDIES  --------------------------------------------------------------------------------              8.8    12.77 >-----------<  246      [02-14-19 @ 07:33]              28.5     140  |  97  |  27  ----------------------------<  206      [02-13-19 @ 07:40]  3.8   |  29  |  1.0        Ca     8.5     [02-13-19 @ 07:40]      Mg     1.9     [02-14-19 @ 07:33]      Phos  2.7     [02-13-19 @ 07:40]    Creatinine Trend:  SCr 1.0 [02-13 @ 07:40]  SCr 1.1 [02-12 @ 07:17]  SCr 1.2 [02-11 @ 07:03]  SCr 1.2 [02-10 @ 07:33]  SCr 1.7 [02-09 @ 07:02]    Urinalysis - [01-24-19 @ 04:30]      Color Red / Appearance Turbid / SG >=1.030 / pH 5.0      Gluc 100 / Ketone 40  / Bili Large / Urobili 1.0       Blood Large / Protein >=300 / Leuk Est Moderate / Nitrite Positive      RBC >50 / WBC  / Hyaline  / Gran 3-5 / Sq Epi  / Non Sq Epi  / Bacteria Moderate    HbA1c 5.7      [01-11-19 @ 07:14]  Lipid: chol 171, TG 43, HDL 90, LDL 78      [01-11-19 @ 07:14]

## 2019-02-14 NOTE — SWALLOW BEDSIDE ASSESSMENT ADULT - SWALLOW EVAL: DIAGNOSIS
suspected pharyngeal dysphagia. minimal symptoms of penetration/aspiration for puree and nectar thick liquids

## 2019-02-14 NOTE — SWALLOW BEDSIDE ASSESSMENT ADULT - NS SPL SWALLOW CLINIC TRIAL FT
minimal symptoms of penetration/aspiration
delayed cough noted x1 no other overt symptoms of penetration/aspiration noted

## 2019-02-14 NOTE — PROGRESS NOTE ADULT - ASSESSMENT
1. CKD III-stable  2. Bladder mass/hematuria  3. DVT  4. HTN  5. PNA  6. R heart failure  7. Pulmonary HTN  8. Hypernatremia    Plan:  Abx per ID  Urology f/u-outpatient cyto  Daily BMP

## 2019-02-14 NOTE — SWALLOW BEDSIDE ASSESSMENT ADULT - SLP GENERAL OBSERVATIONS
pt awake alert without c/o pain. pt has been on bipap. as per RT pt was placed on 35% o2 via facemask today and tolerating. as per RN facemask was removed for breakfast and can be removed for po trials. pt desaturated to 86% after ~2 min therefore SLP help facemask to face in between each bite sip
pt awake alert without c/o pain. pt on o2 via NC
pt awake alert without c/o pain. pt on o2 via highflow NC. pt becomes more dyspneic after po trials

## 2019-02-14 NOTE — SWALLOW BEDSIDE ASSESSMENT ADULT - SLP PERTINENT HISTORY OF CURRENT PROBLEM
admitted with CP SOB and hypoxia. cxr revealed bibasilar opacifications R>L. pt was treated for PNA. course complicated by acute on chronic hypoxia and chronic hyerpercapnic respiratory failure and exacerbation. pt has been requiring increased o2

## 2019-02-14 NOTE — SWALLOW BEDSIDE ASSESSMENT ADULT - PHARYNGEAL PHASE
Delayed throat clear post oral intake/Multiple swallows Multiple swallows/Delayed throat clear post oral intake

## 2019-02-14 NOTE — SWALLOW BEDSIDE ASSESSMENT ADULT - SWALLOW EVAL: RECOMMENDED FEEDING/EATING TECHNIQUES
maintain upright posture during/after eating for 30 mins/small sips/bites/allow for swallow between intakes/crush medication (when feasible)/position upright (90 degrees)

## 2019-02-14 NOTE — PROGRESS NOTE ADULT - ASSESSMENT
85F    COPD   Peripheral vascular disease  HTN  High cholesterol  CVA   H/O aorto-femoral bypass  History of right common carotid artery stent placement    Admitted 1/21 + coronavirus   Blood and urine cx NGTD, legionella neg  CT 1.2 cm right hepatic lobe lesion with arterial enhancement,. 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  Focal consolidated and groundglass right lower lobe and right middle lobe opacity, compatible with pneumonia in the appropriate clinical setting. MARINA nodule, b/l pleural effusions    Olga Lidia 1/21-1/29, 2/7-2/10, zosyn 2/12-, levo 2/10-    - Pulm following  - on steroids  - continue abx, on linezolid (2/11-), zosyn, levofloxacin plan for total 7 day course  - poor prognosis

## 2019-02-14 NOTE — PROGRESS NOTE ADULT - SUBJECTIVE AND OBJECTIVE BOX
LILA VELA  85y, Female  Allergy: No Known Allergies      LAST 24-Hr EVENTS:  pt seen examined   remains on 45 percent fio2  comfortable  family at bedside      VITALS:  T(F): 97.7 (02-14-19 @ 14:54), Max: 97.7 (02-14-19 @ 14:54)  HR: 110 (02-14-19 @ 18:23)  BP: 90/50 (02-14-19 @ 18:23) (90/50 - 124/62)  RR: 18 (02-14-19 @ 14:54)  SpO2: 89% (02-14-19 @ 18:23)    PHYSICAL EXAM:    GENERAL: NAD cachectic  NECK: Supple, No JVD  CHEST/LUNG: ronchi  HEART: Regular rate and rhythm  ABDOMEN: Soft, Nontender, Nondistended  EXTREMITIES:  No clubbing, edema absent        TESTS & MEASUREMENTS:    IN: 900 mL / OUT: 0 mL / NET: 900 mL                            8.8    12.77 )-----------( 246      ( 14 Feb 2019 07:33 )             28.5       02-13    140  |  97<L>  |  27<H>  ----------------------------<  206<H>  3.8   |  29  |  1.0    Ca    8.5      13 Feb 2019 07:40  Phos  2.7     02-13  Mg     1.9     02-14          RADIOLOGY & ADDITIONAL TESTS:  < from: CT Angio Chest w/ IV Cont (02.12.19 @ 18:35) >  IMPRESSION:    1.   Focal consolidatedand groundglass right lower lobe and right middle   lobe opacity, compatible with pneumonia in the appropriate clinical   setting; follow-up to complete resolution is suggested.    2. Small bilateral pleural effusions, right greater than left.    3. Since April 2018, increased size 1.2 cm left upper lobe pulmonary   nodule.    < end of copied text >        MEDICATIONS:  MEDICATIONS  (STANDING):  ALBUTerol    90 MICROgram(s) HFA Inhaler 1 Puff(s) Inhalation every 4 hours  ALBUTerol/ipratropium for Nebulization 3 milliLiter(s) Nebulizer every 6 hours  aspirin enteric coated 81 milliGRAM(s) Oral <User Schedule>  atorvastatin 10 milliGRAM(s) Oral at bedtime  buDESOnide 160 MICROgram(s)/formoterol 4.5 MICROgram(s) Inhaler 2 Puff(s) Inhalation two times a day  chlorhexidine 4% Liquid 1 Application(s) Topical <User Schedule>  cilostazol 100 milliGRAM(s) Oral two times a day  clopidogrel Tablet 75 milliGRAM(s) Oral daily  docusate sodium 100 milliGRAM(s) Oral two times a day  heparin  Injectable 5000 Unit(s) SubCutaneous every 8 hours  levETIRAcetam  IVPB 500 milliGRAM(s) IV Intermittent every 12 hours  levoFLOXacin IVPB 250 milliGRAM(s) IV Intermittent every 24 hours  linezolid  IVPB 600 milliGRAM(s) IV Intermittent every 12 hours  metoprolol tartrate 25 milliGRAM(s) Oral two times a day  nystatin Powder 1 Application(s) Topical two times a day  pantoprazole    Tablet 40 milliGRAM(s) Oral before breakfast  piperacillin/tazobactam IVPB. 3.375 Gram(s) IV Intermittent every 6 hours  predniSONE   Tablet 40 milliGRAM(s) Oral daily  senna 2 Tablet(s) Oral at bedtime  tiotropium 18 MICROgram(s) Capsule 1 Capsule(s) Inhalation daily    MEDICATIONS  (PRN):  acetaminophen   Tablet .. 650 milliGRAM(s) Oral every 6 hours PRN Temp greater or equal to 38C (100.4F), Moderate Pain (4 - 6)  ALBUTerol/ipratropium for Nebulization 3 milliLiter(s) Nebulizer every 6 hours PRN Shortness of Breath and/or Wheezing  ALPRAZolam 0.125 milliGRAM(s) Oral every 12 hours PRN anxiety  guaiFENesin    Syrup 100 milliGRAM(s) Oral every 6 hours PRN Cough  lactulose Syrup 10 Gram(s) Oral every 6 hours PRN constipation

## 2019-02-14 NOTE — PROGRESS NOTE ADULT - ASSESSMENT
84 yo F, CCU Downgrade, PMHx COPD on 2 L NC, PVD, CKD3, HTN, DLD, hx of CVA, ex-smoker, recently discharged from [facility] on 01/13 after PNA tx w/Levaquin and Prednisone, presented/w SOB and dysuria. She was doing well until 01/20 when she starting feeling SOB again.     Pt was admitted to CCU for acute on chronic hypoxic and chronic hypercapnic respiratory failure 2/2 COPD exacerbation likely due to RLL GNR PNA exacerbated by coronavirus coinfection. She was on BiPaP prn, solumedrol, bronchodilators and Meropenem. She was treated for distal DVT and subsequently developed hematuria. Further w/u was positive for moderate ascites (2D echo), +UA (negative urine and blood cultures). MRSA and Flu were negative.       Today Events:, day 5 off Lasix , c/w medical management, c/w prednisone 40mg day 2, Pulm following,   ID following c/w Levo IV, Unasyn IV and Zyvox IV for 7 days,  palliative following     IMPRESSION   RLL PNA  vs other Unknown Etiology not excluding CA  COPD exacerbation       Plan  # Acute on chronic hypoxic and chronic hypercapnic respiratory failure exacerbation etiology Unknown    - maintain  88-92 o2sat   - Prednisone daily 40mg  day two, will c/w one more day then begin taper 20mg  3 day, then  10mg    - pulm following   - ID following   - c/w   Levo IV, Unasyn IV and Zyvox IV. ending on  02/18  - albuterol Neb patient    -  CT chest with con showing RLL consolidation - no invasive procedures   - starting High flow O2  - switching Diet to puree and nectar     # HFpEF:   ECHO 1/22- EF 55-60%, severe pulm HTN, decreased RV systolic fx, RV vol and pressure overload,   RV enlarged, severe mitral annular calcification    - Metoprolol    # Right Tibial vein DVT: Stable  Duplex LE- Right post tibial DVT  No need for therapeutic AC as per Dr Dunham- will repeat Duplex unremarkable    - ppx heparin     # Hematuria: Resolved  Cont prophylactic heparin   HGB stable  CT Urogram: 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  1.2 cm right hepatic lobe lesion with arterial enhancement, incompletely characterized but could be a benign lesion. Consider nonemergent MRI of the abdomen with contrast if clinically indicated.  - OP f/u urology    - Hematuria overnight       # Transaminitis: waxing and waning  RUQ US: negative    # Constipation:   Cont. Senna, Colace  lactulose PRN     Electrolyte Imbalances: repletion   []  Hyponatremia   /   Hypernatremia  []    []  Hypokalemia   /   Hyperkalemia  []   []  Hypochloremia   /    Hyperchloremia  []   []  Hypocapnia   /   Hypercapnia  []   []  Hypomagnesemia   /   Hypermagnesemia  []   []  Hypophosphatemia   /   Hyperphosphatemia  []           GI ppx:                                   [] Not indicated   /   [x] Pantoprazole 40mg PO Daily    DVT ppx:  [] Not indicated / [x] Heparin 5000mg SubQ / [] Lovenox 40mg SubQ / [] SCDs    Fluids:   [x] PO  |  [x] IVF one L only     Activity:  [X] Assisatnce needed   [X] Increase as Tolerated  /  [] OOB w/ assist  /  [] Bed Rest      DISPO:   attempting NH placement after optimization   [ ] From Home     [ x] NH/SNF   [ ] 4A Rehab  [ ] Detox Clinic  [X] Plan Discussion with patient and/or family.  [X] Discussed Case and Plan with the Medical Attending.    CODE STATUS  [] FULL   /    [X] DNR/DNI 86 yo F, CCU Downgrade, PMHx COPD on 2 L NC, PVD, CKD3, HTN, DLD, hx of CVA, ex-smoker, recently discharged from [facility] on 01/13 after PNA tx w/Levaquin and Prednisone, presented/w SOB and dysuria. She was doing well until 01/20 when she starting feeling SOB again.     Pt was admitted to CCU for acute on chronic hypoxic and chronic hypercapnic respiratory failure 2/2 COPD exacerbation likely due to RLL GNR PNA exacerbated by coronavirus coinfection. She was on BiPaP prn, solumedrol, bronchodilators and Meropenem. She was treated for distal DVT and subsequently developed hematuria. Further w/u was positive for moderate ascites (2D echo), +UA (negative urine and blood cultures). MRSA and Flu were negative.       Today Events:, day 5 off Lasix , c/w medical management, c/w prednisone 40mg day 2, Pulm following,   ID following c/w Levo IV, Unasyn IV and Zyvox IV for 7 days,  palliative following     IMPRESSION   RLL PNA  vs other Unknown Etiology not excluding CA  COPD exacerbation       Plan  # Acute on chronic hypoxic and chronic hypercapnic respiratory failure exacerbation etiology Unknown    - maintain  88-92 o2sat   - Prednisone daily 40mg  day two, will c/w one more day then begin taper 20mg  3 day, then  10mg    - pulm following   - ID following   - c/w   Levo IV, Unasyn IV and Zyvox IV. ending on  02/18  - albuterol Neb patient    -  CT chest with con showing RLL consolidation - no invasive procedures   - starting High flow O2  - switching Diet to puree and nectar   - palliative following       # HFpEF:   ECHO 1/22- EF 55-60%, severe pulm HTN, decreased RV systolic fx, RV vol and pressure overload,   RV enlarged, severe mitral annular calcification    - Metoprolol    # Right Tibial vein DVT: Stable  Duplex LE- Right post tibial DVT  No need for therapeutic AC as per Dr Dunham- will repeat Duplex unremarkable    - ppx heparin     # Hematuria: Resolved  Cont prophylactic heparin   HGB stable  CT Urogram: 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  1.2 cm right hepatic lobe lesion with arterial enhancement, incompletely characterized but could be a benign lesion. Consider nonemergent MRI of the abdomen with contrast if clinically indicated.  - OP f/u urology    - Hematuria overnight       # Transaminitis: waxing and waning  RUQ US: negative    # Constipation:   Cont. Senna, Colace  lactulose PRN     Electrolyte Imbalances: repletion   []  Hyponatremia   /   Hypernatremia  []    []  Hypokalemia   /   Hyperkalemia  []   []  Hypochloremia   /    Hyperchloremia  []   []  Hypocapnia   /   Hypercapnia  []   []  Hypomagnesemia   /   Hypermagnesemia  []   []  Hypophosphatemia   /   Hyperphosphatemia  []           GI ppx:                                   [] Not indicated   /   [x] Pantoprazole 40mg PO Daily    DVT ppx:  [] Not indicated / [x] Heparin 5000mg SubQ / [] Lovenox 40mg SubQ / [] SCDs    Fluids:   [x] PO  |  [x] IVF one L only     Activity:  [X] Assisatnce needed   [X] Increase as Tolerated  /  [] OOB w/ assist  /  [] Bed Rest      DISPO:   attempting NH placement after optimization   [ ] From Home     [ x] NH/SNF   [ ] 4A Rehab  [ ] Detox Clinic  [X] Plan Discussion with patient and/or family.  [X] Discussed Case and Plan with the Medical Attending.    CODE STATUS  [] FULL   /    [X] DNR/DNI

## 2019-02-14 NOTE — SWALLOW BEDSIDE ASSESSMENT ADULT - SWALLOW EVAL: RECOMMENDED DIET
continue regular with thins
continue regular with thins
Dysphagia diet I puree consistency with Nectar-thickened liquids

## 2019-02-14 NOTE — PROGRESS NOTE ADULT - SUBJECTIVE AND OBJECTIVE BOX
LENGTH OF HOSPITAL STAY: 24d      CHIEF COMPLAINT:   Patient is a 85y old  Female who presents with a chief complaint of sob (14 Feb 2019 11:17)      OVER Past 24hrs:  The patient was seen and examined at bedside there were patient is unchanged from Yesterday. She is still requiring High flow, O2 uable to decrease to NC. She denies fevers chills or chest pain.  Brief ep of blood in urine.       PAST MEDICAL & SURGICAL HISTORY  PAST MEDICAL & SURGICAL HISTORY:  PVD (peripheral vascular disease)  COPD (chronic obstructive pulmonary disease)  Kidney disease, chronic, stage I (GFR over 89 ml/min)  Peripheral vascular disease  HTN (hypertension)  High cholesterol  CVA (cerebral vascular accident)  H/O aorto-femoral bypass  History of right common carotid artery stent placement      REVIEW OF SYSTEMS  RESPIRATORY: + cough,  wheezing ,no  chills or hemoptysis; + shortness of breath  CARDIOVASCULAR: No chest pain, palpitations, dizziness, or leg swelling    ALLERGIES:  No Known Allergies    MEDICATIONS:  STANDING MEDICATIONS  ALBUTerol    90 MICROgram(s) HFA Inhaler 1 Puff(s) Inhalation every 4 hours  ALBUTerol/ipratropium for Nebulization 3 milliLiter(s) Nebulizer every 6 hours  aspirin enteric coated 81 milliGRAM(s) Oral <User Schedule>  atorvastatin 10 milliGRAM(s) Oral at bedtime  buDESOnide 160 MICROgram(s)/formoterol 4.5 MICROgram(s) Inhaler 2 Puff(s) Inhalation two times a day  chlorhexidine 4% Liquid 1 Application(s) Topical <User Schedule>  cilostazol 100 milliGRAM(s) Oral two times a day  clopidogrel Tablet 75 milliGRAM(s) Oral daily  docusate sodium 100 milliGRAM(s) Oral two times a day  heparin  Injectable 5000 Unit(s) SubCutaneous every 8 hours  levETIRAcetam  IVPB 500 milliGRAM(s) IV Intermittent every 12 hours  levoFLOXacin IVPB 250 milliGRAM(s) IV Intermittent every 24 hours  linezolid  IVPB 600 milliGRAM(s) IV Intermittent every 12 hours  metoprolol tartrate 25 milliGRAM(s) Oral two times a day  nystatin Powder 1 Application(s) Topical two times a day  pantoprazole    Tablet 40 milliGRAM(s) Oral before breakfast  piperacillin/tazobactam IVPB. 3.375 Gram(s) IV Intermittent every 6 hours  predniSONE   Tablet 40 milliGRAM(s) Oral daily  senna 2 Tablet(s) Oral at bedtime  tiotropium 18 MICROgram(s) Capsule 1 Capsule(s) Inhalation daily    PRN MEDICATIONS  acetaminophen   Tablet .. 650 milliGRAM(s) Oral every 6 hours PRN  ALBUTerol/ipratropium for Nebulization 3 milliLiter(s) Nebulizer every 6 hours PRN  ALPRAZolam 0.125 milliGRAM(s) Oral every 12 hours PRN  guaiFENesin    Syrup 100 milliGRAM(s) Oral every 6 hours PRN  lactulose Syrup 10 Gram(s) Oral every 6 hours PRN    VITALS:   T(F): 96.2  HR: 84  BP: 107/71  RR: 18  SpO2: 89%    PHYSICAL EXAM:  General: No acute distress.  Alert, oriented, interactive, nonfocal.    HEENT: Pupils equal, reactive to light symmetrically.    PULM: decreased sounds Right side, no significant sputum production.       CVS: Regular rate and rhythm, no murmurs, rubs, or gallops.    ABD: Soft, nondistended, nontender, no masses.    EXT: No edema, nontender.    LABS:                        8.8    12.77 )-----------( 246      ( 14 Feb 2019 07:33 )             28.5     02-13    140  |  97<L>  |  27<H>  ----------------------------<  206<H>  3.8   |  29  |  1.0    Ca    8.5      13 Feb 2019 07:40  Phos  2.7     02-13  Mg     1.9     02-14

## 2019-02-15 NOTE — PROGRESS NOTE ADULT - ASSESSMENT
acute on chronic hypoxic respiratory failure  pneumonia  copd exacerbation  pulmonary nodule  anxiety disorder  chf  bladder mass        continue high flow oxygen via nasal canula  bronchodilators  prednisone  finish course of antibiotics  titrate anxiolytic therapy  cardiac medications  gi/dvt prophylaxis  keep comfortable

## 2019-02-15 NOTE — PROGRESS NOTE ADULT - SUBJECTIVE AND OBJECTIVE BOX
LENGTH OF HOSPITAL STAY: 25d      CHIEF COMPLAINT:   Patient is a 85y old  Female who presents with a chief complaint of sob (15 Feb 2019 12:04)      OVER Past 24hrs:  The patient was seen and examined at bedside there were no acute events during the night. Patient is tolerating high flow O2. She deneis fever chills,  chest pain       PAST MEDICAL & SURGICAL HISTORY  PAST MEDICAL & SURGICAL HISTORY:  PVD (peripheral vascular disease)  COPD (chronic obstructive pulmonary disease)  Kidney disease, chronic, stage I (GFR over 89 ml/min)  Peripheral vascular disease  HTN (hypertension)  High cholesterol  CVA (cerebral vascular accident)  H/O aorto-femoral bypass  History of right common carotid artery stent placement    SOCIAL HISTORY:    REVIEW OF SYSTEMS  CONSTITUTIONAL: No fever, weight loss, or fatigue  EYES: No eye pain, visual disturbances, or discharge  ENMT:  No difficulty hearing, tinnitus, vertigo; No sinus or throat pain  NECK: No pain or stiffness  BREASTS: No pain, masses, or nipple discharge  RESPIRATORY: No cough, wheezing, chills or hemoptysis; No shortness of breath  CARDIOVASCULAR: No chest pain, palpitations, dizziness, or leg swelling  GASTROINTESTINAL: No abdominal or epigastric pain. No nausea, vomiting, or hematemesis; No diarrhea or constipation. No melena or hematochezia.  GENITOURINARY: No dysuria, frequency, hematuria, or incontinence  NEUROLOGICAL: No headaches, memory loss, loss of strength, numbness, or tremors  SKIN: No itching, burning, rashes, or lesions   LYMPH NODES: No enlarged glands  ENDOCRINE: No heat or cold intolerance; No hair loss  MUSCULOSKELETAL: No joint pain or swelling; No muscle, back, or extremity pain  PSYCHIATRIC: No depression, anxiety, mood swings, or difficulty sleeping  HEME/LYMPH: No easy bruising, or bleeding gums  ALLERY AND IMMUNOLOGIC: No hives or eczema    ALLERGIES:  No Known Allergies    MEDICATIONS:  STANDING MEDICATIONS  ALBUTerol    90 MICROgram(s) HFA Inhaler 1 Puff(s) Inhalation every 4 hours  ALBUTerol/ipratropium for Nebulization 3 milliLiter(s) Nebulizer every 6 hours  aspirin enteric coated 81 milliGRAM(s) Oral <User Schedule>  atorvastatin 10 milliGRAM(s) Oral at bedtime  buDESOnide 160 MICROgram(s)/formoterol 4.5 MICROgram(s) Inhaler 2 Puff(s) Inhalation two times a day  chlorhexidine 4% Liquid 1 Application(s) Topical <User Schedule>  cilostazol 100 milliGRAM(s) Oral two times a day  clopidogrel Tablet 75 milliGRAM(s) Oral daily  docusate sodium 100 milliGRAM(s) Oral two times a day  heparin  Injectable 5000 Unit(s) SubCutaneous every 8 hours  levETIRAcetam  IVPB 500 milliGRAM(s) IV Intermittent every 12 hours  levoFLOXacin IVPB 250 milliGRAM(s) IV Intermittent every 24 hours  linezolid  IVPB 600 milliGRAM(s) IV Intermittent every 12 hours  metoprolol tartrate 25 milliGRAM(s) Oral two times a day  nystatin Powder 1 Application(s) Topical two times a day  pantoprazole    Tablet 40 milliGRAM(s) Oral before breakfast  piperacillin/tazobactam IVPB. 3.375 Gram(s) IV Intermittent every 6 hours  predniSONE   Tablet 40 milliGRAM(s) Oral daily  senna 2 Tablet(s) Oral at bedtime  tiotropium 18 MICROgram(s) Capsule 1 Capsule(s) Inhalation daily    PRN MEDICATIONS  acetaminophen   Tablet .. 650 milliGRAM(s) Oral every 6 hours PRN  ALBUTerol/ipratropium for Nebulization 3 milliLiter(s) Nebulizer every 6 hours PRN  ALPRAZolam 0.125 milliGRAM(s) Oral every 12 hours PRN  guaiFENesin    Syrup 100 milliGRAM(s) Oral every 6 hours PRN  lactulose Syrup 10 Gram(s) Oral every 6 hours PRN    VITALS:   T(F): 96.7  HR: 106  BP: 163/70  RR: 18  SpO2: 91%    PHYSICAL EXAM:  General: No acute distress.  Alert, oriented, interactive, nonfocal.    HEENT: Pupils equal, reactive to light symmetrically.    PULM: Clear to auscultation bilaterally, no significant sputum production.    CVS: Regular rate and rhythm, no murmurs, rubs, or gallops.    ABD: Soft, nondistended, nontender, no masses.    EXT: No edema, nontender.    SKIN: Warm and well perfused, no rashes noted.    LABS:                        9.0    11.85 )-----------( 258      ( 15 Feb 2019 12:30 )             28.5       Mg     1.9     02-14                    RADIOLOGY: LENGTH OF HOSPITAL STAY: 25d      CHIEF COMPLAINT:   Patient is a 85y old  Female who presents with a chief complaint of sob (15 Feb 2019 12:04)      OVER Past 24hrs:  The patient was seen and examined at bedside there were no acute events during the night. Patient is tolerating high flow O2. She deneis fever chills,  chest pain,  but complains fo sob unchanged from yesterday.       PAST MEDICAL & SURGICAL HISTORY  PAST MEDICAL & SURGICAL HISTORY:  PVD (peripheral vascular disease)  COPD (chronic obstructive pulmonary disease)  Kidney disease, chronic, stage I (GFR over 89 ml/min)  Peripheral vascular disease  HTN (hypertension)  High cholesterol  CVA (cerebral vascular accident)  H/O aorto-femoral bypass  History of right common carotid artery stent placement         REVIEW OF SYSTEMS  CONSTITUTIONAL: No fever,  RESPIRATORY: +cough, no wheezing chills or hemoptysis; +shortness of breath  CARDIOVASCULAR: No chest pain, palpitations, dizziness, or leg swelling      ALLERGIES:  No Known Allergies    MEDICATIONS:  STANDING MEDICATIONS  ALBUTerol    90 MICROgram(s) HFA Inhaler 1 Puff(s) Inhalation every 4 hours  ALBUTerol/ipratropium for Nebulization 3 milliLiter(s) Nebulizer every 6 hours  aspirin enteric coated 81 milliGRAM(s) Oral <User Schedule>  atorvastatin 10 milliGRAM(s) Oral at bedtime  buDESOnide 160 MICROgram(s)/formoterol 4.5 MICROgram(s) Inhaler 2 Puff(s) Inhalation two times a day  chlorhexidine 4% Liquid 1 Application(s) Topical <User Schedule>  cilostazol 100 milliGRAM(s) Oral two times a day  clopidogrel Tablet 75 milliGRAM(s) Oral daily  docusate sodium 100 milliGRAM(s) Oral two times a day  heparin  Injectable 5000 Unit(s) SubCutaneous every 8 hours  levETIRAcetam  IVPB 500 milliGRAM(s) IV Intermittent every 12 hours  levoFLOXacin IVPB 250 milliGRAM(s) IV Intermittent every 24 hours  linezolid  IVPB 600 milliGRAM(s) IV Intermittent every 12 hours  metoprolol tartrate 25 milliGRAM(s) Oral two times a day  nystatin Powder 1 Application(s) Topical two times a day  pantoprazole    Tablet 40 milliGRAM(s) Oral before breakfast  piperacillin/tazobactam IVPB. 3.375 Gram(s) IV Intermittent every 6 hours  predniSONE   Tablet 40 milliGRAM(s) Oral daily  senna 2 Tablet(s) Oral at bedtime  tiotropium 18 MICROgram(s) Capsule 1 Capsule(s) Inhalation daily    PRN MEDICATIONS  acetaminophen   Tablet .. 650 milliGRAM(s) Oral every 6 hours PRN  ALBUTerol/ipratropium for Nebulization 3 milliLiter(s) Nebulizer every 6 hours PRN  ALPRAZolam 0.125 milliGRAM(s) Oral every 12 hours PRN  guaiFENesin    Syrup 100 milliGRAM(s) Oral every 6 hours PRN  lactulose Syrup 10 Gram(s) Oral every 6 hours PRN    VITALS:   T(F): 96.7  HR: 106  BP: 163/70  RR: 18  SpO2: 91%    PHYSICAL EXAM:  General: No acute distress.  Alert, oriented, interactive, nonfocal.    HEENT: Pupils equal, reactive to light symmetrically.    PULM: decreased sounds Right side, no significant sputum production.       CVS: Regular rate and rhythm, no murmurs, rubs, or gallops.    ABD: Soft, nondistended, nontender, no masses.    EXT: No edema, nontender.    LABS:                        9.0    11.85 )-----------( 258      ( 15 Feb 2019 12:30 )             28.5       Mg     1.9     02-14

## 2019-02-15 NOTE — PROGRESS NOTE ADULT - SUBJECTIVE AND OBJECTIVE BOX
DANELILA  85y  Female      SUBJECTIVE:  appears comfortable on oxygen supplement  family at the bedside    PAST MEDICAL & SURGICAL HISTORY:  PVD (peripheral vascular disease)  COPD (chronic obstructive pulmonary disease)  Kidney disease, chronic, stage I (GFR over 89 ml/min)  Peripheral vascular disease  HTN (hypertension)  High cholesterol  CVA (cerebral vascular accident)  H/O aorto-femoral bypass  History of right common carotid artery stent placement    85y    REVIEW OF SYSTEMS:    T(C): 35.6 (02-15-19 @ 13:47), Max: 36.4 (02-14-19 @ 20:00)  HR: 121 (02-15-19 @ 13:47) (89 - 121)  BP: 122/67 (02-15-19 @ 13:47) (90/50 - 163/70)  RR: 18 (02-15-19 @ 13:47) (18 - 18)  SpO2: 92% (02-15-19 @ 14:00) (89% - 97%)  Wt(kg): --Vital Signs Last 24 Hrs  T(C): 35.6 (15 Feb 2019 13:47), Max: 36.4 (14 Feb 2019 20:00)  T(F): 96.1 (15 Feb 2019 13:47), Max: 97.6 (14 Feb 2019 20:00)  HR: 121 (15 Feb 2019 13:47) (89 - 121)  BP: 122/67 (15 Feb 2019 13:47) (90/50 - 163/70)  BP(mean): --  RR: 18 (15 Feb 2019 13:47) (18 - 18)  SpO2: 92% (15 Feb 2019 14:00) (89% - 97%)    MEDICATION:  acetaminophen   Tablet .. 650 milliGRAM(s) Oral every 6 hours PRN  ALBUTerol    90 MICROgram(s) HFA Inhaler 1 Puff(s) Inhalation every 4 hours  ALBUTerol/ipratropium for Nebulization 3 milliLiter(s) Nebulizer every 6 hours  ALBUTerol/ipratropium for Nebulization 3 milliLiter(s) Nebulizer every 6 hours PRN  ALPRAZolam 0.125 milliGRAM(s) Oral every 12 hours PRN  aspirin enteric coated 81 milliGRAM(s) Oral <User Schedule>  atorvastatin 10 milliGRAM(s) Oral at bedtime  buDESOnide 160 MICROgram(s)/formoterol 4.5 MICROgram(s) Inhaler 2 Puff(s) Inhalation two times a day  chlorhexidine 4% Liquid 1 Application(s) Topical <User Schedule>  cilostazol 100 milliGRAM(s) Oral two times a day  clopidogrel Tablet 75 milliGRAM(s) Oral daily  docusate sodium 100 milliGRAM(s) Oral two times a day  guaiFENesin    Syrup 100 milliGRAM(s) Oral every 6 hours PRN  heparin  Injectable 5000 Unit(s) SubCutaneous every 8 hours  lactulose Syrup 10 Gram(s) Oral every 6 hours PRN  levETIRAcetam  IVPB 500 milliGRAM(s) IV Intermittent every 12 hours  levoFLOXacin IVPB 250 milliGRAM(s) IV Intermittent every 24 hours  linezolid  IVPB 600 milliGRAM(s) IV Intermittent every 12 hours  metoprolol tartrate 25 milliGRAM(s) Oral two times a day  nystatin Powder 1 Application(s) Topical two times a day  pantoprazole    Tablet 40 milliGRAM(s) Oral before breakfast  piperacillin/tazobactam IVPB. 3.375 Gram(s) IV Intermittent every 6 hours  predniSONE   Tablet 20 milliGRAM(s) Oral daily  senna 2 Tablet(s) Oral at bedtime  tiotropium 18 MICROgram(s) Capsule 1 Capsule(s) Inhalation daily      LABS:                       9.0    11.85 )-----------( 258      ( 15 Feb 2019 12:30 )             28.5       02-15    140  |  97<L>  |  23<H>  ----------------------------<  160<H>  3.6   |  25  |  1.2    Ca    8.4<L>      15 Feb 2019 12:30  Phos  3.0     02-15  Mg     1.9     02-15    RADIOLOGY:    < from: CT Angio Chest w/ IV Cont (02.12.19 @ 18:35) >  IMPRESSION:    1.   Focal consolidatedand groundglass right lower lobe and right middle   lobe opacity, compatible with pneumonia in the appropriate clinical   setting; follow-up to complete resolution is suggested.    2. Small bilateral pleural effusions, right greater than left.    3. Since April 2018, increased size 1.2 cm left upper lobe pulmonary   nodule.    < end of copied text >    PHYSICAL EXAM:  alert in NAD  HEART RSR S1S2 +  LUNGS - CLEAR  ABDOMEN - SOFT NONTENDER BS+    IMPRESSION:  ACUTE / CHRONIC HYPOXIC RESPIRATORY FAILURE  Rt LLL RT ML pneumonia GNR - coinfection with CORONA VIRUS - LEGIONELLA NEGATIVE	  COPD - oxygen dependent -   PULMONARY HYPERTENSION - SEVERE - rt sided heart failure  Rt posterior tibial vein thrombosis - on sc heparin for dvt prophylaxis  OLD CVA  CAROTID STENOSIS - S/P Rt common CAROTID STENT  PAD - S/P AORTO FEMORAL BYPASS  CKD STAGE3 BUN/CR STABLE  CHRONIC ANEMIA H/H STABLE  HTN  DLD  LEUKOCYTOSIS SEC.TO INFECTION AND STEROIDS   BLADDER MASS  ANXIETY    PLAN  CONTINUE HIGH FLOW O2  CONTINUE  ANTIBIOTICS AS PER ID  TAPER prednisone 20 mg daily starting tomorrow  continue INHALERS  symbicort and spiriva  CONTINUE xanax 0.125 q 12hours prn  PALLIATIVE CONSULT NOTED  PROGNOSIS GRIM FAMILY AWARE

## 2019-02-15 NOTE — PROGRESS NOTE ADULT - SUBJECTIVE AND OBJECTIVE BOX
[place] [facility] FOLLOW UP NOTE  --------------------------------------------------------------------------------  24 hour events/subjective: Patient examined. Appears comfortable.    PAST HISTORY  --------------------------------------------------------------------------------  No significant changes to PMH, PSH, FHx, SHx, unless otherwise noted    ALLERGIES & MEDICATIONS  --------------------------------------------------------------------------------  Allergies    No Known Allergies    Standing Inpatient Medications  ALBUTerol    90 MICROgram(s) HFA Inhaler 1 Puff(s) Inhalation every 4 hours  ALBUTerol/ipratropium for Nebulization 3 milliLiter(s) Nebulizer every 6 hours  aspirin enteric coated 81 milliGRAM(s) Oral <User Schedule>  atorvastatin 10 milliGRAM(s) Oral at bedtime  buDESOnide 160 MICROgram(s)/formoterol 4.5 MICROgram(s) Inhaler 2 Puff(s) Inhalation two times a day  chlorhexidine 4% Liquid 1 Application(s) Topical <User Schedule>  cilostazol 100 milliGRAM(s) Oral two times a day  clopidogrel Tablet 75 milliGRAM(s) Oral daily  docusate sodium 100 milliGRAM(s) Oral two times a day  heparin  Injectable 5000 Unit(s) SubCutaneous every 8 hours  levETIRAcetam  IVPB 500 milliGRAM(s) IV Intermittent every 12 hours  levoFLOXacin IVPB 250 milliGRAM(s) IV Intermittent every 24 hours  linezolid  IVPB 600 milliGRAM(s) IV Intermittent every 12 hours  metoprolol tartrate 25 milliGRAM(s) Oral two times a day  nystatin Powder 1 Application(s) Topical two times a day  pantoprazole    Tablet 40 milliGRAM(s) Oral before breakfast  piperacillin/tazobactam IVPB. 3.375 Gram(s) IV Intermittent every 6 hours  predniSONE   Tablet 40 milliGRAM(s) Oral daily  senna 2 Tablet(s) Oral at bedtime  tiotropium 18 MICROgram(s) Capsule 1 Capsule(s) Inhalation daily    PRN Inpatient Medications  acetaminophen   Tablet .. 650 milliGRAM(s) Oral every 6 hours PRN  ALBUTerol/ipratropium for Nebulization 3 milliLiter(s) Nebulizer every 6 hours PRN  ALPRAZolam 0.125 milliGRAM(s) Oral every 12 hours PRN  guaiFENesin    Syrup 100 milliGRAM(s) Oral every 6 hours PRN  lactulose Syrup 10 Gram(s) Oral every 6 hours PRN      VITALS/PHYSICAL EXAM  --------------------------------------------------------------------------------  T(C): 35.9 (02-15-19 @ 05:30), Max: 36.5 (02-14-19 @ 14:54)  HR: 106 (02-15-19 @ 05:30) (88 - 118)  BP: 163/70 (02-15-19 @ 05:30) (90/50 - 163/70)  RR: 18 (02-15-19 @ 05:30) (18 - 18)  SpO2: 91% (02-15-19 @ 08:14) (89% - 97%)  Wt(kg): --        Physical Exam:  	Gen: NAD  	Pulm: R rales  	CV: RRR, S1S2  	Abd: +BS, soft, nontender/nondistended  	: No suprapubic tenderness  	LE: Warm,no edema    LABS/STUDIES  --------------------------------------------------------------------------------              8.8    12.77 >-----------<  246      [02-14-19 @ 07:33]              28.5           Mg     1.9     [02-14-19 @ 07:33]    Creatinine Trend:  SCr 1.0 [02-13 @ 07:40]  SCr 1.1 [02-12 @ 07:17]  SCr 1.2 [02-11 @ 07:03]  SCr 1.2 [02-10 @ 07:33]  SCr 1.7 [02-09 @ 07:02]    Urinalysis - [01-24-19 @ 04:30]      Color Red / Appearance Turbid / SG >=1.030 / pH 5.0      Gluc 100 / Ketone 40  / Bili Large / Urobili 1.0       Blood Large / Protein >=300 / Leuk Est Moderate / Nitrite Positive      RBC >50 / WBC  / Hyaline  / Gran 3-5 / Sq Epi  / Non Sq Epi  / Bacteria Moderate      HbA1c 5.7      [01-11-19 @ 07:14]  Lipid: chol 171, TG 43, HDL 90, LDL 78      [01-11-19 @ 07:14]

## 2019-02-15 NOTE — PROGRESS NOTE ADULT - SUBJECTIVE AND OBJECTIVE BOX
LILA VELA  85y, Female      OVERNIGHT EVENTS:  afebrile  wbc 12    ROS negative except as per above    VITALS:  T(F): 96.7, Max: 97.7 (02-14-19 @ 14:54)  HR: 106  BP: 163/70  RR: 18Vital Signs Last 24 Hrs  T(C): 35.9 (15 Feb 2019 05:30), Max: 36.5 (14 Feb 2019 14:54)  T(F): 96.7 (15 Feb 2019 05:30), Max: 97.7 (14 Feb 2019 14:54)  HR: 106 (15 Feb 2019 05:30) (88 - 118)  BP: 163/70 (15 Feb 2019 05:30) (90/50 - 163/70)  BP(mean): --  RR: 18 (15 Feb 2019 05:30) (18 - 18)  SpO2: 91% (15 Feb 2019 08:14) (89% - 97%)    PHYSICAL EXAM:  Gen: Elderly F on NRB more alert today  HEENT: NCAT. EOMI. MMM.   Neck: Supple  CV: RRR  Lungs: poor air entry  Abd: Soft. NTND  Extr: wwp, no edema  Skin: no rash  Neuro: No focal deficits  Lines: clean    TESTS & MEASUREMENTS:                        8.8    12.77 )-----------( 246      ( 14 Feb 2019 07:33 )             28.5       Mg     1.9     02-14              RADIOLOGY & ADDITIONAL TESTS:    ANTIBIOTICS:  cefepime   IVPB   100 mL/Hr IV Intermittent (01-21-19 @ 12:28)    levoFLOXacin  Tablet   250 milliGRAM(s) Oral (02-10-19 @ 18:31)    levoFLOXacin  Tablet   750 milliGRAM(s) Oral (01-22-19 @ 06:02)    levoFLOXacin  Tablet   250 milliGRAM(s) Oral (02-02-19 @ 18:35)   250 milliGRAM(s) Oral (02-01-19 @ 18:22)   250 milliGRAM(s) Oral (01-31-19 @ 18:15)   250 milliGRAM(s) Oral (01-30-19 @ 18:49)    levoFLOXacin IVPB   100 mL/Hr IV Intermittent (01-21-19 @ 12:29)    levoFLOXacin IVPB   50 mL/Hr IV Intermittent (02-14-19 @ 18:12)   50 mL/Hr IV Intermittent (02-13-19 @ 17:54)   50 mL/Hr IV Intermittent (02-12-19 @ 19:11)   50 mL/Hr IV Intermittent (02-11-19 @ 18:18)    linezolid  IVPB   300 mL/Hr IV Intermittent (02-15-19 @ 05:17)   300 mL/Hr IV Intermittent (02-14-19 @ 18:11)   300 mL/Hr IV Intermittent (02-14-19 @ 05:06)   300 mL/Hr IV Intermittent (02-13-19 @ 17:43)   300 mL/Hr IV Intermittent (02-13-19 @ 06:29)   300 mL/Hr IV Intermittent (02-12-19 @ 22:52)   300 mL/Hr IV Intermittent (02-12-19 @ 09:11)   300 mL/Hr IV Intermittent (02-11-19 @ 21:42)    meropenem  IVPB   100 mL/Hr IV Intermittent (01-29-19 @ 06:13)   100 mL/Hr IV Intermittent (01-28-19 @ 21:13)   100 mL/Hr IV Intermittent (01-28-19 @ 15:06)   100 mL/Hr IV Intermittent (01-28-19 @ 06:11)   100 mL/Hr IV Intermittent (01-27-19 @ 21:45)   100 mL/Hr IV Intermittent (01-27-19 @ 16:56)   100 mL/Hr IV Intermittent (01-27-19 @ 05:20)   100 mL/Hr IV Intermittent (01-26-19 @ 21:34)   100 mL/Hr IV Intermittent (01-26-19 @ 14:05)   100 mL/Hr IV Intermittent (01-26-19 @ 05:47)   100 mL/Hr IV Intermittent (01-25-19 @ 21:38)   100 mL/Hr IV Intermittent (01-25-19 @ 14:42)   100 mL/Hr IV Intermittent (01-25-19 @ 06:40)   100 mL/Hr IV Intermittent (01-24-19 @ 21:29)   100 mL/Hr IV Intermittent (01-24-19 @ 14:27)   100 mL/Hr IV Intermittent (01-24-19 @ 05:18)   100 mL/Hr IV Intermittent (01-23-19 @ 22:04)   100 mL/Hr IV Intermittent (01-23-19 @ 14:32)    meropenem  IVPB   100 mL/Hr IV Intermittent (01-23-19 @ 05:04)   100 mL/Hr IV Intermittent (01-22-19 @ 17:32)   100 mL/Hr IV Intermittent (01-22-19 @ 05:16)   100 mL/Hr IV Intermittent (01-21-19 @ 18:32)    meropenem  IVPB   100 mL/Hr IV Intermittent (02-07-19 @ 16:20)    meropenem  IVPB   100 mL/Hr IV Intermittent (02-10-19 @ 13:42)   100 mL/Hr IV Intermittent (02-10-19 @ 05:07)   100 mL/Hr IV Intermittent (02-09-19 @ 21:13)   100 mL/Hr IV Intermittent (02-09-19 @ 13:55)   100 mL/Hr IV Intermittent (02-09-19 @ 06:33)   100 mL/Hr IV Intermittent (02-08-19 @ 21:12)   100 mL/Hr IV Intermittent (02-08-19 @ 13:43)   100 mL/Hr IV Intermittent (02-08-19 @ 05:56)   100 mL/Hr IV Intermittent (02-07-19 @ 21:31)   100 mL/Hr IV Intermittent (02-07-19 @ 16:38)    piperacillin/tazobactam IVPB.   25 mL/Hr IV Intermittent (02-15-19 @ 05:17)   25 mL/Hr IV Intermittent (02-15-19 @ 00:11)   25 mL/Hr IV Intermittent (02-14-19 @ 18:12)   25 mL/Hr IV Intermittent (02-14-19 @ 11:28)   25 mL/Hr IV Intermittent (02-14-19 @ 05:06)   25 mL/Hr IV Intermittent (02-13-19 @ 23:35)   25 mL/Hr IV Intermittent (02-13-19 @ 17:45)   25 mL/Hr IV Intermittent (02-13-19 @ 11:05)   25 mL/Hr IV Intermittent (02-13-19 @ 06:43)   25 mL/Hr IV Intermittent (02-13-19 @ 00:10)   25 mL/Hr IV Intermittent (02-12-19 @ 18:48)   25 mL/Hr IV Intermittent (02-12-19 @ 13:25)   25 mL/Hr IV Intermittent (02-12-19 @ 07:13)   25 mL/Hr IV Intermittent (02-12-19 @ 01:21)    vancomycin  IVPB   250 mL/Hr IV Intermittent (01-22-19 @ 17:24)   250 mL/Hr IV Intermittent (01-21-19 @ 18:31)    vancomycin  IVPB   250 mL/Hr IV Intermittent (02-07-19 @ 16:37)    vancomycin  IVPB   250 mL/Hr IV Intermittent (02-10-19 @ 05:07)   250 mL/Hr IV Intermittent (02-09-19 @ 17:21)   250 mL/Hr IV Intermittent (02-09-19 @ 07:20)   250 mL/Hr IV Intermittent (02-08-19 @ 18:02)   250 mL/Hr IV Intermittent (02-08-19 @ 05:56)        levoFLOXacin IVPB 250 milliGRAM(s) IV Intermittent every 24 hours  linezolid  IVPB 600 milliGRAM(s) IV Intermittent every 12 hours  piperacillin/tazobactam IVPB. 3.375 Gram(s) IV Intermittent every 6 hours

## 2019-02-15 NOTE — PROGRESS NOTE ADULT - SUBJECTIVE AND OBJECTIVE BOX
LILA VELA  85y, Female  Allergy: No Known Allergies      LAST 24-Hr EVENTS:  breathing and cough are better but feeling anxious  VITALS:  T(F): 96.1 (02-15-19 @ 13:47), Max: 97.6 (02-14-19 @ 20:00)  HR: 121 (02-15-19 @ 13:47)  BP: 122/67 (02-15-19 @ 13:47) (90/50 - 163/70)  RR: 18 (02-15-19 @ 13:47)  SpO2: 92% (02-15-19 @ 14:00)on high flow O2 via nasal canula    PHYSICAL EXAM:    GENERAL: NAD, well-developed  HEAD:  Atraumatic, Normocephalic  NECK: Supple, No JVD  CHEST/LUNG: Clear to auscultation bilaterally; No wheeze  HEART: Regular rate and rhythm; No murmurs, rubs, or gallops  ABDOMEN: Soft, Nontender, Nondistended; Bowel sounds present  EXTREMITIES:  2+ Peripheral Pulses, No clubbing, cyanosis, or edema        TESTS & MEASUREMENTS:                          9.0    11.85 )-----------( 258      ( 15 Feb 2019 12:30 )             28.5       02-15    140  |  97<L>  |  23<H>  ----------------------------<  160<H>  3.6   |  25  |  1.2    Ca    8.4<L>      15 Feb 2019 12:30  Phos  3.0     02-15  Mg     1.9     02-15                  ABG & VENT SETTINGS: (when applicable)    n/a    RADIOLOGY & ADDITIONAL TESTS:    MEDICATIONS:  MEDICATIONS  (STANDING):  ALBUTerol    90 MICROgram(s) HFA Inhaler 1 Puff(s) Inhalation every 4 hours  ALBUTerol/ipratropium for Nebulization 3 milliLiter(s) Nebulizer every 6 hours  aspirin enteric coated 81 milliGRAM(s) Oral <User Schedule>  atorvastatin 10 milliGRAM(s) Oral at bedtime  buDESOnide 160 MICROgram(s)/formoterol 4.5 MICROgram(s) Inhaler 2 Puff(s) Inhalation two times a day  chlorhexidine 4% Liquid 1 Application(s) Topical <User Schedule>  cilostazol 100 milliGRAM(s) Oral two times a day  clopidogrel Tablet 75 milliGRAM(s) Oral daily  docusate sodium 100 milliGRAM(s) Oral two times a day  heparin  Injectable 5000 Unit(s) SubCutaneous every 8 hours  levETIRAcetam  IVPB 500 milliGRAM(s) IV Intermittent every 12 hours  levoFLOXacin IVPB 250 milliGRAM(s) IV Intermittent every 24 hours  linezolid  IVPB 600 milliGRAM(s) IV Intermittent every 12 hours  metoprolol tartrate 25 milliGRAM(s) Oral two times a day  nystatin Powder 1 Application(s) Topical two times a day  pantoprazole    Tablet 40 milliGRAM(s) Oral before breakfast  piperacillin/tazobactam IVPB. 3.375 Gram(s) IV Intermittent every 6 hours  predniSONE   Tablet 20 milliGRAM(s) Oral daily  senna 2 Tablet(s) Oral at bedtime  tiotropium 18 MICROgram(s) Capsule 1 Capsule(s) Inhalation daily    MEDICATIONS  (PRN):  acetaminophen   Tablet .. 650 milliGRAM(s) Oral every 6 hours PRN Temp greater or equal to 38C (100.4F), Moderate Pain (4 - 6)  ALBUTerol/ipratropium for Nebulization 3 milliLiter(s) Nebulizer every 6 hours PRN Shortness of Breath and/or Wheezing  ALPRAZolam 0.125 milliGRAM(s) Oral every 12 hours PRN anxiety  guaiFENesin    Syrup 100 milliGRAM(s) Oral every 6 hours PRN Cough  lactulose Syrup 10 Gram(s) Oral every 6 hours PRN constipation

## 2019-02-15 NOTE — PROGRESS NOTE ADULT - ASSESSMENT
85F    COPD   Peripheral vascular disease  HTN  High cholesterol  CVA   H/O aorto-femoral bypass  History of right common carotid artery stent placement    Admitted 1/21 + coronavirus   Blood and urine cx NGTD, legionella neg  CT 1.2 cm right hepatic lobe lesion with arterial enhancement,. 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  Focal consolidated and groundglass right lower lobe and right middle lobe opacity, compatible with pneumonia in the appropriate clinical setting. MARINA nodule, b/l pleural effusions    Olga Lidia 1/21-1/29, 2/7-2/10, zosyn 2/12-, levo 2/10-    - Pulm following  - on steroids  - continue abx, on linezolid (2/11-), zosyn, levofloxacin plan for total 7 day course (end 2/18)  - poor prognosis

## 2019-02-15 NOTE — PROGRESS NOTE ADULT - ASSESSMENT
86 yo F, CCU Downgrade, PMHx COPD on 2 L NC, PVD, CKD3, HTN, DLD, hx of CVA, ex-smoker, recently discharged from [facility] on 01/13 after PNA tx w/Levaquin and Prednisone, presented/w SOB and dysuria. She was doing well until 01/20 when she starting feeling SOB again.     Pt was admitted to CCU for acute on chronic hypoxic and chronic hypercapnic respiratory failure 2/2 COPD exacerbation likely due to RLL GNR PNA exacerbated by coronavirus coinfection. She was on BiPaP prn, solumedrol, bronchodilators and Meropenem. She was treated for distal DVT and subsequently developed hematuria. Further w/u was positive for moderate ascites (2D echo), +UA (negative urine and blood cultures). MRSA and Flu were negative.       Today Events:,  c/w High flow, Day 6 off Lasix , c/w medical management, starting prednisone 20mg day 1, Pulm following,   ID following c/w Levo IV, Unasyn IV and Zyvox IV for 7 days ending 02/18 ,  palliative following     IMPRESSION   RLL PNA  vs other Unknown Etiology not excluding CA  COPD exacerbation       Plan  # Acute on chronic hypoxic and chronic hypercapnic respiratory failure exacerbation etiology Unknown    - maintain  88-92 o2sat   -  begin taper 20mg  3 day, then  10mg    - pulm following   - ID following   - c/w   Levo IV, Unasyn IV and Zyvox IV. ending on  02/18  - albuterol Neb patient    -  CT chest with con showing RLL consolidation - no invasive procedures   - c/w High flow O2  - switching Diet to puree and nectar   - palliative following       # HFpEF:   ECHO 1/22- EF 55-60%, severe pulm HTN, decreased RV systolic fx, RV vol and pressure overload,   RV enlarged, severe mitral annular calcification    - Metoprolol    # Right Tibial vein DVT: Stable  Duplex LE- Right post tibial DVT  No need for therapeutic AC as per Dr Dunham- will repeat Duplex unremarkable    - ppx heparin     # Hematuria: Resolved  Cont prophylactic heparin   HGB stable  CT Urogram: 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  1.2 cm right hepatic lobe lesion with arterial enhancement, incompletely characterized but could be a benign lesion. Consider nonemergent MRI of the abdomen with contrast if clinically indicated.  - OP f/u urology    - Hematuria overnight       # Transaminitis: waxing and waning  RUQ US: negative    # Constipation:   Cont. Senna, Colace  lactulose PRN         GI ppx:                                   [] Not indicated   /   [x] Pantoprazole 40mg PO Daily    DVT ppx:  [] Not indicated / [x] Heparin 5000mg SubQ / [] Lovenox 40mg SubQ / [] SCDs    Fluids:   [x] PO  |  [x] IVF one L only     Activity:  [X] Assisatnce needed   [X] Increase as Tolerated  /  [] OOB w/ assist  /  [] Bed Rest      DISPO:   attempting NH placement after optimization   [ ] From Home     [ x] NH/SNF   [ ] 4A Rehab  [ ] Detox Clinic  [X] Plan Discussion with patient and/or family.  [X] Discussed Case and Plan with the Medical Attending.    CODE STATUS  [] FULL   /    [X] DNR/DNI

## 2019-02-16 NOTE — PROGRESS NOTE ADULT - ASSESSMENT
84 yo F, CCU Downgrade, PMHx COPD on 2 L NC, PVD, CKD3, HTN, DLD, hx of CVA, ex-smoker, recently discharged from [facility] on 01/13 after PNA tx w/Levaquin and Prednisone, presented/w SOB and dysuria. She was doing well until 01/20 when she starting feeling SOB again.     Pt was admitted to CCU for acute on chronic hypoxic and chronic hypercapnic respiratory failure 2/2 COPD exacerbation likely due to RLL GNR PNA exacerbated by coronavirus coinfection. She was on BiPaP prn, solumedrol, bronchodilators and Meropenem. She was treated for distal DVT and subsequently developed hematuria. Further w/u was positive for moderate ascites (2D echo), +UA (negative urine and blood cultures). MRSA and Flu were negative.       Today Events:, c/w medical management, PO mag and K     IMPRESSION   RLL PNA  vs other Unknown Etiology not excluding CA  COPD exacerbation       Plan  # Acute on chronic hypoxic and chronic hypercapnic respiratory failure exacerbation etiology Unknown    - maintain  88-92 o2sat   -  begin taper 20mg  3 day, then  10mg    - pulm following   - ID following   - c/w   Levo IV, Unasyn IV and Zyvox IV. ending on  02/18  - albuterol Neb patient    -  CT chest with con showing RLL consolidation - no invasive procedures   - c/w High flow O2  - switching Diet to puree and nectar   - palliative following       # HFpEF:   ECHO 1/22- EF 55-60%, severe pulm HTN, decreased RV systolic fx, RV vol and pressure overload,   RV enlarged, severe mitral annular calcification    - Metoprolol    # Right Tibial vein DVT: Stable  Duplex LE- Right post tibial DVT  No need for therapeutic AC as per Dr Dunham- will repeat Duplex unremarkable    - ppx heparin     # Hematuria: Resolved  Cont prophylactic heparin   HGB stable  CT Urogram: 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  1.2 cm right hepatic lobe lesion with arterial enhancement, incompletely characterized but could be a benign lesion. Consider nonemergent MRI of the abdomen with contrast if clinically indicated.  - OP f/u urology    - Hematuria overnight       # Transaminitis: waxing and waning  RUQ US: negative    # Constipation:   Cont. Senna, Colace  lactulose PRN     Electrolyte Imbalances: Correct as needed  []  Hyponatremia   /   Hypernatremia  []   [x]  Hypokalemia   /   Hyperkalemia  []   []  Hypochlorhydria   /    Hypochlorhydria  []   [x]  Hypomagnesemia   /   Hypermagnesemia  []   []  Hypophosphatemia   /   Hyperphosphatemia  []       GI ppx:                                   [] Not indicated   /   [x] Pantoprazole 40mg PO Daily    DVT ppx:  [] Not indicated / [x] Heparin 5000mg SubQ / [] Lovenox 40mg SubQ / [] SCDs    Fluids:   [x] PO  |  [x] IVF one L only     Activity:  [X] Assisatnce needed   [X] Increase as Tolerated  /  [x] OOB w/ assist  /  [] Bed Rest      DISPO:   attempting NH placement after optimization   [ ] From Home     [ x] NH/SNF   [ ] 4A Rehab  [ ] Detox Clinic  [X] Plan Discussion with patient and/or family.  [X] Discussed Case and Plan with the Medical Attending.    CODE STATUS  [] FULL   /    [X] DNR/DNI

## 2019-02-16 NOTE — PROGRESS NOTE ADULT - SUBJECTIVE AND OBJECTIVE BOX
LENGTH OF HOSPITAL STAY: 26d      CHIEF COMPLAINT:   Patient is a 85y old  Female who presents with a chief complaint of sob (16 Feb 2019 07:10)      OVER Past 24hrs:  The patient was seen and examined at bedside there were no acute events during the night. Patient status unchanged. Still experiencing sob , anxiety.         PAST MEDICAL & SURGICAL HISTORY  PAST MEDICAL & SURGICAL HISTORY:  PVD (peripheral vascular disease)  COPD (chronic obstructive pulmonary disease)  Kidney disease, chronic, stage I (GFR over 89 ml/min)  Peripheral vascular disease  HTN (hypertension)  High cholesterol  CVA (cerebral vascular accident)  H/O aorto-femoral bypass  History of right common carotid artery stent placement      REVIEW OF SYSTEMS  CONSTITUTIONAL: No fever,  RESPIRATORY: +cough, no wheezing chills or hemoptysis; +shortness of breath  CARDIOVASCULAR: No chest pain, palpitations, dizziness, or leg swelling        ALLERGIES:  No Known Allergies    MEDICATIONS:  STANDING MEDICATIONS  ALBUTerol    90 MICROgram(s) HFA Inhaler 1 Puff(s) Inhalation every 4 hours  ALBUTerol/ipratropium for Nebulization 3 milliLiter(s) Nebulizer every 6 hours  aspirin enteric coated 81 milliGRAM(s) Oral <User Schedule>  atorvastatin 10 milliGRAM(s) Oral at bedtime  buDESOnide 160 MICROgram(s)/formoterol 4.5 MICROgram(s) Inhaler 2 Puff(s) Inhalation two times a day  chlorhexidine 4% Liquid 1 Application(s) Topical <User Schedule>  cilostazol 100 milliGRAM(s) Oral two times a day  clopidogrel Tablet 75 milliGRAM(s) Oral daily  docusate sodium 100 milliGRAM(s) Oral two times a day  heparin  Injectable 5000 Unit(s) SubCutaneous every 8 hours  levETIRAcetam  IVPB 500 milliGRAM(s) IV Intermittent every 12 hours  levoFLOXacin IVPB 250 milliGRAM(s) IV Intermittent every 24 hours  linezolid  IVPB 600 milliGRAM(s) IV Intermittent every 12 hours  magnesium oxide 400 milliGRAM(s) Oral two times a day with meals  metoprolol tartrate 25 milliGRAM(s) Oral two times a day  nystatin Powder 1 Application(s) Topical two times a day  pantoprazole    Tablet 40 milliGRAM(s) Oral before breakfast  piperacillin/tazobactam IVPB. 3.375 Gram(s) IV Intermittent every 6 hours  potassium chloride    Tablet ER 20 milliEquivalent(s) Oral every 2 hours  predniSONE   Tablet 20 milliGRAM(s) Oral daily  senna 2 Tablet(s) Oral at bedtime  tiotropium 18 MICROgram(s) Capsule 1 Capsule(s) Inhalation daily    PRN MEDICATIONS  acetaminophen   Tablet .. 650 milliGRAM(s) Oral every 6 hours PRN  ALBUTerol/ipratropium for Nebulization 3 milliLiter(s) Nebulizer every 6 hours PRN  ALPRAZolam 0.125 milliGRAM(s) Oral every 12 hours PRN  guaiFENesin    Syrup 100 milliGRAM(s) Oral every 6 hours PRN  lactulose Syrup 10 Gram(s) Oral every 6 hours PRN    VITALS:   T(F): 97.2  HR: 92  BP: 102/62  RR: 18  SpO2: 91%    PHYSICAL EXAM:  General: No acute distress.  Alert, oriented, interactive, nonfocal.    HEENT: Pupils equal, reactive to light symmetrically.    PULM: clear to ascultation decreased air movement  , no significant sputum production.       CVS: Regular rate and rhythm, no murmurs, rubs, or gallops.    ABD: Soft, nondistended, nontender, no masses.    EXT: No edema, nontender.    LABS:                        8.5    11.10 )-----------( 268      ( 16 Feb 2019 06:45 )             26.3     02-16    138  |  96<L>  |  25<H>  ----------------------------<  245<H>  3.1<L>   |  27  |  1.4    Ca    8.2<L>      16 Feb 2019 06:45  Phos  3.3     02-16  Mg     1.7     02-16

## 2019-02-16 NOTE — PROGRESS NOTE ADULT - ASSESSMENT
85F    COPD   Peripheral vascular disease  HTN  High cholesterol  CVA   H/O aorto-femoral bypass  History of right common carotid artery stent placement    Admitted 1/21 + coronavirus   Blood and urine cx NGTD, legionella neg  CT 1.2 cm right hepatic lobe lesion with arterial enhancement,. 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  Focal consolidated and groundglass right lower lobe and right middle lobe opacity, compatible with pneumonia in the appropriate clinical setting. MARINA nodule, b/l pleural effusions    Olga Lidia 1/21-1/29, 2/7-2/10, zosyn 2/12-, levo 2/10-    - on steroids  - continue abx, on linezolid (2/11-), zosyn, levofloxacin plan for total 7 day course (end 2/18)  - poor prognosis

## 2019-02-16 NOTE — PROGRESS NOTE ADULT - ASSESSMENT
CKD 3  hypokalemia  hypomagnesemia  bladder mass  gross hematuria  resp failure  right heart failure  PNA  old CVA  PVD / LE bypass / right carotid stent      Plan:    KCl PO repletion  Mg++Ox PO repletion  off diuretics  ABX per ID  BIPAP / O2  oupt  f/u  DNR / DNI

## 2019-02-16 NOTE — PROGRESS NOTE ADULT - SUBJECTIVE AND OBJECTIVE BOX
[place] NEPHROLOGY FOLLOW UP NOTE  --------------------------------------------------------------------------------    pt seen and examined  d/w family at bedside and resident  pt currently on BIPAP      PAST HISTORY  --------------------------------------------------------------------------------  No significant changes to PMH, PSH, FHx, SHx, unless otherwise noted    ALLERGIES & MEDICATIONS  --------------------------------------------------------------------------------  Allergies    No Known Allergies    advance care planning and advance care directives reviewed and discussed    Physical Exam:  	  + bipap  awake and alert  b/l rales  rrr  soft, nt  no edema  +ecchymosis    Hospital Medications:   MEDICATIONS  (STANDING):  ALBUTerol    90 MICROgram(s) HFA Inhaler 1 Puff(s) Inhalation every 4 hours  ALBUTerol/ipratropium for Nebulization 3 milliLiter(s) Nebulizer every 6 hours  aspirin enteric coated 81 milliGRAM(s) Oral <User Schedule>  atorvastatin 10 milliGRAM(s) Oral at bedtime  buDESOnide 160 MICROgram(s)/formoterol 4.5 MICROgram(s) Inhaler 2 Puff(s) Inhalation two times a day  chlorhexidine 4% Liquid 1 Application(s) Topical <User Schedule>  cilostazol 100 milliGRAM(s) Oral two times a day  clopidogrel Tablet 75 milliGRAM(s) Oral daily  docusate sodium 100 milliGRAM(s) Oral two times a day  heparin  Injectable 5000 Unit(s) SubCutaneous every 8 hours  levETIRAcetam  IVPB 500 milliGRAM(s) IV Intermittent every 12 hours  levoFLOXacin IVPB 250 milliGRAM(s) IV Intermittent every 24 hours  linezolid  IVPB 600 milliGRAM(s) IV Intermittent every 12 hours  magnesium oxide 400 milliGRAM(s) Oral two times a day with meals  metoprolol tartrate 25 milliGRAM(s) Oral two times a day  nystatin Powder 1 Application(s) Topical two times a day  pantoprazole    Tablet 40 milliGRAM(s) Oral before breakfast  piperacillin/tazobactam IVPB. 3.375 Gram(s) IV Intermittent every 6 hours  predniSONE   Tablet 20 milliGRAM(s) Oral daily  senna 2 Tablet(s) Oral at bedtime  tiotropium 18 MICROgram(s) Capsule 1 Capsule(s) Inhalation daily        VITALS:  T(F): 96.7 (02-16-19 @ 14:27), Max: 97.2 (02-16-19 @ 04:37)  HR: 97 (02-16-19 @ 14:27)  BP: 93/56 (02-16-19 @ 14:27)  RR: 18 (02-16-19 @ 14:27)  SpO2: 91% (02-16-19 @ 08:22)  Wt(kg): --        LABS:  02-16    138  |  96<L>  |  25<H>  ----------------------------<  245<H>  3.1<L>   |  27  |  1.4    Ca    8.2<L>      16 Feb 2019 06:45  Phos  3.3     02-16  Mg     1.7     02-16                            8.5    11.10 )-----------( 268      ( 16 Feb 2019 06:45 )             26.3       Urine Studies:        RADIOLOGY & ADDITIONAL STUDIES:

## 2019-02-16 NOTE — PROGRESS NOTE ADULT - SUBJECTIVE AND OBJECTIVE BOX
02-16-19 @ 12:25    LILA VELA  85y  Female  Seen OOB to chair, more comfortable. Dtr. Nancy at bedside. Using BIPAP, maintaining PO2.     INTERVAL EVENTS: none    MEDICATIONS  (STANDING):  ALBUTerol    90 MICROgram(s) HFA Inhaler 1 Puff(s) Inhalation every 4 hours  ALBUTerol/ipratropium for Nebulization 3 milliLiter(s) Nebulizer every 6 hours  aspirin enteric coated 81 milliGRAM(s) Oral <User Schedule>  atorvastatin 10 milliGRAM(s) Oral at bedtime  buDESOnide 160 MICROgram(s)/formoterol 4.5 MICROgram(s) Inhaler 2 Puff(s) Inhalation two times a day  chlorhexidine 4% Liquid 1 Application(s) Topical <User Schedule>  cilostazol 100 milliGRAM(s) Oral two times a day  clopidogrel Tablet 75 milliGRAM(s) Oral daily  docusate sodium 100 milliGRAM(s) Oral two times a day  heparin  Injectable 5000 Unit(s) SubCutaneous every 8 hours  levETIRAcetam  IVPB 500 milliGRAM(s) IV Intermittent every 12 hours  levoFLOXacin IVPB 250 milliGRAM(s) IV Intermittent every 24 hours  linezolid  IVPB 600 milliGRAM(s) IV Intermittent every 12 hours  magnesium oxide 400 milliGRAM(s) Oral two times a day with meals  metoprolol tartrate 25 milliGRAM(s) Oral two times a day  nystatin Powder 1 Application(s) Topical two times a day  pantoprazole    Tablet 40 milliGRAM(s) Oral before breakfast  piperacillin/tazobactam IVPB. 3.375 Gram(s) IV Intermittent every 6 hours  potassium chloride    Tablet ER 20 milliEquivalent(s) Oral every 2 hours  predniSONE   Tablet 20 milliGRAM(s) Oral daily  senna 2 Tablet(s) Oral at bedtime  tiotropium 18 MICROgram(s) Capsule 1 Capsule(s) Inhalation daily    MEDICATIONS  (PRN):  acetaminophen   Tablet .. 650 milliGRAM(s) Oral every 6 hours PRN Temp greater or equal to 38C (100.4F), Moderate Pain (4 - 6)  ALBUTerol/ipratropium for Nebulization 3 milliLiter(s) Nebulizer every 6 hours PRN Shortness of Breath and/or Wheezing  ALPRAZolam 0.125 milliGRAM(s) Oral every 12 hours PRN anxiety  guaiFENesin    Syrup 100 milliGRAM(s) Oral every 6 hours PRN Cough  lactulose Syrup 10 Gram(s) Oral every 6 hours PRN constipation      T(C): 36.2 (02-16-19 @ 04:37), Max: 36.2 (02-16-19 @ 04:37)  HR: 92 (02-16-19 @ 04:37) (92 - 121)  BP: 102/62 (02-16-19 @ 04:37) (102/62 - 122/67)  RR: 18 (02-16-19 @ 04:37) (18 - 20)  SpO2: 91% (02-16-19 @ 08:22) (90% - 92%)  Wt(kg): --Vital Signs Last 24 Hrs  T(C): 36.2 (16 Feb 2019 04:37), Max: 36.2 (16 Feb 2019 04:37)  T(F): 97.2 (16 Feb 2019 04:37), Max: 97.2 (16 Feb 2019 04:37)  HR: 92 (16 Feb 2019 04:37) (92 - 121)  BP: 102/62 (16 Feb 2019 04:37) (102/62 - 122/67)  BP(mean): --  RR: 18 (16 Feb 2019 04:37) (18 - 20)  SpO2: 91% (16 Feb 2019 08:22) (90% - 92%)    PHYSICAL EXAM:  GENERAL: More alert. Not in ac distress.   NECK: supple. No JVD	  CHEST/LUNG: Coarse BS BL. Poor resp excursion.   HEART: S1S2, essentially regular  ABDOMEN: benign  EXTREMITIES: no CCE. Hematoma & ecchymoses around fore arms.                           8.5    11.10 )-----------( 268      ( 16 Feb 2019 06:45 )             26.3     02-16    138  |  96<L>  |  25<H>  ----------------------------<  245<H>  3.1<L>   |  27  |  1.4    Ca    8.2<L>      16 Feb 2019 06:45  Phos  3.3     02-16  Mg     1.7     02-16              RADIOLOGY & ADDITIONAL TESTS:      ASSESSMENT / PLAN  :    WORSENING SOB/HYPOXIA : Doing better. Prognosis continues to look better than few d ago, but still guarded. Has Pulm HTN also. On broad spectrum Abx. Seen by pulm for f/u. O2 supplement.  Discussed w/ son & daughter at bedside.   ACUTE HYPOXIC  RESPIRATORY FAILURE : Worsened condition. But doing better.    COPD : Cont inhaler, steroid.   PNEUMONIA ; Being f/u by ID & Pulm. Abx as per ID. Cult-s -ve so far.     BL PLEURAL EFFUSION : CT more c/w PNA, min effusion.  MARINA lesion : Also has multiple adenopathy. High risk for further invasive procedure.  Observe for now.   RIGHT TIBIAL VEIN THROMBOSIS ; Not an active issue now.   ANEMIA ; chronic, cont monitor. Also having hematoma from IV line attempts. Poor peripheral veins. Discussed w/ family.   RECENT HEMATURIA : was seen by Uro. as advised, CT done, showed ant bladder mass. OP cysto & further management, if & when stable.     CKD III : Stable. Avoid dehydration   Hypokalemia : Adjusted. Monitor.   Hx. OLD CVA ; stable.   Hx. AORTO-FEM BYPASS, RIGHT CAROTID STENT : not an issue now.   Hematoma R forearm. : to observe. sono PRN. As per dtr. improving.     Pt. encouraged to move limbs. trunk. Try to elevate from sitting, w/ family around.   Discussed & explained to children, son Mundo joined later.     Spent 30 min to care & coordination.

## 2019-02-16 NOTE — PROGRESS NOTE ADULT - SUBJECTIVE AND OBJECTIVE BOX
LILA VELA  85y, Female      OVERNIGHT EVENTS:    family at bedside  no cough, less SOB, no chest pain  no diarrhea    VITALS:  T(F): 97.2, Max: 97.2 (02-16-19 @ 04:37)  HR: 92  BP: 102/62  RR: 18Vital Signs Last 24 Hrs  T(C): 36.2 (16 Feb 2019 04:37), Max: 36.2 (16 Feb 2019 04:37)  T(F): 97.2 (16 Feb 2019 04:37), Max: 97.2 (16 Feb 2019 04:37)  HR: 92 (16 Feb 2019 04:37) (92 - 121)  BP: 102/62 (16 Feb 2019 04:37) (102/62 - 122/67)  BP(mean): --  RR: 18 (16 Feb 2019 04:37) (18 - 20)  SpO2: 90% (16 Feb 2019 01:31) (90% - 92%)    TESTS & MEASUREMENTS:                        9.0    11.85 )-----------( 258      ( 15 Feb 2019 12:30 )             28.5     02-15    140  |  97<L>  |  23<H>  ----------------------------<  160<H>  3.6   |  25  |  1.2    Ca    8.4<L>      15 Feb 2019 12:30  Phos  3.0     02-15  Mg     1.9     02-15                RADIOLOGY & ADDITIONAL TESTS:    ANTIBIOTICS:  levoFLOXacin IVPB 250 milliGRAM(s) IV Intermittent every 24 hours  linezolid  IVPB 600 milliGRAM(s) IV Intermittent every 12 hours  piperacillin/tazobactam IVPB. 3.375 Gram(s) IV Intermittent every 6 hours

## 2019-02-17 NOTE — PROGRESS NOTE ADULT - ASSESSMENT
CKD 3 - Cr creeping up again  hypokalemia - corrected  hypomagnesemia - corrected  bladder mass  gross hematuria  resp failure  right heart failure  PNA  old CVA  PVD / LE bypass / right carotid stent      Plan:    KCl PO repletion given  Mg++Ox PO repletion given  f/u bmp  off diuretics  ABX per ID  BIPAP / O2  oupt  f/u  DNR / DNI

## 2019-02-17 NOTE — PROGRESS NOTE ADULT - EXTREMITIES
No cyanosis, clubbing or edema
detailed exam

## 2019-02-17 NOTE — PROGRESS NOTE ADULT - SUBJECTIVE AND OBJECTIVE BOX
LILA VELA  85y  Female      SUBJECTIVE:  family at bedside awake alert offers no complaints cough + remains in O2 EATING POORLY    PAST MEDICAL & SURGICAL HISTORY:  PVD (peripheral vascular disease)  COPD (chronic obstructive pulmonary disease)  Kidney disease, chronic, stage I (GFR over 89 ml/min)  Peripheral vascular disease  HTN (hypertension)  High cholesterol  CVA (cerebral vascular accident)  H/O aorto-femoral bypass  History of right common carotid artery stent placement    85y    REVIEW OF SYSTEMS:    T(C): 36.6 (02-17-19 @ 04:00), Max: 36.6 (02-17-19 @ 04:00)  HR: 98 (02-17-19 @ 06:18) (95 - 113)  BP: 113/70 (02-17-19 @ 04:00) (77/52 - 115/69)  RR: 18 (02-17-19 @ 04:00) (18 - 20)  SpO2: 90% (02-17-19 @ 08:04) (90% - 92%)  Wt(kg): --Vital Signs Last 24 Hrs  T(C): 36.6 (17 Feb 2019 04:00), Max: 36.6 (17 Feb 2019 04:00)  T(F): 97.8 (17 Feb 2019 04:00), Max: 97.8 (17 Feb 2019 04:00)  HR: 98 (17 Feb 2019 06:18) (95 - 113)  BP: 113/70 (17 Feb 2019 04:00) (77/52 - 115/69)  BP(mean): --  RR: 18 (17 Feb 2019 04:00) (18 - 20)  SpO2: 90% (17 Feb 2019 08:04) (90% - 92%)    MEDICATION:  acetaminophen   Tablet .. 650 milliGRAM(s) Oral every 6 hours PRN  ALBUTerol    90 MICROgram(s) HFA Inhaler 1 Puff(s) Inhalation every 4 hours  ALBUTerol/ipratropium for Nebulization 3 milliLiter(s) Nebulizer every 6 hours  ALBUTerol/ipratropium for Nebulization 3 milliLiter(s) Nebulizer every 6 hours PRN  ALPRAZolam 0.125 milliGRAM(s) Oral every 12 hours PRN  aspirin enteric coated 81 milliGRAM(s) Oral <User Schedule>  atorvastatin 10 milliGRAM(s) Oral at bedtime  buDESOnide 160 MICROgram(s)/formoterol 4.5 MICROgram(s) Inhaler 2 Puff(s) Inhalation two times a day  chlorhexidine 4% Liquid 1 Application(s) Topical <User Schedule>  cilostazol 100 milliGRAM(s) Oral two times a day  clopidogrel Tablet 75 milliGRAM(s) Oral daily  docusate sodium 100 milliGRAM(s) Oral two times a day  guaiFENesin    Syrup 100 milliGRAM(s) Oral every 6 hours PRN  heparin  Injectable 5000 Unit(s) SubCutaneous every 8 hours  lactulose Syrup 10 Gram(s) Oral every 6 hours PRN  levETIRAcetam  IVPB 500 milliGRAM(s) IV Intermittent every 12 hours  levoFLOXacin IVPB 250 milliGRAM(s) IV Intermittent every 24 hours  linezolid  IVPB 600 milliGRAM(s) IV Intermittent every 12 hours  metoprolol tartrate 25 milliGRAM(s) Oral two times a day  nystatin Powder 1 Application(s) Topical two times a day  pantoprazole    Tablet 40 milliGRAM(s) Oral before breakfast  piperacillin/tazobactam IVPB. 3.375 Gram(s) IV Intermittent every 6 hours  predniSONE   Tablet 20 milliGRAM(s) Oral daily  senna 2 Tablet(s) Oral at bedtime  tiotropium 18 MICROgram(s) Capsule 1 Capsule(s) Inhalation daily      LABS:                      8.7    15.62 )-----------( 254      ( 17 Feb 2019 08:47 )             27.3     02-17    139  |  99  |  32<H>  ----------------------------<  169<H>  3.7   |  26  |  1.7<H>    Ca    8.6      17 Feb 2019 08:47  Phos  3.0     02-17  Mg     1.9     02-17    RADIOLOGY:    < from: Xray Chest 1 View-PORTABLE IMMEDIATE (02.16.19 @ 19:25) >  Impression:      Worsening right lower lung field opacity.    Mediastinal vascular stent.  < end of copied text >    PHYSICAL EXAM:  alert in NAD  haert s1 s2+  lungs few rhonchi  abdomen soft nontender  rtUE - swelling echymosis non tender    IMPRESSION:  acute hypoxic respiratory failure  worsening pneumonia rt LL  COPD  S/P ELECTROLYTE IMBALANCE mg and K+ corrected   rt UE SWELLING FROM INFILTRATED IV  PULMONARY HYPERTENSION - SEVERE - rt sided heart failure  Rt posterior tibial vein thrombosis - on sc heparin for dvt prophylaxis  OLD CVA  CAROTID STENOSIS - S/P Rt common CAROTID STENT  PAD - S/P AORTO FEMORAL BYPASS  CKD STAGE3 BUN/CR STABLE  CHRONIC ANEMIA H/H STABLE  HTN  DLD  LEUKOCYTOSIS SEC.TO INFECTION AND STEROIDS being tapered   BLADDER MASS-   ANXIETY    PLAN  CONTINUE HIGH FLOW O2  CONTINUE  present ANTIBIOTIC course AS PER ID to end 2/18  TAPER prednisone 20 mg daily starting tomorrow  continue INHALERS  symbicort and spiriva and nebulizer rxs  CONTINUE xanax 0.125 q 12hours prn  PALLIATIVE CONSULT NOTED  PROGNOSIS GRIM FAMILY AWARE DNR DNI                              PLAN:

## 2019-02-17 NOTE — PROGRESS NOTE ADULT - GASTROINTESTINAL
Soft, non-tender, no hepatosplenomegaly, normal bowel sounds
detailed exam
Soft, non-tender, no hepatosplenomegaly, normal bowel sounds

## 2019-02-17 NOTE — PROGRESS NOTE ADULT - SUBJECTIVE AND OBJECTIVE BOX
[place] NEPHROLOGY FOLLOW UP NOTE  --------------------------------------------------------------------------------    pt seen and examined  d/w family at bedside  off BIPAP  no new events  on hfo2nc        PAST HISTORY  --------------------------------------------------------------------------------  No significant changes to PMH, PSH, FHx, SHx, unless otherwise noted    ALLERGIES & MEDICATIONS  --------------------------------------------------------------------------------  Allergies    No Known Allergies      Physical Exam:  	  + bipap  awake and alert  b/l rales  rrr  soft, nt  no edema  +ecchymosis    Hospital Medications:   MEDICATIONS  (STANDING):  ALBUTerol    90 MICROgram(s) HFA Inhaler 1 Puff(s) Inhalation every 4 hours  ALBUTerol/ipratropium for Nebulization 3 milliLiter(s) Nebulizer every 6 hours  aspirin enteric coated 81 milliGRAM(s) Oral <User Schedule>  atorvastatin 10 milliGRAM(s) Oral at bedtime  buDESOnide 160 MICROgram(s)/formoterol 4.5 MICROgram(s) Inhaler 2 Puff(s) Inhalation two times a day  chlorhexidine 4% Liquid 1 Application(s) Topical <User Schedule>  cilostazol 100 milliGRAM(s) Oral two times a day  clopidogrel Tablet 75 milliGRAM(s) Oral daily  docusate sodium 100 milliGRAM(s) Oral two times a day  heparin  Injectable 5000 Unit(s) SubCutaneous every 8 hours  levETIRAcetam  IVPB 500 milliGRAM(s) IV Intermittent every 12 hours  levoFLOXacin IVPB 250 milliGRAM(s) IV Intermittent every 24 hours  linezolid  IVPB 600 milliGRAM(s) IV Intermittent every 12 hours  metoprolol tartrate 25 milliGRAM(s) Oral two times a day  nystatin Powder 1 Application(s) Topical two times a day  pantoprazole    Tablet 40 milliGRAM(s) Oral before breakfast  piperacillin/tazobactam IVPB. 3.375 Gram(s) IV Intermittent every 6 hours  predniSONE   Tablet 20 milliGRAM(s) Oral daily  senna 2 Tablet(s) Oral at bedtime  tiotropium 18 MICROgram(s) Capsule 1 Capsule(s) Inhalation daily        VITALS:  T(F): 97.7 (02-17-19 @ 12:54), Max: 97.8 (02-17-19 @ 04:00)  HR: 97 (02-17-19 @ 12:54)  BP: 99/56 (02-17-19 @ 12:54)  RR: 18 (02-17-19 @ 12:54)  SpO2: 90% (02-17-19 @ 08:04)  Wt(kg): --        LABS:  02-17    139  |  99  |  32<H>  ----------------------------<  169<H>  3.7   |  26  |  1.7<H>    Ca    8.6      17 Feb 2019 08:47  Phos  3.0     02-17  Mg     1.9     02-17                            8.7    15.62 )-----------( 254      ( 17 Feb 2019 08:47 )             27.3       Urine Studies:        RADIOLOGY & ADDITIONAL STUDIES:

## 2019-02-17 NOTE — PROGRESS NOTE ADULT - SUBJECTIVE AND OBJECTIVE BOX
Interval history and ROS:    Seen and examined  Family at bedside  Patient awake and alert on HFNC  Wet sounding cough but non productive    MEDICATIONS:  acetaminophen   Tablet .. 650 milliGRAM(s) Oral every 6 hours PRN  ALBUTerol    90 MICROgram(s) HFA Inhaler 1 Puff(s) Inhalation every 4 hours  ALBUTerol/ipratropium for Nebulization 3 milliLiter(s) Nebulizer every 6 hours  ALBUTerol/ipratropium for Nebulization 3 milliLiter(s) Nebulizer every 6 hours PRN  ALPRAZolam 0.125 milliGRAM(s) Oral every 12 hours PRN  aspirin enteric coated 81 milliGRAM(s) Oral <User Schedule>  atorvastatin 10 milliGRAM(s) Oral at bedtime  buDESOnide 160 MICROgram(s)/formoterol 4.5 MICROgram(s) Inhaler 2 Puff(s) Inhalation two times a day  chlorhexidine 4% Liquid 1 Application(s) Topical <User Schedule>  cilostazol 100 milliGRAM(s) Oral two times a day  clopidogrel Tablet 75 milliGRAM(s) Oral daily  docusate sodium 100 milliGRAM(s) Oral two times a day  guaiFENesin    Syrup 100 milliGRAM(s) Oral every 6 hours PRN  heparin  Injectable 5000 Unit(s) SubCutaneous every 8 hours  lactulose Syrup 10 Gram(s) Oral every 6 hours PRN  levETIRAcetam  IVPB 500 milliGRAM(s) IV Intermittent every 12 hours  levoFLOXacin IVPB 250 milliGRAM(s) IV Intermittent every 24 hours  linezolid  IVPB 600 milliGRAM(s) IV Intermittent every 12 hours  magnesium oxide 400 milliGRAM(s) Oral two times a day with meals  metoprolol tartrate 25 milliGRAM(s) Oral two times a day  nystatin Powder 1 Application(s) Topical two times a day  pantoprazole    Tablet 40 milliGRAM(s) Oral before breakfast  piperacillin/tazobactam IVPB. 3.375 Gram(s) IV Intermittent every 6 hours  predniSONE   Tablet 20 milliGRAM(s) Oral daily  senna 2 Tablet(s) Oral at bedtime  tiotropium 18 MICROgram(s) Capsule 1 Capsule(s) Inhalation daily      VITAL SIGNS:  Vital Signs Last 24 Hrs  T(C): 36.6 (17 Feb 2019 04:00), Max: 36.6 (17 Feb 2019 04:00)  T(F): 97.8 (17 Feb 2019 04:00), Max: 97.8 (17 Feb 2019 04:00)  HR: 98 (17 Feb 2019 06:18) (95 - 113)  BP: 113/70 (17 Feb 2019 04:00) (77/52 - 115/69)  RR: 18 (17 Feb 2019 04:00) (18 - 20)  SpO2: 90% (17 Feb 2019 06:18) (90% - 92%) on HFNC O2        PHYSICAL EXAM:  Awake and alert elderly, chronically ill appearing  Neck supple, no LN  S1 and S2 no murmur  Lungs diminished BS, but no wheeze or rhonchi heard  Abdomen is soft and non tender  There is no edema  Neurologically non focal      LABS:                        8.5    11.10 )-----------( 268      ( 16 Feb 2019 06:45 )             26.3     02-16    138  |  96<L>  |  25<H>  ----------------------------<  245<H>  3.1<L>   |  27  |  1.4    Ca    8.2<L>      16 Feb 2019 06:45  Phos  3.3     02-16  Mg     1.7     02-16        RADIOLOGY & ADDITIONAL TESTS:   < from: Xray Chest 1 View-PORTABLE IMMEDIATE (02.16.19 @ 19:25) >  Worsening right lower lung field opacity.  Mediastinal vascular stent.

## 2019-02-17 NOTE — PROGRESS NOTE ADULT - ASSESSMENT
85F    COPD   Peripheral vascular disease  HTN  High cholesterol  CVA   H/O aorto-femoral bypass  History of right common carotid artery stent placement    Admitted 1/21 + coronavirus   Blood and urine cx NGTD, legionella neg  CT 1.2 cm right hepatic lobe lesion with arterial enhancement,. 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  Focal consolidated and groundglass right lower lobe and right middle lobe opacity, compatible with pneumonia in the appropriate clinical setting. MARINA nodule, b/l pleural effusions    Olga Lidia 1/21-1/29, 2/7-2/10, zosyn 2/12-, levo 2/10-    - on steroids with steroid induced leucocytosis  - continue abx, on linezolid (2/11-), zosyn, levofloxacin plan for total 7 day course (end 2/18)  - poor prognosis

## 2019-02-17 NOTE — PROGRESS NOTE ADULT - GUM GEN PE MLT EXAM PC
Normal genitalia; no lesions; no discharge

## 2019-02-17 NOTE — PROGRESS NOTE ADULT - ASSESSMENT
IMPRESSION:  86 yo female with pneumonia, COPD, CHF with acute on chronic hypoxemic respiratory failure  Anxiety disorder  Pulmonary nodule and bladder mass    RECOMMENDATIONS:  Continue O2 as needed to maintain sat > 88%, nocturnal BiPAP  Continue antibiotics as per ID  Slow steroid taper  Continue bronchodilators encouraged coughing and airway clearance  Anxiolytics porn  GI and DVT prophylaxis as ordered, OOB to chair with assist  Family considering palliative care

## 2019-02-17 NOTE — PROGRESS NOTE ADULT - SUBJECTIVE AND OBJECTIVE BOX
LILA VELA  85y, Female      OVERNIGHT EVENTS:    clinically feels better but still SOB, minimal cough    VITALS:  T(F): 97.8, Max: 97.8 (02-17-19 @ 04:00)  HR: 98  BP: 113/70  RR: 18Vital Signs Last 24 Hrs  T(C): 36.6 (17 Feb 2019 04:00), Max: 36.6 (17 Feb 2019 04:00)  T(F): 97.8 (17 Feb 2019 04:00), Max: 97.8 (17 Feb 2019 04:00)  HR: 98 (17 Feb 2019 06:18) (95 - 113)  BP: 113/70 (17 Feb 2019 04:00) (77/52 - 115/69)  BP(mean): --  RR: 18 (17 Feb 2019 04:00) (18 - 20)  SpO2: 90% (17 Feb 2019 08:04) (90% - 92%)    TESTS & MEASUREMENTS:                        8.7    15.62 )-----------( 254      ( 17 Feb 2019 08:47 )             27.3     02-17    139  |  99  |  32<H>  ----------------------------<  169<H>  3.7   |  26  |  1.7<H>    Ca    8.6      17 Feb 2019 08:47  Phos  3.0     02-17  Mg     1.9     02-17                RADIOLOGY & ADDITIONAL TESTS:    ANTIBIOTICS:  levoFLOXacin IVPB 250 milliGRAM(s) IV Intermittent every 24 hours  linezolid  IVPB 600 milliGRAM(s) IV Intermittent every 12 hours  piperacillin/tazobactam IVPB. 3.375 Gram(s) IV Intermittent every 6 hours

## 2019-02-17 NOTE — PROGRESS NOTE ADULT - RS GEN PE MLT RESP DETAILS PC
diminished breath sounds, R
crackles right base
diminished breath sounds, R
diminished breath sounds, R/wheezes
rhonchi

## 2019-02-18 NOTE — PROGRESS NOTE ADULT - SUBJECTIVE AND OBJECTIVE BOX
LENGTH OF HOSPITAL STAY: 28d      CHIEF COMPLAINT:   Patient is a 85y old  Female who presents with a chief complaint of sob (17 Feb 2019 16:08)      OVER Past 24hrs:  The patient was seen and examined at bedside there were no acute events during the night. Patient is tolerating high flow, Mildly Hypotensive.  She denies fever chills nausea. but complain of sob unchanged fro prior day.     PAST MEDICAL & SURGICAL HISTORY  PAST MEDICAL & SURGICAL HISTORY:  PVD (peripheral vascular disease)  COPD (chronic obstructive pulmonary disease)  Kidney disease, chronic, stage I (GFR over 89 ml/min)  Peripheral vascular disease  HTN (hypertension)  High cholesterol  CVA (cerebral vascular accident)  H/O aorto-femoral bypass  History of right common carotid artery stent placement        REVIEW OF SYSTEMS  CONSTITUTIONAL: No fever,  RESPIRATORY: +cough, no wheezing chills or hemoptysis; +shortness of breath  CARDIOVASCULAR: No chest pain, palpitations, dizziness, or leg swelling    ALLERGIES:  No Known Allergies    MEDICATIONS:  STANDING MEDICATIONS  ALBUTerol    90 MICROgram(s) HFA Inhaler 1 Puff(s) Inhalation every 4 hours  ALBUTerol/ipratropium for Nebulization 3 milliLiter(s) Nebulizer every 6 hours  aspirin enteric coated 81 milliGRAM(s) Oral <User Schedule>  atorvastatin 10 milliGRAM(s) Oral at bedtime  buDESOnide 160 MICROgram(s)/formoterol 4.5 MICROgram(s) Inhaler 2 Puff(s) Inhalation two times a day  chlorhexidine 4% Liquid 1 Application(s) Topical <User Schedule>  cilostazol 100 milliGRAM(s) Oral two times a day  clopidogrel Tablet 75 milliGRAM(s) Oral daily  docusate sodium 100 milliGRAM(s) Oral two times a day  heparin  Injectable 5000 Unit(s) SubCutaneous every 8 hours  levETIRAcetam  IVPB 500 milliGRAM(s) IV Intermittent every 12 hours  levoFLOXacin IVPB 250 milliGRAM(s) IV Intermittent every 24 hours  linezolid  IVPB 600 milliGRAM(s) IV Intermittent every 12 hours  metoprolol tartrate 25 milliGRAM(s) Oral two times a day  nystatin Powder 1 Application(s) Topical two times a day  pantoprazole    Tablet 40 milliGRAM(s) Oral before breakfast  piperacillin/tazobactam IVPB. 3.375 Gram(s) IV Intermittent every 6 hours  predniSONE   Tablet 10 milliGRAM(s) Oral daily  senna 2 Tablet(s) Oral at bedtime  tiotropium 18 MICROgram(s) Capsule 1 Capsule(s) Inhalation daily    PRN MEDICATIONS  acetaminophen   Tablet .. 650 milliGRAM(s) Oral every 6 hours PRN  ALBUTerol/ipratropium for Nebulization 3 milliLiter(s) Nebulizer every 6 hours PRN  ALPRAZolam 0.125 milliGRAM(s) Oral every 12 hours PRN  guaiFENesin    Syrup 100 milliGRAM(s) Oral every 6 hours PRN  lactulose Syrup 10 Gram(s) Oral every 6 hours PRN    VITALS:   T(F): 97.3  HR: 109  BP: 95/52  RR: 18  SpO2: 93%    PHYSICAL EXAM:  General: No acute distress.  Alert, oriented, interactive, nonfocal.    HEENT: Pupils equal, reactive to light symmetrically.    PULM: crackle and rahles herd b/l lungs   , no significant sputum production.       CVS: Regular rate and rhythm, no murmurs, rubs, or gallops.    ABD: Soft, nondistended, nontender, no masses.    EXT: No edema, nontender.        LABS:                        8.2    16.88 )-----------( 269      ( 18 Feb 2019 08:33 )             25.7     02-18    138  |  94<L>  |  38<H>  ----------------------------<  271<H>  3.5   |  25  |  1.7<H>    Ca    8.1<L>      18 Feb 2019 08:33  Phos  3.7     02-18  Mg     2.0     02-18

## 2019-02-18 NOTE — PROGRESS NOTE ADULT - ASSESSMENT
Acute on chronic hypoxic respiratory failure  Copd exacerbation,   Pneumonia  Pulmonary nodule  CHF  Anxiety  Bladder mass   Advanced directives-DNR/DNI      oxygen per pulse oximetry,  wean fio2 as tolerated-remains on hfnc, maintain sat 88-92  completed abx per id  dec prednisone 10 mg daily  continue symbicort and spiriva  xanax 0.125 q 12hours prn  lasix 40 mg iv x1  discussed with family at bed side   overall grim prognosis  Advanced directives - dnr/dni  d/w house staff and family

## 2019-02-18 NOTE — PROGRESS NOTE ADULT - SUBJECTIVE AND OBJECTIVE BOX
LILA VELA  85y, Female  Allergy: No Known Allergies      LAST 24-Hr EVENTS:  pt seen examined  onelia comfortable in bed  remains on hfnc    VITALS:  T(F): 97.3 (02-18-19 @ 07:29), Max: 97.7 (02-17-19 @ 12:54)  HR: 109 (02-18-19 @ 09:35)  BP: 95/52 (02-18-19 @ 09:35) (88/51 - 108/59)  RR: 18 (02-18-19 @ 09:35)  SpO2: 93% (02-18-19 @ 07:59)    PHYSICAL EXAM:    GENERAL: NAD  NECK: Supple, No JVD  CHEST/LUNG: minimal wheeze  HEART: Regular rate and rhythm  ABDOMEN: Soft, Nontender, Nondistended  EXTREMITIES:  No clubbing, edema absent        TESTS & MEASUREMENTS:    IN: 250 mL / OUT: 0 mL / NET: 250 mL                            8.2    16.88 )-----------( 269      ( 18 Feb 2019 08:33 )             25.7       02-18    138  |  94<L>  |  38<H>  ----------------------------<  271<H>  3.5   |  25  |  1.7<H>    Ca    8.1<L>      18 Feb 2019 08:33  Phos  3.7     02-18  Mg     2.0     02-18              RADIOLOGY & ADDITIONAL TESTS:  < from: Xray Chest 1 View-PORTABLE IMMEDIATE (02.16.19 @ 19:25) >  Worsening right lower lung field opacity.    Mediastinal vascular stent.    < end of copied text >        MEDICATIONS:  MEDICATIONS  (STANDING):  ALBUTerol    90 MICROgram(s) HFA Inhaler 1 Puff(s) Inhalation every 4 hours  ALBUTerol/ipratropium for Nebulization 3 milliLiter(s) Nebulizer every 6 hours  aspirin enteric coated 81 milliGRAM(s) Oral <User Schedule>  atorvastatin 10 milliGRAM(s) Oral at bedtime  buDESOnide 160 MICROgram(s)/formoterol 4.5 MICROgram(s) Inhaler 2 Puff(s) Inhalation two times a day  chlorhexidine 4% Liquid 1 Application(s) Topical <User Schedule>  cilostazol 100 milliGRAM(s) Oral two times a day  clopidogrel Tablet 75 milliGRAM(s) Oral daily  docusate sodium 100 milliGRAM(s) Oral two times a day  heparin  Injectable 5000 Unit(s) SubCutaneous every 8 hours  levETIRAcetam  IVPB 500 milliGRAM(s) IV Intermittent every 12 hours  levoFLOXacin IVPB 250 milliGRAM(s) IV Intermittent every 24 hours  linezolid  IVPB 600 milliGRAM(s) IV Intermittent every 12 hours  metoprolol tartrate 25 milliGRAM(s) Oral two times a day  nystatin Powder 1 Application(s) Topical two times a day  pantoprazole    Tablet 40 milliGRAM(s) Oral before breakfast  piperacillin/tazobactam IVPB. 3.375 Gram(s) IV Intermittent every 6 hours  predniSONE   Tablet 10 milliGRAM(s) Oral daily  senna 2 Tablet(s) Oral at bedtime  tiotropium 18 MICROgram(s) Capsule 1 Capsule(s) Inhalation daily    MEDICATIONS  (PRN):  acetaminophen   Tablet .. 650 milliGRAM(s) Oral every 6 hours PRN Temp greater or equal to 38C (100.4F), Moderate Pain (4 - 6)  ALBUTerol/ipratropium for Nebulization 3 milliLiter(s) Nebulizer every 6 hours PRN Shortness of Breath and/or Wheezing  ALPRAZolam 0.125 milliGRAM(s) Oral every 12 hours PRN anxiety  guaiFENesin    Syrup 100 milliGRAM(s) Oral every 6 hours PRN Cough  lactulose Syrup 10 Gram(s) Oral every 6 hours PRN constipation

## 2019-02-18 NOTE — PROGRESS NOTE ADULT - ASSESSMENT
1. CKD III-stable  2. Bladder mass/hematuria  3. DVT  4. HTN  5. PNA  6. R heart failure  7. Pulmonary HTN  8. Hypernatremia    Plan:  Abx   No significant improvement over weekend  Renal worse- Furosemide vs hypotension  Agree with steroids  Can give furosemide if BP improves  Daily BMP

## 2019-02-18 NOTE — PROGRESS NOTE ADULT - ASSESSMENT
84 yo F, CCU Downgrade, PMHx COPD on 2 L NC, PVD, CKD3, HTN, DLD, hx of CVA, ex-smoker, recently discharged from [facility] on 01/13 after PNA tx w/Levaquin and Prednisone, presented/w SOB and dysuria. She was doing well until 01/20 when she starting feeling SOB again.     Pt was admitted to CCU for acute on chronic hypoxic and chronic hypercapnic respiratory failure 2/2 COPD exacerbation likely due to RLL GNR PNA exacerbated by coronavirus coinfection. She was on BiPaP prn, solumedrol, bronchodilators and Meropenem. She was treated for distal DVT and subsequently developed hematuria. Further w/u was positive for moderate ascites (2D echo), +UA (negative urine and blood cultures). MRSA and Flu were negative.       Today Events:, c/w medical management, maintain on 10mg PO prednisone, hold lasix for hypotension.     IMPRESSION   RLL PNA  vs other Unknown Etiology not excluding CA  COPD exacerbation       Plan  # Acute on chronic hypoxic and chronic hypercapnic respiratory failure exacerbation etiology Unknown    - maintain  88-92 o2sat   -  begin taper 20mg  3 day, then  10mg    - pulm following   - ID following   - c/w   Levo IV, Unasyn IV and Zyvox IV. ending on  02/18 last does today   - albuterol Neb patient    -  CT chest with con showing RLL consolidation - no invasive procedures   - c/w High flow O2      # HFpEF:   ECHO 1/22- EF 55-60%, severe pulm HTN, decreased RV systolic fx, RV vol and pressure overload,   RV enlarged, severe mitral annular calcification    - Metoprolol    # Right Tibial vein DVT: Stable  Duplex LE- Right post tibial DVT  No need for therapeutic AC as per Dr Dunham- will repeat Duplex unremarkable    - ppx heparin     # Hematuria: Resolved  Cont prophylactic heparin   HGB stable  CT Urogram: 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  1.2 cm right hepatic lobe lesion with arterial enhancement, incompletely characterized but could be a benign lesion. Consider nonemergent MRI of the abdomen with contrast if clinically indicated.  - OP f/u urology    - Hematuria overnight       # Transaminitis: waxing and waning  RUQ US: negative    # Constipation:   Cont. Senna, Colace  lactulose PRN     Electrolyte Imbalances: Correct as needed  []  Hyponatremia   /   Hypernatremia  []   []  Hypokalemia   /   Hyperkalemia  []   []  Hypochlorhydria   /    Hypochlorhydria  []   []  Hypomagnesemia   /   Hypermagnesemia  []   []  Hypophosphatemia   /   Hyperphosphatemia  []       GI ppx:                                   [] Not indicated   /   [x] Pantoprazole 40mg PO Daily    DVT ppx:  [] Not indicated / [x] Heparin 5000mg SubQ / [] Lovenox 40mg SubQ / [] SCDs    Fluids:   [x] PO  |  [x] IVF one L only     Activity:  [X] Assisatnce needed   [X] Increase as Tolerated  /  [x] OOB w/ assist  /  [] Bed Rest      DISPO:   attempting NH placement after optimization   [ ] From Home     [ x] NH/SNF   [ ] 4A Rehab  [ ] Detox Clinic  [X] Plan Discussion with patient and/or family.  [X] Discussed Case and Plan with the Medical Attending.    CODE STATUS  [] FULL   /    [X] DNR/DNI

## 2019-02-18 NOTE — PROGRESS NOTE ADULT - SUBJECTIVE AND OBJECTIVE BOX
[place] [facility] FOLLOW UP NOTE  --------------------------------------------------------------------------------  24 hour events/subjective: Patient examined. Appears comfortable.    PAST HISTORY  --------------------------------------------------------------------------------  No significant changes to PMH, PSH, FHx, SHx, unless otherwise noted    ALLERGIES & MEDICATIONS  --------------------------------------------------------------------------------  Allergies    No Known Allergies    Standing Inpatient Medications  ALBUTerol    90 MICROgram(s) HFA Inhaler 1 Puff(s) Inhalation every 4 hours  ALBUTerol/ipratropium for Nebulization 3 milliLiter(s) Nebulizer every 6 hours  aspirin enteric coated 81 milliGRAM(s) Oral <User Schedule>  atorvastatin 10 milliGRAM(s) Oral at bedtime  buDESOnide 160 MICROgram(s)/formoterol 4.5 MICROgram(s) Inhaler 2 Puff(s) Inhalation two times a day  chlorhexidine 4% Liquid 1 Application(s) Topical <User Schedule>  cilostazol 100 milliGRAM(s) Oral two times a day  clopidogrel Tablet 75 milliGRAM(s) Oral daily  docusate sodium 100 milliGRAM(s) Oral two times a day  heparin  Injectable 5000 Unit(s) SubCutaneous every 8 hours  levETIRAcetam  IVPB 500 milliGRAM(s) IV Intermittent every 12 hours  levoFLOXacin IVPB 250 milliGRAM(s) IV Intermittent every 24 hours  linezolid  IVPB 600 milliGRAM(s) IV Intermittent every 12 hours  metoprolol tartrate 25 milliGRAM(s) Oral two times a day  nystatin Powder 1 Application(s) Topical two times a day  pantoprazole    Tablet 40 milliGRAM(s) Oral before breakfast  piperacillin/tazobactam IVPB. 3.375 Gram(s) IV Intermittent every 6 hours  predniSONE   Tablet 10 milliGRAM(s) Oral daily  senna 2 Tablet(s) Oral at bedtime  tiotropium 18 MICROgram(s) Capsule 1 Capsule(s) Inhalation daily    PRN Inpatient Medications  acetaminophen   Tablet .. 650 milliGRAM(s) Oral every 6 hours PRN  ALBUTerol/ipratropium for Nebulization 3 milliLiter(s) Nebulizer every 6 hours PRN  ALPRAZolam 0.125 milliGRAM(s) Oral every 12 hours PRN  guaiFENesin    Syrup 100 milliGRAM(s) Oral every 6 hours PRN  lactulose Syrup 10 Gram(s) Oral every 6 hours PRN    VITALS/PHYSICAL EXAM  --------------------------------------------------------------------------------  T(C): 37.1 (02-18-19 @ 13:45), Max: 37.1 (02-18-19 @ 13:45)  HR: 101 (02-18-19 @ 13:45) (101 - 119)  BP: 100/55 (02-18-19 @ 13:45) (88/51 - 108/59)  RR: 20 (02-18-19 @ 13:45) (18 - 20)  SpO2: 93% (02-18-19 @ 07:59) (90% - 93%)    02-17-19 @ 07:01  -  02-18-19 @ 07:00  --------------------------------------------------------  IN: 250 mL / OUT: 0 mL / NET: 250 mL    Physical Exam:  	Gen: NAD  	Pulm: CTA B/L  	CV: RRR, S1S2  	Abd: +BS, soft, nontender/nondistended  	: No suprapubic tenderness  	LE: Warm, no edema    LABS/STUDIES  --------------------------------------------------------------------------------              8.2    16.88 >-----------<  269      [02-18-19 @ 08:33]              25.7     138  |  94  |  38  ----------------------------<  271      [02-18-19 @ 08:33]  3.5   |  25  |  1.7        Ca     8.1     [02-18-19 @ 08:33]      Mg     2.0     [02-18-19 @ 08:33]      Phos  3.7     [02-18-19 @ 08:33]    Creatinine Trend:  SCr 1.7 [02-18 @ 08:33]  SCr 1.7 [02-17 @ 08:47]  SCr 1.4 [02-16 @ 06:45]  SCr 1.2 [02-15 @ 12:30]  SCr 1.0 [02-13 @ 07:40]    Urinalysis - [01-24-19 @ 04:30]      Color Red / Appearance Turbid / SG >=1.030 / pH 5.0      Gluc 100 / Ketone 40  / Bili Large / Urobili 1.0       Blood Large / Protein >=300 / Leuk Est Moderate / Nitrite Positive      RBC >50 / WBC  / Hyaline  / Gran 3-5 / Sq Epi  / Non Sq Epi  / Bacteria Moderate    HbA1c 5.7      [01-11-19 @ 07:14]  Lipid: chol 171, TG 43, HDL 90, LDL 78      [01-11-19 @ 07:14]

## 2019-02-18 NOTE — PROGRESS NOTE ADULT - SUBJECTIVE AND OBJECTIVE BOX
DANE LILA  85y  Female      SUBJECTIVE:  out of bed in chair appears comfortable on hfnc bipap at [facility]    PAST MEDICAL & SURGICAL HISTORY:  PVD (peripheral vascular disease)  COPD (chronic obstructive pulmonary disease)  Kidney disease, chronic, stage I (GFR over 89 ml/min)  Peripheral vascular disease  HTN (hypertension)  High cholesterol  CVA (cerebral vascular accident)  H/O aorto-femoral bypass  History of right common carotid artery stent placement    85y    REVIEW OF SYSTEMS:    T(C): 36.3 (02-18-19 @ 07:29), Max: 36.3 (02-17-19 @ 20:48)  HR: 109 (02-18-19 @ 09:35) (109 - 119)  BP: 95/52 (02-18-19 @ 09:35) (88/51 - 108/59)  RR: 18 (02-18-19 @ 09:35) (18 - 18)  SpO2: 93% (02-18-19 @ 07:59) (90% - 93%)  Wt(kg): --Vital Signs Last 24 Hrs  T(C): 36.3 (18 Feb 2019 07:29), Max: 36.3 (17 Feb 2019 20:48)  T(F): 97.3 (18 Feb 2019 07:29), Max: 97.4 (17 Feb 2019 20:48)  HR: 109 (18 Feb 2019 09:35) (109 - 119)  BP: 95/52 (18 Feb 2019 09:35) (88/51 - 108/59)  BP(mean): 69 (18 Feb 2019 09:35) (69 - 69)  RR: 18 (18 Feb 2019 09:35) (18 - 18)  SpO2: 93% (18 Feb 2019 07:59) (90% - 93%)    MEDICATION:  acetaminophen   Tablet .. 650 milliGRAM(s) Oral every 6 hours PRN  ALBUTerol    90 MICROgram(s) HFA Inhaler 1 Puff(s) Inhalation every 4 hours  ALBUTerol/ipratropium for Nebulization 3 milliLiter(s) Nebulizer every 6 hours  ALBUTerol/ipratropium for Nebulization 3 milliLiter(s) Nebulizer every 6 hours PRN  ALPRAZolam 0.125 milliGRAM(s) Oral every 12 hours PRN  aspirin enteric coated 81 milliGRAM(s) Oral <User Schedule>  atorvastatin 10 milliGRAM(s) Oral at bedtime  buDESOnide 160 MICROgram(s)/formoterol 4.5 MICROgram(s) Inhaler 2 Puff(s) Inhalation two times a day  chlorhexidine 4% Liquid 1 Application(s) Topical <User Schedule>  cilostazol 100 milliGRAM(s) Oral two times a day  clopidogrel Tablet 75 milliGRAM(s) Oral daily  docusate sodium 100 milliGRAM(s) Oral two times a day  guaiFENesin    Syrup 100 milliGRAM(s) Oral every 6 hours PRN  heparin  Injectable 5000 Unit(s) SubCutaneous every 8 hours  lactulose Syrup 10 Gram(s) Oral every 6 hours PRN  levETIRAcetam  IVPB 500 milliGRAM(s) IV Intermittent every 12 hours  levoFLOXacin IVPB 250 milliGRAM(s) IV Intermittent every 24 hours  linezolid  IVPB 600 milliGRAM(s) IV Intermittent every 12 hours  metoprolol tartrate 25 milliGRAM(s) Oral two times a day  nystatin Powder 1 Application(s) Topical two times a day  pantoprazole    Tablet 40 milliGRAM(s) Oral before breakfast  piperacillin/tazobactam IVPB. 3.375 Gram(s) IV Intermittent every 6 hours  predniSONE   Tablet 10 milliGRAM(s) Oral daily  senna 2 Tablet(s) Oral at bedtime  tiotropium 18 MICROgram(s) Capsule 1 Capsule(s) Inhalation daily      LABS:                       8.2    16.88 )-----------( 269      ( 18 Feb 2019 08:33 )             25.7     02-18    138  |  94<L>  |  38<H>  ----------------------------<  271<H>  3.5   |  25  |  1.7<H>    Ca    8.1<L>      18 Feb 2019 08:33  Phos  3.7     02-18  Mg     2.0     02-18    RADIOLOGY:  < from: Xray Chest 1 View-PORTABLE IMMEDIATE (02.16.19 @ 19:25) >  Impression:      Worsening right lower lung field opacity.    Mediastinal vascular stent.  < end of copied text >    PHYSICAL EXAM:  alert comfortable  heart rsr s1 s2 +  lungs clear  abdomen soft nontender bs+  rt ue swelling echymoses improving nontender    IMPRESSION:      IMPRESSION:  acute / chronic hypoxic respiratory failure  worsening pneumonia rt LL on iv antibiotics   hypotension r/o from [id number] - poor po intake  COPD  rt UE SWELLING FROM INFILTRATED IV improving  PULMONARY HYPERTENSION - SEVERE - rt sided heart failure  Rt posterior tibial vein thrombosis - on sc heparin for dvt prophylaxis  OLD CVA  CAROTID STENOSIS - S/P Rt common CAROTID STENT  PAD - S/P AORTO FEMORAL BYPASS  CKD STAGE3   CHRONIC ANEMIA H/H STABLE  HTN  DLD  LEUKOCYTOSIS SEC.TO INFECTION AND STEROIDS   BLADDER MASS-   ANXIETY    PLAN  monitor bp   if po intake not improved needs ngt supplement - family aware  CONTINUE HIGH FLOW O2  present ANTIBIOTIC course AS PER ID to end today 2/18  TAPER prednisone   continue INHALERS  symbicort and spiriva and nebulizer rxs  CONTINUE xanax 0.125 q 12hours prn  PALLIATIVE CONSULT NOTED  PROGNOSIS  poor  FAMILY AWARE / DNR DNI                        PLAN:

## 2019-02-19 NOTE — PROGRESS NOTE ADULT - NSHPATTENDINGPLANDISCUSS_GEN_ALL_CORE
PATIENTS FAMILY AND MEDICAL RESIDENT
family
Housestaff
Medicine floor resident
family medical resident
patients daughter at bedside
Medicine floor resident
pt, family and HS
pt, family and house staff
resident
son at bedside

## 2019-02-19 NOTE — PROGRESS NOTE ADULT - ASSESSMENT
85F    COPD   Peripheral vascular disease  HTN  High cholesterol  CVA   H/O aorto-femoral bypass  History of right common carotid artery stent placement    Admitted 1/21 + coronavirus   Blood and urine cx NGTD, legionella neg  CT 1.2 cm right hepatic lobe lesion with arterial enhancement,. 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  Focal consolidated and groundglass right lower lobe and right middle lobe opacity, compatible with pneumonia in the appropriate clinical setting. MARINA nodule, b/l pleural effusions    Olga Lidia 1/21-1/29, 2/7-2/10, zosyn 2/12-, levo 2/10-    - repeat CXR  - Pulm following  - on steroids  - D/C linezolid as MRSA PCR neg  - On zosyn/levaquin f/u repeat CXR , consider D/C  - poor prognosis 85F    COPD   Peripheral vascular disease  HTN  High cholesterol  CVA   H/O aorto-femoral bypass  History of right common carotid artery stent placement    Admitted 1/21 + coronavirus   Blood and urine cx NGTD, legionella neg  CT 1.2 cm right hepatic lobe lesion with arterial enhancement,. 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  Focal consolidated and groundglass right lower lobe and right middle lobe opacity, compatible with pneumonia in the appropriate clinical setting. MARINA nodule, b/l pleural effusions    Olga Lidia 1/21-1/29, 2/7-2/10, zosyn 2/12-, levo 2/10-    - repeat CXR with continued RML consolidation  - Pulm following  - on steroids  - D/C linezolid as MRSA PCR neg and risks outweigh benefit  - On zosyn/levaquin  - poor prognosis

## 2019-02-19 NOTE — PROGRESS NOTE ADULT - ASSESSMENT
85yFemale being evaluated for goals of care.    Met with pt's daughter and son. Clinical condition reviewed, overall poor prognosis understood.   Family made aware that BIPAP is not intended to be used as a continuous modality especially at end of life and they acknowledge pt w declining quality of life.   End of life care options discussed including option to withdraw BIPAP in conjunction with supplemental morphine for comfort.    Family plan to discuss comfort care options this evening.  DNR/DNI status    Recommendations:  Ongoing med mngmnt  Supportive care       We will follow  [phone]

## 2019-02-19 NOTE — PROGRESS NOTE ADULT - SUBJECTIVE AND OBJECTIVE BOX
KUSHALSHANIKAANNLILA  85y  Female      SUBJECTIVE:  no c/o    Progress Note:      REVIEW OF SYSTEMS:    T(C): 35.6 (02-19-19 @ 06:43), Max: 37.2 (02-18-19 @ 21:12)  HR: 116 (02-19-19 @ 06:43) (101 - 116)  BP: 97/56 (02-19-19 @ 06:43) (94/56 - 100/55)  RR: 20 (02-19-19 @ 06:43) (18 - 20)  SpO2: 94% (02-19-19 @ 06:00) (94% - 97%)  Wt(kg): --Vital Signs Last 24 Hrs  T(C): 35.6 (19 Feb 2019 06:43), Max: 37.2 (18 Feb 2019 21:12)  T(F): 96 (19 Feb 2019 06:43), Max: 98.9 (18 Feb 2019 21:12)  HR: 116 (19 Feb 2019 06:43) (101 - 116)  BP: 97/56 (19 Feb 2019 06:43) (94/56 - 100/55)  BP(mean): 69 (18 Feb 2019 09:35) (69 - 69)  RR: 20 (19 Feb 2019 06:43) (18 - 20)  SpO2: 94% (19 Feb 2019 06:00) (94% - 97%)    PHYSICAL EXAM:  Bipap in use  lungs-clear  cor reg nl s! & s2  ext-no calf tenderness  LABS:                        8.2    16.88 )-----------( 269      ( 18 Feb 2019 08:33 )             25.7   02-18    138  |  94<L>  |  38<H>  ----------------------------<  271<H>  3.5   |  25  |  1.7<H>    Ca    8.1<L>      18 Feb 2019 08:33  Phos  3.7     02-18  Mg     2.0     02-18        RADIOLOGY:  < from: Xray Chest 1 View-PORTABLE IMMEDIATE (02.16.19 @ 19:25) >    EXAM:  XR CHEST PORTABLE IMMED 1V            PROCEDURE DATE:  02/16/2019            INTERPRETATION:  Clinical History / Reason for exam: Crackles.    Comparison : Chest radiograph February 9, 2019.    Technique/Positioning: Satisfactory.    Findings:    Support devices: A vascular stent is noted within the mediastinum.    Cardiac/mediastinum/hilum: Stable    Lung parenchyma/Pleura: Worsening right lower lung field opacity. No   pneumothorax is seen.    Skeleton/soft tissues: Stable    Impression:      Worsening right lower lung field opacity.    Mediastinal vascular stent.                  JESUS MCCOY M.D., ATTENDING RADIOLOGIST  This document has been electronically signed. Feb 17 2019  6:38AM        < end of copied text >            IMPRESSION:  ACUTE HYPOXIC  RESPIRATORY FAILURE-CLINICALLY PATIENT WORSE NOW ON BIPAP DURING DAYTIME  PNEUMONIA-   VOLUME DEPLETED  PULMONARY HYPERTENSION  RIGHT SIDED CHF  RIGHT TIBIAL VEIN THROMBOSIS-CHRONIC  BLADDER TUMOR  ANEMIA-STABLE  LEUKOCYTOSIS-DUE TO STEROIDS-IMPROVING  HEMATURIA DUE TO BLADDER TUMOR  CKD-STAGE 3WORSE  HYPERNATREMIA  OLD CVA  AORTO-FEM BYPASS  RIGHT CAROTID STENT  PVD  ABNORMAL LFT'S DUE TO MEDS AND INFECTION            PLAN:  BIPAP  ANTIBIOTICS AS PER ID  PATIENT DNI/DNR AS PER FAMILY AT BEDSIDE  IV FLUIDS  MONITOR I& O'S AND BMP AND LFT'S  PROGNOSIS POOR-FAMILY INFORMED  I SPENT 30 MINS.  EXAMINING,EVALUATING ,COUNSELING PATIENT AND FAMILY AND COORDINATING CARE WITH RESIDENT

## 2019-02-19 NOTE — CHART NOTE - NSCHARTNOTEFT_GEN_A_CORE
LMSW discussed case in Palliative Care rounds and reviewed medical record.  Encountered pt. resting in bed, bi-pap in place.  Daughter and son at bedside and appropriate tearful, but having difficulty coping with pt.'s illness.  Attending Dr. Valentine and LMSW met with daughter and son to provide options regarding bi-pap removal; all questions answered and extensive support provided. At this time they wish to speak to their sister and will f/u with palliative team tomorrow, 2/20.  DNR/I status.

## 2019-02-19 NOTE — PROGRESS NOTE ADULT - SUBJECTIVE AND OBJECTIVE BOX
LILA VELA  85y, Female  Allergy: No Known Allergies      LAST 24-Hr EVENTS:  pt seen examined  currently on bipap    VITALS:  T(F): 96 (02-19-19 @ 06:43), Max: 98.9 (02-18-19 @ 21:12)  HR: 116 (02-19-19 @ 06:43)  BP: 97/56 (02-19-19 @ 06:43) (94/56 - 100/55)  RR: 20 (02-19-19 @ 06:43)  SpO2: 94% (02-19-19 @ 06:00)    PHYSICAL EXAM:    GENERAL: on bipap  NECK: Supple, No JVD  CHEST/LUNG: dec breath sounds b/l bases  HEART: Regular rate and rhythm  ABDOMEN: Soft, Nontender, Nondistended  EXTREMITIES:  No clubbing, edema absent        TESTS & MEASUREMENTS:    IN: 250 mL / OUT: 0 mL / NET: 250 mL                            8.2    16.88 )-----------( 269      ( 18 Feb 2019 08:33 )             25.7       02-18    138  |  94<L>  |  38<H>  ----------------------------<  271<H>  3.5   |  25  |  1.7<H>    Ca    8.1<L>      18 Feb 2019 08:33  Phos  3.7     02-18  Mg     2.0     02-18        MEDICATIONS:  MEDICATIONS  (STANDING):  ALBUTerol    90 MICROgram(s) HFA Inhaler 1 Puff(s) Inhalation every 4 hours  ALBUTerol/ipratropium for Nebulization 3 milliLiter(s) Nebulizer every 6 hours  aspirin enteric coated 81 milliGRAM(s) Oral <User Schedule>  atorvastatin 10 milliGRAM(s) Oral at bedtime  buDESOnide 160 MICROgram(s)/formoterol 4.5 MICROgram(s) Inhaler 2 Puff(s) Inhalation two times a day  chlorhexidine 4% Liquid 1 Application(s) Topical <User Schedule>  cilostazol 100 milliGRAM(s) Oral two times a day  clopidogrel Tablet 75 milliGRAM(s) Oral daily  docusate sodium 100 milliGRAM(s) Oral two times a day  heparin  Injectable 5000 Unit(s) SubCutaneous every 8 hours  levETIRAcetam  IVPB 500 milliGRAM(s) IV Intermittent every 12 hours  metoprolol tartrate 25 milliGRAM(s) Oral two times a day  nystatin Powder 1 Application(s) Topical two times a day  pantoprazole    Tablet 40 milliGRAM(s) Oral before breakfast  predniSONE   Tablet 10 milliGRAM(s) Oral daily  senna 2 Tablet(s) Oral at bedtime  tiotropium 18 MICROgram(s) Capsule 1 Capsule(s) Inhalation daily    MEDICATIONS  (PRN):  acetaminophen   Tablet .. 650 milliGRAM(s) Oral every 6 hours PRN Temp greater or equal to 38C (100.4F), Moderate Pain (4 - 6)  ALBUTerol/ipratropium for Nebulization 3 milliLiter(s) Nebulizer every 6 hours PRN Shortness of Breath and/or Wheezing  guaiFENesin    Syrup 100 milliGRAM(s) Oral every 6 hours PRN Cough  lactulose Syrup 10 Gram(s) Oral every 6 hours PRN constipation

## 2019-02-19 NOTE — PROGRESS NOTE ADULT - SUBJECTIVE AND OBJECTIVE BOX
LENGTH OF HOSPITAL STAY: 29d      CHIEF COMPLAINT:   Patient is a 85y old  Female who presents with a chief complaint of sob (19 Feb 2019 10:32)      OVER Past 24hrs:  The patient was seen and examined at bedside  there were no acute events during the night, In the am patient Failed to wean to [place]. She denies fever chill Chest pain.         PAST MEDICAL & SURGICAL HISTORY  PAST MEDICAL & SURGICAL HISTORY:  PVD (peripheral vascular disease)  COPD (chronic obstructive pulmonary disease)  Kidney disease, chronic, stage I (GFR over 89 ml/min)  Peripheral vascular disease  HTN (hypertension)  High cholesterol  CVA (cerebral vascular accident)  H/O aorto-femoral bypass  History of right common carotid artery stent placement        REVIEW OF SYSTEMS  CONSTITUTIONAL: No fever,  RESPIRATORY: +cough, no wheezing chills or hemoptysis; +shortness of breath  CARDIOVASCULAR: No chest pain, palpitations, dizziness, or leg swelling    ALLERGIES:  No Known Allergies    MEDICATIONS:  STANDING MEDICATIONS  ALBUTerol    90 MICROgram(s) HFA Inhaler 1 Puff(s) Inhalation every 4 hours  ALBUTerol/ipratropium for Nebulization 3 milliLiter(s) Nebulizer every 6 hours  aspirin enteric coated 81 milliGRAM(s) Oral <User Schedule>  atorvastatin 10 milliGRAM(s) Oral at bedtime  buDESOnide 160 MICROgram(s)/formoterol 4.5 MICROgram(s) Inhaler 2 Puff(s) Inhalation two times a day  chlorhexidine 4% Liquid 1 Application(s) Topical <User Schedule>  cilostazol 100 milliGRAM(s) Oral two times a day  clopidogrel Tablet 75 milliGRAM(s) Oral daily  docusate sodium 100 milliGRAM(s) Oral two times a day  heparin  Injectable 5000 Unit(s) SubCutaneous every 8 hours  levETIRAcetam  IVPB 500 milliGRAM(s) IV Intermittent every 12 hours  metoprolol tartrate 25 milliGRAM(s) Oral two times a day  nystatin Powder 1 Application(s) Topical two times a day  pantoprazole    Tablet 40 milliGRAM(s) Oral before breakfast  predniSONE   Tablet 10 milliGRAM(s) Oral daily  senna 2 Tablet(s) Oral at bedtime  tiotropium 18 MICROgram(s) Capsule 1 Capsule(s) Inhalation daily    PRN MEDICATIONS  acetaminophen   Tablet .. 650 milliGRAM(s) Oral every 6 hours PRN  ALBUTerol/ipratropium for Nebulization 3 milliLiter(s) Nebulizer every 6 hours PRN  guaiFENesin    Syrup 100 milliGRAM(s) Oral every 6 hours PRN  lactulose Syrup 10 Gram(s) Oral every 6 hours PRN    VITALS:   T(F): 96  HR: 116  BP: 97/56  RR: 20  SpO2: 94%    PHYSICAL EXAM:  General: No acute distress.  Alert, oriented, interactive, nonfocal.    HEENT: Pupils equal, reactive to light symmetrically.    PULM: decreased breath sounds on right side   of lungs , no significant sputum production.       CVS: Regular rate and rhythm, no murmurs, rubs, or gallops.    ABD: Soft, nondistended, nontender, no masses.    EXT: No edema, nontender.    LABS:                        8.2    16.88 )-----------( 269      ( 18 Feb 2019 08:33 )             25.7     02-19    135  |  95<L>  |  49<H>  ----------------------------<  177<H>  3.8   |  17  |  2.3<H>    Ca    8.1<L>      19 Feb 2019 07:36  Phos  4.6     02-19  Mg     2.2     02-19                    RADIOLOGY:  < from: Xray Chest 1 View-PORTABLE IMMEDIATE (02.16.19 @ 19:25) >  Impression:      Worsening right lower lung field opacity.    Mediastinal vascular stent.    < end of copied text >

## 2019-02-19 NOTE — PROGRESS NOTE ADULT - SUBJECTIVE AND OBJECTIVE BOX
LILA VELA  85y, Female      OVERNIGHT EVENTS:  afebrile    ROS negative except as per above    VITALS:  T(F): 96, Max: 98.9 (02-18-19 @ 21:12)  HR: 116  BP: 97/56  RR: 20Vital Signs Last 24 Hrs  T(C): 35.6 (19 Feb 2019 06:43), Max: 37.2 (18 Feb 2019 21:12)  T(F): 96 (19 Feb 2019 06:43), Max: 98.9 (18 Feb 2019 21:12)  HR: 116 (19 Feb 2019 06:43) (101 - 116)  BP: 97/56 (19 Feb 2019 06:43) (94/56 - 100/55)  BP(mean): --  RR: 20 (19 Feb 2019 06:43) (20 - 20)  SpO2: 94% (19 Feb 2019 06:00) (94% - 97%)    PHYSICAL EXAM:  Gen: Elderly F on NRB more alert today  HEENT: NCAT. EOMI. MMM.   Neck: Supple  CV: RRR  Lungs: poor air entry  Abd: Soft. NTND  Extr: wwp, no edema  Skin: no rash  Neuro: No focal deficits  Lines: clean        TESTS & MEASUREMENTS:                        8.2    16.88 )-----------( 269      ( 18 Feb 2019 08:33 )             25.7     02-19    135  |  95<L>  |  49<H>  ----------------------------<  177<H>  3.8   |  17  |  2.3<H>    Ca    8.1<L>      19 Feb 2019 07:36  Phos  4.6     02-19  Mg     2.2     02-19              RADIOLOGY & ADDITIONAL TESTS:    ANTIBIOTICS:  cefepime   IVPB   100 mL/Hr IV Intermittent (01-21-19 @ 12:28)    levoFLOXacin  Tablet   250 milliGRAM(s) Oral (02-10-19 @ 18:31)    levoFLOXacin  Tablet   750 milliGRAM(s) Oral (01-22-19 @ 06:02)    levoFLOXacin  Tablet   250 milliGRAM(s) Oral (02-02-19 @ 18:35)   250 milliGRAM(s) Oral (02-01-19 @ 18:22)   250 milliGRAM(s) Oral (01-31-19 @ 18:15)   250 milliGRAM(s) Oral (01-30-19 @ 18:49)    levoFLOXacin IVPB   100 mL/Hr IV Intermittent (01-21-19 @ 12:29)    levoFLOXacin IVPB   50 mL/Hr IV Intermittent (02-18-19 @ 18:31)   50 mL/Hr IV Intermittent (02-17-19 @ 17:48)   50 mL/Hr IV Intermittent (02-16-19 @ 17:43)   50 mL/Hr IV Intermittent (02-15-19 @ 17:27)   50 mL/Hr IV Intermittent (02-14-19 @ 18:12)   50 mL/Hr IV Intermittent (02-13-19 @ 17:54)   50 mL/Hr IV Intermittent (02-12-19 @ 19:11)   50 mL/Hr IV Intermittent (02-11-19 @ 18:18)    linezolid  IVPB   300 mL/Hr IV Intermittent (02-19-19 @ 06:14)   300 mL/Hr IV Intermittent (02-18-19 @ 18:30)   300 mL/Hr IV Intermittent (02-18-19 @ 06:13)   300 mL/Hr IV Intermittent (02-17-19 @ 17:48)   300 mL/Hr IV Intermittent (02-17-19 @ 06:20)   300 mL/Hr IV Intermittent (02-16-19 @ 17:43)   300 mL/Hr IV Intermittent (02-16-19 @ 05:43)   300 mL/Hr IV Intermittent (02-15-19 @ 17:28)   300 mL/Hr IV Intermittent (02-15-19 @ 05:17)   300 mL/Hr IV Intermittent (02-14-19 @ 18:11)   300 mL/Hr IV Intermittent (02-14-19 @ 05:06)   300 mL/Hr IV Intermittent (02-13-19 @ 17:43)   300 mL/Hr IV Intermittent (02-13-19 @ 06:29)   300 mL/Hr IV Intermittent (02-12-19 @ 22:52)   300 mL/Hr IV Intermittent (02-12-19 @ 09:11)   300 mL/Hr IV Intermittent (02-11-19 @ 21:42)    meropenem  IVPB   100 mL/Hr IV Intermittent (01-29-19 @ 06:13)   100 mL/Hr IV Intermittent (01-28-19 @ 21:13)   100 mL/Hr IV Intermittent (01-28-19 @ 15:06)   100 mL/Hr IV Intermittent (01-28-19 @ 06:11)   100 mL/Hr IV Intermittent (01-27-19 @ 21:45)   100 mL/Hr IV Intermittent (01-27-19 @ 16:56)   100 mL/Hr IV Intermittent (01-27-19 @ 05:20)   100 mL/Hr IV Intermittent (01-26-19 @ 21:34)   100 mL/Hr IV Intermittent (01-26-19 @ 14:05)   100 mL/Hr IV Intermittent (01-26-19 @ 05:47)   100 mL/Hr IV Intermittent (01-25-19 @ 21:38)   100 mL/Hr IV Intermittent (01-25-19 @ 14:42)   100 mL/Hr IV Intermittent (01-25-19 @ 06:40)   100 mL/Hr IV Intermittent (01-24-19 @ 21:29)   100 mL/Hr IV Intermittent (01-24-19 @ 14:27)   100 mL/Hr IV Intermittent (01-24-19 @ 05:18)   100 mL/Hr IV Intermittent (01-23-19 @ 22:04)   100 mL/Hr IV Intermittent (01-23-19 @ 14:32)    meropenem  IVPB   100 mL/Hr IV Intermittent (01-23-19 @ 05:04)   100 mL/Hr IV Intermittent (01-22-19 @ 17:32)   100 mL/Hr IV Intermittent (01-22-19 @ 05:16)   100 mL/Hr IV Intermittent (01-21-19 @ 18:32)    meropenem  IVPB   100 mL/Hr IV Intermittent (02-07-19 @ 16:20)    meropenem  IVPB   100 mL/Hr IV Intermittent (02-10-19 @ 13:42)   100 mL/Hr IV Intermittent (02-10-19 @ 05:07)   100 mL/Hr IV Intermittent (02-09-19 @ 21:13)   100 mL/Hr IV Intermittent (02-09-19 @ 13:55)   100 mL/Hr IV Intermittent (02-09-19 @ 06:33)   100 mL/Hr IV Intermittent (02-08-19 @ 21:12)   100 mL/Hr IV Intermittent (02-08-19 @ 13:43)   100 mL/Hr IV Intermittent (02-08-19 @ 05:56)   100 mL/Hr IV Intermittent (02-07-19 @ 21:31)   100 mL/Hr IV Intermittent (02-07-19 @ 16:38)    piperacillin/tazobactam IVPB.   25 mL/Hr IV Intermittent (02-19-19 @ 00:20)   25 mL/Hr IV Intermittent (02-18-19 @ 18:33)   25 mL/Hr IV Intermittent (02-18-19 @ 12:42)   25 mL/Hr IV Intermittent (02-18-19 @ 07:01)   25 mL/Hr IV Intermittent (02-18-19 @ 00:32)   25 mL/Hr IV Intermittent (02-17-19 @ 18:45)   25 mL/Hr IV Intermittent (02-17-19 @ 11:12)   25 mL/Hr IV Intermittent (02-17-19 @ 06:20)   25 mL/Hr IV Intermittent (02-16-19 @ 23:01)   25 mL/Hr IV Intermittent (02-16-19 @ 18:51)   25 mL/Hr IV Intermittent (02-16-19 @ 11:09)   25 mL/Hr IV Intermittent (02-16-19 @ 05:43)   25 mL/Hr IV Intermittent (02-16-19 @ 00:03)   25 mL/Hr IV Intermittent (02-15-19 @ 17:28)   25 mL/Hr IV Intermittent (02-15-19 @ 11:48)   25 mL/Hr IV Intermittent (02-15-19 @ 05:17)   25 mL/Hr IV Intermittent (02-15-19 @ 00:11)   25 mL/Hr IV Intermittent (02-14-19 @ 18:12)   25 mL/Hr IV Intermittent (02-14-19 @ 11:28)   25 mL/Hr IV Intermittent (02-14-19 @ 05:06)   25 mL/Hr IV Intermittent (02-13-19 @ 23:35)   25 mL/Hr IV Intermittent (02-13-19 @ 17:45)   25 mL/Hr IV Intermittent (02-13-19 @ 11:05)   25 mL/Hr IV Intermittent (02-13-19 @ 06:43)   25 mL/Hr IV Intermittent (02-13-19 @ 00:10)   25 mL/Hr IV Intermittent (02-12-19 @ 18:48)   25 mL/Hr IV Intermittent (02-12-19 @ 13:25)   25 mL/Hr IV Intermittent (02-12-19 @ 07:13)   25 mL/Hr IV Intermittent (02-12-19 @ 01:21)    vancomycin  IVPB   250 mL/Hr IV Intermittent (01-22-19 @ 17:24)   250 mL/Hr IV Intermittent (01-21-19 @ 18:31)    vancomycin  IVPB   250 mL/Hr IV Intermittent (02-07-19 @ 16:37)    vancomycin  IVPB   250 mL/Hr IV Intermittent (02-10-19 @ 05:07)   250 mL/Hr IV Intermittent (02-09-19 @ 17:21)   250 mL/Hr IV Intermittent (02-09-19 @ 07:20)   250 mL/Hr IV Intermittent (02-08-19 @ 18:02)   250 mL/Hr IV Intermittent (02-08-19 @ 05:56) LILA VELA  85y, Female      OVERNIGHT EVENTS:  afebrile  lethargic on bipap  repeat CXR with continued RML consolidation    ROS negative except as per above    VITALS:  T(F): 96, Max: 98.9 (02-18-19 @ 21:12)  HR: 116  BP: 97/56  RR: 20Vital Signs Last 24 Hrs  T(C): 35.6 (19 Feb 2019 06:43), Max: 37.2 (18 Feb 2019 21:12)  T(F): 96 (19 Feb 2019 06:43), Max: 98.9 (18 Feb 2019 21:12)  HR: 116 (19 Feb 2019 06:43) (101 - 116)  BP: 97/56 (19 Feb 2019 06:43) (94/56 - 100/55)  BP(mean): --  RR: 20 (19 Feb 2019 06:43) (20 - 20)  SpO2: 94% (19 Feb 2019 06:00) (94% - 97%)    PHYSICAL EXAM:  Gen: Elderly F on BIPAP, lethargic  HEENT: NCAT. EOMI. MMM.   Neck: Supple  CV: RRR  Lungs: poor air entry  Abd: Soft. NTND  Extr: wwp, no edema  Skin: no rash  Neuro: lethargic  Lines: clean        TESTS & MEASUREMENTS:                        8.2    16.88 )-----------( 269      ( 18 Feb 2019 08:33 )             25.7     02-19    135  |  95<L>  |  49<H>  ----------------------------<  177<H>  3.8   |  17  |  2.3<H>    Ca    8.1<L>      19 Feb 2019 07:36  Phos  4.6     02-19  Mg     2.2     02-19              RADIOLOGY & ADDITIONAL TESTS:    ANTIBIOTICS:  cefepime   IVPB   100 mL/Hr IV Intermittent (01-21-19 @ 12:28)    levoFLOXacin  Tablet   250 milliGRAM(s) Oral (02-10-19 @ 18:31)    levoFLOXacin  Tablet   750 milliGRAM(s) Oral (01-22-19 @ 06:02)    levoFLOXacin  Tablet   250 milliGRAM(s) Oral (02-02-19 @ 18:35)   250 milliGRAM(s) Oral (02-01-19 @ 18:22)   250 milliGRAM(s) Oral (01-31-19 @ 18:15)   250 milliGRAM(s) Oral (01-30-19 @ 18:49)    levoFLOXacin IVPB   100 mL/Hr IV Intermittent (01-21-19 @ 12:29)    levoFLOXacin IVPB   50 mL/Hr IV Intermittent (02-18-19 @ 18:31)   50 mL/Hr IV Intermittent (02-17-19 @ 17:48)   50 mL/Hr IV Intermittent (02-16-19 @ 17:43)   50 mL/Hr IV Intermittent (02-15-19 @ 17:27)   50 mL/Hr IV Intermittent (02-14-19 @ 18:12)   50 mL/Hr IV Intermittent (02-13-19 @ 17:54)   50 mL/Hr IV Intermittent (02-12-19 @ 19:11)   50 mL/Hr IV Intermittent (02-11-19 @ 18:18)    linezolid  IVPB   300 mL/Hr IV Intermittent (02-19-19 @ 06:14)   300 mL/Hr IV Intermittent (02-18-19 @ 18:30)   300 mL/Hr IV Intermittent (02-18-19 @ 06:13)   300 mL/Hr IV Intermittent (02-17-19 @ 17:48)   300 mL/Hr IV Intermittent (02-17-19 @ 06:20)   300 mL/Hr IV Intermittent (02-16-19 @ 17:43)   300 mL/Hr IV Intermittent (02-16-19 @ 05:43)   300 mL/Hr IV Intermittent (02-15-19 @ 17:28)   300 mL/Hr IV Intermittent (02-15-19 @ 05:17)   300 mL/Hr IV Intermittent (02-14-19 @ 18:11)   300 mL/Hr IV Intermittent (02-14-19 @ 05:06)   300 mL/Hr IV Intermittent (02-13-19 @ 17:43)   300 mL/Hr IV Intermittent (02-13-19 @ 06:29)   300 mL/Hr IV Intermittent (02-12-19 @ 22:52)   300 mL/Hr IV Intermittent (02-12-19 @ 09:11)   300 mL/Hr IV Intermittent (02-11-19 @ 21:42)    meropenem  IVPB   100 mL/Hr IV Intermittent (01-29-19 @ 06:13)   100 mL/Hr IV Intermittent (01-28-19 @ 21:13)   100 mL/Hr IV Intermittent (01-28-19 @ 15:06)   100 mL/Hr IV Intermittent (01-28-19 @ 06:11)   100 mL/Hr IV Intermittent (01-27-19 @ 21:45)   100 mL/Hr IV Intermittent (01-27-19 @ 16:56)   100 mL/Hr IV Intermittent (01-27-19 @ 05:20)   100 mL/Hr IV Intermittent (01-26-19 @ 21:34)   100 mL/Hr IV Intermittent (01-26-19 @ 14:05)   100 mL/Hr IV Intermittent (01-26-19 @ 05:47)   100 mL/Hr IV Intermittent (01-25-19 @ 21:38)   100 mL/Hr IV Intermittent (01-25-19 @ 14:42)   100 mL/Hr IV Intermittent (01-25-19 @ 06:40)   100 mL/Hr IV Intermittent (01-24-19 @ 21:29)   100 mL/Hr IV Intermittent (01-24-19 @ 14:27)   100 mL/Hr IV Intermittent (01-24-19 @ 05:18)   100 mL/Hr IV Intermittent (01-23-19 @ 22:04)   100 mL/Hr IV Intermittent (01-23-19 @ 14:32)    meropenem  IVPB   100 mL/Hr IV Intermittent (01-23-19 @ 05:04)   100 mL/Hr IV Intermittent (01-22-19 @ 17:32)   100 mL/Hr IV Intermittent (01-22-19 @ 05:16)   100 mL/Hr IV Intermittent (01-21-19 @ 18:32)    meropenem  IVPB   100 mL/Hr IV Intermittent (02-07-19 @ 16:20)    meropenem  IVPB   100 mL/Hr IV Intermittent (02-10-19 @ 13:42)   100 mL/Hr IV Intermittent (02-10-19 @ 05:07)   100 mL/Hr IV Intermittent (02-09-19 @ 21:13)   100 mL/Hr IV Intermittent (02-09-19 @ 13:55)   100 mL/Hr IV Intermittent (02-09-19 @ 06:33)   100 mL/Hr IV Intermittent (02-08-19 @ 21:12)   100 mL/Hr IV Intermittent (02-08-19 @ 13:43)   100 mL/Hr IV Intermittent (02-08-19 @ 05:56)   100 mL/Hr IV Intermittent (02-07-19 @ 21:31)   100 mL/Hr IV Intermittent (02-07-19 @ 16:38)    piperacillin/tazobactam IVPB.   25 mL/Hr IV Intermittent (02-19-19 @ 00:20)   25 mL/Hr IV Intermittent (02-18-19 @ 18:33)   25 mL/Hr IV Intermittent (02-18-19 @ 12:42)   25 mL/Hr IV Intermittent (02-18-19 @ 07:01)   25 mL/Hr IV Intermittent (02-18-19 @ 00:32)   25 mL/Hr IV Intermittent (02-17-19 @ 18:45)   25 mL/Hr IV Intermittent (02-17-19 @ 11:12)   25 mL/Hr IV Intermittent (02-17-19 @ 06:20)   25 mL/Hr IV Intermittent (02-16-19 @ 23:01)   25 mL/Hr IV Intermittent (02-16-19 @ 18:51)   25 mL/Hr IV Intermittent (02-16-19 @ 11:09)   25 mL/Hr IV Intermittent (02-16-19 @ 05:43)   25 mL/Hr IV Intermittent (02-16-19 @ 00:03)   25 mL/Hr IV Intermittent (02-15-19 @ 17:28)   25 mL/Hr IV Intermittent (02-15-19 @ 11:48)   25 mL/Hr IV Intermittent (02-15-19 @ 05:17)   25 mL/Hr IV Intermittent (02-15-19 @ 00:11)   25 mL/Hr IV Intermittent (02-14-19 @ 18:12)   25 mL/Hr IV Intermittent (02-14-19 @ 11:28)   25 mL/Hr IV Intermittent (02-14-19 @ 05:06)   25 mL/Hr IV Intermittent (02-13-19 @ 23:35)   25 mL/Hr IV Intermittent (02-13-19 @ 17:45)   25 mL/Hr IV Intermittent (02-13-19 @ 11:05)   25 mL/Hr IV Intermittent (02-13-19 @ 06:43)   25 mL/Hr IV Intermittent (02-13-19 @ 00:10)   25 mL/Hr IV Intermittent (02-12-19 @ 18:48)   25 mL/Hr IV Intermittent (02-12-19 @ 13:25)   25 mL/Hr IV Intermittent (02-12-19 @ 07:13)   25 mL/Hr IV Intermittent (02-12-19 @ 01:21)    vancomycin  IVPB   250 mL/Hr IV Intermittent (01-22-19 @ 17:24)   250 mL/Hr IV Intermittent (01-21-19 @ 18:31)    vancomycin  IVPB   250 mL/Hr IV Intermittent (02-07-19 @ 16:37)    vancomycin  IVPB   250 mL/Hr IV Intermittent (02-10-19 @ 05:07)   250 mL/Hr IV Intermittent (02-09-19 @ 17:21)   250 mL/Hr IV Intermittent (02-09-19 @ 07:20)   250 mL/Hr IV Intermittent (02-08-19 @ 18:02)   250 mL/Hr IV Intermittent (02-08-19 @ 05:56)

## 2019-02-19 NOTE — PROGRESS NOTE ADULT - SUBJECTIVE AND OBJECTIVE BOX
[facility] NEPHROLOGY FOLLOW UP NOTE  --------------------------------------------------------------------------------  24 hour events/subjective: Patient examined. Appears comfortable.    PAST HISTORY  --------------------------------------------------------------------------------  No significant changes to PMH, PSH, FHx, SHx, unless otherwise noted    ALLERGIES & MEDICATIONS  --------------------------------------------------------------------------------  Allergies    No Known Allergies    Standing Inpatient Medications  ALBUTerol    90 MICROgram(s) HFA Inhaler 1 Puff(s) Inhalation every 4 hours  ALBUTerol/ipratropium for Nebulization 3 milliLiter(s) Nebulizer every 6 hours  aspirin enteric coated 81 milliGRAM(s) Oral <User Schedule>  atorvastatin 10 milliGRAM(s) Oral at bedtime  buDESOnide 160 MICROgram(s)/formoterol 4.5 MICROgram(s) Inhaler 2 Puff(s) Inhalation two times a day  chlorhexidine 4% Liquid 1 Application(s) Topical <User Schedule>  cilostazol 100 milliGRAM(s) Oral two times a day  clopidogrel Tablet 75 milliGRAM(s) Oral daily  docusate sodium 100 milliGRAM(s) Oral two times a day  heparin  Injectable 5000 Unit(s) SubCutaneous every 8 hours  levETIRAcetam  IVPB 500 milliGRAM(s) IV Intermittent every 12 hours  metoprolol tartrate 25 milliGRAM(s) Oral two times a day  nystatin Powder 1 Application(s) Topical two times a day  pantoprazole    Tablet 40 milliGRAM(s) Oral before breakfast  predniSONE   Tablet 10 milliGRAM(s) Oral daily  senna 2 Tablet(s) Oral at bedtime  tiotropium 18 MICROgram(s) Capsule 1 Capsule(s) Inhalation daily    PRN Inpatient Medications  acetaminophen   Tablet .. 650 milliGRAM(s) Oral every 6 hours PRN  ALBUTerol/ipratropium for Nebulization 3 milliLiter(s) Nebulizer every 6 hours PRN  guaiFENesin    Syrup 100 milliGRAM(s) Oral every 6 hours PRN  lactulose Syrup 10 Gram(s) Oral every 6 hours PRN    VITALS/PHYSICAL EXAM  --------------------------------------------------------------------------------  T(C): 35.6 (02-19-19 @ 06:43), Max: 37.2 (02-18-19 @ 21:12)  HR: 116 (02-19-19 @ 06:43) (101 - 116)  BP: 97/56 (02-19-19 @ 06:43) (94/56 - 100/55)  RR: 20 (02-19-19 @ 06:43) (20 - 20)  SpO2: 94% (02-19-19 @ 06:00) (94% - 97%)    Physical Exam:  	Gen: NAD  	Pulm: CTA B/L  	CV: RRR, S1S2  	Abd: +BS, soft, nontender/nondistended  	: No suprapubic tenderness  	LE: Warm, no edema    LABS/STUDIES  --------------------------------------------------------------------------------              8.2    16.88 >-----------<  269      [02-18-19 @ 08:33]              25.7     135  |  95  |  49  ----------------------------<  177      [02-19-19 @ 07:36]  3.8   |  17  |  2.3        Ca     8.1     [02-19-19 @ 07:36]      Mg     2.2     [02-19-19 @ 07:36]      Phos  4.6     [02-19-19 @ 07:36]    Creatinine Trend:  SCr 2.3 [02-19 @ 07:36]  SCr 1.7 [02-18 @ 08:33]  SCr 1.7 [02-17 @ 08:47]  SCr 1.4 [02-16 @ 06:45]  SCr 1.2 [02-15 @ 12:30]    Urinalysis - [01-24-19 @ 04:30]      Color Red / Appearance Turbid / SG >=1.030 / pH 5.0      Gluc 100 / Ketone 40  / Bili Large / Urobili 1.0       Blood Large / Protein >=300 / Leuk Est Moderate / Nitrite Positive      RBC >50 / WBC  / Hyaline  / Gran 3-5 / Sq Epi  / Non Sq Epi  / Bacteria Moderate    HbA1c 5.7      [01-11-19 @ 07:14]  Lipid: chol 171, TG 43, HDL 90, LDL 78      [01-11-19 @ 07:14]

## 2019-02-19 NOTE — PROGRESS NOTE ADULT - SUBJECTIVE AND OBJECTIVE BOX
85yFemale with diagnosis: SEPSIS;PNEUMONIA;CHF;ELEVATED TROPONIN      Patient seen, chart reviewed, case d/w Pulm.  No significant improvement in status despite change in IV Abx. Pt now dependent on BIPAP, rapidly desaturates with attempted removal. Remains lethargic, arousable. No po intake. No pain/distress.      PHYSICAL EXAM  unchanged      T(C): , Max: 37.2 (21:12)  T(F): 96  HR: 113 (106 - 116)  BP: 110/64 (72/44 - 110/64)  RR: 18 (18 - 20)  SpO2: 94% (94% - 94%)              LABS:                          8.3    17.58 )-----------( 208      ( 19 Feb 2019 11:36 )             25.2                                                                                      02-19    135  |  95<L>  |  49<H>  ----------------------------<  177<H>  3.8   |  17  |  2.3<H>    Ca    8.1<L>      19 Feb 2019 07:36  Phos  4.6     02-19  Mg     2.2     02-19    TPro  4.9<L>  /  Alb  3.1<L>  /  TBili  0.5  /  DBili  0.2  /  AST  44<H>  /  ALT  79<H>  /  AlkPhos  75  02-19                                                      MEDICATIONS  (STANDING):  ALBUTerol    90 MICROgram(s) HFA Inhaler 1 Puff(s) Inhalation every 4 hours  ALBUTerol/ipratropium for Nebulization 3 milliLiter(s) Nebulizer every 6 hours  aspirin enteric coated 81 milliGRAM(s) Oral <User Schedule>  atorvastatin 10 milliGRAM(s) Oral at bedtime  buDESOnide 160 MICROgram(s)/formoterol 4.5 MICROgram(s) Inhaler 2 Puff(s) Inhalation two times a day  chlorhexidine 4% Liquid 1 Application(s) Topical <User Schedule>  cilostazol 100 milliGRAM(s) Oral two times a day  clopidogrel Tablet 75 milliGRAM(s) Oral daily  docusate sodium 100 milliGRAM(s) Oral two times a day  heparin  Injectable 5000 Unit(s) SubCutaneous every 8 hours  levETIRAcetam  IVPB 500 milliGRAM(s) IV Intermittent every 12 hours  metoprolol tartrate 25 milliGRAM(s) Oral two times a day  nystatin Powder 1 Application(s) Topical two times a day  pantoprazole    Tablet 40 milliGRAM(s) Oral before breakfast  predniSONE   Tablet 10 milliGRAM(s) Oral daily  senna 2 Tablet(s) Oral at bedtime  tiotropium 18 MICROgram(s) Capsule 1 Capsule(s) Inhalation daily    MEDICATIONS  (PRN):  acetaminophen   Tablet .. 650 milliGRAM(s) Oral every 6 hours PRN Temp greater or equal to 38C (100.4F), Moderate Pain (4 - 6)  ALBUTerol/ipratropium for Nebulization 3 milliLiter(s) Nebulizer every 6 hours PRN Shortness of Breath and/or Wheezing  guaiFENesin    Syrup 100 milliGRAM(s) Oral every 6 hours PRN Cough  lactulose Syrup 10 Gram(s) Oral every 6 hours PRN constipation

## 2019-02-19 NOTE — PROGRESS NOTE ADULT - ASSESSMENT
Acute on chronic hypoxic respiratory failure  Copd exacerbation,   Pneumonia s/p abx course  Pulmonary nodule  CHF  Anxiety  Bladder mass   Advanced directives-DNR/DNI      oxygen per pulse oximetry,  cont bipap for now, try to place on hfnc  prednisone 10 mg daily  continue symbicort and spiriva  albuterol nebs  repeat cxr now please  avoid volume overload  xanax 0.125 q 12hours prn  discussed with family at bed side   overall grim prognosis  Advanced directives - dnr/dni  d/w primary team

## 2019-02-19 NOTE — PROGRESS NOTE ADULT - ASSESSMENT
1. CKD III-stable  2. Bladder mass/hematuria  3. DVT  4. HTN  5. PNA  6. R heart failure  7. Pulmonary HTN  8. Hypernatremia    Plan:  Abx   No significant improvement in respiratory status  Renal function worse likely secondary to hypotension  Daily BMP  Poor prognosis  D/W family

## 2019-02-19 NOTE — PROGRESS NOTE ADULT - ASSESSMENT
4 yo F, CCU Downgrade, PMHx COPD on 2 L NC, PVD, CKD3, HTN, DLD, hx of CVA, ex-smoker, recently discharged from [facility] on 01/13 after PNA tx w/Levaquin and Prednisone, presented/w SOB and dysuria. She was doing well until 01/20 when she starting feeling SOB again.     Pt was admitted to CCU for acute on chronic hypoxic and chronic hypercapnic respiratory failure 2/2 COPD exacerbation likely due to RLL GNR PNA exacerbated by coronavirus coinfection. She was on BiPaP prn, solumedrol, bronchodilators and Meropenem. She was treated for distal DVT and subsequently developed hematuria. Further w/u was positive for moderate ascites (2D echo), +UA (negative urine and blood cultures). MRSA and Flu were negative.       Today Events:, c/w medical management, maintain on 10mg PO prednisone, likely developing ATN 2/2 hypotensive injury,  CXR worsening, on BIpap not tolerating high flow desaturating 50%, need palliative follow up .     IMPRESSION   RLL PNA  vs other Unknown Etiology not excluding CA  COPD exacerbation  New ATN        Plan  # Acute on chronic hypoxic and chronic hypercapnic respiratory failure exacerbation etiology Unknown    - maintain  88-92 o2sat   -  c/w   10mg  daily  prednisone   - pulm following   - ID following   - Levo IV, Unasyn IV and Zyvox IV. ending on  02/18  completed   - albuterol Neb patient    -  CT chest with con showing RLL consolidation - no invasive procedures   - CXR  worsening opacities   - BIPAP as needed   - Bipap     # ATN  likely 2/2 hypotensive ep  - 1L IVF as per attending       # HFpEF:   ECHO 1/22- EF 55-60%, severe pulm HTN, decreased RV systolic fx, RV vol and pressure overload,   RV enlarged, severe mitral annular calcification    - Metoprolol    # Right Tibial vein DVT: Stable  Duplex LE- Right post tibial DVT  No need for therapeutic AC as per Dr Dunham- will repeat Duplex unremarkable    - ppx heparin     # Hematuria: Resolved  Cont prophylactic heparin   HGB stable  CT Urogram: 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  1.2 cm right hepatic lobe lesion with arterial enhancement, incompletely characterized but could be a benign lesion. Consider nonemergent MRI of the abdomen with contrast if clinically indicated.  - OP f/u urology        # Transaminitis: waxing and waning  RUQ US: negative    # Constipation:   Cont. Senna, Colace  lactulose PRN     Electrolyte Imbalances: Correct as needed  []  Hyponatremia   /   Hypernatremia  []   []  Hypokalemia   /   Hyperkalemia  []   []  Hypochlorhydria   /    Hypochlorhydria  []   []  Hypomagnesemia   /   Hypermagnesemia  []   []  Hypophosphatemia   /   Hyperphosphatemia  []       GI ppx:                                   [] Not indicated   /   [x] Pantoprazole 40mg PO Daily    DVT ppx:  [] Not indicated / [x] Heparin 5000mg SubQ / [] Lovenox 40mg SubQ / [] SCDs    Fluids:   [x] PO  |  [x] IVF one L only     Activity:  [X] Assisatnce needed   [X] Increase as Tolerated  /  [x] OOB w/ assist  /  [] Bed Rest      DISPO:   attempting NH placement after optimization   [ ] From Home     [ x] NH/SNF   [ ] 4A Rehab  [ ] Detox Clinic  [X] Plan Discussion with patient and/or family.  [X] Discussed Case and Plan with the Medical Attending.    CODE STATUS  [] FULL   /    [X] DNR/DNI

## 2019-02-20 NOTE — DISCHARGE NOTE FOR THE EXPIRED PATIENT - HOSPITAL COURSE
Ms. Raymundo is a 84 yo F, CCU Downgrade, PMHx COPD on 2 L NC, PVD, CKD3, HTN, DLD, hx of CVA, ex-smoker, recently discharged from [facility] on  after PNA tx w/Levaquin and Prednisone, presented/w SOB and dysuria. She was doing well until  when she starting feeling SOB again. Pt was admitted to CCU for acute on chronic hypoxic and chronic hypercapnic respiratory failure 2/2 COPD exacerbation likely due to RLL GNR PNA exacerbated by coronavirus coinfection. She was on BiPaP prn, solumedrol, bronchodilators and Meropenem. She was treated for distal DVT and subsequently developed hematuria. On imaging she was found  have a suspicious bladder mass.  Further w/u was positive for moderate ascites (2D echo), +UA (negative urine and blood cultures). MRSA and Flu were negative. CT chest showed extensive right lower lobe consolidation. Repeat CXR showed worsening opacities.  Respiratory status continued to deteriorate, even after another round of antibiotic. She was placed on BIPAP as it became more difficult to maintain saturation.   On the day of expiration patient had  continued  medical management, she had sub specialties from pulmonology, Nephrology and Infectious diseases. She  on 19 at 09:32 2/2 cardiopulmonary compromised.

## 2019-02-20 NOTE — PROGRESS NOTE ADULT - REASON FOR ADMISSION
sob

## 2019-02-20 NOTE — PROGRESS NOTE ADULT - PROVIDER SPECIALTY LIST ADULT
CCU
Cardiology
Cardiology
Infectious Disease
Internal Medicine
Nephrology
Palliative Care
Pulmonology
Urology
Cardiology
Internal Medicine
Urology
Internal Medicine
Internal Medicine
Infectious Disease

## 2019-02-20 NOTE — PROGRESS NOTE ADULT - SUBJECTIVE AND OBJECTIVE BOX
LILA VELA  85y  Female      SUBJECTIVE:  no c/o    Progress Note:    PHYSICAL EXAM:  high flow oxygen in use  lungs-clear  cor reg nl s! & s2  ext-no calf tenderness  neuro- slightly lethargic    REVIEW OF SYSTEMS:    T(C): 35.9 (02-20-19 @ 07:13), Max: 35.9 (02-20-19 @ 07:13)  HR: 103 (02-20-19 @ 07:13) (103 - 113)  BP: 88/55 (02-20-19 @ 07:13) (72/44 - 110/64)  RR: 16 (02-20-19 @ 07:13) (16 - 18)  SpO2: 97% (02-19-19 @ 16:00) (97% - 97%)  Wt(kg): --Vital Signs Last 24 Hrs  T(C): 35.9 (20 Feb 2019 07:13), Max: 35.9 (20 Feb 2019 07:13)  T(F): 96.6 (20 Feb 2019 07:13), Max: 96.6 (20 Feb 2019 07:13)  HR: 103 (20 Feb 2019 07:13) (103 - 113)  BP: 88/55 (20 Feb 2019 07:13) (72/44 - 110/64)  BP(mean): --  RR: 16 (20 Feb 2019 07:13) (16 - 18)  SpO2: 97% (19 Feb 2019 16:00) (97% - 97%)      LABS:                        8.3    17.58 )-----------( 208      ( 19 Feb 2019 11:36 )             25.2   02-19    135  |  95<L>  |  49<H>  ----------------------------<  177<H>  3.8   |  17  |  2.3<H>    Ca    8.1<L>      19 Feb 2019 07:36  Phos  4.6     02-19  Mg     2.2     02-19    TPro  4.9<L>  /  Alb  3.1<L>  /  TBili  0.5  /  DBili  0.2  /  AST  44<H>  /  ALT  79<H>  /  AlkPhos  75  02-19      RADIOLOGY:  < from: Xray Chest 1 View- PORTABLE-Urgent (02.19.19 @ 10:51) >    EXAM:  XR CHEST PORTABLE URGENT 1V            PROCEDURE DATE:  02/19/2019            INTERPRETATION:  Clinical History / Reason for exam: Worsening shortness   of breath.    Comparison : Chest radiograph February 16, 2019.    Technique/Positioning:Single AP view.    Findings:    Support devices: None.    Cardiac/mediastinum/hilum: Unchanged.    Lung parenchyma/Pleura: Unchanged right perihilar and bibasilar   opacities. No pneumothorax.    Skeleton/soft tissues: Unchanged.    Impression:      Unchanged right perihilar and bibasilar opacities.                      ELLIOT LANDAU M.D., ATTENDING RADIOLOGIST  This document has been electronically signed. Feb 19 2019 11:47AM        < end of copied text >      IMPRESSION:    ACUTE HYPOXIC  RESPIRATORY FAILURE-CLINICALLY PATIENT WORSE   PNEUMONIA-   VOLUME DEPLETED  PULMONARY HYPERTENSION  RIGHT SIDED CHF  RIGHT TIBIAL VEIN THROMBOSIS-CHRONIC  BLADDER TUMOR  ANEMIA-STABLE  LEUKOCYTOSIS-DUE TO STEROIDS-IMPROVING  HEMATURIA DUE TO BLADDER TUMOR  CKD-STAGE 3 GETTING WORSE  HYPERNATREMIA  OLD CVA  AORTO-FEM BYPASS  RIGHT CAROTID STENT  PVD  ABNORMAL LFT'S DUE TO MEDS AND INFECTION  FAMILY AT BEDSIDE.AWARE OF VERY POOR  PROGNOSIS          PLAN:  BIPAP AND HIGH FLOW OXYGEN  ANTIBIOTICS AS PER ID  PATIENT DNI/DNR AS PER FAMILY   MONITOR I& O'S AND BMP AND LFT'S  PALLIATIVE CARE  NOTE  AND CONSULT APPRECIATED  FAMILY TO CONSIDER HOSPICE BUT NOT INTERESTED AT THIS TIME    I SPENT 30 MINS.  EXAMINING,EVALUATING ,COUNSELING PATIENT AND FAMILY AND COORDINATING CARE WITH RESIDENT

## 2019-12-11 NOTE — PATIENT PROFILE ADULT. - SPECIFY REASON UNABLE TO ASSESS:
Implemented All Fall with Harm Risk Interventions:  Lynn to call system. Call bell, personal items and telephone within reach. Instruct patient to call for assistance. Room bathroom lighting operational. Non-slip footwear when patient is off stretcher. Physically safe environment: no spills, clutter or unnecessary equipment. Stretcher in lowest position, wheels locked, appropriate side rails in place. Provide visual cue, wrist band, yellow gown, etc. Monitor gait and stability. Monitor for mental status changes and reorient to person, place, and time. Review medications for side effects contributing to fall risk. Reinforce activity limits and safety measures with patient and family. Provide visual clues: red socks.
pt unsure

## 2020-12-02 NOTE — H&P ADULT - HISTORY OF PRESENT ILLNESS
PCP: Felicia Gloria PA-C    Chief complaint   Chief Complaint   Patient presents with   • Shortness of Breath   • Cough       HPI  Patient is a 69 y.o. FEMALE presents with a history of paroxysmal SVT who presents to the ER due to shortness of air, fatigue, nausea vomiting diarrhea.  Patient states that she has had shortness of air mostly with exertion for the last 3 to 4 days.  She had fatigue and malaise.  She said a headache moderate to severe, Tylenol has not seemed to make it better, fever makes it worse, frontal, no radiation, associated with some nausea and some vomiting and she had a diarrhea in an accident today.  She said decreased oral intake and she can only keep down a little bit of water.  She denies any inhalers or nebulizers or oxygen at home.  She says she is been unable to keep her medications down the last couple days.  Patient denies any chest pain.    After evaluating the patient and while I was still in the ER the nurse came up to me and said that the patient had a few minute run of tachycardia of heart rate into the 170s it was regular and narrow complex and by the time she got her PPE on the patient had converted back.      PAST MEDICAL HISTORY  Past Medical History:   Diagnosis Date   • Disease of thyroid gland    • Elevated cholesterol    • History of indigestion    • History of staph infection 1980    RIGHT LEG 1980;  DENIES MRSA/NO LONGTERM ABX/ISOLATION   • History of transfusion    • Hyperlipidemia     DIET CONTROLLED   • Hypertension    • Kidney stone    • Mitral regurgitation     Trace   • Osteoarthritis of knee    • Osteoarthritis, multiple sites 08/18/2010   • PSVT (paroxysmal supraventricular tachycardia) (CMS/HCC)    • Renal disorder    • Tricuspid regurgitation     Trace   • Vitamin D deficiency        PAST SURGICAL HISTORY  Past Surgical History:   Procedure Laterality Date   • CATARACT EXTRACTION     • FRACTURE SURGERY      left wrist   • JOINT REPLACEMENT     • LAPAROSCOPIC  TUBAL LIGATION     • DE TOTAL KNEE ARTHROPLASTY Right 8/15/2016    Procedure: RT TOTAL KNEE ARTHROPLASTY;  Surgeon: Marlon Wills MD;  Location: [facility] OR;  Service: Orthopedics   • THYROID SURGERY      thyroid nodule removed--removed about a third of her thyroid   • TONSILLECTOMY     • WRIST SURGERY Left 2003       FAMILY HISTORY  Family History   Problem Relation Age of Onset   • Cirrhosis Father        SOCIAL HISTORY  Social History     Tobacco Use   • Smoking status: Former Smoker     Packs/day: 1.00     Years: 20.00     Pack years: 20.00     Types: Cigarettes     Quit date: 2015     Years since quittin.7   • Smokeless tobacco: Never Used   • Tobacco comment: CAFFEINE USE   Substance Use Topics   • Alcohol use: Yes     Alcohol/week: 1.0 standard drinks     Types: 1 Glasses of wine per week     Comment: OCCASIONAL   • Drug use: No       MEDICATIONS:  Medications Prior to Admission   Medication Sig Dispense Refill Last Dose   • amLODIPine (NORVASC) 5 MG tablet Take 1 tablet by mouth Daily. For BP 90 tablet 1 Past Week at Unknown time   • Cholecalciferol 1.25 MG (16502 UT) tablet One PO once weekly 5 tablet 11 Past Week at Unknown time   • Cyanocobalamin (VITAMIN B-12) 1000 MCG sublingual tablet DISSOLVE 1 LOZENGE BY MOUTH ONCE DAILY 30 each 0 Past Week at Unknown time   • folic acid (FOLVITE) 1 MG tablet Take 1 tablet by mouth once daily 90 tablet 0 Past Week at Unknown time   • levothyroxine (SYNTHROID, LEVOTHROID) 25 MCG tablet Take 1 tablet by mouth Daily. One PO daily before breakfast for thyroid.  Skip  dose. 30 tablet 11 Past Week at Unknown time   • metoprolol succinate XL (TOPROL-XL) 25 MG 24 hr tablet Take 1 tablet by mouth Daily. For heart and BP 90 tablet 1 Past Week at Unknown time   • rosuvastatin (CRESTOR) 20 MG tablet TAKE 1 TABLET BY MOUTH ONCE DAILY FOR CHOLESTEROL 30 tablet 0 Past Week at Unknown time       Allergies:  Sulfa antibiotics, Ciprofloxacin, Levofloxacin, and  "Macrodantin [nitrofurantoin]    Review of Systems:   fevers, fatigue  shortness of air  diarrhea, nausea, vomiting,  Negative for following (except as per HPI):  Constitution: chills,  Eyes: change of vision, loss of vision and discharge  ENT: ear drainage, ear ringing and facial trauma  Respiratory: cough, pleuritic pain,   Cardiovascular: chest pressure, pain, lower extremity edema, palpitations  Gastrointestinal: constipation,  pain    Integument: rash and wound  Hematologic / Lymphatic: excessive bleeding and easy bruising  Musculoskeletal: joint pain, joint stiffness, joint swelling and muscle pain  Neurological: , numbness, seizures and tremors  Behavioral / Psych: anxiety, depression and hallucinations         Vital Signs  Temp:  [97.4 °F (36.3 °C)-99.1 °F (37.3 °C)] 97.4 °F (36.3 °C)  Heart Rate:  [] 84  Resp:  [16-20] 20  BP: (157-163)/() 157/110   SpO2 Readings from Last 3 Encounters:   12/02/20 92.% on 2L 02           .result    Flowsheet Rows      First Filed Value   Admission Height  167.6 cm (66\") Documented at 12/02/2020 1205   Admission Weight  81.6 kg (180 lb) Documented at 12/02/2020 1205         Body mass index is 31.99 kg/m².    Physical Exam:  General Appearance:    Alert, cooperative, in mild respiratory distress and speaking in short sentences   Head:    Normocephalic, without obvious abnormality, atraumatic   Eyes:         conjunctivae and sclerae normal, no icterus, PERRLA   ENT:    Ears grossly intact, oral mucosa dry, nasal cannula, mask   Neck:   No adenopathy, supple, trachea midline,    Back:     Normal to inspection, range of motion normal   Lungs:    Occasional rhonchi, decreased in the bases, tachypneic, expiratory wheezing bilaterally,regular respirations even and        mildly labored    Heart:   Tachycardic but regular,  no murmur, normal S1 and S2,   Abdomen:     Normal bowel sounds, no masses,  soft non-tender, non-distended, obese   Extremities:   Moves all " extremities well, no cyanosis, no edema,             Pulses:   Pulses palpable and equal bilaterally   Skin:   No bleeding, rash, bruising    Neurologic:    Psych:   Cranial nerves 2 - 12 grossly intact, sensation grossly intact,     Moves all extremities well, equal bilateral strength4/5    Alert and Oriented x 3, Normal Affect     I used full protective equipment while examining this patient.  This includes face mask /protective eyewear and protective gown when appropriate.  I washed my hands before entering the room and immediately upon leaving the room.    LABS:  Admission on 12/02/2020   Component Date Value Ref Range Status   • Glucose 12/02/2020 109* 65 - 99 mg/dL Final   • BUN 12/02/2020 33* 8 - 23 mg/dL Final   • Creatinine 12/02/2020 1.61* 0.57 - 1.00 mg/dL Final   • Sodium 12/02/2020 134* 136 - 145 mmol/L Final   • Potassium 12/02/2020 4.0  3.5 - 5.2 mmol/L Final   • Chloride 12/02/2020 99  98 - 107 mmol/L Final   • CO2 12/02/2020 18.3* 22.0 - 29.0 mmol/L Final   • Calcium 12/02/2020 9.9  8.6 - 10.5 mg/dL Final   • Total Protein 12/02/2020 8.1  6.0 - 8.5 g/dL Final   • Albumin 12/02/2020 4.20  3.50 - 5.20 g/dL Final   • ALT (SGPT) 12/02/2020 12  1 - 33 U/L Final   • AST (SGOT) 12/02/2020 24  1 - 32 U/L Final   • Alkaline Phosphatase 12/02/2020 148* 39 - 117 U/L Final   • Total Bilirubin 12/02/2020 0.5  0.0 - 1.2 mg/dL Final   • eGFR Non African Amer 12/02/2020 32* >60 mL/min/1.73 Final   • Globulin 12/02/2020 3.9  gm/dL Final   • A/G Ratio 12/02/2020 1.1  g/dL Final   • BUN/Creatinine Ratio 12/02/2020 20.5  7.0 - 25.0 Final   • Anion Gap 12/02/2020 16.7* 5.0 - 15.0 mmol/L Final   • LDH 12/02/2020 318* 135 - 214 U/L Final   • Procalcitonin 12/02/2020 0.26* 0.00 - 0.25 ng/mL Final   • C-Reactive Protein 12/02/2020 28.19* 0.00 - 0.50 mg/dL Final   • Lactate 12/02/2020 1.9  0.5 - 2.0 mmol/L Final   • Ferritin 12/02/2020 436.00* 13.00 - 150.00 ng/mL Final   • QT Interval 12/02/2020 327  ms Preliminary   • WBC  12/02/2020 12.09* 3.40 - 10.80 10*3/mm3 Final   • RBC 12/02/2020 5.45* 3.77 - 5.28 10*6/mm3 Final   • Hemoglobin 12/02/2020 16.1* 12.0 - 15.9 g/dL Final   • Hematocrit 12/02/2020 48.5* 34.0 - 46.6 % Final   • MCV 12/02/2020 89.0  79.0 - 97.0 fL Final   • MCH 12/02/2020 29.5  26.6 - 33.0 pg Final   • MCHC 12/02/2020 33.2  31.5 - 35.7 g/dL Final   • RDW 12/02/2020 14.4  12.3 - 15.4 % Final   • RDW-SD 12/02/2020 46.1  37.0 - 54.0 fl Final   • MPV 12/02/2020 10.3  6.0 - 12.0 fL Final   • Platelets 12/02/2020 325  140 - 450 10*3/mm3 Final   • Neutrophil % 12/02/2020 81.3* 42.7 - 76.0 % Final   • Lymphocyte % 12/02/2020 8.9* 19.6 - 45.3 % Final   • Monocyte % 12/02/2020 9.0  5.0 - 12.0 % Final   • Eosinophil % 12/02/2020 0.0* 0.3 - 6.2 % Final   • Basophil % 12/02/2020 0.2  0.0 - 1.5 % Final   • Immature Grans % 12/02/2020 0.6* 0.0 - 0.5 % Final   • Neutrophils, Absolute 12/02/2020 9.82* 1.70 - 7.00 10*3/mm3 Final   • Lymphocytes, Absolute 12/02/2020 1.08  0.70 - 3.10 10*3/mm3 Final   • Monocytes, Absolute 12/02/2020 1.09* 0.10 - 0.90 10*3/mm3 Final   • Eosinophils, Absolute 12/02/2020 0.00  0.00 - 0.40 10*3/mm3 Final   • Basophils, Absolute 12/02/2020 0.03  0.00 - 0.20 10*3/mm3 Final   • Immature Grans, Absolute 12/02/2020 0.07* 0.00 - 0.05 10*3/mm3 Final   • nRBC 12/02/2020 0.0  0.0 - 0.2 /100 WBC Final   • Site 12/02/2020 Arterial: right radial   Final   • Kb's Test 12/02/2020 Positive   Final   • pH, Arterial 12/02/2020 7.383  7.350 - 7.450 pH units Final   • pCO2, Arterial 12/02/2020 27.1* 35.0 - 45.0 mm Hg Final   • pO2, Arterial 12/02/2020 79.5* 80.0 - 100.0 mm Hg Final   • HCO3, Arterial 12/02/2020 16.2* 22.0 - 28.0 mmol/L Final   • Base Excess, Arterial 12/02/2020 -7.1* 0.0 - 2.0 mmol/L Final   • O2 Saturation Calculated 12/02/2020 95.7  92.0 - 99.0 % Final   • Barometric Pressure for Blood Gas 12/02/2020 760.6  mmHg Final   • Modality 12/02/2020 Cannula   Final   • Flow Rate 12/02/2020 1  lpm Final   • Set  84 yo F with PMHx of COPD on 2 L NC, PVD, CKD3, essential HTN, DLD, hx of CVA, recent admission in September 2018 for TIA who presented with worsening shortness of breath x 3 days. Normally, she is able to ambulate with oxygen without issue [facility] Resp Rate 12/02/2020 26   Final       DIAGNOSTICS:  Xr Chest Ap    Result Date: 12/2/2020  Hazy peripheral predominant infiltrates within the right mid to lower lung consistent with COVID-19 infiltrates.  This report was finalized on 12/2/2020 1:01 PM by Dr. Flako Clements M.D.             Results Review:   I reviewed the patient's new clinical results.  Discussed with ER physician  I personally viewed and interpreted the patient's EKG/Telemetry data sinus rhythm but then patient had a couple minute run of SVT into the 170s regular narrow complex  Old records reviewed   Echo and September 2015 showed EF of 62% and diastolic dysfunction grade 1    ASSESSMENT AND PLAN         ·  Acute respiratory failure with hypoxia /  Pneumonia due to COVID-19 virus/ Sepsis  -Monitor patient closely,  Monitor continuous O2 saturation  - symptom day  4-5     -Hydrate- IVF  -Encourage hydration, and nutrition- protein supplement drinks  -Antipyretics and analgesics ordered, PRN cough medication, michelle-burger muscle aches, ice and tylenol for HA  --Protein drinks for nutritional supplement advance diet as tolerated  -Daily CK, CRP, ferritin  -Initial evaluation with LDH, troponin, and monitor EKG for QTC-if abnormal will recheck   -Every other day monitor PTT/PT/fibrinogen/d-dimer  -Albuterol MDI as needed; Inhaled steroid BID vale since patient had wheezing  -Oxygen supplementation to keep sats >94   -sats <94% so started oral steroids, and consider patient requiring 2L 02 so IV remdesivir ordered sat  -lovenox Sub Q bid for microemboli prophylaxis    ·   PSVT (paroxysmal supraventricular tachycardia)   -Patient has a history of paroxysmal SVT.  And patient has not had her medications in a couple days due to the nausea vomiting.  Therefore will hydrate patient and restart her beta-blockers.  Monitor troponins    · Nausea vomiting and diarrhea  -Likely due to Covid.  Patient could have nausea vomiting due to pain.  And diarrhea has  been common with Covid patients during the course  -IV fluids, antiemetics, advance diet as tolerated    · Evaded anion gap metabolic acidosis  -Due to dehydration, starvation, and poor renal function  -IV fluids, feed patient as she tolerates.    ·  CKD (chronic kidney disease) stage 4, GFR 15-29 ml/min   -Stable, creatinine clearance around 37 currently.  Just medications as needed.    ·  Hypothyroidism, acquired  -Stable, continue Synthroid.  Patient takes it Monday through Saturday    · Chronic diastolic heart failure  -Monitor daily weights and DC fluids when appropriate      +DVT proph: lovenox twice daily  + Full code    I discussed the patients findings and my recommendations with the patient and/or family.  Please reference all orders placed.  Critical care time [id number], [id number], [id number]  Melissa Valadez MD  12/02/20  18:33 EST       84 yo F with PMHx of COPD on 2 L NC, PVD, CKD3, essential HTN, DLD, hx of CVA, ex-smoker, recent admission in September 2018 for TIA who presented with worsening shortness of breath x 3 days. Normally, she is able to ambulate with oxygen without issue; however, for the past 3 days, she could barely ambulate 30 ft before feeling winded. She felt very short of breath at 6 pm. On arrival, she was 78% on 3 L NC, which improved to 96% on 8 L NC. No need for BiPap. In the ED, she received azithromycin 500 mg x 1, Rocephin 1 g x 1, Mg 2 g x 1, Solumedrol 125 mg x 1, LR 1.5 L x 1, Duoneb x 2. She denied leg swelling, orthopnea, nocturnal dyspnea, fever, chills, N/V/D, denies increased sputum production. She had obtained her pneumovax/flu shots. Her son and daughter-in-law are down with the cold since Saturday-Sunday, and they are only recovering.     She is an ex-smoker, quit in 2005 after her stroke, she had smoked for 30+ years, 1 ppd.

## 2021-04-14 NOTE — DISCHARGE NOTE ADULT - NS AS DC FOLLOWUP INST YN
Detail Level: Detailed Topical Sulfur Applications Pregnancy And Lactation Text: This medication is Pregnancy Category C and has an unknown safety profile during pregnancy. It is unknown if this topical medication is excreted in breast milk. Topical Retinoid Pregnancy And Lactation Text: This medication is Pregnancy Category C. It is unknown if this medication is excreted in breast milk. Bactrim Counseling:  I discussed with the patient the risks of sulfa antibiotics including but not limited to GI upset, allergic reaction, drug rash, diarrhea, dizziness, photosensitivity, and yeast infections.  Rarely, more serious reactions can occur including but not limited to aplastic anemia, agranulocytosis, methemoglobinemia, blood dyscrasias, liver or kidney failure, lung infiltrates or desquamative/blistering drug rashes. Dapsone Counseling: I discussed with the patient the risks of dapsone including but not limited to hemolytic anemia, agranulocytosis, rashes, methemoglobinemia, kidney failure, peripheral neuropathy, headaches, GI upset, and liver toxicity.  Patients who start dapsone require monitoring including baseline LFTs and weekly CBCs for the first month, then every month thereafter.  The patient verbalized understanding of the proper use and possible adverse effects of dapsone.  All of the patient's questions and concerns were addressed. Erythromycin Counseling:  I discussed with the patient the risks of erythromycin including but not limited to GI upset, allergic reaction, drug rash, diarrhea, increase in liver enzymes, and yeast infections. Minocycline Counseling: Patient advised regarding possible photosensitivity and discoloration of the teeth, skin, lips, tongue and gums.  Patient instructed to avoid sunlight, if possible.  When exposed to sunlight, patients should wear protective clothing, sunglasses, and sunscreen.  The patient was instructed to call the office immediately if the following severe adverse effects occur:  hearing changes, easy bruising/bleeding, severe headache, or vision changes.  The patient verbalized understanding of the proper use and possible adverse effects of minocycline.  All of the patient's questions and concerns were addressed. Tetracycline Counseling: Patient counseled regarding possible photosensitivity and increased risk for sunburn.  Patient instructed to avoid sunlight, if possible.  When exposed to sunlight, patients should wear protective clothing, sunglasses, and sunscreen.  The patient was instructed to call the office immediately if the following severe adverse effects occur:  hearing changes, easy bruising/bleeding, severe headache, or vision changes.  The patient verbalized understanding of the proper use and possible adverse effects of tetracycline.  All of the patient's questions and concerns were addressed. Patient understands to avoid pregnancy while on therapy due to potential birth defects. Doxycycline Pregnancy And Lactation Text: This medication is Pregnancy Category D and not consider safe during pregnancy. It is also excreted in breast milk but is considered safe for shorter treatment courses. High Dose Vitamin A Counseling: Side effects reviewed, pt to contact office should one occur. Spironolactone Pregnancy And Lactation Text: This medication can cause feminization of the male fetus and should be avoided during pregnancy. The active metabolite is also found in breast milk. Include Pregnancy/Lactation Warning?: No Isotretinoin Pregnancy And Lactation Text: This medication is Pregnancy Category X and is considered extremely dangerous during pregnancy. It is unknown if it is excreted in breast milk. High Dose Vitamin A Pregnancy And Lactation Text: High dose vitamin A therapy is contraindicated during pregnancy and breast feeding. Tazorac Pregnancy And Lactation Text: This medication is not safe during pregnancy. It is unknown if this medication is excreted in breast milk. Azithromycin Counseling:  I discussed with the patient the risks of azithromycin including but not limited to GI upset, allergic reaction, drug rash, diarrhea, and yeast infections. Isotretinoin Counseling: Patient should get monthly blood tests, not donate blood, not drive at night if vision affected, not share medication, and not undergo elective surgery for 6 months after tx completed. Side effects reviewed, pt to contact office should one occur. Birth Control Pills Pregnancy And Lactation Text: This medication should be avoided if pregnant and for the first 30 days post-partum. Sarecycline Counseling: Patient advised regarding possible photosensitivity and discoloration of the teeth, skin, lips, tongue and gums.  Patient instructed to avoid sunlight, if possible.  When exposed to sunlight, patients should wear protective clothing, sunglasses, and sunscreen.  The patient was instructed to call the office immediately if the following severe adverse effects occur:  hearing changes, easy bruising/bleeding, severe headache, or vision changes.  The patient verbalized understanding of the proper use and possible adverse effects of sarecycline.  All of the patient's questions and concerns were addressed. Benzoyl Peroxide Pregnancy And Lactation Text: This medication is Pregnancy Category C. It is unknown if benzoyl peroxide is excreted in breast milk. Erythromycin Pregnancy And Lactation Text: This medication is Pregnancy Category B and is considered safe during pregnancy. It is also excreted in breast milk. Doxycycline Counseling:  Patient counseled regarding possible photosensitivity and increased risk for sunburn.  Patient instructed to avoid sunlight, if possible.  When exposed to sunlight, patients should wear protective clothing, sunglasses, and sunscreen.  The patient was instructed to call the office immediately if the following severe adverse effects occur:  hearing changes, easy bruising/bleeding, severe headache, or vision changes.  The patient verbalized understanding of the proper use and possible adverse effects of doxycycline.  All of the patient's questions and concerns were addressed. Sarecycline Pregnancy And Lactation Text: This medication is Pregnancy Category D and not consider safe during pregnancy. It is also excreted in breast milk. Benzoyl Peroxide Counseling: Patient counseled that medicine may cause skin irritation and bleach clothing.  In the event of skin irritation, the patient was advised to reduce the amount of the drug applied or use it less frequently.   The patient verbalized understanding of the proper use and possible adverse effects of benzoyl peroxide.  All of the patient's questions and concerns were addressed. Bactrim Pregnancy And Lactation Text: This medication is Pregnancy Category D and is known to cause fetal risk.  It is also excreted in breast milk. Spironolactone Counseling: Patient advised regarding risks of diarrhea, abdominal pain, hyperkalemia, birth defects (for female patients), liver toxicity and renal toxicity. The patient may need blood work to monitor liver and kidney function and potassium levels while on therapy. The patient verbalized understanding of the proper use and possible adverse effects of spironolactone.  All of the patient's questions and concerns were addressed. Dapsone Pregnancy And Lactation Text: This medication is Pregnancy Category C and is not considered safe during pregnancy or breast feeding. Topical Clindamycin Pregnancy And Lactation Text: This medication is Pregnancy Category B and is considered safe during pregnancy. It is unknown if it is excreted in breast milk. Topical Sulfur Applications Counseling: Topical Sulfur Counseling: Patient counseled that this medication may cause skin irritation or allergic reactions.  In the event of skin irritation, the patient was advised to reduce the amount of the drug applied or use it less frequently.   The patient verbalized understanding of the proper use and possible adverse effects of topical sulfur application.  All of the patient's questions and concerns were addressed. Azithromycin Pregnancy And Lactation Text: This medication is considered safe during pregnancy and is also secreted in breast milk. Yes Birth Control Pills Counseling: Birth Control Pill Counseling: I discussed with the patient the potential side effects of OCPs including but not limited to increased risk of stroke, heart attack, thrombophlebitis, deep venous thrombosis, hepatic adenomas, breast changes, GI upset, headaches, and depression.  The patient verbalized understanding of the proper use and possible adverse effects of OCPs. All of the patient's questions and concerns were addressed. Topical Retinoid counseling:  Patient advised to apply a pea-sized amount only at bedtime and wait 30 minutes after washing their face before applying.  If too drying, patient may add a non-comedogenic moisturizer. The patient verbalized understanding of the proper use and possible adverse effects of retinoids.  All of the patient's questions and concerns were addressed. Topical Clindamycin Counseling: Patient counseled that this medication may cause skin irritation or allergic reactions.  In the event of skin irritation, the patient was advised to reduce the amount of the drug applied or use it less frequently.   The patient verbalized understanding of the proper use and possible adverse effects of clindamycin.  All of the patient's questions and concerns were addressed. Tazorac Counseling:  Patient advised that medication is irritating and drying.  Patient may need to apply sparingly and wash off after an hour before eventually leaving it on overnight.  The patient verbalized understanding of the proper use and possible adverse effects of tazorac.  All of the patient's questions and concerns were addressed.

## 2021-08-17 NOTE — ED ADULT TRIAGE NOTE - CCCP TRG CHIEF CMPLNT
difficulty breathing
Note Text (......Xxx Chief Complaint.): This diagnosis correlates with the
Detail Level: Zone
Other (Free Text): Patient takes augmentin and doxycycline daily
Render Risk Assessment In Note?: no

## 2021-10-11 NOTE — DISCHARGE NOTE ADULT - NS MD DC PLAN IMMU FLU REF OTH
AML (acute myeloblastic leukemia) Large pleural effusion Large pleural effusion AML (acute myeloblastic leukemia) Out of season

## 2021-12-25 NOTE — PROGRESS NOTE ADULT - SUBJECTIVE AND OBJECTIVE BOX
OB ALEXANDERJYOTSNAANNLILA  84y  Female      SUBJECTIVE:  c/o feels better    Progress Note:      REVIEW OF SYSTEMS:    T(C): 36.1 (04-14-18 @ 06:11), Max: 36.1 (04-13-18 @ 09:07)  HR: 68 (04-14-18 @ 06:11) (68 - 85)  BP: 113/54 (04-14-18 @ 06:11) (103/53 - 114/74)  RR: 18 (04-14-18 @ 06:11) (18 - 20)  SpO2: 91% (04-13-18 @ 23:54) (77% - 92%)  Wt(kg): --Vital Signs Last 24 Hrs  T(C): 36.1 (14 Apr 2018 06:11), Max: 36.1 (13 Apr 2018 09:07)  T(F): 97 (14 Apr 2018 06:11), Max: 97 (14 Apr 2018 06:11)  HR: 68 (14 Apr 2018 06:11) (68 - 85)  BP: 113/54 (14 Apr 2018 06:11) (103/53 - 114/74)  BP(mean): --  RR: 18 (14 Apr 2018 06:11) (18 - 20)  SpO2: 91% (13 Apr 2018 23:54) (77% - 92%)    PHYSICAL EXAM:  lungs clear  cor reg nl S1 & S2  abd-soft,non tender  ext-no calf tenderness  neuro alert and oriented  LABS:                        12.0   8.43  )-----------( 274      ( 12 Apr 2018 08:50 )             37.7   04-12    146  |  105  |  30<H>  ----------------------------<  127<H>  4.9   |  31  |  1.1    Ca    9.0      12 Apr 2018 08:50  Mg     1.9     04-12        RADIOLOGY:      IMPRESSION:  chf clinically better  copd exaccerbation improved with oxygen and nebulizer rx  pulm. htn.  hypoxia -will need home oxygen  ckd stable  old cva  seizure disorder  cognitive impairment   lung nodule r/o cancer  pad        PLAN:  lasix po  oxygen  nebulizer   dr. gabriela wayne -outpatient w/u for lung nodule including pet scan  will need home oxygen  dvt prophylaxis  discharge planning  outpatient w/u for cognitive impairement  I SPENT 30 MINUTES EVALUATING PATIENT/EXAMINING AND COORDINATING CARE AND DISCUSSING TREAMENT WITH patient OB OB

## 2022-05-04 NOTE — PATIENT PROFILE ADULT. - FUNCTIONAL SCREEN CURRENT LEVEL: DRESSING, MLM
(2) assistive person Tissue Cultured Epidermal Autograft Text: The defect edges were debeveled with a #15c scalpel blade.  Given the location of the defect, shape of the defect and the proximity to free margins a tissue cultured epidermal autograft was deemed most appropriate.  The graft was then trimmed to fit the size of the defect.  The graft was then placed in the primary defect and oriented appropriately.

## 2022-09-18 NOTE — PROGRESS NOTE ADULT - ASSESSMENT
.       History of Present Illness    chief complaint:    Chief Complaint   Patient presents with   • Abdominal Pain    vomiting and diarrhea, fever     History of present illness:  29-year-old male for the last 2 days has had acute onset of illness.  He has had a cute onset of fever with a maximum temperature of 103.7 °F associated with multiple episodes of vomiting and multiple episodes of diarrhea.  He does get some diffuse crampy abdominal pain intermittently.  He states he did not eat or drink hardly anything all day today and was feeling weak and dizzy all day and then this evening was sitting on the toilet and was having a large amount of diarrhea and felt very weak and nauseous so he came to the ER for further evaluation.  Symptoms have been constant and moderate to severe with no alleviating factors.  No recent travel or antibiotic use.  No previous problems with diarrhea or bloody stools.  He denies any abdominal pain currently.         Review of Systems   Constitutional symptoms: As above  ENMT symptoms:  no headache or neck pain.  Right ear pain.  Respiratory symptoms:  No shortness of breath, cough.    Cardiovascular symptoms:  No chest pain, no palpitations.    Gastrointestinal symptoms:  No melena or bright red blood per rectum, as above   genitourinary symptoms:  No dysuria, no hematuria.              Additional review of systems information: All other systems reviewed and otherwise negative.      Health Status   Allergies:  ALLERGIES:  Cefaclor and Penicillins    Medications:    (Not in a hospital admission)        Past Medical/ Family/ Social History     Past Medical History:   Diagnosis Date   • Essential (primary) hypertension         Past Surgical History:   Procedure Laterality Date   • Clavicle surgery          Social history: Drinks alcohol socially, denies tobacco or drug abuse.  Lives with his girlfriend    Family history: Noncontributory       Physical Examination   Visit Vitals  BP (!)  148/94 (BP Location: LUE - Left upper extremity, Patient Position: Semi-Sebastian's)   Pulse 79   Temp 99.1 °F (37.3 °C) (Oral)   Resp 20   Ht 5' 10\" (1.778 m)   Wt 95.4 kg (210 lb 5.1 oz)   SpO2 95%   BMI 30.18 kg/m²      General: Alert, appears stated age. Vital signs reviewed.  ENT: No scalp swelling or tenderness. External auditory canals and tympanic membranes intact.  Oropharynx is clear without lesions.  Airway is patent with a midline uvula.  Dry oral mucosa eyes:  pupils equal and reactive, no scleral icterus or injection  Neck: the trachea is midline.  No lymphadenopathy.  Respiratory/chest:   equal breath sounds bilaterally, no rhonchi or wheezing.  Cardiovascular: Regular rate and rhythm, Normal S1 and S2, No murmur.  Abdomen:   Abdomen soft, nontender, no rebound or guarding, no masses.  bowel sounds are slightly hyperactive.  No hernia.  Skin: no diffuse rashes.  Normal to palpation.  Extremities: Warm and dry.  No peripheral edema.   Neurologic: alert, oriented , face is symmetric, moves all extremities equally      Medical Decision Making   The patient was initially treated with 2 L of intravenous fluids and Zofran and IV Pepcid.     The pt was masked on arrival  Myself and the nurses wore masks, eye protection and gloves for this encounter although there is no reason to suspect covid-19 in this patient, however as we can not test we will take every precaution to prevent asymptomatic transmission to our vulnerable patient population.         Labs  Admission on 09/18/2022   Component Date Value Ref Range Status   • Sodium 09/18/2022 137  135 - 145 mmol/L Final   • Potassium 09/18/2022 3.6  3.4 - 5.1 mmol/L Final   • Chloride 09/18/2022 106  97 - 110 mmol/L Final   • Carbon Dioxide 09/18/2022 24  21 - 32 mmol/L Final   • Anion Gap 09/18/2022 11  7 - 19 mmol/L Final   • Glucose 09/18/2022 114 (A) 70 - 99 mg/dL Final   • BUN 09/18/2022 11  6 - 20 mg/dL Final   • Creatinine 09/18/2022 0.93  0.67 - 1.17 mg/dL  Final   • Glomerular Filtration Rate 09/18/2022 >90  >=60 Final    eGFR results = or >60 mL/min/1.73m2 = Normal kidney function. Estimated GFR calculated using the CKD-EPI-R (2021) equation that does not include race in the creatinine calculation.   • BUN/ Creatinine Ratio 09/18/2022 12  7 - 25 Final   • Calcium 09/18/2022 9.2  8.4 - 10.2 mg/dL Final   • Bilirubin, Total 09/18/2022 0.6  0.2 - 1.0 mg/dL Final   • GOT/AST 09/18/2022 29  <=37 Units/L Final   • GPT/ALT 09/18/2022 64 (A) <64 Units/L Final   • Alkaline Phosphatase 09/18/2022 56  45 - 117 Units/L Final   • Albumin 09/18/2022 3.9  3.6 - 5.1 g/dL Final   • Protein, Total 09/18/2022 7.5  6.4 - 8.2 g/dL Final   • Globulin 09/18/2022 3.6  2.0 - 4.0 g/dL Final   • A/G Ratio 09/18/2022 1.1  1.0 - 2.4 Final   • Lipase 09/18/2022 109  73 - 393 Units/L Final   • COLOR, URINALYSIS 09/18/2022 Yellow   Final   • APPEARANCE, URINALYSIS 09/18/2022 Cloudy   Final   • GLUCOSE, URINALYSIS 09/18/2022 Negative  Negative mg/dL Final   • BILIRUBIN, URINALYSIS 09/18/2022 Negative  Negative Final   • KETONES, URINALYSIS 09/18/2022 Trace (A) Negative mg/dL Final   • SPECIFIC GRAVITY, URINALYSIS 09/18/2022 1.017  1.005 - 1.030 Final   • OCCULT BLOOD, URINALYSIS 09/18/2022 Negative  Negative Final   • PH, URINALYSIS 09/18/2022 5.0  5.0 - 7.0 Final   • PROTEIN, URINALYSIS 09/18/2022 Negative  Negative mg/dL Final   • UROBILINOGEN, URINALYSIS 09/18/2022 0.2  0.2, 1.0 mg/dL Final   • NITRITE, URINALYSIS 09/18/2022 Negative  Negative Final   • LEUKOCYTE ESTERASE, URINALYSIS 09/18/2022 Negative  Negative Final   • WBC 09/18/2022 9.1  4.2 - 11.0 K/mcL Final   • RBC 09/18/2022 4.30 (A) 4.50 - 5.90 mil/mcL Final   • HGB 09/18/2022 14.1  13.0 - 17.0 g/dL Final   • HCT 09/18/2022 39.2  39.0 - 51.0 % Final   • MCV 09/18/2022 91.2  78.0 - 100.0 fl Final   • MCH 09/18/2022 32.8  26.0 - 34.0 pg Final   • MCHC 09/18/2022 36.0  32.0 - 36.5 g/dL Final   • RDW-CV 09/18/2022 11.9  11.0 - 15.0 % Final    • RDW-SD 09/18/2022 39.6  39.0 - 50.0 fL Final   • PLT 09/18/2022 168  140 - 450 K/mcL Final   • NRBC 09/18/2022 0  <=0 /100 WBC Final   • Rapid SARS-COV-2 by PCR 09/18/2022 Not Detected  Not Detected / Detected / Presumptive Positive / Inhibitors present Final   • Influenza A by PCR 09/18/2022 Not Detected  Not Detected Final   • Influenza B by PCR 09/18/2022 Not Detected  Not Detected Final   • RSV BY PCR 09/18/2022 Not Detected  Not Detected Final   • Isolation Guidelines 09/18/2022    Final    Do not use this test result as the sole decision-maker for discontinuation of isolation.   Clinical evaluation should be considered for other respiratory illness requiring transmission-based isolation.    -    No fever (<99.0 F/37.2 C) for at least 24 hours without the use of fever-reducing medications    AND  -    Respiratory symptoms have improved or resolved (e.g. cough, shortness of breath)     AND  -    COVID-19 negative test    See COVID-19 Deisolation Resource Guide   • Procedural Comment 09/18/2022    Final    SARS-COV-2 nucleic acid has not been detected indicating the absence of COVID-19.    This test was performed using the [employer] Xpert Xpress SARS-CoV-2/Flu/RSV RT-PCR test that has been given Emergency Use Authorization (EUA) by the [place] Food and Drug Administration (FDA).  These tests are considered definitive and do not need to be confirmed by another method.   • Neutrophil, Percent 09/18/2022 83  % Final   • Lymphocytes, Percent 09/18/2022 5  % Final   • Mono, Percent 09/18/2022 6  % Final   • Eosinophils, Percent 09/18/2022 0  % Final   • Basophils, Percent 09/18/2022 0  % Final   • Bands, Percent 09/18/2022 4  0 - 10 % Final   • Metamyelocytes, Percent  09/18/2022 2  0 - 2 % Final   • Absolute Neutrophil 09/18/2022 7.9 (A) 1.8 - 7.7 K/mcL Final   • Absolute Lymphocytes 09/18/2022 0.5 (A) 1.0 - 4.8 K/mcL Final   • Absolute Monocytes 09/18/2022 0.5  0.3 - 0.9 K/mcL Final   • Absolute Eosinophils  09/18/2022 0.0  0.0 - 0.5 K/mcL Final   • Absolute Basophils 09/18/2022 0.0  0.0 - 0.3 K/mcL Final   • RBC Morphology 09/18/2022 Normal  Normal Final   • WBC Morphology 09/18/2022 Normal  Normal Final   • Platelet Morphology 09/18/2022 Normal  Normal Final        Reexamination/ Reevaluation      patient currently feeling improved at 5:50 AM.  Vital signs stable.  Denies abdominal pain, abdomen nontender.  He will follow-up closely with Dr Villafana on Tuesday or Wednesday of this week for results of stool studies and was advised to return to ER for intractable vomiting multiple black or bloody bowel movements severe worsening abdominal pain or pain localizing to his right lower abdomen or any new or worsening symptoms     Impression and Plan     Condition: Stable                                                                    Disposition: Home    Final Diagnosis:   1. Generalized abdominal pain     2. Gastroenteritis     3. Dehydration            Summary of your Discharge Medications      Take these Medications      Details   Hyoscyamine Sulfate SL 0.125 MG SL Tab  Commonly known as: Levsin/SL   Place 0.125 mg under the tongue 3 times daily as needed (Abdominal pain or diarrhea).     ondansetron 8 MG disintegrating tablet  Commonly known as: Zofran ODT   Place 1 tablet onto the tongue every 8 hours as needed for Nausea.            Instructions:  No. 1 discharge home.  May alternate 2 extra strength Tylenol's and 3 Motrin every 3 hours as needed for fever or pain.  Levsin as needed for abdominal pain or diarrhea.  Zofran as needed for nausea or vomiting.  No. 2 bland diet including bread, rice, apples, toast.  Avoid dairy products and salty or spicy foods.  Drink plenty of fluids and rest.  No. 3 follow-up with your regular doctor or Dr. So if you do not have a regular doctor in 2-3 days for re-examination and stool study results.  Close observation by others at home.  No. 4 return for intractable vomiting,  84 yo F, CCU Downgrade, PMHx COPD on 2 L NC, PVD, CKD3, HTN, DLD, hx of CVA, ex-smoker, recently discharged from [facility] on 01/13 after PNA tx w/Levaquin and Prednisone, presented/w SOB and dysuria. She was doing well until 01/20 when she starting feeling SOB again.     Pt was admitted to CCU for acute on chronic hypoxic and chronic hypercapnic respiratory failure 2/2 COPD exacerbation likely due to RLL GNR PNA exacerbated by coronavirus coinfection. She was on BiPaP prn, solumedrol, bronchodilators and Meropenem. She was treated for distal DVT and subsequently developed hematuria. Further w/u was positive for moderate ascites (2D echo), +UA (negative urine and blood cultures). MRSA and Flu were negative.       Today Events : CXR today is unchanged,  hypernatremia , DC planing, SNF placement with OP O2.    IMPRESSION   Possible PNA unchanged Xray   COPD exacerbation resolved.       Plan  # Acute on chronic hypoxic and chronic hypercapnic respiratory failure 2/2 COPD exacerbation 2/2 Possible RLL GNR pneumonia exacerbated by coronavirus coinfection: worse  Continue BiPAP and O2 NC 4L  Prednisone 10 bid. Please d/c 02/03  All cxs: Negative  Pulm on board  Chest xray today: unchanged   s/p completion od abx course     # Mild Hypernatremia:   -will encourage free water intake       # Hypoxic episode and troponinemia likely 2/2 worsening RLL PNA  Cardio consult appreciated: recs below  Troponin elevation could be due to type 2 MI  Continue ASA , plavix ,metoprolol and statin.  No cardiac intervention needed at this time.  Consider CT Chest, ID and Pulm reconsult    # Hypertension:  Cont. Furosemide and Metoprolol  Montor BP  Nephrology f/u appreciated: recs noted    # HFpEF: Stable  ECHO 1/22- EF 55-60%, severe pulm HTN, decreased RV systolic fx, RV vol and pressure overload,   RV enlarged, severe mitral annular calcification    Lasix 20 mg every other day    # Right Tibial vein DVT: Stable    Duplex LE- Right post tibial DVT  No need for therapeutic AC  Cont prophylactic Heparin    # Hematuria: Resolved  Cont prophylactic heparin   HGB stable  CT Urogram: 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  1.2 cm right hepatic lobe lesion with arterial enhancement, incompletely characterized but could be a benign lesion. Consider nonemergent MRI of the abdomen with contrast if clinically indicated.    # Mod ascites noted on echo  Lasix for now    # Transaminitis: waxing and waning  RUQ US: negative    # Constipation: Resolved   Cont. Senna, Colace  lactulose PRN 86 yo F, CCU Downgrade, PMHx COPD on 2 L NC, PVD, CKD3, HTN, DLD, hx of CVA, ex-smoker, recently discharged from [facility] on 01/13 after PNA tx w/Levaquin and Prednisone, presented/w SOB and dysuria. She was doing well until 01/20 when she starting feeling SOB again.     Pt was admitted to CCU for acute on chronic hypoxic and chronic hypercapnic respiratory failure 2/2 COPD exacerbation likely due to RLL GNR PNA exacerbated by coronavirus coinfection. She was on BiPaP prn, solumedrol, bronchodilators and Meropenem. She was treated for distal DVT and subsequently developed hematuria. Further w/u was positive for moderate ascites (2D echo), +UA (negative urine and blood cultures). MRSA and Flu were negative.       Today Events : CXR today is unchanged,  hypernatremia , DC planing, SNF placement with OP O2.    IMPRESSION   Possible PNA unchanged Xray   COPD exacerbation resolved.   Hypernatremia     Plan  # Acute on chronic hypoxic and chronic hypercapnic respiratory failure 2/2 COPD exacerbation 2/2 Possible RLL GNR pneumonia exacerbated by coronavirus coinfection: worse  Continue BiPAP and O2 NC 4L  Prednisone 10 bid. Please d/c 02/03  All cxs: Negative  Pulm on board  Chest xray today: unchanged   s/p completion od abx course     # Mild Hypernatremia:   -will encourage free water intake       # Hypoxic episode and troponinemia likely 2/2 worsening RLL PNA  Cardio consult appreciated: recs below  Troponin elevation could be due to type 2 MI  Continue ASA , plavix ,metoprolol and statin.  No cardiac intervention needed at this time.  Consider CT Chest, ID and Pulm reconsult    # Hypertension:  Cont. Furosemide and Metoprolol  Montor BP  Nephrology f/u appreciated: recs noted    # HFpEF: Stable  ECHO 1/22- EF 55-60%, severe pulm HTN, decreased RV systolic fx, RV vol and pressure overload,   RV enlarged, severe mitral annular calcification    Lasix 20 mg every other day    # Right Tibial vein DVT: Stable    Duplex LE- Right post tibial DVT  No need for therapeutic AC  Cont prophylactic Heparin    # Hematuria: Resolved  Cont prophylactic heparin   HGB stable  CT Urogram: 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  1.2 cm right hepatic lobe lesion with arterial enhancement, incompletely characterized but could be a benign lesion. Consider nonemergent MRI of the abdomen with contrast if clinically indicated.    # Mod ascites noted on echo  Lasix for now    # Transaminitis: waxing and waning  RUQ US: negative    # Constipation: Resolved   Cont. Senna, Colace  lactulose PRN     Electrolyte Imbalances: Correct as needed  []  Hyponatremia   /   Hypernatremia  [x]   []  Hypokalemia   /   Hyperkalemia  []   []  Hypochlorhydria   /    Hypochlorhydria  []   []  Hypomagnesemia   /   Hypermagnesemia  []   []  Hypophosphatemia   /   Hyperphosphatemia  []       GI ppx:                                   [] Not indicated   /   [x] Pantoprazole 40mg PO Daily    DVT ppx:  [] Not indicated / [x] Heparin 5000mg SubQ / [] Lovenox 40mg SubQ / [] SCDs    Fluids:   [x] PO  |  [] IVF    Activity:  [X] Assisatnce needed   [X] Increase as Tolerated  /  [] OOB w/ assist  /  [] Bed Rest      DISPO:  Patient to be discharged when condition(s) optimized. DC planing  [ ] From Home     [ x] NH/SNF   [ ] 4A Rehab  [ ] Detox Clinic  [X] Plan Discussion with patient and/or family.  [X] Discussed Case and Plan with the Medical Attending.    CODE STATUS  [X] FULL   /    [] DNR severe worsening abdominal pain and localizing to the right lower abdomen, black or bloody stools or any new or worsening conditions.     Robby Johnston MD  09/18/22 [id number]     86 yo F, CCU Downgrade, PMHx COPD on 2 L NC, PVD, CKD3, HTN, DLD, hx of CVA, ex-smoker, recently discharged from [facility] on 01/13 after PNA tx w/Levaquin and Prednisone, presented/w SOB and dysuria. She was doing well until 01/20 when she starting feeling SOB again.     Pt was admitted to CCU for acute on chronic hypoxic and chronic hypercapnic respiratory failure 2/2 COPD exacerbation likely due to RLL GNR PNA exacerbated by coronavirus coinfection. She was on BiPaP prn, solumedrol, bronchodilators and Meropenem. She was treated for distal DVT and subsequently developed hematuria. Further w/u was positive for moderate ascites (2D echo), +UA (negative urine and blood cultures). MRSA and Flu were negative.       Today Events : CXR today is unchanged,  hypernatremia , DC planing, SNF placement with OP O2.    IMPRESSION   Possible PNA unchanged Xray   COPD exacerbation resolved.   Hypernatremia     Plan  # Acute on chronic hypoxic and chronic hypercapnic respiratory failure 2/2 COPD exacerbation 2/2 Possible RLL GNR pneumonia exacerbated by coronavirus coinfection: worse  Continue BiPAP and O2 NC 4L  completed Prednisone taper  All cxs: Negative  Pulm on board  Chest xray today: unchanged   s/p completion od abx course     # Mild Hypernatremia:   -will encourage free water intake       # Hypoxic episode and troponinemia likely 2/2 worsening RLL PNA  Cardio consult appreciated: recs below  Troponin elevation could be due to type 2 MI  Continue ASA , plavix ,metoprolol and statin.  No cardiac intervention needed at this time.  Consider CT Chest, ID and Pulm reconsult    # Hypertension:  Cont. Furosemide and Metoprolol  Montor BP  Nephrology f/u appreciated: recs noted    # HFpEF: Stable  ECHO 1/22- EF 55-60%, severe pulm HTN, decreased RV systolic fx, RV vol and pressure overload,   RV enlarged, severe mitral annular calcification    Lasix 20 mg every other day    # Right Tibial vein DVT: Stable    Duplex LE- Right post tibial DVT  No need for therapeutic AC  Cont prophylactic Heparin    # Hematuria: Resolved  Cont prophylactic heparin   HGB stable  CT Urogram: 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  1.2 cm right hepatic lobe lesion with arterial enhancement, incompletely characterized but could be a benign lesion. Consider nonemergent MRI of the abdomen with contrast if clinically indicated.    # Mod ascites noted on echo  Lasix for now    # Transaminitis: waxing and waning  RUQ US: negative    # Constipation: Resolved   Cont. Senna, Colace  lactulose PRN     Electrolyte Imbalances: Correct as needed  []  Hyponatremia   /   Hypernatremia  [x]   []  Hypokalemia   /   Hyperkalemia  []   []  Hypochlorhydria   /    Hypochlorhydria  []   []  Hypomagnesemia   /   Hypermagnesemia  []   []  Hypophosphatemia   /   Hyperphosphatemia  []       GI ppx:                                   [] Not indicated   /   [x] Pantoprazole 40mg PO Daily    DVT ppx:  [] Not indicated / [x] Heparin 5000mg SubQ / [] Lovenox 40mg SubQ / [] SCDs    Fluids:   [x] PO  |  [] IVF    Activity:  [X] Assisatnce needed   [X] Increase as Tolerated  /  [] OOB w/ assist  /  [] Bed Rest      DISPO:  Patient to be discharged when condition(s) optimized. DC planing  [ ] From Home     [ x] NH/SNF   [ ] 4A Rehab  [ ] Detox Clinic  [X] Plan Discussion with patient and/or family.  [X] Discussed Case and Plan with the Medical Attending.    CODE STATUS  [X] FULL   /    [] DNR

## 2022-09-23 NOTE — DIETITIAN INITIAL EVALUATION ADULT. - DIET TYPE
<-- Click to add NO significant Past Surgical History Pt reports good appetite with PO intake 75%/DASH/TLC (sodium and cholesterol restricted diet)/regular

## 2022-10-11 NOTE — H&P ADULT - NSHPREVIEWOFSYSTEMS_GEN_ALL_CORE
REVIEW OF SYSTEMS:    CONSTITUTIONAL: + weakness, no fevers or chills  EYES/ENT: No visual changes;  No vertigo or throat pain   NECK: No pain or stiffness  RESPIRATORY: No cough, + wheezing, no hemoptysis; +shortness of breath  CARDIOVASCULAR: No chest pain or palpitations  GASTROINTESTINAL: No abdominal or epigastric pain. No nausea, vomiting, or hematemesis; No diarrhea or constipation. No melena or hematochezia.  GENITOURINARY: + dysuria, no  frequency or hematuria  NEUROLOGICAL: No numbness or weakness  SKIN: No itching, burning, rashes, or lesions   All other review of systems is negative unless indicated above.
clear

## 2023-02-20 NOTE — DISCHARGE NOTE ADULT - ABILITY TO HEAR (WITH HEARING AID OR HEARING APPLIANCE IF NORMALLY USED):
Adequate: hears normal conversation without difficulty Bactrim Counseling:  I discussed with the patient the risks of sulfa antibiotics including but not limited to GI upset, allergic reaction, drug rash, diarrhea, dizziness, photosensitivity, and yeast infections.  Rarely, more serious reactions can occur including but not limited to aplastic anemia, agranulocytosis, methemoglobinemia, blood dyscrasias, liver or kidney failure, lung infiltrates or desquamative/blistering drug rashes.

## 2023-04-01 NOTE — ED ADULT NURSE NOTE - CAS DISCH CONDITION
Stable Bi-Rhombic Flap Text: The defect edges were debeveled with a #15 scalpel blade.  Given the location of the defect and the proximity to free margins a bi-rhombic flap was deemed most appropriate.  Using a sterile surgical marker, an appropriate rhombic flap was drawn incorporating the defect. The area thus outlined was incised deep to adipose tissue with a #15 scalpel blade.  The skin margins were undermined to an appropriate distance in all directions utilizing iris scissors.

## 2023-09-05 NOTE — ED ADULT TRIAGE NOTE - BP NONINVASIVE SYSTOLIC (MM HG)
[FreeTextEntry1] : 66y/o female 2 weeks s/p retropubic midurethral sling, posterior colporrhaphy, perineorrhaphy, cystourethroscopy. op report reviewed.  follow up in 4 weeks for 6 week exam  call sooner if need arises.  nothing in vagina for next 4 weeks  no bath tub no sex . 121

## 2024-03-26 NOTE — ED PROCEDURE NOTE - PROCEDURE SUPERVISED BY
Received pt from [facility]. Pt is on a safety sit. Pending CT results and disposition. Pt removed her IV while in CT scan, new IV placed. Safety sitter at bedside.
Amber Girard
Amber Girard
none

## 2024-07-29 NOTE — ED ADULT NURSE NOTE - CHPI ED NUR DURATION
Diagnoses and all orders for this visit:    Elevated prolactin level  -     MRI Pituitary W W/O Contrast; Future        Lissett Serrato MD  [facility]  Office: [phone]   Fax: [phone]  07/29/2024    
today

## 2024-11-13 NOTE — ED ADULT NURSE NOTE - TEMPLATE
Physical Therapy Treatment    Patient Name: Beni Méndez  MRN: [id number]  Today's Date: 11/13/2024  Time Calculation  Start Time: 1530  Stop Time: 1614  Time Calculation (min): 44 min  PT Therapeutic Procedures Time Entry  Therapeutic Exercise Time Entry: 42       Assessment:   Patient challenged with line jumps this date with fatigue noted with unilateral LE. Patient demo's challenges in L quad with pistol squats. Demo's fatigue at end of session.     Plan:  Continue to work on improving strength and decreasing pain to be able to play sports with little to no difficulty. -AB.     OP PT Plan  Treatment/Interventions: Cryotherapy, Education/ Instruction, Electrical stimulation, Gait training, Manual therapy, Neuromuscular re-education, Therapeutic activities, Taping techniques, Self care/ home management, Therapeutic exercises, Vasopneumatic device  PT Plan: Skilled PT  PT Frequency: 1 time per week  Duration: 4 additional visits after initial POC  Onset Date: 09/04/24  Rehab Potential: Good  Plan of Care Agreement: Patient    Current Problem  Problem List Items Addressed This Visit             ICD-10-CM    Osteomyelitis of left tibia (Multi) M86.9     Other Visit Diagnoses         Codes    Decreased range of motion (ROM) of left knee     M25.662    Decreased strength involving knee joint     R29.898            Subjective   General  Reason for Referral: osteomyelitis L tibia  General Comment: visit 12  Visit: 12  HEP: Yes  Patient states that he's not having pain. States that his hip is feeling better.     Precautions  Precautions  Precautions Comment: hx of bone infection    Pain  Pain Assessment: 0-10  0-10 (Numeric) Pain Score: 0 - No pain    Objective     Treatments:  Therapeutic Exercise: 42 minutes, 3 units  Elliptical x5' (N)  Slantboard 2x1' (N)  Sports cord 2 cords Fwd/Bkwd/Side x5 (N)  Recumbent bike x2' lvl 10 (X)  70# bilateral leg press x10   55# L single leg press x10   SL balance on L LE bosu 3x30  "sec (P, reps)  Pistol squats 24\" step 2x10 L (N)  Fwd jump over line 3x30\" (N)  Lateral jump over line 3x30\" (N)  Single leg jump over line 3x30\" L Fwd/Lateral (N)  Forward lung onto BOSU 1x10 bilat      Not today due to recheck:   SciFit x5' (X)  Slantboard 2x1'   Extreme Balance board 2x1'   Treatmill running x2 minutes at 5mph   60# bilateral leg press   50# L single leg press   Standing hip extension lime band 2x10   Standing hip abduction lime band 2x10   Resisted walk outs with 2 blue cords x5 in all 4 directions - did not make it all the way to white line    SLS on bosu L [id number]   3 way ankle with theraband (mint for EV and DF, purple for PF) 2x10   Jogging on turf 50' x4   Jump up/down onto 12inch surface x10 (N)      Not today: 10-23-24  Heel taps 6\" step fwd/lateral x15 L   Shuttle Press bilateral 75lbs 2x10 (P, weight, reps)   Shuttle Press SL L 40lbs 2x10    Standing hip abduction yellow loop band x10 Bilat (N)  Standing hip flexion yellow loop band x10 Bilat (N)  Standing hip extension yellow loop band x10 Bilat (N)  SLS on shuttle balance 3x30 sec L (P surface)   Standing TKE purple band resistance 2x10 (X)  Step ups 18\" step 2x10 L leading Fwd/Lateral (P, surface)   Standing quad isometric x15 L   Treadmill ambulation 2' walking/1' jogging/1' walking   SLS on air ex while catching and throwing ball 2x30 sec   Standing heel raises 2x10 Bilat   Lunges onto dome of BOSU 2x10 each LE   Lateral band walk blue x2 trips  Monster walk blue band x2 trips  Shuttle press 90# 2x15 Bilat (P, weight)  Shuttle press 2x15 L 40#   Standing hip abduction x15 Bilat   Standing hip extension x15 Bilat   Standing hip flexion x15 Bilat   Seated HS stretch 3x30\" Bilat   SLR x10 LLE (X)  Seated LAQ 2x10 L   Seated HS curl Lime Green Tband 2x10 L (X)  Heel strike through foot flat without axillary crutches with focus on no IR of L foot (X)     Not today: 9-23-24  -hamstring stretch long sitting 2x30 s bilateral   -knee " flexion AAROM seated using R LE to pull L LE into flexion 1x10 with 10 s hold   -seated heel slides with R LE assist to pull into flexion 1x10     Manual Therapy: Not today 10-9-24  IASTM to L HS (N)  STM to L hip flexors, quad, IT band (X)  PROM to L knee Flexion/Extension (X)     Neuromuscular Reeducation: Not today 9-30-24  quad sets supine 2x10 with 5 sec hold with Stateless     OP EDUCATION:    Access Code: [email or web address]  URL: [email or web address]  Date: 10/21/2024  Prepared by: Jennifer Monique     Exercises  - Standing Hip Abduction with Counter Support  - 1 x daily - 7 x weekly - 3 sets - 10 reps  - Standing Hip Extension with Counter Support  - 1 x daily - 7 x weekly - 3 sets - 10 reps  - Standing Hip Flexion with Counter Support  - 1 x daily - 7 x weekly - 3 sets - 10 reps  - Supine Active Straight Leg Raise  - 1 x daily - 7 x weekly - 3 sets - 10 reps     Access Code: [email or web address]  URL: [email or web address]  Date: 10/16/2024  Prepared by: Jennifer Alvarez     Exercises  - Long Sitting Ankle Plantar Flexion with Resistance  - 1 x daily - 7 x weekly - 3 sets - 10 reps  - Long Sitting Ankle Dorsiflexion with Anchored Resistance  - 1 x daily - 7 x weekly - 3 sets - 10 reps  - Seated Ankle Eversion with Resistance  - 1 x daily - 7 x weekly - 3 sets - 10 reps   Access Code: [email or web address]  URL: [email or web address]  Date: 10/09/2024  Prepared by: Jennifer Monique     Exercises  - Forward Step Down Touch with Heel  - 1 x daily - 7 x weekly - 3 sets - 10 reps     Access Code: [email or web address]  URL: [email or web address]  Date: 10/02/2024  Prepared by: Jennifer Monique     Exercises  - Seated Hamstring Stretch  - 1 x daily - 7 x weekly - 3 sets - 3 reps - 30 hold  - Seated Long Arc Quad  - 1 x daily - 7 x weekly - 3 sets - 10 reps     Access Code: [email or web address]  URL: [email or web address]  Date: 09/18/2024  Prepared by: Jennifer Alvarez      Exercises  - Seated Table Hamstring Stretch  - 1 x daily - 7 x weekly - 3 sets - 30 sec hold  - Supine Quad Set  - 1 x daily - 7 x weekly - 3 sets - 10 reps  - Seated Knee Flexion AAROM  - 1 x daily - 7 x weekly - 3 sets - 10 reps    Goals:  Active       L knee goals       STG: pt will report full compliance with HEP in 2 weeks in order to maximize strength benefits  (Met)       Start:  09/18/24    Expected End:  11/17/24    Resolved:  10/30/24         STG: pt will improve L knee flexion ROM from 40 deg AROM to 100 deg AROM in 3 weeks  (Met)       Start:  09/18/24    Expected End:  11/17/24    Resolved:  10/30/24    LTG: pt will improve L knee flexion ROM from 40 deg AROM to 140 deg AROM in 6 weeks to improve functional mobility and gait.          Pt will ambulate without assistive device for at least 5 minutes with proper heel contact and no more than 1 verbal cue to avoid IR of L LE in 4 weeks.  (Met)       Start:  09/18/24    Expected End:  11/17/24    Resolved:  10/30/24         Pt will run on treadmill for at least 3 minutes with proper form and no increase in pain in 6 weeks to progress towards return to sport (Progressing)       Start:  09/18/24    Expected End:  11/17/24            Pt will demonstrate at least 4+/5 MMT strength in knee flexion, extension and hip flexion in 6 weeks.  (Met)       Start:  09/18/24    Expected End:  11/17/24    Resolved:  10/30/24         New goal: Patient will demonstrate improved hip flexion and knee extension/flexion  strength  to a 5/5 in order to improve functional mobility.  4 weeks       Start:  10/30/24    Expected End:  12/29/24                    Respiratory

## 2024-11-15 NOTE — SWALLOW BEDSIDE ASSESSMENT ADULT - ORAL PREPARATORY PHASE
Summary: UTI d/t chronic rodriguez, acute metabolic encephalopathy, hyponatremia    DATE & TIME: 11/14/24, 1930 - 2330  Cognitive Concerns/ Orientation: A&O x 2, disoriented to time and situation, very forgetful  BEHAVIOR & AGGRESSION TOOL COLOR: Green  CIWA SCORE: NA   ABNL VS/O2: VSS on RA  MOBILITY: SBA, not OOB this shift  PAIN MANAGEMENT: Denied pain. Scheduled Voltaren gel applied to right knee  DIET: Regular, 2000mL FR. Strict I&Os  BOWEL/BLADDER: Chronic rodriguez in place. No BM this shift.  ABNL LAB/BG: AM labs pending   DRAIN/DEVICES:  PIV SL  TELEMETRY RHYTHM: NA  SKIN: Dry skin, scattered scabs and bruises, blanchable redness on coccyx  TESTS/PROCEDURES: NA  D/C DAY/GOALS/PLACE: Pending  OTHER IMPORTANT INFO: Prn Seroquel given for increasing restlessness. ID, Psych and OT following. Bedside attendant present   Within functional limits